# Patient Record
Sex: FEMALE | Race: WHITE | Employment: OTHER | URBAN - METROPOLITAN AREA
[De-identification: names, ages, dates, MRNs, and addresses within clinical notes are randomized per-mention and may not be internally consistent; named-entity substitution may affect disease eponyms.]

---

## 2017-02-08 ENCOUNTER — HOSPITAL ENCOUNTER (OUTPATIENT)
Dept: CARDIOLOGY | Age: 77
Discharge: HOME | End: 2017-02-08
Payer: MEDICARE

## 2017-02-08 PROCEDURE — 93296 REM INTERROG EVL PM/IDS: CPT

## 2017-02-28 RX ORDER — THYROID 30 MG/1
TABLET ORAL
Qty: 135 TABLET | Refills: 3 | Status: ON HOLD | OUTPATIENT
Start: 2017-02-28 | End: 2017-03-03 | Stop reason: HOSPADM

## 2017-03-03 ENCOUNTER — HOSPITAL ENCOUNTER (OUTPATIENT)
Dept: CARDIAC CATH/INVASIVE PROCEDURES | Age: 77
Setting detail: OUTPATIENT SURGERY
Discharge: HOME | End: 2017-03-03
Attending: INTERNAL MEDICINE | Admitting: INTERNAL MEDICINE
Payer: MEDICARE

## 2017-03-03 VITALS
WEIGHT: 184 LBS | OXYGEN SATURATION: 99 % | HEIGHT: 65 IN | BODY MASS INDEX: 30.66 KG/M2 | TEMPERATURE: 97.6 F | RESPIRATION RATE: 2 BRPM

## 2017-03-03 LAB
ANION GAP SERPL CALCULATED.3IONS-SCNC: 9 MEQ/L (ref 7–13)
BUN BLDV-MCNC: 31 MG/DL (ref 8–23)
CALCIUM SERPL-MCNC: 10 MG/DL (ref 8.6–10.2)
CHLORIDE BLD-SCNC: 102 MEQ/L (ref 98–107)
CO2: 29 MEQ/L (ref 22–29)
CREAT SERPL-MCNC: 0.83 MG/DL (ref 0.5–0.9)
EKG ATRIAL RATE: 68 BPM
EKG Q-T INTERVAL: 498 MS
EKG QRS DURATION: 168 MS
EKG QTC CALCULATION (BAZETT): 517 MS
EKG R AXIS: -49 DEGREES
EKG T AXIS: 107 DEGREES
EKG VENTRICULAR RATE: 65 BPM
GFR AFRICAN AMERICAN: >60
GFR NON-AFRICAN AMERICAN: >60
GLUCOSE BLD-MCNC: 98 MG/DL (ref 74–109)
POTASSIUM SERPL-SCNC: 4.4 MEQ/L (ref 3.5–5.1)
SODIUM BLD-SCNC: 140 MEQ/L (ref 132–144)

## 2017-03-03 PROCEDURE — 33230 INSRT PULSE GEN W/DUAL LEADS: CPT | Performed by: INTERNAL MEDICINE

## 2017-03-03 PROCEDURE — 93005 ELECTROCARDIOGRAM TRACING: CPT

## 2017-03-03 PROCEDURE — 2580000003 HC RX 258: Performed by: INTERNAL MEDICINE

## 2017-03-03 PROCEDURE — 33233 REMOVAL OF PM GENERATOR: CPT | Performed by: INTERNAL MEDICINE

## 2017-03-03 PROCEDURE — C1785 PMKR, DUAL, RATE-RESP: HCPCS | Performed by: NURSE PRACTITIONER

## 2017-03-03 PROCEDURE — 80048 BASIC METABOLIC PNL TOTAL CA: CPT

## 2017-03-03 PROCEDURE — 2500000003 HC RX 250 WO HCPCS

## 2017-03-03 PROCEDURE — 33228 REMV&REPLC PM GEN DUAL LEAD: CPT | Performed by: INTERNAL MEDICINE

## 2017-03-03 PROCEDURE — 6360000002 HC RX W HCPCS: Performed by: INTERNAL MEDICINE

## 2017-03-03 PROCEDURE — 2580000003 HC RX 258

## 2017-03-03 PROCEDURE — 6360000002 HC RX W HCPCS

## 2017-03-03 RX ORDER — SODIUM CHLORIDE 9 MG/ML
INJECTION, SOLUTION INTRAVENOUS CONTINUOUS
Status: DISCONTINUED | OUTPATIENT
Start: 2017-03-03 | End: 2017-03-03 | Stop reason: HOSPADM

## 2017-03-03 RX ORDER — ACETAMINOPHEN 325 MG/1
650 TABLET ORAL EVERY 4 HOURS PRN
Status: DISCONTINUED | OUTPATIENT
Start: 2017-03-03 | End: 2017-03-03 | Stop reason: HOSPADM

## 2017-03-03 RX ORDER — ONDANSETRON 2 MG/ML
4 INJECTION INTRAMUSCULAR; INTRAVENOUS EVERY 6 HOURS PRN
Status: DISCONTINUED | OUTPATIENT
Start: 2017-03-03 | End: 2017-03-03 | Stop reason: HOSPADM

## 2017-03-03 RX ORDER — AMLODIPINE BESYLATE 5 MG/1
2.5 TABLET ORAL DAILY
COMMUNITY
End: 2018-01-20 | Stop reason: SDUPTHER

## 2017-03-03 RX ORDER — SODIUM CHLORIDE 0.9 % (FLUSH) 0.9 %
10 SYRINGE (ML) INJECTION PRN
Status: DISCONTINUED | OUTPATIENT
Start: 2017-03-03 | End: 2017-03-03 | Stop reason: HOSPADM

## 2017-03-03 RX ORDER — TRYPTOPHAN 500 MG
1000 TABLET ORAL DAILY
COMMUNITY

## 2017-03-03 RX ORDER — SODIUM CHLORIDE 0.9 % (FLUSH) 0.9 %
10 SYRINGE (ML) INJECTION EVERY 12 HOURS SCHEDULED
Status: DISCONTINUED | OUTPATIENT
Start: 2017-03-03 | End: 2017-03-03 | Stop reason: HOSPADM

## 2017-03-03 RX ORDER — SULFAMETHOXAZOLE AND TRIMETHOPRIM 800; 160 MG/1; MG/1
1 TABLET ORAL 2 TIMES DAILY
Qty: 7 TABLET | Refills: 0 | Status: SHIPPED | OUTPATIENT
Start: 2017-03-03 | End: 2017-03-06

## 2017-03-03 RX ADMIN — DEXTROSE MONOHYDRATE 1 G: 5 INJECTION INTRAVENOUS at 12:17

## 2017-03-03 RX ADMIN — DEXTROSE MONOHYDRATE 1 G: 5 INJECTION INTRAVENOUS at 07:59

## 2017-03-03 RX ADMIN — SODIUM CHLORIDE: 9 INJECTION, SOLUTION INTRAVENOUS at 06:56

## 2017-04-20 ENCOUNTER — OFFICE VISIT (OUTPATIENT)
Dept: FAMILY MEDICINE CLINIC | Age: 77
End: 2017-04-20

## 2017-04-20 VITALS
BODY MASS INDEX: 30.99 KG/M2 | HEART RATE: 83 BPM | WEIGHT: 186 LBS | HEIGHT: 65 IN | DIASTOLIC BLOOD PRESSURE: 72 MMHG | OXYGEN SATURATION: 97 % | SYSTOLIC BLOOD PRESSURE: 118 MMHG | TEMPERATURE: 98.2 F

## 2017-04-20 DIAGNOSIS — N30.00 ACUTE CYSTITIS WITHOUT HEMATURIA: Primary | ICD-10-CM

## 2017-04-20 DIAGNOSIS — J06.9 ACUTE UPPER RESPIRATORY INFECTION: ICD-10-CM

## 2017-04-20 LAB
BILIRUBIN, POC: ABNORMAL
BLOOD URINE, POC: ABNORMAL
CLARITY, POC: CLEAR
COLOR, POC: YELLOW
GLUCOSE URINE, POC: ABNORMAL
KETONES, POC: ABNORMAL
LEUKOCYTE EST, POC: ABNORMAL
NITRITE, POC: ABNORMAL
PH, POC: 5
PROTEIN, POC: ABNORMAL
SPECIFIC GRAVITY, POC: 0.25
UROBILINOGEN, POC: 0.2

## 2017-04-20 PROCEDURE — 99213 OFFICE O/P EST LOW 20 MIN: CPT | Performed by: FAMILY MEDICINE

## 2017-04-20 PROCEDURE — 4040F PNEUMOC VAC/ADMIN/RCVD: CPT | Performed by: FAMILY MEDICINE

## 2017-04-20 PROCEDURE — 1123F ACP DISCUSS/DSCN MKR DOCD: CPT | Performed by: FAMILY MEDICINE

## 2017-04-20 PROCEDURE — G8419 CALC BMI OUT NRM PARAM NOF/U: HCPCS | Performed by: FAMILY MEDICINE

## 2017-04-20 PROCEDURE — 81003 URINALYSIS AUTO W/O SCOPE: CPT | Performed by: FAMILY MEDICINE

## 2017-04-20 PROCEDURE — G8427 DOCREV CUR MEDS BY ELIG CLIN: HCPCS | Performed by: FAMILY MEDICINE

## 2017-04-20 PROCEDURE — 1090F PRES/ABSN URINE INCON ASSESS: CPT | Performed by: FAMILY MEDICINE

## 2017-04-20 PROCEDURE — G8400 PT W/DXA NO RESULTS DOC: HCPCS | Performed by: FAMILY MEDICINE

## 2017-04-20 PROCEDURE — 3017F COLORECTAL CA SCREEN DOC REV: CPT | Performed by: FAMILY MEDICINE

## 2017-04-20 PROCEDURE — 1036F TOBACCO NON-USER: CPT | Performed by: FAMILY MEDICINE

## 2017-04-20 RX ORDER — SULFAMETHOXAZOLE AND TRIMETHOPRIM 800; 160 MG/1; MG/1
1 TABLET ORAL 2 TIMES DAILY
Qty: 20 TABLET | Refills: 0 | Status: SHIPPED | OUTPATIENT
Start: 2017-04-20 | End: 2017-04-24 | Stop reason: ALTCHOICE

## 2017-04-21 DIAGNOSIS — N30.00 ACUTE CYSTITIS WITHOUT HEMATURIA: ICD-10-CM

## 2017-04-23 LAB — URINE CULTURE, ROUTINE: NORMAL

## 2017-04-24 ENCOUNTER — OFFICE VISIT (OUTPATIENT)
Dept: FAMILY MEDICINE CLINIC | Age: 77
End: 2017-04-24

## 2017-04-24 VITALS
WEIGHT: 188 LBS | OXYGEN SATURATION: 97 % | TEMPERATURE: 98.4 F | HEIGHT: 65 IN | BODY MASS INDEX: 31.32 KG/M2 | DIASTOLIC BLOOD PRESSURE: 66 MMHG | HEART RATE: 73 BPM | SYSTOLIC BLOOD PRESSURE: 112 MMHG

## 2017-04-24 DIAGNOSIS — R50.9 FEVER, UNSPECIFIED FEVER CAUSE: Primary | ICD-10-CM

## 2017-04-24 DIAGNOSIS — R05.9 COUGH: ICD-10-CM

## 2017-04-24 DIAGNOSIS — R35.0 URINARY FREQUENCY: ICD-10-CM

## 2017-04-24 PROCEDURE — 1036F TOBACCO NON-USER: CPT | Performed by: NURSE PRACTITIONER

## 2017-04-24 PROCEDURE — 4040F PNEUMOC VAC/ADMIN/RCVD: CPT | Performed by: NURSE PRACTITIONER

## 2017-04-24 PROCEDURE — 1090F PRES/ABSN URINE INCON ASSESS: CPT | Performed by: NURSE PRACTITIONER

## 2017-04-24 PROCEDURE — G8417 CALC BMI ABV UP PARAM F/U: HCPCS | Performed by: NURSE PRACTITIONER

## 2017-04-24 PROCEDURE — 1123F ACP DISCUSS/DSCN MKR DOCD: CPT | Performed by: NURSE PRACTITIONER

## 2017-04-24 PROCEDURE — 99213 OFFICE O/P EST LOW 20 MIN: CPT | Performed by: NURSE PRACTITIONER

## 2017-04-24 PROCEDURE — G8427 DOCREV CUR MEDS BY ELIG CLIN: HCPCS | Performed by: NURSE PRACTITIONER

## 2017-04-24 PROCEDURE — 3017F COLORECTAL CA SCREEN DOC REV: CPT | Performed by: NURSE PRACTITIONER

## 2017-04-24 PROCEDURE — G8400 PT W/DXA NO RESULTS DOC: HCPCS | Performed by: NURSE PRACTITIONER

## 2017-04-24 RX ORDER — CEFDINIR 300 MG/1
300 CAPSULE ORAL 2 TIMES DAILY
Qty: 20 CAPSULE | Refills: 0 | Status: SHIPPED | OUTPATIENT
Start: 2017-04-24 | End: 2017-05-04

## 2017-04-24 ASSESSMENT — ENCOUNTER SYMPTOMS
SINUS PRESSURE: 1
SHORTNESS OF BREATH: 0
COUGH: 1
WHEEZING: 0
VOMITING: 0
RHINORRHEA: 1
NAUSEA: 0
ABDOMINAL PAIN: 0

## 2017-04-25 DIAGNOSIS — R74.01 ELEVATED ALT MEASUREMENT: ICD-10-CM

## 2017-04-25 DIAGNOSIS — R35.0 URINARY FREQUENCY: ICD-10-CM

## 2017-04-25 DIAGNOSIS — R74.8 ELEVATED LIVER ENZYMES: Primary | ICD-10-CM

## 2017-04-25 DIAGNOSIS — R05.9 COUGH: ICD-10-CM

## 2017-04-25 DIAGNOSIS — R74.01 ELEVATED AST (SGOT): ICD-10-CM

## 2017-04-25 DIAGNOSIS — R50.9 FEVER, UNSPECIFIED FEVER CAUSE: ICD-10-CM

## 2017-04-25 LAB
ALBUMIN SERPL-MCNC: 3.9 G/DL (ref 3.9–4.9)
ALP BLD-CCNC: 116 U/L (ref 40–130)
ALT SERPL-CCNC: 78 U/L (ref 0–33)
ANION GAP SERPL CALCULATED.3IONS-SCNC: 12 MEQ/L (ref 7–13)
AST SERPL-CCNC: 48 U/L (ref 0–35)
BASOPHILS ABSOLUTE: 0 K/UL (ref 0–0.2)
BASOPHILS RELATIVE PERCENT: 1 %
BILIRUB SERPL-MCNC: 0.3 MG/DL (ref 0–1.2)
BUN BLDV-MCNC: 22 MG/DL (ref 8–23)
CALCIUM SERPL-MCNC: 9.5 MG/DL (ref 8.6–10.2)
CHLORIDE BLD-SCNC: 94 MEQ/L (ref 98–107)
CO2: 26 MEQ/L (ref 22–29)
CREAT SERPL-MCNC: 1.07 MG/DL (ref 0.5–0.9)
EOSINOPHILS ABSOLUTE: 0.2 K/UL (ref 0–0.7)
EOSINOPHILS RELATIVE PERCENT: 5.2 %
GFR AFRICAN AMERICAN: >60
GFR NON-AFRICAN AMERICAN: 49.9
GLOBULIN: 2.4 G/DL (ref 2.3–3.5)
GLUCOSE BLD-MCNC: 79 MG/DL (ref 74–109)
HAV IGM SER IA-ACNC: NORMAL
HCT VFR BLD CALC: 40.8 % (ref 37–47)
HEMOGLOBIN: 13.5 G/DL (ref 12–16)
HEPATITIS B CORE IGM ANTIBODY: NORMAL
HEPATITIS B SURFACE ANTIGEN INTERPRETATION: NORMAL
HEPATITIS C ANTIBODY INTERPRETATION: NORMAL
HEPATITIS INTERPRETATION:: NORMAL
LYMPHOCYTES ABSOLUTE: 0.7 K/UL (ref 1–4.8)
LYMPHOCYTES RELATIVE PERCENT: 15.3 %
MCH RBC QN AUTO: 28.1 PG (ref 27–31.3)
MCHC RBC AUTO-ENTMCNC: 33 % (ref 33–37)
MCV RBC AUTO: 85.3 FL (ref 82–100)
MONOCYTES ABSOLUTE: 0.5 K/UL (ref 0.2–0.8)
MONOCYTES RELATIVE PERCENT: 10.4 %
NEUTROPHILS ABSOLUTE: 3 K/UL (ref 1.4–6.5)
NEUTROPHILS RELATIVE PERCENT: 68.1 %
PDW BLD-RTO: 14.2 % (ref 11.5–14.5)
PLATELET # BLD: 217 K/UL (ref 130–400)
POTASSIUM SERPL-SCNC: 4.6 MEQ/L (ref 3.5–5.1)
RBC # BLD: 4.79 M/UL (ref 4.2–5.4)
SODIUM BLD-SCNC: 132 MEQ/L (ref 132–144)
TOTAL PROTEIN: 6.3 G/DL (ref 6.4–8.1)
WBC # BLD: 4.5 K/UL (ref 4.8–10.8)

## 2017-04-27 ENCOUNTER — OFFICE VISIT (OUTPATIENT)
Dept: FAMILY MEDICINE CLINIC | Age: 77
End: 2017-04-27

## 2017-04-27 VITALS
WEIGHT: 188 LBS | HEART RATE: 84 BPM | HEIGHT: 65 IN | SYSTOLIC BLOOD PRESSURE: 120 MMHG | BODY MASS INDEX: 31.32 KG/M2 | DIASTOLIC BLOOD PRESSURE: 74 MMHG | TEMPERATURE: 98 F | OXYGEN SATURATION: 97 %

## 2017-04-27 DIAGNOSIS — E86.0 DEHYDRATION: ICD-10-CM

## 2017-04-27 DIAGNOSIS — R79.89 ABNORMAL LFTS: ICD-10-CM

## 2017-04-27 DIAGNOSIS — R50.9 FEVER, UNSPECIFIED FEVER CAUSE: Primary | ICD-10-CM

## 2017-04-27 LAB
ALBUMIN SERPL-MCNC: 3.9 G/DL (ref 3.9–4.9)
ALP BLD-CCNC: 109 U/L (ref 40–130)
ALT SERPL-CCNC: 62 U/L (ref 0–33)
ANION GAP SERPL CALCULATED.3IONS-SCNC: 12 MEQ/L (ref 7–13)
AST SERPL-CCNC: 33 U/L (ref 0–35)
BILIRUB SERPL-MCNC: 0.2 MG/DL (ref 0–1.2)
BILIRUBIN DIRECT: 0 MG/DL (ref 0–0.3)
BILIRUBIN, INDIRECT: 0.2 MG/DL (ref 0–0.6)
BUN BLDV-MCNC: 29 MG/DL (ref 8–23)
CALCIUM SERPL-MCNC: 10.1 MG/DL (ref 8.6–10.2)
CHLORIDE BLD-SCNC: 96 MEQ/L (ref 98–107)
CO2: 27 MEQ/L (ref 22–29)
CREAT SERPL-MCNC: 0.93 MG/DL (ref 0.5–0.9)
EPSTEIN BARR VIRUS NUCLEAR AB IGG: <3 U/ML (ref 0–21.9)
EPSTEIN-BARR EARLY ANTIGEN ANTIBODY: <5 U/ML (ref 0–10.9)
EPSTEIN-BARR VCA IGG: <10 U/ML (ref 0–21.9)
EPSTEIN-BARR VCA IGM: <10 U/ML (ref 0–43.9)
GFR AFRICAN AMERICAN: >60
GFR NON-AFRICAN AMERICAN: 58.6
GLUCOSE BLD-MCNC: 82 MG/DL (ref 74–109)
POTASSIUM SERPL-SCNC: 5.7 MEQ/L (ref 3.5–5.1)
SODIUM BLD-SCNC: 135 MEQ/L (ref 132–144)
TOTAL PROTEIN: 6.6 G/DL (ref 6.4–8.1)

## 2017-04-27 PROCEDURE — G8427 DOCREV CUR MEDS BY ELIG CLIN: HCPCS | Performed by: FAMILY MEDICINE

## 2017-04-27 PROCEDURE — 1090F PRES/ABSN URINE INCON ASSESS: CPT | Performed by: FAMILY MEDICINE

## 2017-04-27 PROCEDURE — G8400 PT W/DXA NO RESULTS DOC: HCPCS | Performed by: FAMILY MEDICINE

## 2017-04-27 PROCEDURE — 3017F COLORECTAL CA SCREEN DOC REV: CPT | Performed by: FAMILY MEDICINE

## 2017-04-27 PROCEDURE — 4040F PNEUMOC VAC/ADMIN/RCVD: CPT | Performed by: FAMILY MEDICINE

## 2017-04-27 PROCEDURE — 99213 OFFICE O/P EST LOW 20 MIN: CPT | Performed by: FAMILY MEDICINE

## 2017-04-27 PROCEDURE — 1123F ACP DISCUSS/DSCN MKR DOCD: CPT | Performed by: FAMILY MEDICINE

## 2017-04-27 PROCEDURE — G8417 CALC BMI ABV UP PARAM F/U: HCPCS | Performed by: FAMILY MEDICINE

## 2017-04-27 PROCEDURE — 1036F TOBACCO NON-USER: CPT | Performed by: FAMILY MEDICINE

## 2017-04-27 ASSESSMENT — PATIENT HEALTH QUESTIONNAIRE - PHQ9
SUM OF ALL RESPONSES TO PHQ QUESTIONS 1-9: 0
1. LITTLE INTEREST OR PLEASURE IN DOING THINGS: 0
SUM OF ALL RESPONSES TO PHQ9 QUESTIONS 1 & 2: 0
2. FEELING DOWN, DEPRESSED OR HOPELESS: 0

## 2017-04-28 ENCOUNTER — TELEPHONE (OUTPATIENT)
Dept: FAMILY MEDICINE CLINIC | Age: 77
End: 2017-04-28

## 2017-04-30 LAB — BLOOD CULTURE, ROUTINE: NORMAL

## 2017-05-01 ENCOUNTER — TELEPHONE (OUTPATIENT)
Dept: FAMILY MEDICINE CLINIC | Age: 77
End: 2017-05-01

## 2017-05-01 ENCOUNTER — HOSPITAL ENCOUNTER (OUTPATIENT)
Dept: ULTRASOUND IMAGING | Age: 77
Discharge: HOME | End: 2017-05-01
Payer: MEDICARE

## 2017-05-01 DIAGNOSIS — R74.8 ELEVATED LIVER ENZYMES: ICD-10-CM

## 2017-05-01 DIAGNOSIS — R74.01 ELEVATED AST (SGOT): ICD-10-CM

## 2017-05-01 DIAGNOSIS — R74.01 ELEVATED ALT MEASUREMENT: ICD-10-CM

## 2017-05-01 PROCEDURE — 76705 ECHO EXAM OF ABDOMEN: CPT

## 2017-05-02 DIAGNOSIS — R79.89 ELEVATED LFTS: ICD-10-CM

## 2017-05-02 DIAGNOSIS — K76.89 LIVER CYST: Primary | ICD-10-CM

## 2017-05-05 ENCOUNTER — TELEPHONE (OUTPATIENT)
Dept: FAMILY MEDICINE CLINIC | Age: 77
End: 2017-05-05

## 2017-05-05 DIAGNOSIS — R79.89 ABNORMAL LFTS: ICD-10-CM

## 2017-05-05 DIAGNOSIS — E87.5 HYPERKALEMIA: ICD-10-CM

## 2017-05-05 DIAGNOSIS — E87.5 HYPERKALEMIA: Primary | ICD-10-CM

## 2017-05-05 LAB
ALBUMIN SERPL-MCNC: 3.8 G/DL (ref 3.9–4.9)
ALP BLD-CCNC: 83 U/L (ref 40–130)
ALT SERPL-CCNC: 27 U/L (ref 0–33)
ANION GAP SERPL CALCULATED.3IONS-SCNC: 10 MEQ/L (ref 7–13)
AST SERPL-CCNC: 21 U/L (ref 0–35)
BILIRUB SERPL-MCNC: 0.2 MG/DL (ref 0–1.2)
BUN BLDV-MCNC: 28 MG/DL (ref 8–23)
CALCIUM SERPL-MCNC: 9.4 MG/DL (ref 8.6–10.2)
CHLORIDE BLD-SCNC: 99 MEQ/L (ref 98–107)
CO2: 30 MEQ/L (ref 22–29)
CREAT SERPL-MCNC: 0.87 MG/DL (ref 0.5–0.9)
GFR AFRICAN AMERICAN: >60
GFR NON-AFRICAN AMERICAN: >60
GLOBULIN: 2.3 G/DL (ref 2.3–3.5)
GLUCOSE BLD-MCNC: 97 MG/DL (ref 74–109)
POTASSIUM SERPL-SCNC: 4.2 MEQ/L (ref 3.5–5.1)
SODIUM BLD-SCNC: 139 MEQ/L (ref 132–144)
TOTAL PROTEIN: 6.1 G/DL (ref 6.4–8.1)

## 2017-05-25 ENCOUNTER — HOSPITAL ENCOUNTER (OUTPATIENT)
Dept: WOMENS IMAGING | Age: 77
Discharge: HOME | End: 2017-05-25
Payer: MEDICARE

## 2017-05-25 DIAGNOSIS — Z13.9 SCREENING: ICD-10-CM

## 2017-05-25 PROCEDURE — 77063 BREAST TOMOSYNTHESIS BI: CPT

## 2017-05-30 ENCOUNTER — HOSPITAL ENCOUNTER (EMERGENCY)
Age: 77
Discharge: HOME | End: 2017-05-30
Attending: EMERGENCY MEDICINE
Payer: MEDICARE

## 2017-05-30 ENCOUNTER — APPOINTMENT (OUTPATIENT)
Dept: GENERAL RADIOLOGY | Age: 77
End: 2017-05-30
Payer: MEDICARE

## 2017-05-30 VITALS
HEART RATE: 65 BPM | BODY MASS INDEX: 30.82 KG/M2 | OXYGEN SATURATION: 96 % | WEIGHT: 185 LBS | RESPIRATION RATE: 16 BRPM | DIASTOLIC BLOOD PRESSURE: 67 MMHG | SYSTOLIC BLOOD PRESSURE: 129 MMHG | HEIGHT: 65 IN | TEMPERATURE: 98.2 F

## 2017-05-30 DIAGNOSIS — R07.89 OTHER CHEST PAIN: Primary | ICD-10-CM

## 2017-05-30 LAB
ALBUMIN SERPL-MCNC: 4 G/DL (ref 3.9–4.9)
ALP BLD-CCNC: 66 U/L (ref 40–130)
ALT SERPL-CCNC: 17 U/L (ref 0–33)
ANION GAP SERPL CALCULATED.3IONS-SCNC: 10 MEQ/L (ref 7–13)
AST SERPL-CCNC: 28 U/L (ref 0–35)
BASOPHILS ABSOLUTE: 0 K/UL (ref 0–0.2)
BASOPHILS RELATIVE PERCENT: 0.5 %
BILIRUB SERPL-MCNC: 0.2 MG/DL (ref 0–1.2)
BUN BLDV-MCNC: 33 MG/DL (ref 8–23)
CALCIUM SERPL-MCNC: 9.6 MG/DL (ref 8.6–10.2)
CHLORIDE BLD-SCNC: 99 MEQ/L (ref 98–107)
CO2: 27 MEQ/L (ref 22–29)
CREAT SERPL-MCNC: 0.86 MG/DL (ref 0.5–0.9)
EOSINOPHILS ABSOLUTE: 0.1 K/UL (ref 0–0.7)
EOSINOPHILS RELATIVE PERCENT: 1.3 %
GFR AFRICAN AMERICAN: >60
GFR NON-AFRICAN AMERICAN: >60
GLOBULIN: 2.4 G/DL (ref 2.3–3.5)
GLUCOSE BLD-MCNC: 101 MG/DL (ref 74–109)
HCT VFR BLD CALC: 42.5 % (ref 37–47)
HEMOGLOBIN: 13.7 G/DL (ref 12–16)
LYMPHOCYTES ABSOLUTE: 1.3 K/UL (ref 1–4.8)
LYMPHOCYTES RELATIVE PERCENT: 19.9 %
MCH RBC QN AUTO: 28.4 PG (ref 27–31.3)
MCHC RBC AUTO-ENTMCNC: 32.2 % (ref 33–37)
MCV RBC AUTO: 88.1 FL (ref 82–100)
MONOCYTES ABSOLUTE: 0.5 K/UL (ref 0.2–0.8)
MONOCYTES RELATIVE PERCENT: 7.9 %
NEUTROPHILS ABSOLUTE: 4.7 K/UL (ref 1.4–6.5)
NEUTROPHILS RELATIVE PERCENT: 70.4 %
PDW BLD-RTO: 14.6 % (ref 11.5–14.5)
PLATELET # BLD: 198 K/UL (ref 130–400)
POTASSIUM SERPL-SCNC: 5.2 MEQ/L (ref 3.5–5.1)
RBC # BLD: 4.82 M/UL (ref 4.2–5.4)
SODIUM BLD-SCNC: 136 MEQ/L (ref 132–144)
TOTAL PROTEIN: 6.4 G/DL (ref 6.4–8.1)
TROPONIN: <0.01 NG/ML (ref 0–0.01)
WBC # BLD: 6.6 K/UL (ref 4.8–10.8)

## 2017-05-30 PROCEDURE — 85025 COMPLETE CBC W/AUTO DIFF WBC: CPT

## 2017-05-30 PROCEDURE — 80053 COMPREHEN METABOLIC PANEL: CPT

## 2017-05-30 PROCEDURE — 36415 COLL VENOUS BLD VENIPUNCTURE: CPT

## 2017-05-30 PROCEDURE — 71010 XR CHEST PORTABLE: CPT

## 2017-05-30 PROCEDURE — 93005 ELECTROCARDIOGRAM TRACING: CPT

## 2017-05-30 PROCEDURE — 84484 ASSAY OF TROPONIN QUANT: CPT

## 2017-05-30 PROCEDURE — 99285 EMERGENCY DEPT VISIT HI MDM: CPT

## 2017-05-30 ASSESSMENT — ENCOUNTER SYMPTOMS
EYE DISCHARGE: 0
ABDOMINAL DISTENTION: 0
CHEST TIGHTNESS: 0
SHORTNESS OF BREATH: 0
COUGH: 0
SORE THROAT: 0
VOMITING: 0
WHEEZING: 0
PHOTOPHOBIA: 0
ABDOMINAL PAIN: 0

## 2017-05-31 LAB
EKG ATRIAL RATE: 60 BPM
EKG P AXIS: 86 DEGREES
EKG P-R INTERVAL: 298 MS
EKG Q-T INTERVAL: 446 MS
EKG QRS DURATION: 94 MS
EKG QTC CALCULATION (BAZETT): 446 MS
EKG R AXIS: -36 DEGREES
EKG T AXIS: 56 DEGREES
EKG VENTRICULAR RATE: 60 BPM

## 2017-06-14 ENCOUNTER — APPOINTMENT (OUTPATIENT)
Dept: GENERAL RADIOLOGY | Age: 77
DRG: 287 | End: 2017-06-14
Payer: MEDICARE

## 2017-06-14 ENCOUNTER — HOSPITAL ENCOUNTER (INPATIENT)
Age: 77
LOS: 1 days | Discharge: HOME | DRG: 287 | End: 2017-06-15
Attending: INTERNAL MEDICINE | Admitting: INTERNAL MEDICINE
Payer: MEDICARE

## 2017-06-14 DIAGNOSIS — I20.0 UNSTABLE ANGINA (HCC): Primary | ICD-10-CM

## 2017-06-14 LAB
ALBUMIN SERPL-MCNC: 3.8 G/DL (ref 3.9–4.9)
ALP BLD-CCNC: 63 U/L (ref 40–130)
ALT SERPL-CCNC: 19 U/L (ref 0–33)
ANION GAP SERPL CALCULATED.3IONS-SCNC: 11 MEQ/L (ref 7–13)
APTT: 25.7 SEC (ref 21.6–35.4)
AST SERPL-CCNC: 19 U/L (ref 0–35)
BACTERIA: NORMAL /HPF
BILIRUB SERPL-MCNC: 0.6 MG/DL (ref 0–1.2)
BILIRUBIN URINE: NEGATIVE
BLOOD, URINE: NEGATIVE
BUN BLDV-MCNC: 22 MG/DL (ref 8–23)
CALCIUM SERPL-MCNC: 9.1 MG/DL (ref 8.6–10.2)
CASTS: NORMAL /LPF
CHLORIDE BLD-SCNC: 96 MEQ/L (ref 98–107)
CLARITY: CLEAR
CO2: 25 MEQ/L (ref 22–29)
COLOR: YELLOW
CREAT SERPL-MCNC: 0.64 MG/DL (ref 0.5–0.9)
EPITHELIAL CELLS, UA: NORMAL /HPF
GFR AFRICAN AMERICAN: >60
GFR NON-AFRICAN AMERICAN: >60
GLOBULIN: 2.3 G/DL (ref 2.3–3.5)
GLUCOSE BLD-MCNC: 98 MG/DL (ref 74–109)
GLUCOSE URINE: NEGATIVE MG/DL
HCT VFR BLD CALC: 40.2 % (ref 37–47)
HEMOGLOBIN: 13.4 G/DL (ref 12–16)
INR BLD: 1
KETONES, URINE: NEGATIVE MG/DL
LEUKOCYTE ESTERASE, URINE: ABNORMAL
MCH RBC QN AUTO: 28.3 PG (ref 27–31.3)
MCHC RBC AUTO-ENTMCNC: 33.3 % (ref 33–37)
MCV RBC AUTO: 85.1 FL (ref 82–100)
NITRITE, URINE: NEGATIVE
PDW BLD-RTO: 14.2 % (ref 11.5–14.5)
PH UA: 7 (ref 5–9)
PLATELET # BLD: 182 K/UL (ref 130–400)
POTASSIUM SERPL-SCNC: 3.9 MEQ/L (ref 3.5–5.1)
PRO-BNP: 146 PG/ML
PROTEIN UA: NEGATIVE MG/DL
PROTHROMBIN TIME: 10.3 SEC (ref 8.1–13.7)
RBC # BLD: 4.72 M/UL (ref 4.2–5.4)
RBC UA: NORMAL /HPF (ref 0–2)
SODIUM BLD-SCNC: 132 MEQ/L (ref 132–144)
SPECIFIC GRAVITY UA: 1 (ref 1–1.03)
TOTAL CK: 90 U/L (ref 0–170)
TOTAL PROTEIN: 6.1 G/DL (ref 6.4–8.1)
TROPONIN: <0.01 NG/ML (ref 0–0.01)
URINE REFLEX TO CULTURE: YES
UROBILINOGEN, URINE: 0.2 E.U./DL
WBC # BLD: 5.2 K/UL (ref 4.8–10.8)
WBC UA: NORMAL /HPF (ref 0–5)

## 2017-06-14 PROCEDURE — 85610 PROTHROMBIN TIME: CPT

## 2017-06-14 PROCEDURE — 93005 ELECTROCARDIOGRAM TRACING: CPT

## 2017-06-14 PROCEDURE — 99285 EMERGENCY DEPT VISIT HI MDM: CPT

## 2017-06-14 PROCEDURE — 87086 URINE CULTURE/COLONY COUNT: CPT

## 2017-06-14 PROCEDURE — 71010 XR CHEST PORTABLE: CPT

## 2017-06-14 PROCEDURE — 83880 ASSAY OF NATRIURETIC PEPTIDE: CPT

## 2017-06-14 PROCEDURE — 85730 THROMBOPLASTIN TIME PARTIAL: CPT

## 2017-06-14 PROCEDURE — 85027 COMPLETE CBC AUTOMATED: CPT

## 2017-06-14 PROCEDURE — 36415 COLL VENOUS BLD VENIPUNCTURE: CPT

## 2017-06-14 PROCEDURE — 82550 ASSAY OF CK (CPK): CPT

## 2017-06-14 PROCEDURE — 80053 COMPREHEN METABOLIC PANEL: CPT

## 2017-06-14 PROCEDURE — 2580000003 HC RX 258: Performed by: PHYSICIAN ASSISTANT

## 2017-06-14 PROCEDURE — 6370000000 HC RX 637 (ALT 250 FOR IP): Performed by: PHYSICIAN ASSISTANT

## 2017-06-14 PROCEDURE — 84484 ASSAY OF TROPONIN QUANT: CPT

## 2017-06-14 PROCEDURE — 81001 URINALYSIS AUTO W/SCOPE: CPT

## 2017-06-14 PROCEDURE — 1210000000 HC MED SURG R&B

## 2017-06-14 PROCEDURE — 2580000003 HC RX 258: Performed by: INTERNAL MEDICINE

## 2017-06-14 RX ORDER — AMLODIPINE BESYLATE 5 MG/1
2.5 TABLET ORAL
Status: DISCONTINUED | OUTPATIENT
Start: 2017-06-15 | End: 2017-06-15 | Stop reason: HOSPADM

## 2017-06-14 RX ORDER — LEVOTHYROXINE AND LIOTHYRONINE 19; 4.5 UG/1; UG/1
30 TABLET ORAL
Status: DISCONTINUED | OUTPATIENT
Start: 2017-06-15 | End: 2017-06-14

## 2017-06-14 RX ORDER — ASPIRIN 81 MG/1
81 TABLET, CHEWABLE ORAL DAILY
Status: DISCONTINUED | OUTPATIENT
Start: 2017-06-14 | End: 2017-06-15 | Stop reason: HOSPADM

## 2017-06-14 RX ORDER — ASPIRIN 81 MG/1
324 TABLET, CHEWABLE ORAL ONCE
Status: COMPLETED | OUTPATIENT
Start: 2017-06-14 | End: 2017-06-14

## 2017-06-14 RX ORDER — NITROGLYCERIN 0.4 MG/1
0.4 TABLET SUBLINGUAL EVERY 5 MIN PRN
Status: DISCONTINUED | OUTPATIENT
Start: 2017-06-14 | End: 2017-06-15 | Stop reason: HOSPADM

## 2017-06-14 RX ORDER — SODIUM CHLORIDE 9 MG/ML
INJECTION, SOLUTION INTRAVENOUS CONTINUOUS
Status: DISCONTINUED | OUTPATIENT
Start: 2017-06-14 | End: 2017-06-15 | Stop reason: HOSPADM

## 2017-06-14 RX ORDER — LEVOTHYROXINE AND LIOTHYRONINE 19; 4.5 UG/1; UG/1
30 TABLET ORAL DAILY
Status: DISCONTINUED | OUTPATIENT
Start: 2017-06-14 | End: 2017-06-14

## 2017-06-14 RX ORDER — ASPIRIN 81 MG/1
324 TABLET, CHEWABLE ORAL ONCE
Status: DISCONTINUED | OUTPATIENT
Start: 2017-06-14 | End: 2017-06-14

## 2017-06-14 RX ORDER — SOTALOL HYDROCHLORIDE 80 MG/1
80 TABLET ORAL DAILY
Status: DISCONTINUED | OUTPATIENT
Start: 2017-06-14 | End: 2017-06-14

## 2017-06-14 RX ORDER — SODIUM CHLORIDE 0.9 % (FLUSH) 0.9 %
10 SYRINGE (ML) INJECTION PRN
Status: DISCONTINUED | OUTPATIENT
Start: 2017-06-14 | End: 2017-06-15 | Stop reason: HOSPADM

## 2017-06-14 RX ORDER — AMLODIPINE BESYLATE 2.5 MG/1
2.5 TABLET ORAL DAILY
Status: DISCONTINUED | OUTPATIENT
Start: 2017-06-15 | End: 2017-06-14 | Stop reason: CLARIF

## 2017-06-14 RX ORDER — LEVOTHYROXINE AND LIOTHYRONINE 19; 4.5 UG/1; UG/1
45 TABLET ORAL
Status: DISCONTINUED | OUTPATIENT
Start: 2017-06-16 | End: 2017-06-15 | Stop reason: HOSPADM

## 2017-06-14 RX ORDER — SOTALOL HYDROCHLORIDE 80 MG/1
40 TABLET ORAL 2 TIMES DAILY
Status: DISCONTINUED | OUTPATIENT
Start: 2017-06-15 | End: 2017-06-15 | Stop reason: HOSPADM

## 2017-06-14 RX ORDER — AMLODIPINE BESYLATE 5 MG/1
5 TABLET ORAL
Status: DISCONTINUED | OUTPATIENT
Start: 2017-06-16 | End: 2017-06-15 | Stop reason: HOSPADM

## 2017-06-14 RX ORDER — AMLODIPINE BESYLATE 5 MG/1
5 TABLET ORAL DAILY
Status: DISCONTINUED | OUTPATIENT
Start: 2017-06-14 | End: 2017-06-14

## 2017-06-14 RX ORDER — HYDROCHLOROTHIAZIDE 25 MG/1
25 TABLET ORAL DAILY
Status: DISCONTINUED | OUTPATIENT
Start: 2017-06-14 | End: 2017-06-15 | Stop reason: HOSPADM

## 2017-06-14 RX ORDER — TRYPTOPHAN 500 MG
750 TABLET ORAL DAILY
Status: DISCONTINUED | OUTPATIENT
Start: 2017-06-14 | End: 2017-06-15 | Stop reason: HOSPADM

## 2017-06-14 RX ORDER — SODIUM CHLORIDE 0.9 % (FLUSH) 0.9 %
10 SYRINGE (ML) INJECTION EVERY 12 HOURS SCHEDULED
Status: DISCONTINUED | OUTPATIENT
Start: 2017-06-14 | End: 2017-06-15 | Stop reason: HOSPADM

## 2017-06-14 RX ORDER — LEVOTHYROXINE AND LIOTHYRONINE 19; 4.5 UG/1; UG/1
30 TABLET ORAL
Status: DISCONTINUED | OUTPATIENT
Start: 2017-06-15 | End: 2017-06-15 | Stop reason: HOSPADM

## 2017-06-14 RX ADMIN — SODIUM CHLORIDE: 900 INJECTION, SOLUTION INTRAVENOUS at 15:15

## 2017-06-14 RX ADMIN — NITROGLYCERIN 0.5 INCH: 20 OINTMENT TOPICAL at 11:47

## 2017-06-14 RX ADMIN — SODIUM CHLORIDE: 9 INJECTION, SOLUTION INTRAVENOUS at 11:18

## 2017-06-14 RX ADMIN — ASPIRIN 81 MG 324 MG: 81 TABLET ORAL at 11:48

## 2017-06-14 ASSESSMENT — PAIN SCALES - GENERAL
PAINLEVEL_OUTOF10: 0

## 2017-06-14 ASSESSMENT — ENCOUNTER SYMPTOMS
DIARRHEA: 0
VOMITING: 0
COLOR CHANGE: 0
ABDOMINAL DISTENTION: 0
CONSTIPATION: 0
SHORTNESS OF BREATH: 1
BACK PAIN: 0
COUGH: 1
CHOKING: 0
RHINORRHEA: 0
ABDOMINAL PAIN: 0
NAUSEA: 0
SORE THROAT: 0
EYE DISCHARGE: 0

## 2017-06-15 ENCOUNTER — APPOINTMENT (OUTPATIENT)
Dept: CARDIAC CATH/INVASIVE PROCEDURES | Age: 77
DRG: 287 | End: 2017-06-15
Payer: MEDICARE

## 2017-06-15 VITALS
DIASTOLIC BLOOD PRESSURE: 79 MMHG | SYSTOLIC BLOOD PRESSURE: 145 MMHG | HEART RATE: 66 BPM | WEIGHT: 189 LBS | BODY MASS INDEX: 31.49 KG/M2 | RESPIRATION RATE: 16 BRPM | TEMPERATURE: 97.7 F | HEIGHT: 65 IN | OXYGEN SATURATION: 97 %

## 2017-06-15 LAB
MAGNESIUM: 2.3 MG/DL (ref 1.7–2.3)
SEDIMENTATION RATE, ERYTHROCYTE: 22 MM (ref 0–30)
TROPONIN: <0.01 NG/ML (ref 0–0.01)
TROPONIN: <0.01 NG/ML (ref 0–0.01)
URINE CULTURE, ROUTINE: NORMAL

## 2017-06-15 PROCEDURE — 6360000002 HC RX W HCPCS

## 2017-06-15 PROCEDURE — 83735 ASSAY OF MAGNESIUM: CPT

## 2017-06-15 PROCEDURE — 4A023N7 MEASUREMENT OF CARDIAC SAMPLING AND PRESSURE, LEFT HEART, PERCUTANEOUS APPROACH: ICD-10-PCS | Performed by: INTERNAL MEDICINE

## 2017-06-15 PROCEDURE — C1725 CATH, TRANSLUMIN NON-LASER: HCPCS

## 2017-06-15 PROCEDURE — B2151ZZ FLUOROSCOPY OF LEFT HEART USING LOW OSMOLAR CONTRAST: ICD-10-PCS | Performed by: INTERNAL MEDICINE

## 2017-06-15 PROCEDURE — 85652 RBC SED RATE AUTOMATED: CPT

## 2017-06-15 PROCEDURE — B2111ZZ FLUOROSCOPY OF MULTIPLE CORONARY ARTERIES USING LOW OSMOLAR CONTRAST: ICD-10-PCS | Performed by: INTERNAL MEDICINE

## 2017-06-15 PROCEDURE — 2580000003 HC RX 258

## 2017-06-15 PROCEDURE — 6370000000 HC RX 637 (ALT 250 FOR IP): Performed by: INTERNAL MEDICINE

## 2017-06-15 PROCEDURE — 93458 L HRT ARTERY/VENTRICLE ANGIO: CPT | Performed by: INTERNAL MEDICINE

## 2017-06-15 PROCEDURE — 2500000003 HC RX 250 WO HCPCS

## 2017-06-15 PROCEDURE — 84484 ASSAY OF TROPONIN QUANT: CPT

## 2017-06-15 PROCEDURE — C1894 INTRO/SHEATH, NON-LASER: HCPCS

## 2017-06-15 PROCEDURE — 93005 ELECTROCARDIOGRAM TRACING: CPT

## 2017-06-15 PROCEDURE — 36415 COLL VENOUS BLD VENIPUNCTURE: CPT

## 2017-06-15 PROCEDURE — C1769 GUIDE WIRE: HCPCS

## 2017-06-15 PROCEDURE — 2580000003 HC RX 258: Performed by: INTERNAL MEDICINE

## 2017-06-15 RX ORDER — SODIUM CHLORIDE 0.9 % (FLUSH) 0.9 %
10 SYRINGE (ML) INJECTION EVERY 12 HOURS SCHEDULED
Status: CANCELLED | OUTPATIENT
Start: 2017-06-15

## 2017-06-15 RX ORDER — ONDANSETRON 2 MG/ML
4 INJECTION INTRAMUSCULAR; INTRAVENOUS EVERY 6 HOURS PRN
Status: CANCELLED | OUTPATIENT
Start: 2017-06-15

## 2017-06-15 RX ORDER — ACETAMINOPHEN 325 MG/1
650 TABLET ORAL EVERY 4 HOURS PRN
Status: CANCELLED | OUTPATIENT
Start: 2017-06-15

## 2017-06-15 RX ORDER — SODIUM CHLORIDE 9 MG/ML
100 INJECTION, SOLUTION INTRAVENOUS CONTINUOUS
Qty: 250 ML | Refills: 0 | Status: SHIPPED | OUTPATIENT
Start: 2017-06-15 | End: 2017-07-13 | Stop reason: ALTCHOICE

## 2017-06-15 RX ORDER — SODIUM CHLORIDE 0.9 % (FLUSH) 0.9 %
10 SYRINGE (ML) INJECTION PRN
Status: CANCELLED | OUTPATIENT
Start: 2017-06-15

## 2017-06-15 RX ADMIN — SODIUM CHLORIDE: 900 INJECTION, SOLUTION INTRAVENOUS at 13:53

## 2017-06-15 RX ADMIN — NITROGLYCERIN 0.5 INCH: 20 OINTMENT TOPICAL at 07:57

## 2017-06-15 RX ADMIN — ASPIRIN 81 MG 81 MG: 81 TABLET ORAL at 07:57

## 2017-06-15 RX ADMIN — LEVOTHYROXINE AND LIOTHYRONINE 30 MG: 19; 4.5 TABLET ORAL at 06:25

## 2017-06-15 RX ADMIN — Medication 10 ML: at 01:05

## 2017-06-15 RX ADMIN — SODIUM CHLORIDE: 900 INJECTION, SOLUTION INTRAVENOUS at 03:21

## 2017-06-16 LAB
EKG ATRIAL RATE: 65 BPM
EKG P AXIS: 76 DEGREES
EKG P-R INTERVAL: 256 MS
EKG Q-T INTERVAL: 430 MS
EKG QRS DURATION: 92 MS
EKG QTC CALCULATION (BAZETT): 447 MS
EKG R AXIS: -45 DEGREES
EKG T AXIS: 65 DEGREES
EKG VENTRICULAR RATE: 65 BPM

## 2017-06-19 LAB
EKG ATRIAL RATE: 61 BPM
EKG ATRIAL RATE: 80 BPM
EKG P AXIS: 73 DEGREES
EKG P AXIS: 92 DEGREES
EKG P-R INTERVAL: 292 MS
EKG P-R INTERVAL: 358 MS
EKG Q-T INTERVAL: 406 MS
EKG Q-T INTERVAL: 424 MS
EKG QRS DURATION: 90 MS
EKG QRS DURATION: 92 MS
EKG QTC CALCULATION (BAZETT): 426 MS
EKG QTC CALCULATION (BAZETT): 468 MS
EKG R AXIS: -38 DEGREES
EKG R AXIS: -47 DEGREES
EKG T AXIS: 52 DEGREES
EKG T AXIS: 76 DEGREES
EKG VENTRICULAR RATE: 61 BPM
EKG VENTRICULAR RATE: 80 BPM

## 2017-07-11 ENCOUNTER — ANESTHESIA EVENT (OUTPATIENT)
Dept: ENDOSCOPY | Age: 77
End: 2017-07-11
Payer: MEDICARE

## 2017-07-11 ENCOUNTER — ANESTHESIA (OUTPATIENT)
Dept: ENDOSCOPY | Age: 77
End: 2017-07-11
Payer: MEDICARE

## 2017-07-11 ENCOUNTER — HOSPITAL ENCOUNTER (OUTPATIENT)
Age: 77
Setting detail: OUTPATIENT SURGERY
Discharge: HOME | End: 2017-07-11
Attending: SPECIALIST | Admitting: SPECIALIST
Payer: MEDICARE

## 2017-07-11 VITALS
DIASTOLIC BLOOD PRESSURE: 82 MMHG | HEIGHT: 65 IN | RESPIRATION RATE: 16 BRPM | WEIGHT: 185 LBS | BODY MASS INDEX: 30.82 KG/M2 | SYSTOLIC BLOOD PRESSURE: 136 MMHG | HEART RATE: 63 BPM | TEMPERATURE: 98.1 F | OXYGEN SATURATION: 95 %

## 2017-07-11 VITALS
DIASTOLIC BLOOD PRESSURE: 58 MMHG | RESPIRATION RATE: 16 BRPM | SYSTOLIC BLOOD PRESSURE: 121 MMHG | OXYGEN SATURATION: 98 %

## 2017-07-11 PROCEDURE — 3609027000 HC COLONOSCOPY: Performed by: SPECIALIST

## 2017-07-11 PROCEDURE — 3700000000 HC ANESTHESIA ATTENDED CARE: Performed by: SPECIALIST

## 2017-07-11 PROCEDURE — 7100000010 HC PHASE II RECOVERY - FIRST 15 MIN: Performed by: SPECIALIST

## 2017-07-11 PROCEDURE — 3700000001 HC ADD 15 MINUTES (ANESTHESIA): Performed by: SPECIALIST

## 2017-07-11 PROCEDURE — 88305 TISSUE EXAM BY PATHOLOGIST: CPT

## 2017-07-11 PROCEDURE — 2500000003 HC RX 250 WO HCPCS: Performed by: NURSE ANESTHETIST, CERTIFIED REGISTERED

## 2017-07-11 PROCEDURE — 6360000002 HC RX W HCPCS: Performed by: NURSE ANESTHETIST, CERTIFIED REGISTERED

## 2017-07-11 PROCEDURE — 2580000003 HC RX 258: Performed by: SPECIALIST

## 2017-07-11 PROCEDURE — 7100000011 HC PHASE II RECOVERY - ADDTL 15 MIN: Performed by: SPECIALIST

## 2017-07-11 RX ORDER — PROPOFOL 10 MG/ML
INJECTION, EMULSION INTRAVENOUS PRN
Status: DISCONTINUED | OUTPATIENT
Start: 2017-07-11 | End: 2017-07-11 | Stop reason: SDUPTHER

## 2017-07-11 RX ORDER — SODIUM CHLORIDE 9 MG/ML
INJECTION, SOLUTION INTRAVENOUS CONTINUOUS
Status: DISCONTINUED | OUTPATIENT
Start: 2017-07-11 | End: 2017-07-11 | Stop reason: HOSPADM

## 2017-07-11 RX ORDER — ONDANSETRON 2 MG/ML
4 INJECTION INTRAMUSCULAR; INTRAVENOUS
Status: DISCONTINUED | OUTPATIENT
Start: 2017-07-11 | End: 2017-07-11 | Stop reason: HOSPADM

## 2017-07-11 RX ORDER — VITAMIN B COMPLEX
1 CAPSULE ORAL DAILY
COMMUNITY

## 2017-07-11 RX ORDER — SODIUM CHLORIDE 0.9 % (FLUSH) 0.9 %
10 SYRINGE (ML) INJECTION EVERY 12 HOURS SCHEDULED
Status: DISCONTINUED | OUTPATIENT
Start: 2017-07-11 | End: 2017-07-11 | Stop reason: HOSPADM

## 2017-07-11 RX ORDER — LIDOCAINE HYDROCHLORIDE 10 MG/ML
1 INJECTION, SOLUTION EPIDURAL; INFILTRATION; INTRACAUDAL; PERINEURAL
Status: DISCONTINUED | OUTPATIENT
Start: 2017-07-11 | End: 2017-07-11 | Stop reason: HOSPADM

## 2017-07-11 RX ORDER — LIDOCAINE HYDROCHLORIDE 20 MG/ML
INJECTION, SOLUTION INFILTRATION; PERINEURAL PRN
Status: DISCONTINUED | OUTPATIENT
Start: 2017-07-11 | End: 2017-07-11 | Stop reason: SDUPTHER

## 2017-07-11 RX ORDER — SODIUM CHLORIDE 0.9 % (FLUSH) 0.9 %
10 SYRINGE (ML) INJECTION PRN
Status: DISCONTINUED | OUTPATIENT
Start: 2017-07-11 | End: 2017-07-11 | Stop reason: HOSPADM

## 2017-07-11 RX ADMIN — PROPOFOL 40 MG: 10 INJECTION, EMULSION INTRAVENOUS at 08:35

## 2017-07-11 RX ADMIN — PROPOFOL 20 MG: 10 INJECTION, EMULSION INTRAVENOUS at 08:41

## 2017-07-11 RX ADMIN — PROPOFOL 20 MG: 10 INJECTION, EMULSION INTRAVENOUS at 08:39

## 2017-07-11 RX ADMIN — PROPOFOL 20 MG: 10 INJECTION, EMULSION INTRAVENOUS at 08:45

## 2017-07-11 RX ADMIN — SODIUM CHLORIDE: 900 INJECTION, SOLUTION INTRAVENOUS at 07:43

## 2017-07-11 RX ADMIN — SODIUM CHLORIDE: 900 INJECTION, SOLUTION INTRAVENOUS at 08:48

## 2017-07-11 RX ADMIN — PROPOFOL 20 MG: 10 INJECTION, EMULSION INTRAVENOUS at 08:43

## 2017-07-11 RX ADMIN — PROPOFOL 20 MG: 10 INJECTION, EMULSION INTRAVENOUS at 08:37

## 2017-07-11 RX ADMIN — LIDOCAINE HYDROCHLORIDE 40 MG: 20 INJECTION, SOLUTION INFILTRATION; PERINEURAL at 08:35

## 2017-07-11 RX ADMIN — PROPOFOL 20 MG: 10 INJECTION, EMULSION INTRAVENOUS at 08:47

## 2017-07-11 ASSESSMENT — PAIN - FUNCTIONAL ASSESSMENT: PAIN_FUNCTIONAL_ASSESSMENT: 0-10

## 2017-07-13 ENCOUNTER — OFFICE VISIT (OUTPATIENT)
Dept: FAMILY MEDICINE CLINIC | Age: 77
End: 2017-07-13

## 2017-07-13 VITALS
HEIGHT: 65 IN | WEIGHT: 187 LBS | SYSTOLIC BLOOD PRESSURE: 104 MMHG | BODY MASS INDEX: 31.16 KG/M2 | OXYGEN SATURATION: 96 % | DIASTOLIC BLOOD PRESSURE: 68 MMHG | TEMPERATURE: 97.5 F | HEART RATE: 65 BPM

## 2017-07-13 DIAGNOSIS — Z77.120 SUSPECTED EXPOSURE TO MOLD: ICD-10-CM

## 2017-07-13 DIAGNOSIS — E03.9 HYPOTHYROIDISM, UNSPECIFIED TYPE: ICD-10-CM

## 2017-07-13 DIAGNOSIS — I48.91 ATRIAL FIBRILLATION, UNSPECIFIED TYPE (HCC): ICD-10-CM

## 2017-07-13 DIAGNOSIS — E03.9 HYPOTHYROIDISM, UNSPECIFIED TYPE: Primary | ICD-10-CM

## 2017-07-13 LAB
T4 TOTAL: 6.6 UG/DL (ref 4.5–11.7)
TSH SERPL DL<=0.05 MIU/L-ACNC: 2.12 UIU/ML (ref 0.27–4.2)

## 2017-07-13 PROCEDURE — G8427 DOCREV CUR MEDS BY ELIG CLIN: HCPCS | Performed by: NURSE PRACTITIONER

## 2017-07-13 PROCEDURE — G8598 ASA/ANTIPLAT THER USED: HCPCS | Performed by: NURSE PRACTITIONER

## 2017-07-13 PROCEDURE — 4040F PNEUMOC VAC/ADMIN/RCVD: CPT | Performed by: NURSE PRACTITIONER

## 2017-07-13 PROCEDURE — G8400 PT W/DXA NO RESULTS DOC: HCPCS | Performed by: NURSE PRACTITIONER

## 2017-07-13 PROCEDURE — 1123F ACP DISCUSS/DSCN MKR DOCD: CPT | Performed by: NURSE PRACTITIONER

## 2017-07-13 PROCEDURE — 1036F TOBACCO NON-USER: CPT | Performed by: NURSE PRACTITIONER

## 2017-07-13 PROCEDURE — 99213 OFFICE O/P EST LOW 20 MIN: CPT | Performed by: NURSE PRACTITIONER

## 2017-07-13 PROCEDURE — 1111F DSCHRG MED/CURRENT MED MERGE: CPT | Performed by: NURSE PRACTITIONER

## 2017-07-13 PROCEDURE — G8417 CALC BMI ABV UP PARAM F/U: HCPCS | Performed by: NURSE PRACTITIONER

## 2017-07-13 PROCEDURE — 1090F PRES/ABSN URINE INCON ASSESS: CPT | Performed by: NURSE PRACTITIONER

## 2017-07-13 RX ORDER — NITROGLYCERIN 0.4 MG/1
TABLET SUBLINGUAL
Refills: 5 | COMMUNITY
Start: 2017-06-13 | End: 2018-04-05

## 2017-07-13 ASSESSMENT — ENCOUNTER SYMPTOMS
COUGH: 0
SHORTNESS OF BREATH: 0

## 2017-07-31 ENCOUNTER — OFFICE VISIT (OUTPATIENT)
Dept: INFECTIOUS DISEASES | Age: 77
End: 2017-07-31

## 2017-07-31 VITALS
HEIGHT: 65 IN | RESPIRATION RATE: 18 BRPM | BODY MASS INDEX: 30.59 KG/M2 | HEART RATE: 88 BPM | DIASTOLIC BLOOD PRESSURE: 88 MMHG | SYSTOLIC BLOOD PRESSURE: 137 MMHG | TEMPERATURE: 98.4 F | WEIGHT: 183.6 LBS

## 2017-07-31 DIAGNOSIS — R43.1 ABNORMAL SMELL: ICD-10-CM

## 2017-07-31 DIAGNOSIS — T73.2XXA FATIGUE DUE TO EXPOSURE, INITIAL ENCOUNTER: Primary | ICD-10-CM

## 2017-07-31 PROCEDURE — 4040F PNEUMOC VAC/ADMIN/RCVD: CPT | Performed by: INTERNAL MEDICINE

## 2017-07-31 PROCEDURE — 99203 OFFICE O/P NEW LOW 30 MIN: CPT | Performed by: INTERNAL MEDICINE

## 2017-07-31 PROCEDURE — G8400 PT W/DXA NO RESULTS DOC: HCPCS | Performed by: INTERNAL MEDICINE

## 2017-07-31 PROCEDURE — 1090F PRES/ABSN URINE INCON ASSESS: CPT | Performed by: INTERNAL MEDICINE

## 2017-07-31 PROCEDURE — G8427 DOCREV CUR MEDS BY ELIG CLIN: HCPCS | Performed by: INTERNAL MEDICINE

## 2017-07-31 PROCEDURE — 1036F TOBACCO NON-USER: CPT | Performed by: INTERNAL MEDICINE

## 2017-07-31 PROCEDURE — 1123F ACP DISCUSS/DSCN MKR DOCD: CPT | Performed by: INTERNAL MEDICINE

## 2017-07-31 PROCEDURE — G8598 ASA/ANTIPLAT THER USED: HCPCS | Performed by: INTERNAL MEDICINE

## 2017-07-31 PROCEDURE — G8417 CALC BMI ABV UP PARAM F/U: HCPCS | Performed by: INTERNAL MEDICINE

## 2017-07-31 ASSESSMENT — ENCOUNTER SYMPTOMS
VOMITING: 0
SHORTNESS OF BREATH: 1
COUGH: 0
ABDOMINAL PAIN: 0
DIARRHEA: 0
SORE THROAT: 0
NAUSEA: 0

## 2017-08-02 DIAGNOSIS — R43.1 ABNORMAL SMELL: ICD-10-CM

## 2017-08-02 DIAGNOSIS — T73.2XXA FATIGUE DUE TO EXPOSURE, INITIAL ENCOUNTER: ICD-10-CM

## 2017-08-02 LAB
ALBUMIN SERPL-MCNC: 4.1 G/DL (ref 3.9–4.9)
ALP BLD-CCNC: 66 U/L (ref 40–130)
ALT SERPL-CCNC: 17 U/L (ref 0–33)
ANION GAP SERPL CALCULATED.3IONS-SCNC: 14 MEQ/L (ref 7–13)
AST SERPL-CCNC: 17 U/L (ref 0–35)
BASOPHILS ABSOLUTE: 0 K/UL (ref 0–0.2)
BASOPHILS RELATIVE PERCENT: 0.3 %
BILIRUB SERPL-MCNC: 0.4 MG/DL (ref 0–1.2)
BUN BLDV-MCNC: 22 MG/DL (ref 8–23)
C-REACTIVE PROTEIN: 15.7 MG/L (ref 0–5)
CALCIUM SERPL-MCNC: 9.3 MG/DL (ref 8.6–10.2)
CHLORIDE BLD-SCNC: 96 MEQ/L (ref 98–107)
CO2: 28 MEQ/L (ref 22–29)
CREAT SERPL-MCNC: 0.69 MG/DL (ref 0.5–0.9)
EOSINOPHILS ABSOLUTE: 0 K/UL (ref 0–0.7)
EOSINOPHILS RELATIVE PERCENT: 0.2 %
GFR AFRICAN AMERICAN: >60
GFR NON-AFRICAN AMERICAN: >60
GLOBULIN: 2.5 G/DL (ref 2.3–3.5)
GLUCOSE BLD-MCNC: 82 MG/DL (ref 74–109)
HCT VFR BLD CALC: 43.5 % (ref 37–47)
HEMOGLOBIN: 14.2 G/DL (ref 12–16)
LYMPHOCYTES ABSOLUTE: 0.7 K/UL (ref 1–4.8)
LYMPHOCYTES RELATIVE PERCENT: 8.8 %
MCH RBC QN AUTO: 28.6 PG (ref 27–31.3)
MCHC RBC AUTO-ENTMCNC: 32.6 % (ref 33–37)
MCV RBC AUTO: 87.7 FL (ref 82–100)
MONOCYTES ABSOLUTE: 0.6 K/UL (ref 0.2–0.8)
MONOCYTES RELATIVE PERCENT: 7.1 %
NEUTROPHILS ABSOLUTE: 6.6 K/UL (ref 1.4–6.5)
NEUTROPHILS RELATIVE PERCENT: 83.6 %
PDW BLD-RTO: 14.4 % (ref 11.5–14.5)
PLATELET # BLD: 205 K/UL (ref 130–400)
POTASSIUM SERPL-SCNC: 4.6 MEQ/L (ref 3.5–5.1)
RBC # BLD: 4.96 M/UL (ref 4.2–5.4)
SEDIMENTATION RATE, ERYTHROCYTE: 22 MM (ref 0–30)
SLIDE REVIEW: ABNORMAL
SODIUM BLD-SCNC: 138 MEQ/L (ref 132–144)
TOTAL PROTEIN: 6.6 G/DL (ref 6.4–8.1)
WBC # BLD: 7.9 K/UL (ref 4.8–10.8)

## 2017-08-03 DIAGNOSIS — R43.1 ABNORMAL SMELL: Primary | ICD-10-CM

## 2017-08-03 DIAGNOSIS — R53.83 FATIGUE, UNSPECIFIED TYPE: ICD-10-CM

## 2017-08-03 DIAGNOSIS — I49.5 SICK SINUS SYNDROME (HCC): ICD-10-CM

## 2017-08-06 LAB
ASPERGILLUS ANTIBODY CF: NORMAL
ASPERGILLUS ANTIBODY ID: NORMAL

## 2017-08-08 ENCOUNTER — HOSPITAL ENCOUNTER (OUTPATIENT)
Dept: CT IMAGING | Age: 77
Discharge: HOME | End: 2017-08-08
Payer: MEDICARE

## 2017-08-08 DIAGNOSIS — R43.1 ABNORMAL SMELL: ICD-10-CM

## 2017-08-08 DIAGNOSIS — I49.5 SICK SINUS SYNDROME (HCC): ICD-10-CM

## 2017-08-08 DIAGNOSIS — R53.83 FATIGUE, UNSPECIFIED TYPE: ICD-10-CM

## 2017-08-08 PROCEDURE — 70486 CT MAXILLOFACIAL W/O DYE: CPT

## 2017-08-30 ENCOUNTER — HOSPITAL ENCOUNTER (OUTPATIENT)
Dept: CARDIOLOGY | Age: 77
Discharge: HOME | End: 2017-08-30
Payer: MEDICARE

## 2017-08-30 PROCEDURE — 93280 PM DEVICE PROGR EVAL DUAL: CPT

## 2017-10-13 ENCOUNTER — OFFICE VISIT (OUTPATIENT)
Dept: FAMILY MEDICINE CLINIC | Age: 77
End: 2017-10-13

## 2017-10-13 VITALS
DIASTOLIC BLOOD PRESSURE: 82 MMHG | WEIGHT: 187 LBS | SYSTOLIC BLOOD PRESSURE: 110 MMHG | BODY MASS INDEX: 31.16 KG/M2 | HEART RATE: 85 BPM | HEIGHT: 65 IN | OXYGEN SATURATION: 98 %

## 2017-10-13 DIAGNOSIS — M21.619 BUNION: ICD-10-CM

## 2017-10-13 DIAGNOSIS — L57.0 AK (ACTINIC KERATOSIS): Primary | ICD-10-CM

## 2017-10-13 DIAGNOSIS — M20.41 HAMMER TOES OF BOTH FEET: ICD-10-CM

## 2017-10-13 DIAGNOSIS — M20.42 HAMMER TOES OF BOTH FEET: ICD-10-CM

## 2017-10-13 DIAGNOSIS — L98.9 FACIAL LESION: ICD-10-CM

## 2017-10-13 DIAGNOSIS — N39.46 MIXED INCONTINENCE: ICD-10-CM

## 2017-10-13 PROCEDURE — G8427 DOCREV CUR MEDS BY ELIG CLIN: HCPCS | Performed by: FAMILY MEDICINE

## 2017-10-13 PROCEDURE — 17000 DESTRUCT PREMALG LESION: CPT | Performed by: FAMILY MEDICINE

## 2017-10-13 PROCEDURE — G8483 FLU IMM NO ADMIN DOC REA: HCPCS | Performed by: FAMILY MEDICINE

## 2017-10-13 PROCEDURE — 1036F TOBACCO NON-USER: CPT | Performed by: FAMILY MEDICINE

## 2017-10-13 PROCEDURE — 1123F ACP DISCUSS/DSCN MKR DOCD: CPT | Performed by: FAMILY MEDICINE

## 2017-10-13 PROCEDURE — 0509F URINE INCON PLAN DOCD: CPT | Performed by: FAMILY MEDICINE

## 2017-10-13 PROCEDURE — 1090F PRES/ABSN URINE INCON ASSESS: CPT | Performed by: FAMILY MEDICINE

## 2017-10-13 PROCEDURE — 99213 OFFICE O/P EST LOW 20 MIN: CPT | Performed by: FAMILY MEDICINE

## 2017-10-13 PROCEDURE — G8598 ASA/ANTIPLAT THER USED: HCPCS | Performed by: FAMILY MEDICINE

## 2017-10-13 PROCEDURE — 4040F PNEUMOC VAC/ADMIN/RCVD: CPT | Performed by: FAMILY MEDICINE

## 2017-10-13 PROCEDURE — G8417 CALC BMI ABV UP PARAM F/U: HCPCS | Performed by: FAMILY MEDICINE

## 2017-10-13 PROCEDURE — G8400 PT W/DXA NO RESULTS DOC: HCPCS | Performed by: FAMILY MEDICINE

## 2017-10-13 NOTE — PROGRESS NOTES
ROS: no cough, shortness of breath, or wheezing  Cardiovascular ROS: no chest pain or dyspnea on exertion        Physical Exam:  /82 (Site: Left Arm)   Pulse 85   Ht 5' 5\" (1.651 m)   Wt 187 lb (84.8 kg)   LMP 01/01/1996   SpO2 98%   Breastfeeding? No   BMI 31.12 kg/m²     Gen: Well, NAD, Alert, Oriented x 3   HEENT: EOMI, eyes clear, MMM  Skin: without rash or jaundice  Neck: no significant lymphadenopathy or thyromegaly  Back: she is sitting in chair comfortably without distress at this time  Foot right, bunion deformity (severe) hammer toes, no lateral deformity or tenderness      Lab Results   Component Value Date    WBC 7.9 08/02/2017    HGB 14.2 08/02/2017    HCT 43.5 08/02/2017     08/02/2017    CHOL 253 (H) 06/02/2017    TRIG 105 06/02/2017    HDL 74 (H) 06/02/2017    ALT 17 08/02/2017    AST 17 08/02/2017     08/02/2017    K 4.6 08/02/2017    CL 96 (L) 08/02/2017    CREATININE 0.69 08/02/2017    BUN 22 08/02/2017    CO2 28 08/02/2017    TSH 2.120 07/13/2017    INR 1.0 06/14/2017         A&P  1. AK (actinic keratosis)  [address]8583 - PA DESTRUC PREMALIGNANT, FIRST LESION   2. Facial lesion  [address]   3. Bunion  Referral To Podiatry - ([facility]) Torsten Villanueva DPM   4. Hammer toes of both feet  Referral To Podiatry - (DONNA) Torsten Villanueva DPM   5.  Mixed incontinence       Podiatry  Dermatology referral    Discussed incontinence        Parker Sumner MD

## 2017-10-16 ENCOUNTER — TELEPHONE (OUTPATIENT)
Dept: FAMILY MEDICINE CLINIC | Age: 77
End: 2017-10-16

## 2017-10-16 ENCOUNTER — OFFICE VISIT (OUTPATIENT)
Dept: FAMILY MEDICINE CLINIC | Age: 77
End: 2017-10-16

## 2017-10-16 VITALS
WEIGHT: 189 LBS | OXYGEN SATURATION: 98 % | HEART RATE: 76 BPM | HEIGHT: 65 IN | BODY MASS INDEX: 31.49 KG/M2 | SYSTOLIC BLOOD PRESSURE: 122 MMHG | DIASTOLIC BLOOD PRESSURE: 68 MMHG

## 2017-10-16 DIAGNOSIS — D23.30 BENIGN NEOPLASM OF SKIN OF FACE: Primary | ICD-10-CM

## 2017-10-16 PROCEDURE — 99999 PR OFFICE/OUTPT VISIT,PROCEDURE ONLY: CPT | Performed by: FAMILY MEDICINE

## 2017-10-16 ASSESSMENT — ENCOUNTER SYMPTOMS
ABDOMINAL PAIN: 0
SHORTNESS OF BREATH: 0

## 2017-10-16 NOTE — PROGRESS NOTES
Subjective  Onesimo Moreland, 69 y.o. female presents today with:  Chief Complaint   Patient presents with    Lesion(s)       HPI    Pt of Dr. Ramona Rangel here today for evaluation of a lesion on her left cheek that has been there for months. Denies personal h/o skin cancer. Review of Systems   Constitutional: Negative for fever. Respiratory: Negative for shortness of breath. Cardiovascular: Negative for chest pain. Gastrointestinal: Negative for abdominal pain. Skin: Negative for rash. Past Medical History:   Diagnosis Date    Atrial fibrillation (HCC)     no coumadin pt preference    Colon cancer ([address]      H/O COLON AND UTERINE [id number]    Constipation     Fatigue     History of blood transfusion     x2 1996    Hyperlipidemia     Hypertension     BENIGN ESSENTIAL    Hypothyroidism 1/23/2014    Obesity     Pacemaker     Sick sinus syndrome (HCC)     Sleep apnea     noncompliant with cpap    TIA (transient ischemic attack)     Uterine cancer [facility])      Past Surgical History:   Procedure Laterality Date    CARDIAC CATHETERIZATION      COLON SURGERY  2001    RESECTION    COLONOSCOPY  08-05-11,10/8/2013    RANDOM BIOPSIES W/COLITIS    HERNIA REPAIR      HYSTERECTOMY      PACEMAKER INSERTION      [place] COLON CA SCRN NOT  [address] [facility] N/A 7/11/2017    COLONOSCOPY performed by Yulissa Garg MD at [facility]      age 9   [facility] TUMOR REMOVAL      fatty tumor lower back     Social History     Social History    Marital status: Single     Spouse name: N/A    Number of children: N/A    Years of education: N/A     Occupational History    Not on file.      Social History Main Topics    Smoking status: Never Smoker    Smokeless tobacco: Never Used    Alcohol use No    Drug use: No    Sexual activity: Not on file     Other Topics Concern    Not on file     Social History Narrative    No narrative on file     Family History   Problem Relation Age of Onset    Heart Disease Maternal Grandmother     Emphysema Paternal Grandmother     Stroke Mother     High Blood Pressure Father      Allergies   Allergen Reactions    Cortisone Other (See Comments)     Bruising and swelling    Coumadin [Warfarin Sodium]      Capillary swelling/inflammation    Eggs [Egg White]      ALLERGIC TO FLU SHOT    Hydrolase     Levothyroxine     Pneumococcal Vaccine Hives     Pt states has an allergy to any vaccines that have egg embryo in them. Current Outpatient Prescriptions   Medication Sig Dispense Refill    nitroGLYCERIN (NITROSTAT) 0.4 MG SL tablet DISSOLVE 1 (ONE) TABLET UNDER THE TONGUE AS NEEDED FOR CHEST PAIN. MAY REPEAT EVERY 5 MINUTES IF NEEDED; MAX OF 3 DOSES. IF NO RELIEF, MARCELLA  5    IRON PO Take by mouth      b complex vitamins capsule Take 1 capsule by mouth daily      Multiple Vitamins-Minerals (ONE DAILY CALCIUM/IRON PO) Take by mouth      MAGNESIUM PO Take 200 mg by mouth       Glucosamine-Chondroitin (GLUCOSAMINE CHONDR COMPLEX PO) Take by mouth      amLODIPine (NORVASC) 5 MG tablet Take 2.5 mg by mouth daily Currently on hold       aspirin 81 MG tablet Take 81 mg by mouth daily      L-Tryptophan 500 MG TABS Take 500 mg by mouth daily At hs       sotalol (BETAPACE) 80 MG tablet 1/2 tablet bid  3    indapamide (LOZOL) 1.25 MG tablet Take 1.25 mg by mouth daily       thyroid (ARMOUR) 30 MG tablet 45mg PO Sunday, Monday, Wednesday, Friday, 30mg PO Tuesday Thursday and Saturday 135 tablet 2     No current facility-administered medications for this visit. Objective    Vitals:    10/16/17 0723   BP: 122/68   Pulse: 76   SpO2: 98%   Weight: 189 lb (85.7 kg)   Height: 5' 5\" (1.651 m)       Physical Exam   Constitutional: She appears well-developed and well-nourished. HENT:   Head: Normocephalic and atraumatic. Skin: Skin is warm and dry. Left lateral cheek with 0.4 cm pink pearly papule. Psychiatric: She has a normal mood and affect.        Assessment & Plan   1. Benign neoplasm of skin of face       Left lateral cheek excision, r/o BCC.  1% Lidocaine with epi used for anesthesia. A 15 blade scalpel was used to remove the lesion. A 0.3 cm margin surrounded the entire lesion creating a total surgical size of 1.0 cm in diameter. Hyfercation at the base of the lesion. Informed consent given by the patient. Risks including infection, bleeding and scarring were discussed with the patient. No guarantee how the scar will look. Post op instructions were given. The patient will clean the area daily with antibacterial soap and water. Apply vaseline or antibiotic ointment and a clean bandage daily for 2 weeks. Path c/w with sebaceous adenoma/benign. No orders of the defined types were placed in this encounter. No orders of the defined types were placed in this encounter. Medications Discontinued During This Encounter   Medication Reason    Multiple Vitamin (MULTI VITAMIN PO) Therapy completed    DiphenhydrAMINE HCl (BENADRYL PO)      Return in about 4 weeks (around 11/13/2017).     Halina Conway MD

## 2017-10-16 NOTE — TELEPHONE ENCOUNTER
She doesn't have to stop. It was a mistake. Both meds were likely marked \"not taking\" by the MA and then got accidentally deleted.

## 2017-10-16 NOTE — PATIENT INSTRUCTIONS
Clean surgical area with antibacterial soap and water once daily. Keep surgical site moist with vaseline or polysporin and apply a fresh bandage daily for 14 days. Follow up immediately if any redness surrounds the surgical site or if drainage occurs at surgical site. After 14 day no more bandage needed.

## 2017-11-07 ENCOUNTER — TELEPHONE (OUTPATIENT)
Dept: FAMILY MEDICINE CLINIC | Age: 77
End: 2017-11-07

## 2017-11-14 ENCOUNTER — OFFICE VISIT (OUTPATIENT)
Dept: FAMILY MEDICINE CLINIC | Age: 77
End: 2017-11-14

## 2017-11-14 VITALS
BODY MASS INDEX: 31.29 KG/M2 | OXYGEN SATURATION: 97 % | DIASTOLIC BLOOD PRESSURE: 68 MMHG | HEIGHT: 65 IN | WEIGHT: 187.8 LBS | HEART RATE: 91 BPM | SYSTOLIC BLOOD PRESSURE: 98 MMHG

## 2017-11-14 DIAGNOSIS — L85.3 XEROSIS OF SKIN: ICD-10-CM

## 2017-11-14 DIAGNOSIS — D23.9 SEBACEOUS ADENOMA: Primary | ICD-10-CM

## 2017-11-14 DIAGNOSIS — H00.15 CHALAZION OF LEFT LOWER EYELID: ICD-10-CM

## 2017-11-14 PROCEDURE — G8427 DOCREV CUR MEDS BY ELIG CLIN: HCPCS | Performed by: FAMILY MEDICINE

## 2017-11-14 PROCEDURE — 1123F ACP DISCUSS/DSCN MKR DOCD: CPT | Performed by: FAMILY MEDICINE

## 2017-11-14 PROCEDURE — 1090F PRES/ABSN URINE INCON ASSESS: CPT | Performed by: FAMILY MEDICINE

## 2017-11-14 PROCEDURE — 4040F PNEUMOC VAC/ADMIN/RCVD: CPT | Performed by: FAMILY MEDICINE

## 2017-11-14 PROCEDURE — G8400 PT W/DXA NO RESULTS DOC: HCPCS | Performed by: FAMILY MEDICINE

## 2017-11-14 PROCEDURE — G8598 ASA/ANTIPLAT THER USED: HCPCS | Performed by: FAMILY MEDICINE

## 2017-11-14 PROCEDURE — G8483 FLU IMM NO ADMIN DOC REA: HCPCS | Performed by: FAMILY MEDICINE

## 2017-11-14 PROCEDURE — G8417 CALC BMI ABV UP PARAM F/U: HCPCS | Performed by: FAMILY MEDICINE

## 2017-11-14 PROCEDURE — 1036F TOBACCO NON-USER: CPT | Performed by: FAMILY MEDICINE

## 2017-11-14 PROCEDURE — 99213 OFFICE O/P EST LOW 20 MIN: CPT | Performed by: FAMILY MEDICINE

## 2017-11-14 RX ORDER — ERYTHROMYCIN 5 MG/G
OINTMENT OPHTHALMIC 3 TIMES DAILY
Qty: 3.5 G | Refills: 0 | Status: SHIPPED | OUTPATIENT
Start: 2017-11-14 | End: 2017-11-24

## 2017-11-14 ASSESSMENT — ENCOUNTER SYMPTOMS
SHORTNESS OF BREATH: 0
ABDOMINAL PAIN: 0

## 2017-11-14 NOTE — PATIENT INSTRUCTIONS
Patient Education     Pt advised to use Free and Clear Detergent such as All free and clear; use Dove soap, take warm, not hot showers; avoid long showers; do not get into hot tub. Recommended moisturizers for the skin:  Cetaphil DermaControl Moisturizer for face in people with acne, Cetaphil, CeraVe, Aveeno, or Vanicream-(this is free of dyes/lanolin/fragrance, masking fragrance, parabens formaldehyde) and is excellent for people with sensitive skin or eczema. The use of sunscreen with an SPF of 30 or higher was discussed and recommended. If you have a history of precancers or skin cancer, you should be seen AT LEAST once yearly for a skin check or sooner if recommended by your doctor. You should follow up immediately for any new or changing lesions. Styes and Chalazia: Care Instructions  Your Care Instructions    Styes and chalazia (say \"oiy-ODO-vqx-\") are both conditions that can cause swelling of the eyelid. A stye is an infection in the root of an eyelash. The infection causes a tender red lump on the edge of the eyelid. The infection can spread until the whole eyelid becomes red and inflamed. Styes usually break open, and a tiny amount of pus drains. They usually clear up on their own in about a week, but they sometimes need treatment with antibiotics. A chalazion is a lump or cyst in the eyelid (chalazion is singular; chalazia is plural). It is caused by swelling and inflammation of deep oil glands inside the eyelid. Chalazia are usually not infected. They can take a few months to heal.  If a chalazion becomes more swollen and painful or does not go away, you may need to have it drained by your doctor. Follow-up care is a key part of your treatment and safety. Be sure to make and go to all appointments, and call your doctor if you are having problems. It's also a good idea to know your test results and keep a list of the medicines you take. How can you care for yourself at home?   · Do not rub your eyes. Do not squeeze or try to open a stye or chalazion. · To help a stye or chalazion heal faster:  ¨ Put a warm, moist compress on your eye for 5 to 10 minutes, 3 to 6 times a day. Heat often brings a stye to a point where it drains on its own. Keep in mind that warm compresses will often increase swelling a little at first.  ¨ Do not use hot water or heat a wet cloth in a microwave oven. The compress may get too hot and can burn the eyelid. · Always wash your hands before and after you use a compress or touch your eyes. · If the doctor gave you antibiotic drops or ointment, use the medicine exactly as directed. Use the medicine for as long as instructed, even if your eye starts to feel better. · To put in eyedrops or ointment:  ¨ Tilt your head back, and pull your lower eyelid down with one finger. ¨ Drop or squirt the medicine inside the lower lid. ¨ Close your eye for 30 to 60 seconds to let the drops or ointment move around. ¨ Do not touch the ointment or dropper tip to your eyelashes or any other surface. · Do not wear eye makeup or contact lenses until the stye or chalazion heals. · Do not share towels, pillows, or washcloths while you have a stye. When should you call for help? Call your doctor now or seek immediate medical care if:  · You have pain in your eye. · You have a change in vision or loss of vision. · Redness and swelling get much worse. Watch closely for changes in your health, and be sure to contact your doctor if:  · Your stye does not get better in 1 week. · Your chalazion does not start to get better after several weeks. Where can you learn more? Go to [email or web address] and sign in to your [employer] account. Enter V621 in the [employer] box to learn more about \"Styes and Chalazia: Care Instructions. \"     If you do not have an account, please click on the \"Sign Up Now\" link.   Current as of: March 3, 2017  Content Version: 11.3  ©

## 2017-11-14 NOTE — PROGRESS NOTES
Subjective  Satira Friendly, 69 y.o. female presents today with:  Chief Complaint   Patient presents with    1 Month Follow-Up     left eye is red       HPI    Pt of Dr. James Oropeza here today for f/u on an excision on her left cheek. Denies personal h/o skin cancer. C/o red sore lump on left lower lid. Tried baby shampoo. C/o dry skin. Review of Systems   Constitutional: Negative for fever. Respiratory: Negative for shortness of breath. Cardiovascular: Negative for chest pain. Gastrointestinal: Negative for abdominal pain. Skin: Negative for rash. Past Medical History:   Diagnosis Date    Atrial fibrillation (HCC)     no coumadin pt preference    Colon cancer ([address]      H/O COLON AND UTERINE [id number]    Constipation     Fatigue     History of blood transfusion     x2 1996    Hyperlipidemia     Hypertension     BENIGN ESSENTIAL    Hypothyroidism 1/23/2014    Obesity     Pacemaker     Sick sinus syndrome (HCC)     Sleep apnea     noncompliant with cpap    TIA (transient ischemic attack)     Uterine cancer [facility])      Past Surgical History:   Procedure Laterality Date    CARDIAC CATHETERIZATION      COLON SURGERY  2001    RESECTION    COLONOSCOPY  08-05-11,10/8/2013    RANDOM BIOPSIES W/COLITIS    HERNIA REPAIR      HYSTERECTOMY      PACEMAKER INSERTION      [facility] COLON [facility] SCRN NOT  [address] [facility] N/A 7/11/2017    COLONOSCOPY performed by Meryl Magana MD at [facility]      age 9   [facility] TUMOR REMOVAL      fatty tumor lower back     Social History     Social History    Marital status: Single     Spouse name: N/A    Number of children: 0    Years of education: N/A     Occupational History    Not on file.      Social History Main Topics    Smoking status: Never Smoker    Smokeless tobacco: Never Used    Alcohol use No    Drug use: No    Sexual activity: Not on file     Other Topics Concern    Not on file     Social History Narrative    No narrative on file Family History   Problem Relation Age of Onset    Heart Disease Maternal Grandmother     Emphysema Paternal Grandmother     Stroke Mother     High Blood Pressure Father      Allergies   Allergen Reactions    Cortisone Other (See Comments)     Bruising and swelling    Coumadin [Warfarin Sodium]      Capillary swelling/inflammation    Eggs [Egg White]      ALLERGIC TO FLU SHOT    Hydrolase     Levothyroxine     Pneumococcal Vaccine Hives     Pt states has an allergy to any vaccines that have egg embryo in them. Current Outpatient Prescriptions   Medication Sig Dispense Refill    erythromycin (ROMYCIN) 5 MG/GM ophthalmic ointment Place into the left eye 3 times daily for 10 days 3.5 g 0    nitroGLYCERIN (NITROSTAT) 0.4 MG SL tablet DISSOLVE 1 (ONE) TABLET UNDER THE TONGUE AS NEEDED FOR CHEST PAIN. MAY REPEAT EVERY 5 MINUTES IF NEEDED; MAX OF 3 DOSES. IF NO RELIEF, MARCELLA  5    IRON PO Take by mouth      b complex vitamins capsule Take 1 capsule by mouth daily      Multiple Vitamins-Minerals (ONE DAILY CALCIUM/IRON PO) Take by mouth      MAGNESIUM PO Take 200 mg by mouth       Glucosamine-Chondroitin (GLUCOSAMINE CHONDR COMPLEX PO) Take by mouth      amLODIPine (NORVASC) 5 MG tablet Take 2.5 mg by mouth daily Currently on hold       aspirin 81 MG tablet Take 81 mg by mouth daily      L-Tryptophan 500 MG TABS Take 500 mg by mouth daily At hs       sotalol (BETAPACE) 80 MG tablet 1/2 tablet bid  3    indapamide (LOZOL) 1.25 MG tablet Take 1.25 mg by mouth daily       thyroid (ARMOUR) 30 MG tablet 45mg PO Sunday, Monday, Wednesday, Friday, 30mg PO Tuesday Thursday and Saturday 135 tablet 2     No current facility-administered medications for this visit. Objective    Vitals:    11/14/17 1142   BP: 98/68   Pulse: 91   SpO2: 97%   Weight: 187 lb 12.8 oz (85.2 kg)   Height: 5' 5\" (1.651 m)       Physical Exam   Constitutional: She appears well-developed and well-nourished.    HENT:   Head: worsen or fail to improve.     Jovana Nobles MD

## 2017-11-30 ENCOUNTER — HOSPITAL ENCOUNTER (OUTPATIENT)
Dept: CARDIOLOGY | Age: 77
Discharge: HOME | End: 2017-11-30
Payer: MEDICARE

## 2017-11-30 PROCEDURE — 93296 REM INTERROG EVL PM/IDS: CPT

## 2018-01-04 LAB
ANION GAP SERPL CALCULATED.3IONS-SCNC: 14 MEQ/L (ref 7–13)
BUN BLDV-MCNC: 30 MG/DL (ref 8–23)
CALCIUM SERPL-MCNC: 9.6 MG/DL (ref 8.6–10.2)
CHLORIDE BLD-SCNC: 100 MEQ/L (ref 98–107)
CO2: 28 MEQ/L (ref 22–29)
CREAT SERPL-MCNC: 0.84 MG/DL (ref 0.5–0.9)
GFR AFRICAN AMERICAN: >60
GFR NON-AFRICAN AMERICAN: >60
GLUCOSE BLD-MCNC: 81 MG/DL (ref 74–109)
HCT VFR BLD CALC: 44.4 % (ref 37–47)
HEMOGLOBIN: 14.9 G/DL (ref 12–16)
MAGNESIUM: 2.8 MG/DL (ref 1.7–2.3)
MCH RBC QN AUTO: 29.9 PG (ref 27–31.3)
MCHC RBC AUTO-ENTMCNC: 33.5 % (ref 33–37)
MCV RBC AUTO: 89.3 FL (ref 82–100)
PDW BLD-RTO: 14 % (ref 11.5–14.5)
PLATELET # BLD: 211 K/UL (ref 130–400)
POTASSIUM SERPL-SCNC: 4.8 MEQ/L (ref 3.5–5.1)
RBC # BLD: 4.97 M/UL (ref 4.2–5.4)
SODIUM BLD-SCNC: 142 MEQ/L (ref 132–144)
WBC # BLD: 4.6 K/UL (ref 4.8–10.8)

## 2018-01-22 RX ORDER — AMLODIPINE BESYLATE 5 MG/1
TABLET ORAL
Qty: 90 TABLET | Refills: 2 | Status: SHIPPED | OUTPATIENT
Start: 2018-01-22 | End: 2018-12-11 | Stop reason: SDUPTHER

## 2018-03-06 ENCOUNTER — HOSPITAL ENCOUNTER (OUTPATIENT)
Dept: CARDIOLOGY | Age: 78
Discharge: HOME | End: 2018-03-06
Payer: MEDICARE

## 2018-03-06 PROCEDURE — 93280 PM DEVICE PROGR EVAL DUAL: CPT

## 2018-04-05 ENCOUNTER — OFFICE VISIT (OUTPATIENT)
Dept: FAMILY MEDICINE CLINIC | Age: 78
End: 2018-04-05
Payer: MEDICARE

## 2018-04-05 VITALS
WEIGHT: 190.6 LBS | DIASTOLIC BLOOD PRESSURE: 80 MMHG | BODY MASS INDEX: 31.75 KG/M2 | OXYGEN SATURATION: 98 % | SYSTOLIC BLOOD PRESSURE: 130 MMHG | HEIGHT: 65 IN | HEART RATE: 78 BPM

## 2018-04-05 DIAGNOSIS — I49.5 SICK SINUS SYNDROME (HCC): ICD-10-CM

## 2018-04-05 DIAGNOSIS — Z95.0 PACEMAKER: ICD-10-CM

## 2018-04-05 DIAGNOSIS — E03.9 HYPOTHYROIDISM, UNSPECIFIED TYPE: Primary | ICD-10-CM

## 2018-04-05 DIAGNOSIS — I48.91 ATRIAL FIBRILLATION, UNSPECIFIED TYPE (HCC): ICD-10-CM

## 2018-04-05 DIAGNOSIS — Z85.42 HISTORY OF UTERINE CANCER: ICD-10-CM

## 2018-04-05 DIAGNOSIS — E03.9 HYPOTHYROIDISM, UNSPECIFIED TYPE: ICD-10-CM

## 2018-04-05 LAB
T4 TOTAL: 6.3 UG/DL (ref 4.5–11.7)
TSH SERPL DL<=0.05 MIU/L-ACNC: 2.62 UIU/ML (ref 0.27–4.2)

## 2018-04-05 PROCEDURE — 1123F ACP DISCUSS/DSCN MKR DOCD: CPT | Performed by: FAMILY MEDICINE

## 2018-04-05 PROCEDURE — G8427 DOCREV CUR MEDS BY ELIG CLIN: HCPCS | Performed by: FAMILY MEDICINE

## 2018-04-05 PROCEDURE — 99213 OFFICE O/P EST LOW 20 MIN: CPT | Performed by: FAMILY MEDICINE

## 2018-04-05 PROCEDURE — G8400 PT W/DXA NO RESULTS DOC: HCPCS | Performed by: FAMILY MEDICINE

## 2018-04-05 PROCEDURE — 1036F TOBACCO NON-USER: CPT | Performed by: FAMILY MEDICINE

## 2018-04-05 PROCEDURE — 4040F PNEUMOC VAC/ADMIN/RCVD: CPT | Performed by: FAMILY MEDICINE

## 2018-04-05 PROCEDURE — G8417 CALC BMI ABV UP PARAM F/U: HCPCS | Performed by: FAMILY MEDICINE

## 2018-04-05 PROCEDURE — 1090F PRES/ABSN URINE INCON ASSESS: CPT | Performed by: FAMILY MEDICINE

## 2018-04-05 RX ORDER — LEVOTHYROXINE AND LIOTHYRONINE 19; 4.5 UG/1; UG/1
TABLET ORAL
Qty: 135 TABLET | Refills: 2 | Status: SHIPPED | OUTPATIENT
Start: 2018-04-05 | End: 2019-03-13 | Stop reason: SDUPTHER

## 2018-04-29 LAB
ALBUMIN SERPL-MCNC: 4.5 G/DL
ALP BLD-CCNC: 70 U/L
ALT SERPL-CCNC: 20 U/L
ANION GAP SERPL CALCULATED.3IONS-SCNC: 13 MMOL/L
AST SERPL-CCNC: 19 U/L
AVERAGE GLUCOSE: NORMAL
BILIRUB SERPL-MCNC: 0.3 MG/DL (ref 0.1–1.4)
BUN BLDV-MCNC: 34 MG/DL
CALCIUM SERPL-MCNC: 9.6 MG/DL
CHLORIDE BLD-SCNC: 96 MMOL/L
CHOLESTEROL, TOTAL: 266 MG/DL
CHOLESTEROL/HDL RATIO: ABNORMAL
CO2: 30 MMOL/L
CREAT SERPL-MCNC: 0.84 MG/DL
GFR CALCULATED: >60
GLUCOSE BLD-MCNC: 87 MG/DL
HBA1C MFR BLD: 5.6 %
HDLC SERPL-MCNC: 78 MG/DL (ref 35–70)
LDL CHOLESTEROL CALCULATED: 162 MG/DL (ref 0–160)
POTASSIUM SERPL-SCNC: 4.9 MMOL/L
SODIUM BLD-SCNC: 139 MMOL/L
TOTAL PROTEIN: 6.8
TRIGL SERPL-MCNC: 128 MG/DL
TSH SERPL DL<=0.05 MIU/L-ACNC: 2.22 UIU/ML
VLDLC SERPL CALC-MCNC: ABNORMAL MG/DL

## 2018-05-04 DIAGNOSIS — E03.9 HYPOTHYROIDISM, UNSPECIFIED TYPE: ICD-10-CM

## 2018-05-04 DIAGNOSIS — I10 ESSENTIAL HYPERTENSION: Primary | ICD-10-CM

## 2018-05-04 DIAGNOSIS — Z00.00 PHYSICAL EXAM: ICD-10-CM

## 2018-05-04 DIAGNOSIS — E78.5 HYPERLIPIDEMIA, UNSPECIFIED HYPERLIPIDEMIA TYPE: ICD-10-CM

## 2018-05-31 ENCOUNTER — OFFICE VISIT (OUTPATIENT)
Dept: FAMILY MEDICINE CLINIC | Age: 78
End: 2018-05-31
Payer: MEDICARE

## 2018-05-31 VITALS
OXYGEN SATURATION: 98 % | WEIGHT: 187 LBS | DIASTOLIC BLOOD PRESSURE: 76 MMHG | HEIGHT: 65 IN | HEART RATE: 62 BPM | BODY MASS INDEX: 31.16 KG/M2 | SYSTOLIC BLOOD PRESSURE: 130 MMHG

## 2018-05-31 DIAGNOSIS — L82.1 SEBORRHEIC KERATOSES: ICD-10-CM

## 2018-05-31 DIAGNOSIS — L91.8 INFLAMED SKIN TAG: Primary | ICD-10-CM

## 2018-05-31 DIAGNOSIS — L29.9 PRURITUS OF SKIN: ICD-10-CM

## 2018-05-31 PROCEDURE — 11200 RMVL SKIN TAGS UP TO&INC 15: CPT | Performed by: FAMILY MEDICINE

## 2018-05-31 RX ORDER — M-VIT,TX,IRON,MINS/CALC/FOLIC 27MG-0.4MG
1 TABLET ORAL DAILY
COMMUNITY

## 2018-05-31 RX ORDER — CALCIUM CARBONATE 500(1250)
500 TABLET ORAL DAILY
COMMUNITY
End: 2021-07-27 | Stop reason: ALTCHOICE

## 2018-05-31 ASSESSMENT — PATIENT HEALTH QUESTIONNAIRE - PHQ9
SUM OF ALL RESPONSES TO PHQ QUESTIONS 1-9: 0
2. FEELING DOWN, DEPRESSED OR HOPELESS: 0
1. LITTLE INTEREST OR PLEASURE IN DOING THINGS: 0
SUM OF ALL RESPONSES TO PHQ9 QUESTIONS 1 & 2: 0

## 2018-05-31 ASSESSMENT — ENCOUNTER SYMPTOMS
SHORTNESS OF BREATH: 0
ABDOMINAL PAIN: 0

## 2018-06-05 ENCOUNTER — HOSPITAL ENCOUNTER (OUTPATIENT)
Dept: CARDIOLOGY | Age: 78
Discharge: HOME | End: 2018-06-05
Payer: MEDICARE

## 2018-06-05 PROCEDURE — 93296 REM INTERROG EVL PM/IDS: CPT

## 2018-07-13 LAB
ALBUMIN SERPL-MCNC: 3.9 G/DL (ref 3.9–4.9)
ALP BLD-CCNC: 59 U/L (ref 40–130)
ALT SERPL-CCNC: 17 U/L (ref 0–33)
AST SERPL-CCNC: 16 U/L (ref 0–35)
BILIRUB SERPL-MCNC: 0.4 MG/DL (ref 0–1.2)
BILIRUBIN DIRECT: <0.2 MG/DL (ref 0–0.3)
BILIRUBIN, INDIRECT: NORMAL MG/DL (ref 0–0.6)
CHOLESTEROL, TOTAL: 240 MG/DL (ref 0–199)
HDLC SERPL-MCNC: 68 MG/DL (ref 40–59)
LDL CHOLESTEROL CALCULATED: 156 MG/DL (ref 0–129)
TOTAL PROTEIN: 6.8 G/DL (ref 6.4–8.1)
TRIGL SERPL-MCNC: 80 MG/DL (ref 0–200)

## 2018-09-04 ENCOUNTER — HOSPITAL ENCOUNTER (OUTPATIENT)
Dept: CARDIOLOGY | Age: 78
Discharge: HOME | End: 2018-09-04
Payer: MEDICARE

## 2018-09-04 PROCEDURE — 93280 PM DEVICE PROGR EVAL DUAL: CPT

## 2018-10-23 ENCOUNTER — OFFICE VISIT (OUTPATIENT)
Dept: FAMILY MEDICINE CLINIC | Age: 78
End: 2018-10-23
Payer: MEDICARE

## 2018-10-23 VITALS
BODY MASS INDEX: 30.56 KG/M2 | DIASTOLIC BLOOD PRESSURE: 80 MMHG | WEIGHT: 183.4 LBS | SYSTOLIC BLOOD PRESSURE: 130 MMHG | HEIGHT: 65 IN | HEART RATE: 83 BPM | OXYGEN SATURATION: 96 %

## 2018-10-23 DIAGNOSIS — G89.29 CHRONIC BILATERAL LOW BACK PAIN WITH BILATERAL SCIATICA: Primary | ICD-10-CM

## 2018-10-23 DIAGNOSIS — M54.42 CHRONIC BILATERAL LOW BACK PAIN WITH BILATERAL SCIATICA: Primary | ICD-10-CM

## 2018-10-23 DIAGNOSIS — M54.41 CHRONIC BILATERAL LOW BACK PAIN WITH BILATERAL SCIATICA: Primary | ICD-10-CM

## 2018-10-23 DIAGNOSIS — M25.362 KNEE GIVES OUT, LEFT: ICD-10-CM

## 2018-10-23 PROCEDURE — G8417 CALC BMI ABV UP PARAM F/U: HCPCS | Performed by: FAMILY MEDICINE

## 2018-10-23 PROCEDURE — 99213 OFFICE O/P EST LOW 20 MIN: CPT | Performed by: FAMILY MEDICINE

## 2018-10-23 PROCEDURE — 1090F PRES/ABSN URINE INCON ASSESS: CPT | Performed by: FAMILY MEDICINE

## 2018-10-23 PROCEDURE — G8400 PT W/DXA NO RESULTS DOC: HCPCS | Performed by: FAMILY MEDICINE

## 2018-10-23 PROCEDURE — G8427 DOCREV CUR MEDS BY ELIG CLIN: HCPCS | Performed by: FAMILY MEDICINE

## 2018-10-23 PROCEDURE — G8483 FLU IMM NO ADMIN DOC REA: HCPCS | Performed by: FAMILY MEDICINE

## 2018-10-23 PROCEDURE — 1123F ACP DISCUSS/DSCN MKR DOCD: CPT | Performed by: FAMILY MEDICINE

## 2018-10-23 PROCEDURE — 4040F PNEUMOC VAC/ADMIN/RCVD: CPT | Performed by: FAMILY MEDICINE

## 2018-10-23 PROCEDURE — 1036F TOBACCO NON-USER: CPT | Performed by: FAMILY MEDICINE

## 2018-10-23 PROCEDURE — 1101F PT FALLS ASSESS-DOCD LE1/YR: CPT | Performed by: FAMILY MEDICINE

## 2018-10-23 RX ORDER — CELECOXIB 200 MG/1
200 CAPSULE ORAL DAILY
Qty: 30 CAPSULE | Refills: 3 | Status: SHIPPED | OUTPATIENT
Start: 2018-10-23 | End: 2019-01-22

## 2018-10-25 ENCOUNTER — TELEPHONE (OUTPATIENT)
Dept: FAMILY MEDICINE CLINIC | Age: 78
End: 2018-10-25

## 2018-10-30 ENCOUNTER — TELEPHONE (OUTPATIENT)
Dept: FAMILY MEDICINE CLINIC | Age: 78
End: 2018-10-30

## 2018-10-30 DIAGNOSIS — I10 ESSENTIAL HYPERTENSION: Primary | ICD-10-CM

## 2018-11-02 DIAGNOSIS — I10 ESSENTIAL HYPERTENSION: ICD-10-CM

## 2018-11-02 LAB
ANION GAP SERPL CALCULATED.3IONS-SCNC: 10 MEQ/L (ref 7–13)
BUN BLDV-MCNC: 34 MG/DL (ref 8–23)
CALCIUM SERPL-MCNC: 9.7 MG/DL (ref 8.6–10.2)
CHLORIDE BLD-SCNC: 100 MEQ/L (ref 98–107)
CO2: 32 MEQ/L (ref 22–29)
CREAT SERPL-MCNC: 0.82 MG/DL (ref 0.5–0.9)
GFR AFRICAN AMERICAN: >60
GFR NON-AFRICAN AMERICAN: >60
GLUCOSE BLD-MCNC: 72 MG/DL (ref 74–109)
POTASSIUM SERPL-SCNC: 4.8 MEQ/L (ref 3.5–5.1)
SODIUM BLD-SCNC: 142 MEQ/L (ref 132–144)

## 2018-12-03 ENCOUNTER — HOSPITAL ENCOUNTER (OUTPATIENT)
Dept: CARDIOLOGY | Age: 78
Discharge: HOME | End: 2018-12-03
Payer: MEDICARE

## 2018-12-03 PROCEDURE — 93296 REM INTERROG EVL PM/IDS: CPT

## 2018-12-11 ENCOUNTER — OFFICE VISIT (OUTPATIENT)
Dept: FAMILY MEDICINE CLINIC | Age: 78
End: 2018-12-11
Payer: MEDICARE

## 2018-12-11 VITALS
WEIGHT: 168 LBS | HEIGHT: 65 IN | HEART RATE: 78 BPM | OXYGEN SATURATION: 98 % | DIASTOLIC BLOOD PRESSURE: 70 MMHG | SYSTOLIC BLOOD PRESSURE: 130 MMHG | BODY MASS INDEX: 27.99 KG/M2

## 2018-12-11 DIAGNOSIS — R53.83 FATIGUE, UNSPECIFIED TYPE: ICD-10-CM

## 2018-12-11 DIAGNOSIS — I10 ESSENTIAL HYPERTENSION: ICD-10-CM

## 2018-12-11 DIAGNOSIS — G89.29 CHRONIC PAIN OF LEFT KNEE: ICD-10-CM

## 2018-12-11 DIAGNOSIS — M20.41 HAMMER TOE OF RIGHT FOOT: ICD-10-CM

## 2018-12-11 DIAGNOSIS — B02.9 HERPES ZOSTER WITHOUT COMPLICATION: ICD-10-CM

## 2018-12-11 DIAGNOSIS — I48.91 ATRIAL FIBRILLATION, UNSPECIFIED TYPE (HCC): Primary | ICD-10-CM

## 2018-12-11 DIAGNOSIS — M62.830 BACK SPASM: ICD-10-CM

## 2018-12-11 DIAGNOSIS — M25.562 CHRONIC PAIN OF LEFT KNEE: ICD-10-CM

## 2018-12-11 PROCEDURE — 4040F PNEUMOC VAC/ADMIN/RCVD: CPT | Performed by: FAMILY MEDICINE

## 2018-12-11 PROCEDURE — 1123F ACP DISCUSS/DSCN MKR DOCD: CPT | Performed by: FAMILY MEDICINE

## 2018-12-11 PROCEDURE — 99213 OFFICE O/P EST LOW 20 MIN: CPT | Performed by: FAMILY MEDICINE

## 2018-12-11 PROCEDURE — 1090F PRES/ABSN URINE INCON ASSESS: CPT | Performed by: FAMILY MEDICINE

## 2018-12-11 PROCEDURE — G8417 CALC BMI ABV UP PARAM F/U: HCPCS | Performed by: FAMILY MEDICINE

## 2018-12-11 PROCEDURE — 1036F TOBACCO NON-USER: CPT | Performed by: FAMILY MEDICINE

## 2018-12-11 PROCEDURE — 96372 THER/PROPH/DIAG INJ SC/IM: CPT | Performed by: FAMILY MEDICINE

## 2018-12-11 PROCEDURE — G8427 DOCREV CUR MEDS BY ELIG CLIN: HCPCS | Performed by: FAMILY MEDICINE

## 2018-12-11 PROCEDURE — 1101F PT FALLS ASSESS-DOCD LE1/YR: CPT | Performed by: FAMILY MEDICINE

## 2018-12-11 PROCEDURE — G8400 PT W/DXA NO RESULTS DOC: HCPCS | Performed by: FAMILY MEDICINE

## 2018-12-11 PROCEDURE — G8483 FLU IMM NO ADMIN DOC REA: HCPCS | Performed by: FAMILY MEDICINE

## 2018-12-11 RX ORDER — METHOCARBAMOL 500 MG/1
500 TABLET, FILM COATED ORAL 2 TIMES DAILY
Qty: 60 TABLET | Refills: 5 | Status: SHIPPED | OUTPATIENT
Start: 2018-12-11 | End: 2019-01-22

## 2018-12-11 RX ORDER — CYANOCOBALAMIN 1000 UG/ML
1000 INJECTION INTRAMUSCULAR; SUBCUTANEOUS ONCE
Status: COMPLETED | OUTPATIENT
Start: 2018-12-11 | End: 2018-12-11

## 2018-12-11 RX ORDER — VALACYCLOVIR HYDROCHLORIDE 1 G/1
1000 TABLET, FILM COATED ORAL 3 TIMES DAILY
Qty: 21 TABLET | Refills: 0 | Status: SHIPPED | OUTPATIENT
Start: 2018-12-11 | End: 2018-12-18

## 2018-12-11 RX ORDER — AMLODIPINE BESYLATE 5 MG/1
TABLET ORAL
Qty: 90 TABLET | Refills: 2 | COMMUNITY
Start: 2018-12-11 | End: 2019-07-29 | Stop reason: SDUPTHER

## 2018-12-11 RX ADMIN — CYANOCOBALAMIN 1000 MCG: 1000 INJECTION INTRAMUSCULAR; SUBCUTANEOUS at 09:33

## 2019-01-22 ENCOUNTER — OFFICE VISIT (OUTPATIENT)
Dept: FAMILY MEDICINE CLINIC | Age: 79
End: 2019-01-22
Payer: MEDICARE

## 2019-01-22 VITALS
DIASTOLIC BLOOD PRESSURE: 64 MMHG | BODY MASS INDEX: 30.99 KG/M2 | WEIGHT: 186 LBS | SYSTOLIC BLOOD PRESSURE: 120 MMHG | OXYGEN SATURATION: 97 % | HEART RATE: 88 BPM | HEIGHT: 65 IN

## 2019-01-22 DIAGNOSIS — M20.41 HAMMER TOE OF RIGHT FOOT: ICD-10-CM

## 2019-01-22 DIAGNOSIS — G47.30 SLEEP APNEA, UNSPECIFIED TYPE: ICD-10-CM

## 2019-01-22 DIAGNOSIS — R43.1 ABNORMAL SMELL: ICD-10-CM

## 2019-01-22 DIAGNOSIS — M21.611 BUNION OF GREAT TOE OF RIGHT FOOT: ICD-10-CM

## 2019-01-22 DIAGNOSIS — I10 ESSENTIAL HYPERTENSION: ICD-10-CM

## 2019-01-22 DIAGNOSIS — M79.672 PAIN IN BOTH FEET: ICD-10-CM

## 2019-01-22 DIAGNOSIS — M79.671 PAIN IN BOTH FEET: ICD-10-CM

## 2019-01-22 DIAGNOSIS — Z85.42 HISTORY OF UTERINE CANCER: ICD-10-CM

## 2019-01-22 DIAGNOSIS — E78.5 HYPERLIPIDEMIA, UNSPECIFIED HYPERLIPIDEMIA TYPE: ICD-10-CM

## 2019-01-22 DIAGNOSIS — T73.2XXS FATIGUE DUE TO EXPOSURE, SEQUELA: ICD-10-CM

## 2019-01-22 DIAGNOSIS — I49.5 SICK SINUS SYNDROME (HCC): ICD-10-CM

## 2019-01-22 DIAGNOSIS — R53.83 FATIGUE, UNSPECIFIED TYPE: ICD-10-CM

## 2019-01-22 DIAGNOSIS — E03.9 HYPOTHYROIDISM, UNSPECIFIED TYPE: ICD-10-CM

## 2019-01-22 DIAGNOSIS — I48.91 ATRIAL FIBRILLATION, UNSPECIFIED TYPE (HCC): Primary | ICD-10-CM

## 2019-01-22 PROCEDURE — 4040F PNEUMOC VAC/ADMIN/RCVD: CPT | Performed by: FAMILY MEDICINE

## 2019-01-22 PROCEDURE — 1036F TOBACCO NON-USER: CPT | Performed by: FAMILY MEDICINE

## 2019-01-22 PROCEDURE — G8483 FLU IMM NO ADMIN DOC REA: HCPCS | Performed by: FAMILY MEDICINE

## 2019-01-22 PROCEDURE — 1101F PT FALLS ASSESS-DOCD LE1/YR: CPT | Performed by: FAMILY MEDICINE

## 2019-01-22 PROCEDURE — G8400 PT W/DXA NO RESULTS DOC: HCPCS | Performed by: FAMILY MEDICINE

## 2019-01-22 PROCEDURE — 99214 OFFICE O/P EST MOD 30 MIN: CPT | Performed by: FAMILY MEDICINE

## 2019-01-22 PROCEDURE — G8417 CALC BMI ABV UP PARAM F/U: HCPCS | Performed by: FAMILY MEDICINE

## 2019-01-22 PROCEDURE — 1123F ACP DISCUSS/DSCN MKR DOCD: CPT | Performed by: FAMILY MEDICINE

## 2019-01-22 PROCEDURE — 1090F PRES/ABSN URINE INCON ASSESS: CPT | Performed by: FAMILY MEDICINE

## 2019-01-22 PROCEDURE — G8427 DOCREV CUR MEDS BY ELIG CLIN: HCPCS | Performed by: FAMILY MEDICINE

## 2019-01-25 LAB
ALBUMIN SERPL-MCNC: 3.9 G/DL (ref 3.9–4.9)
ALP BLD-CCNC: 61 U/L (ref 40–130)
ALT SERPL-CCNC: 18 U/L (ref 0–33)
ANION GAP SERPL CALCULATED.3IONS-SCNC: 11 MEQ/L (ref 7–13)
AST SERPL-CCNC: 17 U/L (ref 0–35)
BILIRUB SERPL-MCNC: 0.4 MG/DL (ref 0–1.2)
BILIRUBIN DIRECT: <0.2 MG/DL (ref 0–0.3)
BILIRUBIN, INDIRECT: NORMAL MG/DL (ref 0–0.6)
BUN BLDV-MCNC: 31 MG/DL (ref 8–23)
CALCIUM SERPL-MCNC: 9.5 MG/DL (ref 8.6–10.2)
CHLORIDE BLD-SCNC: 101 MEQ/L (ref 98–107)
CHOLESTEROL, TOTAL: 230 MG/DL (ref 0–199)
CO2: 30 MEQ/L (ref 22–29)
CREAT SERPL-MCNC: 0.79 MG/DL (ref 0.5–0.9)
GFR AFRICAN AMERICAN: >60
GFR NON-AFRICAN AMERICAN: >60
GLUCOSE BLD-MCNC: 58 MG/DL (ref 74–109)
HCT VFR BLD CALC: 40.6 % (ref 37–47)
HDLC SERPL-MCNC: 70 MG/DL (ref 40–59)
HEMOGLOBIN: 14.1 G/DL (ref 12–16)
LDL CHOLESTEROL CALCULATED: 138 MG/DL (ref 0–129)
MCH RBC QN AUTO: 31.4 PG (ref 27–31.3)
MCHC RBC AUTO-ENTMCNC: 34.9 % (ref 33–37)
MCV RBC AUTO: 90 FL (ref 82–100)
PDW BLD-RTO: 14.5 % (ref 11.5–14.5)
PLATELET # BLD: 199 K/UL (ref 130–400)
POTASSIUM SERPL-SCNC: 4.7 MEQ/L (ref 3.5–5.1)
RBC # BLD: 4.51 M/UL (ref 4.2–5.4)
SODIUM BLD-SCNC: 142 MEQ/L (ref 132–144)
TOTAL PROTEIN: 6.7 G/DL (ref 6.4–8.1)
TRIGL SERPL-MCNC: 111 MG/DL (ref 0–200)
WBC # BLD: 4.4 K/UL (ref 4.8–10.8)

## 2019-01-28 RX ORDER — AMLODIPINE BESYLATE 5 MG/1
TABLET ORAL
Qty: 90 TABLET | Refills: 1 | Status: SHIPPED | OUTPATIENT
Start: 2019-01-28 | End: 2019-02-08 | Stop reason: DRUGHIGH

## 2019-02-08 ENCOUNTER — OFFICE VISIT (OUTPATIENT)
Dept: FAMILY MEDICINE CLINIC | Age: 79
End: 2019-02-08
Payer: MEDICARE

## 2019-02-08 VITALS
TEMPERATURE: 98.7 F | BODY MASS INDEX: 31.49 KG/M2 | DIASTOLIC BLOOD PRESSURE: 64 MMHG | WEIGHT: 189 LBS | SYSTOLIC BLOOD PRESSURE: 112 MMHG | HEIGHT: 65 IN | HEART RATE: 78 BPM | OXYGEN SATURATION: 98 %

## 2019-02-08 DIAGNOSIS — N30.00 ACUTE CYSTITIS WITHOUT HEMATURIA: Primary | ICD-10-CM

## 2019-02-08 DIAGNOSIS — N30.00 ACUTE CYSTITIS WITHOUT HEMATURIA: ICD-10-CM

## 2019-02-08 LAB
BILIRUBIN, POC: NORMAL
BLOOD URINE, POC: NORMAL
CLARITY, POC: NORMAL
COLOR, POC: NORMAL
GLUCOSE URINE, POC: NORMAL
KETONES, POC: NORMAL
LEUKOCYTE EST, POC: NORMAL
NITRITE, POC: NORMAL
PH, POC: 5
PROTEIN, POC: NORMAL
SPECIFIC GRAVITY, POC: 1.01
UROBILINOGEN, POC: 0.2

## 2019-02-08 PROCEDURE — 81002 URINALYSIS NONAUTO W/O SCOPE: CPT | Performed by: NURSE PRACTITIONER

## 2019-02-08 PROCEDURE — 4040F PNEUMOC VAC/ADMIN/RCVD: CPT | Performed by: NURSE PRACTITIONER

## 2019-02-08 PROCEDURE — G8427 DOCREV CUR MEDS BY ELIG CLIN: HCPCS | Performed by: NURSE PRACTITIONER

## 2019-02-08 PROCEDURE — 1036F TOBACCO NON-USER: CPT | Performed by: NURSE PRACTITIONER

## 2019-02-08 PROCEDURE — 99213 OFFICE O/P EST LOW 20 MIN: CPT | Performed by: NURSE PRACTITIONER

## 2019-02-08 PROCEDURE — G8483 FLU IMM NO ADMIN DOC REA: HCPCS | Performed by: NURSE PRACTITIONER

## 2019-02-08 PROCEDURE — 1090F PRES/ABSN URINE INCON ASSESS: CPT | Performed by: NURSE PRACTITIONER

## 2019-02-08 PROCEDURE — G8510 SCR DEP NEG, NO PLAN REQD: HCPCS | Performed by: NURSE PRACTITIONER

## 2019-02-08 PROCEDURE — 1101F PT FALLS ASSESS-DOCD LE1/YR: CPT | Performed by: NURSE PRACTITIONER

## 2019-02-08 PROCEDURE — G8417 CALC BMI ABV UP PARAM F/U: HCPCS | Performed by: NURSE PRACTITIONER

## 2019-02-08 PROCEDURE — 1123F ACP DISCUSS/DSCN MKR DOCD: CPT | Performed by: NURSE PRACTITIONER

## 2019-02-08 PROCEDURE — G8400 PT W/DXA NO RESULTS DOC: HCPCS | Performed by: NURSE PRACTITIONER

## 2019-02-08 RX ORDER — NITROFURANTOIN 25; 75 MG/1; MG/1
100 CAPSULE ORAL 2 TIMES DAILY
Qty: 14 CAPSULE | Refills: 0 | Status: SHIPPED | OUTPATIENT
Start: 2019-02-08 | End: 2019-02-15

## 2019-02-08 ASSESSMENT — ENCOUNTER SYMPTOMS
SHORTNESS OF BREATH: 0
COUGH: 0

## 2019-02-08 ASSESSMENT — PATIENT HEALTH QUESTIONNAIRE - PHQ9
2. FEELING DOWN, DEPRESSED OR HOPELESS: 0
SUM OF ALL RESPONSES TO PHQ9 QUESTIONS 1 & 2: 0
1. LITTLE INTEREST OR PLEASURE IN DOING THINGS: 0
SUM OF ALL RESPONSES TO PHQ QUESTIONS 1-9: 0
SUM OF ALL RESPONSES TO PHQ QUESTIONS 1-9: 0

## 2019-02-10 LAB — URINE CULTURE, ROUTINE: NORMAL

## 2019-02-12 ENCOUNTER — NURSE ONLY (OUTPATIENT)
Dept: FAMILY MEDICINE CLINIC | Age: 79
End: 2019-02-12
Payer: MEDICARE

## 2019-02-12 DIAGNOSIS — N39.0 URINARY TRACT INFECTION WITHOUT HEMATURIA, SITE UNSPECIFIED: Primary | ICD-10-CM

## 2019-02-12 DIAGNOSIS — N39.0 URINARY TRACT INFECTION WITHOUT HEMATURIA, SITE UNSPECIFIED: ICD-10-CM

## 2019-02-12 LAB
BILIRUBIN, POC: NORMAL
BLOOD URINE, POC: NORMAL
CLARITY, POC: CLEAR
COLOR, POC: YELLOW
GLUCOSE URINE, POC: NORMAL
KETONES, POC: NORMAL
LEUKOCYTE EST, POC: NORMAL
NITRITE, POC: NORMAL
PH, POC: 7
PROTEIN, POC: NORMAL
SPECIFIC GRAVITY, POC: 1.01
UROBILINOGEN, POC: 0.2

## 2019-02-12 PROCEDURE — 81002 URINALYSIS NONAUTO W/O SCOPE: CPT | Performed by: FAMILY MEDICINE

## 2019-02-14 LAB — URINE CULTURE, ROUTINE: NORMAL

## 2019-03-04 ENCOUNTER — HOSPITAL ENCOUNTER (OUTPATIENT)
Dept: CARDIOLOGY | Age: 79
Discharge: HOME | End: 2019-03-04
Payer: MEDICARE

## 2019-03-04 PROCEDURE — 93280 PM DEVICE PROGR EVAL DUAL: CPT

## 2019-03-13 DIAGNOSIS — E03.9 HYPOTHYROIDISM, UNSPECIFIED TYPE: ICD-10-CM

## 2019-03-13 RX ORDER — LEVOTHYROXINE AND LIOTHYRONINE 19; 4.5 UG/1; UG/1
TABLET ORAL
Qty: 135 TABLET | Refills: 2 | Status: SHIPPED | OUTPATIENT
Start: 2019-03-13 | End: 2020-01-29 | Stop reason: SDUPTHER

## 2019-03-21 PROBLEM — C18.4 MALIGNANT NEOPLASM OF TRANSVERSE COLON (HCC): Status: ACTIVE | Noted: 2019-03-21

## 2019-03-28 DIAGNOSIS — C18.4 MALIGNANT NEOPLASM OF TRANSVERSE COLON (HCC): ICD-10-CM

## 2019-03-28 LAB
ALBUMIN SERPL-MCNC: 4 G/DL (ref 3.5–4.6)
ALP BLD-CCNC: 61 U/L (ref 40–130)
ALT SERPL-CCNC: 17 U/L (ref 0–33)
ANION GAP SERPL CALCULATED.3IONS-SCNC: 15 MEQ/L (ref 9–15)
AST SERPL-CCNC: 19 U/L (ref 0–35)
BILIRUB SERPL-MCNC: 0.3 MG/DL (ref 0.2–0.7)
BUN BLDV-MCNC: 32 MG/DL (ref 8–23)
CALCIUM SERPL-MCNC: 9.7 MG/DL (ref 8.5–9.9)
CEA: 1.3 NG/ML (ref 0–5.5)
CHLORIDE BLD-SCNC: 99 MEQ/L (ref 95–107)
CO2: 25 MEQ/L (ref 20–31)
CREAT SERPL-MCNC: 0.96 MG/DL (ref 0.5–0.9)
GFR AFRICAN AMERICAN: >60
GFR NON-AFRICAN AMERICAN: 56.2
GLOBULIN: 2.6 G/DL (ref 2.3–3.5)
GLUCOSE BLD-MCNC: 111 MG/DL (ref 70–99)
POTASSIUM SERPL-SCNC: 4.7 MEQ/L (ref 3.4–4.9)
SODIUM BLD-SCNC: 139 MEQ/L (ref 135–144)
TOTAL PROTEIN: 6.6 G/DL (ref 6.3–8)

## 2019-04-09 LAB
ALBUMIN SERPL-MCNC: 3.9 G/DL
ALP BLD-CCNC: 64 U/L
ALT SERPL-CCNC: 17 U/L
ANION GAP SERPL CALCULATED.3IONS-SCNC: NORMAL MMOL/L
AST SERPL-CCNC: 19 U/L
AVERAGE GLUCOSE: NORMAL
BILIRUB SERPL-MCNC: 0.3 MG/DL (ref 0.1–1.4)
BUN BLDV-MCNC: 27 MG/DL
CALCIUM SERPL-MCNC: 9.8 MG/DL
CHLORIDE BLD-SCNC: 98 MMOL/L
CHOLESTEROL, TOTAL: 240 MG/DL
CHOLESTEROL/HDL RATIO: NORMAL
CO2: 28 MMOL/L
CREAT SERPL-MCNC: 0.8 MG/DL
GFR CALCULATED: >60
GLUCOSE BLD-MCNC: 94 MG/DL
HBA1C MFR BLD: 5.5 %
HDLC SERPL-MCNC: 67 MG/DL (ref 35–70)
LDL CHOLESTEROL CALCULATED: 150 MG/DL (ref 0–160)
POTASSIUM SERPL-SCNC: 4.4 MMOL/L
SODIUM BLD-SCNC: 140 MMOL/L
TOTAL PROTEIN: 6.8
TRIGL SERPL-MCNC: 114 MG/DL
TSH SERPL DL<=0.05 MIU/L-ACNC: 1.88 UIU/ML
VLDLC SERPL CALC-MCNC: NORMAL MG/DL

## 2019-04-15 DIAGNOSIS — Z12.31 ENCOUNTER FOR SCREENING MAMMOGRAM FOR MALIGNANT NEOPLASM OF BREAST: ICD-10-CM

## 2019-04-15 DIAGNOSIS — I10 ESSENTIAL HYPERTENSION: ICD-10-CM

## 2019-04-15 DIAGNOSIS — E02 SUBCLINICAL IODINE-DEFICIENCY HYPOTHYROIDISM: Primary | ICD-10-CM

## 2019-04-15 DIAGNOSIS — C18.4 MALIGNANT NEOPLASM OF TRANSVERSE COLON (HCC): ICD-10-CM

## 2019-04-15 DIAGNOSIS — Z79.899 OTHER LONG TERM (CURRENT) DRUG THERAPY: ICD-10-CM

## 2019-04-15 DIAGNOSIS — E78.2 MIXED HYPERLIPIDEMIA: ICD-10-CM

## 2019-05-15 ENCOUNTER — OFFICE VISIT (OUTPATIENT)
Dept: FAMILY MEDICINE CLINIC | Age: 79
End: 2019-05-15
Payer: MEDICARE

## 2019-05-15 VITALS
HEART RATE: 89 BPM | DIASTOLIC BLOOD PRESSURE: 58 MMHG | HEIGHT: 65 IN | OXYGEN SATURATION: 98 % | BODY MASS INDEX: 30.66 KG/M2 | TEMPERATURE: 98.7 F | WEIGHT: 184 LBS | SYSTOLIC BLOOD PRESSURE: 100 MMHG

## 2019-05-15 DIAGNOSIS — D49.2 ABNORMAL SKIN GROWTH: Primary | ICD-10-CM

## 2019-05-15 DIAGNOSIS — D49.2 ABNORMAL SKIN GROWTH: ICD-10-CM

## 2019-05-15 PROCEDURE — 56605 BIOPSY OF VULVA/PERINEUM: CPT | Performed by: FAMILY MEDICINE

## 2019-05-15 ASSESSMENT — ENCOUNTER SYMPTOMS
ABDOMINAL PAIN: 0
CONSTIPATION: 0
NAUSEA: 0
EYE DISCHARGE: 0
ABDOMINAL DISTENTION: 0
SHORTNESS OF BREATH: 0
COLOR CHANGE: 0
COUGH: 0
VOICE CHANGE: 0
TROUBLE SWALLOWING: 0
DIARRHEA: 0

## 2019-05-15 NOTE — PROGRESS NOTES
Pt has multple lesions in genital area that are firm and growing. Dose not recall pain, swelling, drainage. Other   Pertinent negatives include no abdominal pain, chest pain, congestion, coughing, fatigue, joint swelling, nausea, rash or weakness. Current Outpatient Medications on File Prior to Visit   Medication Sig Dispense Refill    thyroid (ARMOUR THYROID) 30 MG tablet take 1 & 1/2 tablets (45mg) by mouth once daily on Sun, Mon, Wed, Fri. Take 1 tablet (30mg) once daily on Tue, Thur, and Sat 135 tablet 2    Handicap Placard MISC by Does not apply route Exp 5 years 1 each 0    amLODIPine (NORVASC) 5 MG tablet TAKE 1/2 TABLET BY MOUTH ONCE DAILY 90 tablet 2    Multiple Vitamins-Minerals (THERAPEUTIC MULTIVITAMIN-MINERALS) tablet Take 1 tablet by mouth daily      calcium carbonate (OSCAL) 500 MG TABS tablet Take 500 mg by mouth daily Take 2 pills daily      b complex vitamins capsule Take 1 capsule by mouth daily      MAGNESIUM PO Take 250 mg by mouth       Glucosamine-Chondroitin (GLUCOSAMINE CHONDR COMPLEX PO) Take 2 tablets by mouth daily       aspirin 81 MG tablet Take 81 mg by mouth daily      L-Tryptophan 500 MG TABS Take 500 mg by mouth daily At hs       sotalol (BETAPACE) 80 MG tablet 1/2 tablet bid  3    indapamide (LOZOL) 1.25 MG tablet Take 1.25 mg by mouth daily        No current facility-administered medications on file prior to visit. Hydralazine; Influenza vaccines; Cortisone; Coumadin [warfarin sodium]; Hydrolase; Levothyroxine; and Pneumococcal vaccine    Review of Systems   Constitutional: Negative for activity change, appetite change, fatigue and unexpected weight change. HENT: Negative for congestion, dental problem, trouble swallowing and voice change. Eyes: Negative for discharge and visual disturbance. Respiratory: Negative for cough and shortness of breath. Cardiovascular: Negative for chest pain.    Gastrointestinal: Negative for abdominal distention, abdominal pain, constipation, diarrhea and nausea. Endocrine: Negative for polydipsia and polyuria. Genitourinary: Negative for dysuria and urgency. Musculoskeletal: Negative for gait problem and joint swelling. Skin: Negative for color change and rash. Allergic/Immunologic: Negative for environmental allergies and food allergies. Neurological: Negative for speech difficulty and weakness. Hematological: Does not bruise/bleed easily. Psychiatric/Behavioral: Negative for agitation and behavioral problems. BP (!) 100/58   Pulse 89   Temp 98.7 °F (37.1 °C)   Ht 5' 5\" (1.651 m)   Wt 184 lb (83.5 kg)   LMP 01/01/1996   SpO2 98%   BMI 30.62 kg/m²   Physical Exam   Constitutional: Vital signs are normal. She appears well-developed and well-nourished. Non-toxic appearance. No distress. HENT:   Head: Normocephalic and atraumatic. Right Ear: Hearing and tympanic membrane normal.   Left Ear: Hearing and tympanic membrane normal.   Nose: Nose normal. No nasal deformity. Mouth/Throat: Oropharynx is clear and moist and mucous membranes are normal.   Eyes: Pupils are equal, round, and reactive to light. Conjunctivae, EOM and lids are normal. Right eye exhibits no discharge. Left eye exhibits no discharge. Neck: Trachea normal, full passive range of motion without pain and phonation normal. No JVD present. No thyroid mass and no thyromegaly present. Cardiovascular: Normal rate and regular rhythm. Pulmonary/Chest: No accessory muscle usage. No respiratory distress. Abdominal: Normal appearance. Musculoskeletal:   FROM all large muscle groups and joints as witnessed when walking to exam table, getting on, and getting off the exam table. Neurological: She is alert. She displays no atrophy and no tremor. Gait normal.   Skin: Skin is warm, dry and intact. No rash noted. R and L labia majora have raised lesions varying in size with flat tops, flesh colored, no obvious flaking.  Firm in texture, no fluctuance or erythema. Almost resemble flat warts    A large lesion on the R labia 3 x 5 mm is sampled for biospy   Psychiatric: She has a normal mood and affect. Her speech is normal and behavior is normal. Thought content normal.       CONSENT:  The procedure was discussed with the patient. All questions were answered and alternative options discussed. The patient is aware of the risks of bleeding, infection,unsatisfactory scar result. Informed consent paperwork was signed by the patient. PROCEDURE:    Area was prepped with iodine swabs  1 cc of 1% Lidocaine with epi used for anesthesia. Dermablade scalpel was used to remove the lesion. The lesion size noted in physical exam and the excision size is 5 x 7  3-0 ethilon x 2 of interrupted simple sutures were used to close the lesion. Zero mattress sutures placed. Complete hemostasis is achieved with < 1 cc EBL. ASSESSMENT AND PLAN:   Diagnosis Orders   1. Abnormal skin growth  CT SHAV SKIN LES <5MM REMAINDR BODY    Specimen to Pathology Outpatient       Return in about 1 week (around 5/22/2019) for procedure appt.       DO NOT USE TRIPLE ANTIBIOTIC OINTMENT    Orders Placed This Encounter   Procedures    Specimen to Pathology Outpatient     Margins requested on all specimens  R/O  Infected cyst  Location  r labia majora     Standing Status:   Future     Standing Expiration Date:   5/15/2020     Order Specific Question:   PREVIOUS BIOPSY     Answer:   No     Order Specific Question:   PREOP DIAGNOSIS     Answer:   abnormal skin growth     Order Specific Question:   FROZEN SECTION - NO OR YES/SPECIMEN     Answer:   No    CT SHAV SKIN LES <5MM REMAINDR BODY     No orders of the defined types were placed in this encounter.      -Clean surgical area with antibacterial soap and water once daily.    -Keep surgical site moist with vaseline or antibiotic ointment (single, not triple) and apply a fresh bandage daily until a solid scab forms or if the wound is at risk for trauma or dirt.   -Follow up immediately if any growing redness (minimal redness or pale pink is normal along wound edges) surrounds the surgical site or if dripping drainage occurs at surgical site. Once a solid scab forms no more bandage needed.   -Once the lesion is healed be sure to apply sunscreen to the area to prevent burn of the newer and more delicate skin during the first 6 months of healing.    -If the scar begins to be raised you may massage the area firmly twice a day to help break down scar tissue and help the area become a flat scar. There is some evidence that Mederma applied to a scar daily for the first few months can help shrink and fade it more quickly then without intervention. Post op wound instructions have been given to the patient. Bacitracin ointment samples have been given to the patient. Patient Instructions     Patient Education        Skin Lesion Removal: What to Expect at Home  Your Recovery  After your procedure, you should not have much pain. But some soreness, swelling, or bruising is normal. Your doctor may recommend over-the-counter medicines to help with any discomfort. Most people can return to their normal routine the same day of their procedure. How quickly your wound heals depends on the size of your wound and the type of procedure you had. Most wounds take 1 to 3 weeks to heal. If you had laser surgery, your skin may change color and then slowly return to its normal color. You may need only a bandage, or you may need stitches. If you had stitches, your doctor will probably remove them 5 to 14 days later. If you have the type of stitches that dissolve, they do not have to be removed. They will disappear on their own. This care sheet gives you a general idea about how long it will take for you to recover. But each person recovers at a different pace. Follow the steps below to get better as quickly as possible.   How can you care for yourself at home? Activity    · For the first few days, try not to bump or knock your wound.     · Depending on where your wound is, you may need to avoid strenuous exercise for 2 weeks after the procedure or until your doctor says it is okay.     · If you have had a lesion removed from your face, do not use makeup near your wound until you have your stitches taken out.     · Ask your doctor when it is okay to shower, bathe, or swim. Medicines    · Your doctor will tell you if and when you can restart your medicines. He or she will also give you instructions about taking any new medicines.     · If you take blood thinners, such as warfarin (Coumadin), clopidogrel (Plavix), or aspirin, be sure to talk to your doctor. He or she will tell you if and when to start taking those medicines again. Make sure that you understand exactly what your doctor wants you to do.     · Be safe with medicines. Take pain medicines exactly as directed. ? If the doctor gave you a prescription medicine for pain, take it as prescribed. ? If you are not taking a prescription pain medicine, ask your doctor if you can take an over-the-counter medicine.    Wound care    · If your doctor told you how to care for your incision, follow your doctor's instructions. If you did not get instructions, follow this general advice:  ? Keep the wound bandaged and dry for the first day. ? After the first 24 to 48 hours, wash around the wound with clean water 2 times a day. Don't use hydrogen peroxide or alcohol, which can slow healing. ? You may cover the wound with a thin layer of petroleum jelly, such as Vaseline, and a nonstick bandage. ? Apply more petroleum jelly and replace the bandage as needed.     · If you have stitches, you may get other instructions.     · If a scab forms, do not pull it off. Let it fall off on its own. Wounds heal faster if no scab forms. Washing the area every day and using petroleum jelly will help prevent a scab from forming.   · If the wound bleeds, put direct pressure on it with a clean cloth until the bleeding stops.     · If you had a growth \"frozen\" off, you may get a blister. Do not break it. Let it dry up on its own. It is common for the blister to fill with blood. You do not need to do anything about this, but if it becomes too painful, call your doctor.     · Avoid the sun until your stitches are removed. Follow-up care is a key part of your treatment and safety. Be sure to make and go to all appointments, and call your doctor if you are having problems. It's also a good idea to know your test results and keep a list of the medicines you take. When should you call for help? Call 911 anytime you think you may need emergency care. For example, call if:    · You passed out (lost consciousness).     · You have severe trouble breathing.     · You have sudden chest pain and shortness of breath, or you cough up blood.    Call your doctor now or seek immediate medical care if:    · You have symptoms of a blood clot in your leg (called a deep vein thrombosis), such as:  ? Pain in the calf, back of the knee, thigh, or groin. ? Redness and swelling in your leg or groin.     · You have signs of infection, such as:  ? Increased pain, swelling, warmth, or redness. ? Red streaks leading from the wound. ? Pus draining from the wound. ? A fever.     · You have pain that does not get better after you take pain medicine.     · You have loose stitches.    Watch closely for changes in your health, and be sure to contact your doctor if you have any problems. Where can you learn more? Go to https://[employer]pepiceweb.[employer]. org and sign in to your [employer] account. Enter Q228 in the [employer]. box to learn more about \"Skin Lesion Removal: What to Expect at Home. \"     If you do not have an account, please click on the \"Sign Up Now\" link.   Current as of: April 17, 2018  Content Version: 12.0  © 9084-9294 Healthwise,

## 2019-05-15 NOTE — PATIENT INSTRUCTIONS
Patient Education        Skin Lesion Removal: What to Expect at Home  Your Recovery  After your procedure, you should not have much pain. But some soreness, swelling, or bruising is normal. Your doctor may recommend over-the-counter medicines to help with any discomfort. Most people can return to their normal routine the same day of their procedure. How quickly your wound heals depends on the size of your wound and the type of procedure you had. Most wounds take 1 to 3 weeks to heal. If you had laser surgery, your skin may change color and then slowly return to its normal color. You may need only a bandage, or you may need stitches. If you had stitches, your doctor will probably remove them 5 to 14 days later. If you have the type of stitches that dissolve, they do not have to be removed. They will disappear on their own. This care sheet gives you a general idea about how long it will take for you to recover. But each person recovers at a different pace. Follow the steps below to get better as quickly as possible. How can you care for yourself at home? Activity    · For the first few days, try not to bump or knock your wound.     · Depending on where your wound is, you may need to avoid strenuous exercise for 2 weeks after the procedure or until your doctor says it is okay.     · If you have had a lesion removed from your face, do not use makeup near your wound until you have your stitches taken out.     · Ask your doctor when it is okay to shower, bathe, or swim. Medicines    · Your doctor will tell you if and when you can restart your medicines. He or she will also give you instructions about taking any new medicines.     · If you take blood thinners, such as warfarin (Coumadin), clopidogrel (Plavix), or aspirin, be sure to talk to your doctor. He or she will tell you if and when to start taking those medicines again.  Make sure that you understand exactly what your doctor wants you to do.     · Be safe with medicines. Take pain medicines exactly as directed. ? If the doctor gave you a prescription medicine for pain, take it as prescribed. ? If you are not taking a prescription pain medicine, ask your doctor if you can take an over-the-counter medicine.    Wound care    · If your doctor told you how to care for your incision, follow your doctor's instructions. If you did not get instructions, follow this general advice:  ? Keep the wound bandaged and dry for the first day. ? After the first 24 to 48 hours, wash around the wound with clean water 2 times a day. Don't use hydrogen peroxide or alcohol, which can slow healing. ? You may cover the wound with a thin layer of petroleum jelly, such as Vaseline, and a nonstick bandage. ? Apply more petroleum jelly and replace the bandage as needed.     · If you have stitches, you may get other instructions.     · If a scab forms, do not pull it off. Let it fall off on its own. Wounds heal faster if no scab forms. Washing the area every day and using petroleum jelly will help prevent a scab from forming.     · If the wound bleeds, put direct pressure on it with a clean cloth until the bleeding stops.     · If you had a growth \"frozen\" off, you may get a blister. Do not break it. Let it dry up on its own. It is common for the blister to fill with blood. You do not need to do anything about this, but if it becomes too painful, call your doctor.     · Avoid the sun until your stitches are removed. Follow-up care is a key part of your treatment and safety. Be sure to make and go to all appointments, and call your doctor if you are having problems. It's also a good idea to know your test results and keep a list of the medicines you take. When should you call for help? Call 911 anytime you think you may need emergency care.  For example, call if:    · You passed out (lost consciousness).     · You have severe trouble breathing.     · You have sudden chest pain and shortness of breath, or you cough up blood.    Call your doctor now or seek immediate medical care if:    · You have symptoms of a blood clot in your leg (called a deep vein thrombosis), such as:  ? Pain in the calf, back of the knee, thigh, or groin. ? Redness and swelling in your leg or groin.     · You have signs of infection, such as:  ? Increased pain, swelling, warmth, or redness. ? Red streaks leading from the wound. ? Pus draining from the wound. ? A fever.     · You have pain that does not get better after you take pain medicine.     · You have loose stitches.    Watch closely for changes in your health, and be sure to contact your doctor if you have any problems. Where can you learn more? Go to https://[employer]. org and sign in to your [employer] account. Enter Q228 in the [employer] box to learn more about \"Skin Lesion Removal: What to Expect at Home. \"     If you do not have an account, please click on the \"Sign Up Now\" link. Current as of: April 17, 2018  Content Version: 12.0  © 7372-2168 Healthwise, [employer]. Care instructions adapted under license by [facility] ([facility] [place]). If you have questions about a medical condition or this instruction, always ask your healthcare professional. [address] any warranty or liability for your use of this information.

## 2019-05-22 ENCOUNTER — PROCEDURE VISIT (OUTPATIENT)
Dept: FAMILY MEDICINE CLINIC | Age: 79
End: 2019-05-22
Payer: MEDICARE

## 2019-05-22 VITALS
OXYGEN SATURATION: 98 % | TEMPERATURE: 97.9 F | HEART RATE: 83 BPM | WEIGHT: 184 LBS | DIASTOLIC BLOOD PRESSURE: 68 MMHG | BODY MASS INDEX: 30.66 KG/M2 | HEIGHT: 65 IN | SYSTOLIC BLOOD PRESSURE: 118 MMHG

## 2019-05-22 DIAGNOSIS — L82.1 SEBORRHEIC KERATOSES: ICD-10-CM

## 2019-05-22 DIAGNOSIS — D49.2 ABNORMAL SKIN GROWTH: ICD-10-CM

## 2019-05-22 DIAGNOSIS — L72.0 EPIDERMOID CYST: Primary | ICD-10-CM

## 2019-05-22 PROCEDURE — G8417 CALC BMI ABV UP PARAM F/U: HCPCS | Performed by: FAMILY MEDICINE

## 2019-05-22 PROCEDURE — 1036F TOBACCO NON-USER: CPT | Performed by: FAMILY MEDICINE

## 2019-05-22 PROCEDURE — 1123F ACP DISCUSS/DSCN MKR DOCD: CPT | Performed by: FAMILY MEDICINE

## 2019-05-22 PROCEDURE — 1090F PRES/ABSN URINE INCON ASSESS: CPT | Performed by: FAMILY MEDICINE

## 2019-05-22 PROCEDURE — 99213 OFFICE O/P EST LOW 20 MIN: CPT | Performed by: FAMILY MEDICINE

## 2019-05-22 PROCEDURE — G8400 PT W/DXA NO RESULTS DOC: HCPCS | Performed by: FAMILY MEDICINE

## 2019-05-22 PROCEDURE — G8427 DOCREV CUR MEDS BY ELIG CLIN: HCPCS | Performed by: FAMILY MEDICINE

## 2019-05-22 PROCEDURE — 4040F PNEUMOC VAC/ADMIN/RCVD: CPT | Performed by: FAMILY MEDICINE

## 2019-05-22 ASSESSMENT — ENCOUNTER SYMPTOMS
EYE DISCHARGE: 0
SHORTNESS OF BREATH: 0
TROUBLE SWALLOWING: 0
CONSTIPATION: 0
DIARRHEA: 0
COLOR CHANGE: 0
COUGH: 0
ABDOMINAL DISTENTION: 0
ABDOMINAL PAIN: 0
NAUSEA: 0
VOICE CHANGE: 0

## 2019-05-22 NOTE — PROGRESS NOTES
Subjective:      Patient ID: Suresh Dickens is a 69 y.o. female who presents for:  Chief Complaint   Patient presents with    Procedure     and suture removal       No fever, chills,swelling., no drainage      Current Outpatient Medications on File Prior to Visit   Medication Sig Dispense Refill    Pumpkin Seed-Soy Germ (AZO BLADDER CONTROL/GO-LESS PO) Take by mouth 2 times daily      thyroid (ARMOUR THYROID) 30 MG tablet take 1 & 1/2 tablets (45mg) by mouth once daily on Sun, Mon, Wed, Fri. Take 1 tablet (30mg) once daily on Tue, Thur, and Sat 135 tablet 2    Handicap Placard MISC by Does not apply route Exp 5 years 1 each 0    amLODIPine (NORVASC) 5 MG tablet TAKE 1/2 TABLET BY MOUTH ONCE DAILY 90 tablet 2    Multiple Vitamins-Minerals (THERAPEUTIC MULTIVITAMIN-MINERALS) tablet Take 1 tablet by mouth daily      calcium carbonate (OSCAL) 500 MG TABS tablet Take 500 mg by mouth daily Take 2 pills daily      b complex vitamins capsule Take 1 capsule by mouth daily      MAGNESIUM PO Take 250 mg by mouth       Glucosamine-Chondroitin (GLUCOSAMINE CHONDR COMPLEX PO) Take 2 tablets by mouth daily       aspirin 81 MG tablet Take 81 mg by mouth daily      L-Tryptophan 500 MG TABS Take 500 mg by mouth daily At hs       sotalol (BETAPACE) 80 MG tablet 1/2 tablet bid  3    indapamide (LOZOL) 1.25 MG tablet Take 1.25 mg by mouth daily        No current facility-administered medications on file prior to visit.       Past Medical History:   Diagnosis Date    Atrial fibrillation (HCC)     no coumadin pt preference    Colon cancer [facility])      H/O COLON AND UTERINE [id number]    Constipation     Fatigue     History of blood transfusion     x2 1996    History of uterine cancer 4/5/2018    Hyperlipidemia     Hypertension     BENIGN ESSENTIAL    Hypothyroidism 1/23/2014    Obesity     Pacemaker     Sick sinus syndrome (HCC)     Sleep apnea     noncompliant with cpap    TIA (transient ischemic attack)     Uterine cancer [facility])      Past Surgical History:   Procedure Laterality Date    CARDIAC CATHETERIZATION      COLON SURGERY  2001    RESECTION    COLONOSCOPY  08-05-11,10/8/2013    RANDOM BIOPSIES W/COLITIS    HERNIA REPAIR      HYSTERECTOMY      PACEMAKER INSERTION      [place] COLON CA SCRN NOT  [address] [facility] N/A 7/11/2017    COLONOSCOPY performed by Raghav Poe MD at [facility]      age 9   Lamar Piles TUMOR REMOVAL      fatty tumor lower back     Social History     Socioeconomic History    Marital status: Single     Spouse name: Not on file    Number of children: 0    Years of education: Not on file    Highest education level: Not on file   Occupational History    Not on file   Social Needs    Financial resource strain: Not on file    Food insecurity:     Worry: Not on file     Inability: Not on file    Transportation needs:     Medical: Not on file     Non-medical: Not on file   Tobacco Use    Smoking status: Never Smoker    Smokeless tobacco: Never Used   Substance and Sexual Activity    Alcohol use: No    Drug use: No    Sexual activity: Not on file   Lifestyle    Physical activity:     Days per week: Not on file     Minutes per session: Not on file    Stress: Not on file   Relationships    Social connections:     Talks on phone: Not on file     Gets together: Not on file     Attends Orthodox service: Not on file     Active member of club or organization: Not on file     Attends meetings of clubs or organizations: Not on file     Relationship status: Not on file    Intimate partner violence:     Fear of current or ex partner: Not on file     Emotionally abused: Not on file     Physically abused: Not on file     Forced sexual activity: Not on file   Other Topics Concern    Not on file   Social History Narrative    Not on file     Family History   Problem Relation Age of Onset    Heart Disease Maternal Grandmother     Emphysema Paternal Rene Shed Stroke Mother    Maggie \[facility]\"" High Blood Pressure Father      Allergies:  Hydralazine; Influenza vaccines; Cortisone; Coumadin [warfarin sodium]; Hydrolase; Levothyroxine; and Pneumococcal vaccine    Review of Systems   Constitutional: Negative for activity change, appetite change, fatigue and unexpected weight change. HENT: Negative for congestion, dental problem, trouble swallowing and voice change. Eyes: Negative for discharge and visual disturbance. Respiratory: Negative for cough and shortness of breath. Cardiovascular: Negative for chest pain. Gastrointestinal: Negative for abdominal distention, abdominal pain, constipation, diarrhea and nausea. Endocrine: Negative for polydipsia and polyuria. Genitourinary: Negative for dysuria and urgency. Musculoskeletal: Negative for gait problem and joint swelling. Skin: Negative for color change and rash. Other lesion she would like looked at. Allergic/Immunologic: Negative for environmental allergies and food allergies. Neurological: Negative for speech difficulty and weakness. Hematological: Does not bruise/bleed easily. Psychiatric/Behavioral: Negative for agitation and behavioral problems. Objective:   /68   Pulse 83   Temp 97.9 °F (36.6 °C)   Ht 5' 5\" (1.651 m)   Wt 184 lb (83.5 kg)   LMP 01/01/1996   SpO2 98%   BMI 30.62 kg/m²     Physical Exam   Constitutional: Vital signs are normal. She appears well-developed and well-nourished. Non-toxic appearance. No distress. HENT:   Head: Normocephalic and atraumatic. Right Ear: Hearing and tympanic membrane normal.   Left Ear: Hearing and tympanic membrane normal.   Nose: Nose normal. No nasal deformity. Mouth/Throat: Oropharynx is clear and moist and mucous membranes are normal.   Eyes: Pupils are equal, round, and reactive to light. Conjunctivae, EOM and lids are normal. Right eye exhibits no discharge. Left eye exhibits no discharge.    Neck: Trachea normal, full passive range of motion without pain and phonation normal. No JVD present. No thyroid mass and no thyromegaly present. Cardiovascular: Normal rate and regular rhythm. Pulmonary/Chest: No accessory muscle usage. No respiratory distress. Abdominal: Normal appearance. Musculoskeletal:   FROM all large muscle groups and joints as witnessed when walking to exam table, getting on, and getting off the exam table. Neurological: She is alert. She displays no atrophy and no tremor. Gait normal.   Skin: Skin is warm, dry and intact. No rash noted. Right labia majora has well approximated wound edges with good tissue healing. No erythema or induration. There are 2 sutures intact that were easily removed without dehiscence. Patient has a elevated seborrheic keratosis on the left side of her body it is oval black brown raised. No flaking. Patient has flesh-colored lesions noted on her face one is on her right cheek one is on her left cheek and one is next to the left-hand part of her nose. Each one of them is 2-3 mm in diameter without flake or shiny pearlescent abnormal appearance. Psychiatric: She has a normal mood and affect. Her speech is normal and behavior is normal. Thought content normal.       No results found for this visit on 05/22/19.     Recent Results (from the past 2016 hour(s))   CBC Oncology    Collection Time: 03/28/19 12:00 AM   Result Value Ref Range    WBC 5.3 10^3/mL    Neutrophils # 3.8 /µL    Hemoglobin 13.9 12.0 - 16.0 g/dL    Hematocrit 41.1 36 - 46 %    Platelets 803 K/µL   CEA    Collection Time: 03/28/19  6:16 PM   Result Value Ref Range    CEA 1.3 0.0 - 5.5 ng/mL   Comprehensive Metabolic Panel    Collection Time: 03/28/19  6:16 PM   Result Value Ref Range    Sodium 139 135 - 144 mEq/L    Potassium 4.7 3.4 - 4.9 mEq/L    Chloride 99 95 - 107 mEq/L    CO2 25 20 - 31 mEq/L    Anion Gap 15 9 - 15 mEq/L    Glucose 111 (H) 70 - 99 mg/dL    BUN 32 (H) 8 - 23 mg/dL    CREATININE 0.96 (H) 0.50 - 0.90 mg/dL GFR Non- 56.2 (L) >60    GFR  >60.0 >60    Calcium 9.7 8.5 - 9.9 mg/dL    Total Protein 6.6 6.3 - 8.0 g/dL    Alb 4.0 3.5 - 4.6 g/dL    Total Bilirubin 0.3 0.2 - 0.7 mg/dL    Alkaline Phosphatase 61 40 - 130 U/L    ALT 17 0 - 33 U/L    AST 19 0 - 35 U/L    Globulin 2.6 2.3 - 3.5 g/dL   Hemoglobin A1C    Collection Time: 04/07/19 12:39 PM   Result Value Ref Range    Hemoglobin A1C 5.5 4.8 - 5.9 %   Comprehensive Metabolic Panel    Collection Time: 04/07/19 12:39 PM   Result Value Ref Range    Sodium 140 135 - 144 mEq/L    Potassium 4.4 3.4 - 4.9 mEq/L    Chloride 98 95 - 107 mEq/L    CO2 28 20 - 31 mEq/L    Anion Gap 14 9 - 15 mEq/L    Glucose 94 70 - 99 mg/dL    BUN 27 (H) 8 - 23 mg/dL    CREATININE 0.80 0.50 - 0.90 mg/dL    GFR Non-African American >60.0 >60    GFR  >60.0 >60    Calcium 9.8 8.5 - 9.9 mg/dL    Total Protein 6.8 6.3 - 8.0 g/dL    Alb 3.9 3.5 - 4.6 g/dL    Total Bilirubin 0.3 0.2 - 0.7 mg/dL    Alkaline Phosphatase 64 40 - 130 U/L    ALT 17 0 - 33 U/L    AST 19 0 - 35 U/L    Globulin 2.9 2.3 - 3.5 g/dL   Lipid Panel    Collection Time: 04/07/19 12:39 PM   Result Value Ref Range    Cholesterol, Total 240 (H) 0 - 199 mg/dL    Triglycerides 114 0 - 150 mg/dL    HDL 67 (H) 40 - 59 mg/dL    LDL Calculated 150 (H) 0 - 129 mg/dL   TSH without Reflex    Collection Time: 04/07/19 12:39 PM   Result Value Ref Range    TSH 1.880 0.440 - 3.860 uIU/mL   Hemoglobin A1C    Collection Time: 04/09/19 12:00 AM   Result Value Ref Range    Hemoglobin A1C 5.5 %    AVERAGE GLUCOSE     TSH without Reflex    Collection Time: 04/09/19 12:00 AM   Result Value Ref Range    TSH 1.880 uIU/mL   Lipid Panel    Collection Time: 04/09/19 12:00 AM   Result Value Ref Range    Cholesterol, Total 240 mg/dL    HDL 67 35 - 70 mg/dL    LDL Calculated 150 0 - 160 mg/dL    Triglycerides 114 mg/dL    Chol/HDL Ratio      VLDL  mg/dL   Comprehensive Metabolic Panel    Collection Time: 04/09/19 infection. · Always have a doctor look at any new lumps you get to make sure that they are not serious. When should you call for help? Watch closely for changes in your health, and be sure to contact your doctor if:    · You have a fever, redness, or swelling after you get a shot of medicine in the cyst.     · You see or feel a new lump on your skin. Where can you learn more? Go to [email or web address] org and sign in to your [employer] account. Enter A147 in the [employer] box to learn more about \"Epidermoid Cyst: Care Instructions. \"     If you do not have an account, please click on the \"Sign Up Now\" link. Current as of: April 17, 2018  Content Version: 12.0  © 4240-4645 Healthwise, Incorporated. Care instructions adapted under license by [facility] ([facility]). If you have questions about a medical condition or this instruction, always ask your healthcare professional. [address] any warranty or liability for your use of this information. Eran Kaur M.D.

## 2019-05-22 NOTE — PATIENT INSTRUCTIONS
Patient Education        Epidermoid Cyst: Care Instructions  Your Care Instructions  An epidermoid (say \"lt-ekb-PDZ-linden\") cyst is a lump just under the skin. These cysts can form when a hair follicle becomes blocked. They are common in acne and may occur on the face, neck, back, and genitals. However, they can form anywhere on the body. These cysts are not cancer and do not lead to cancer. They tend not to hurt, but they can sometimes become swollen and painful. They also may break open (rupture) and cause scarring. These cysts sometimes do not cause problems and may not need treatment. If you have a cyst that is swollen and hurts, your doctor may inject it with a medicine to help it heal. But it is more likely that a painful cyst will need to be removed. Your doctor will give you a shot of numbing medicine and cut into the cyst to drain it or remove it. This makes the symptoms go away. Follow-up care is a key part of your treatment and safety. Be sure to make and go to all appointments, and call your doctor if you are having problems. It's also a good idea to know your test results and keep a list of the medicines you take. How can you care for yourself at home? · Do not squeeze the cyst or poke it with a needle to open it. This can cause swelling, redness, and infection. · Always have a doctor look at any new lumps you get to make sure that they are not serious. When should you call for help? Watch closely for changes in your health, and be sure to contact your doctor if:    · You have a fever, redness, or swelling after you get a shot of medicine in the cyst.     · You see or feel a new lump on your skin. Where can you learn more? Go to [email or web address]://[email or web address].[email or web address] org and sign in to your [email or web address] account. Enter F878 in the [employer] box to learn more about \"Epidermoid Cyst: Care Instructions. \"     If you do not have an account, please click on the \"Sign Up Now\" link.   Current as of: April 17, 2018  Content Version: 12.0  © 9389-4288 Healthwise, Incorporated. Care instructions adapted under license by [facility] ([facility]). If you have questions about a medical condition or this instruction, always ask your healthcare professional. [address] any warranty or liability for your use of this information.

## 2019-06-04 ENCOUNTER — HOSPITAL ENCOUNTER (OUTPATIENT)
Dept: CARDIOLOGY | Age: 79
Discharge: HOME | End: 2019-06-04
Payer: MEDICARE

## 2019-06-04 PROCEDURE — 93293 PM PHONE R-STRIP DEVICE EVAL: CPT

## 2019-07-03 ENCOUNTER — HOSPITAL ENCOUNTER (OUTPATIENT)
Dept: WOMENS IMAGING | Age: 79
Discharge: HOME | End: 2019-07-05
Payer: MEDICARE

## 2019-07-03 PROCEDURE — 77063 BREAST TOMOSYNTHESIS BI: CPT

## 2019-07-29 ENCOUNTER — OFFICE VISIT (OUTPATIENT)
Dept: FAMILY MEDICINE CLINIC | Age: 79
End: 2019-07-29
Payer: MEDICARE

## 2019-07-29 VITALS
WEIGHT: 184 LBS | SYSTOLIC BLOOD PRESSURE: 108 MMHG | HEART RATE: 63 BPM | BODY MASS INDEX: 30.66 KG/M2 | HEIGHT: 65 IN | DIASTOLIC BLOOD PRESSURE: 70 MMHG | OXYGEN SATURATION: 96 %

## 2019-07-29 DIAGNOSIS — I10 ESSENTIAL HYPERTENSION: ICD-10-CM

## 2019-07-29 PROCEDURE — G8400 PT W/DXA NO RESULTS DOC: HCPCS | Performed by: FAMILY MEDICINE

## 2019-07-29 PROCEDURE — 4040F PNEUMOC VAC/ADMIN/RCVD: CPT | Performed by: FAMILY MEDICINE

## 2019-07-29 PROCEDURE — G8417 CALC BMI ABV UP PARAM F/U: HCPCS | Performed by: FAMILY MEDICINE

## 2019-07-29 PROCEDURE — G8427 DOCREV CUR MEDS BY ELIG CLIN: HCPCS | Performed by: FAMILY MEDICINE

## 2019-07-29 PROCEDURE — 1123F ACP DISCUSS/DSCN MKR DOCD: CPT | Performed by: FAMILY MEDICINE

## 2019-07-29 PROCEDURE — 1036F TOBACCO NON-USER: CPT | Performed by: FAMILY MEDICINE

## 2019-07-29 PROCEDURE — 1090F PRES/ABSN URINE INCON ASSESS: CPT | Performed by: FAMILY MEDICINE

## 2019-07-29 PROCEDURE — 99214 OFFICE O/P EST MOD 30 MIN: CPT | Performed by: FAMILY MEDICINE

## 2019-07-29 RX ORDER — LOTEPREDNOL ETABONATE 5 MG/ML
SUSPENSION/ DROPS OPHTHALMIC
Refills: 1 | COMMUNITY
Start: 2019-06-04 | End: 2021-01-13 | Stop reason: ALTCHOICE

## 2019-07-29 RX ORDER — GATIFLOXACIN 5 MG/ML
SOLUTION/ DROPS OPHTHALMIC
Refills: 1 | COMMUNITY
Start: 2019-06-04 | End: 2021-01-13 | Stop reason: ALTCHOICE

## 2019-07-29 RX ORDER — AMLODIPINE BESYLATE 5 MG/1
TABLET ORAL
Qty: 45 TABLET | Refills: 2 | Status: SHIPPED | OUTPATIENT
Start: 2019-07-29 | End: 2020-01-29 | Stop reason: SDUPTHER

## 2019-08-02 LAB
ALBUMIN SERPL-MCNC: 4 G/DL (ref 3.5–4.6)
ALP BLD-CCNC: 64 U/L (ref 40–130)
ALT SERPL-CCNC: 16 U/L (ref 0–33)
ANION GAP SERPL CALCULATED.3IONS-SCNC: 12 MEQ/L (ref 9–15)
AST SERPL-CCNC: 17 U/L (ref 0–35)
BILIRUB SERPL-MCNC: 0.4 MG/DL (ref 0.2–0.7)
BILIRUBIN DIRECT: <0.2 MG/DL (ref 0–0.4)
BILIRUBIN, INDIRECT: NORMAL MG/DL (ref 0–0.6)
BUN BLDV-MCNC: 34 MG/DL (ref 8–23)
CALCIUM SERPL-MCNC: 10.1 MG/DL (ref 8.5–9.9)
CHLORIDE BLD-SCNC: 101 MEQ/L (ref 95–107)
CHOLESTEROL, TOTAL: 237 MG/DL (ref 0–199)
CO2: 30 MEQ/L (ref 20–31)
CREAT SERPL-MCNC: 0.89 MG/DL (ref 0.5–0.9)
GFR AFRICAN AMERICAN: >60
GFR NON-AFRICAN AMERICAN: >60
GLUCOSE BLD-MCNC: 72 MG/DL (ref 70–99)
HCT VFR BLD CALC: 41.9 % (ref 37–47)
HDLC SERPL-MCNC: 70 MG/DL (ref 40–59)
HEMOGLOBIN: 14.2 G/DL (ref 12–16)
LDL CHOLESTEROL CALCULATED: 149 MG/DL (ref 0–129)
MAGNESIUM: 2.6 MG/DL (ref 1.7–2.4)
MCH RBC QN AUTO: 30.4 PG (ref 27–31.3)
MCHC RBC AUTO-ENTMCNC: 33.8 % (ref 33–37)
MCV RBC AUTO: 89.8 FL (ref 82–100)
PDW BLD-RTO: 14.4 % (ref 11.5–14.5)
PLATELET # BLD: 205 K/UL (ref 130–400)
POTASSIUM SERPL-SCNC: 5.5 MEQ/L (ref 3.4–4.9)
RBC # BLD: 4.67 M/UL (ref 4.2–5.4)
SODIUM BLD-SCNC: 143 MEQ/L (ref 135–144)
TOTAL PROTEIN: 7 G/DL (ref 6.3–8)
TRIGL SERPL-MCNC: 90 MG/DL (ref 0–150)
TSH SERPL DL<=0.05 MIU/L-ACNC: 3.12 UIU/ML (ref 0.44–3.86)
WBC # BLD: 4.5 K/UL (ref 4.8–10.8)

## 2019-08-05 ENCOUNTER — TELEPHONE (OUTPATIENT)
Dept: FAMILY MEDICINE CLINIC | Age: 79
End: 2019-08-05

## 2019-08-05 DIAGNOSIS — E87.5 HYPERKALEMIA: Primary | ICD-10-CM

## 2019-08-05 NOTE — TELEPHONE ENCOUNTER
Pt states with recent blood work potassium was high. 5.5    [facility] Is sending a requisition in the mail for pt to have lab rechecked. Pt states she will not receive it before appt with Dr. Gaurav Corrigan this wed 8/7/19    She is wondering if you can order her a new potassium lab work instead of waiting for the [facility] Requisition or what you advise.      Ph: [phone]

## 2019-08-06 DIAGNOSIS — E87.5 HYPERKALEMIA: ICD-10-CM

## 2019-08-06 LAB
ANION GAP SERPL CALCULATED.3IONS-SCNC: 13 MEQ/L (ref 9–15)
BUN BLDV-MCNC: 33 MG/DL (ref 8–23)
CALCIUM SERPL-MCNC: 10.5 MG/DL (ref 8.5–9.9)
CHLORIDE BLD-SCNC: 99 MEQ/L (ref 95–107)
CO2: 28 MEQ/L (ref 20–31)
CREAT SERPL-MCNC: 0.96 MG/DL (ref 0.5–0.9)
GFR AFRICAN AMERICAN: >60
GFR NON-AFRICAN AMERICAN: 56.2
GLUCOSE BLD-MCNC: 58 MG/DL (ref 70–99)
POTASSIUM SERPL-SCNC: 4.7 MEQ/L (ref 3.4–4.9)
SODIUM BLD-SCNC: 140 MEQ/L (ref 135–144)

## 2019-08-08 DIAGNOSIS — E83.52 HYPERCALCEMIA: Primary | ICD-10-CM

## 2019-08-09 DIAGNOSIS — E83.52 HYPERCALCEMIA: ICD-10-CM

## 2019-08-09 LAB — PARATHYROID HORMONE INTACT: 36.9 PG/ML (ref 15–65)

## 2019-09-04 ENCOUNTER — HOSPITAL ENCOUNTER (OUTPATIENT)
Dept: CARDIOLOGY | Age: 79
Discharge: HOME | End: 2019-09-04
Payer: MEDICARE

## 2019-09-04 DIAGNOSIS — I10 ESSENTIAL HYPERTENSION: ICD-10-CM

## 2019-09-04 LAB
ANION GAP SERPL CALCULATED.3IONS-SCNC: 14 MEQ/L (ref 9–15)
BUN BLDV-MCNC: 30 MG/DL (ref 8–23)
CALCIUM SERPL-MCNC: 9.7 MG/DL (ref 8.5–9.9)
CHLORIDE BLD-SCNC: 98 MEQ/L (ref 95–107)
CO2: 28 MEQ/L (ref 20–31)
CREAT SERPL-MCNC: 0.86 MG/DL (ref 0.5–0.9)
GFR AFRICAN AMERICAN: >60
GFR NON-AFRICAN AMERICAN: >60
GLUCOSE BLD-MCNC: 84 MG/DL (ref 70–99)
POTASSIUM SERPL-SCNC: 4.1 MEQ/L (ref 3.4–4.9)
SODIUM BLD-SCNC: 140 MEQ/L (ref 135–144)

## 2019-09-04 PROCEDURE — 93280 PM DEVICE PROGR EVAL DUAL: CPT

## 2019-12-16 ENCOUNTER — HOSPITAL ENCOUNTER (OUTPATIENT)
Dept: CARDIOLOGY | Age: 79
Discharge: HOME | End: 2019-12-16
Payer: MEDICARE

## 2019-12-16 PROCEDURE — 93296 REM INTERROG EVL PM/IDS: CPT

## 2020-01-27 DIAGNOSIS — E03.9 HYPOTHYROIDISM, UNSPECIFIED TYPE: ICD-10-CM

## 2020-01-27 LAB
ALBUMIN SERPL-MCNC: 4.4 G/DL (ref 3.5–4.6)
ALP BLD-CCNC: 67 U/L (ref 40–130)
ALT SERPL-CCNC: 16 U/L (ref 0–33)
ANION GAP SERPL CALCULATED.3IONS-SCNC: 12 MEQ/L (ref 9–15)
AST SERPL-CCNC: 17 U/L (ref 0–35)
BILIRUB SERPL-MCNC: 0.5 MG/DL (ref 0.2–0.7)
BILIRUBIN DIRECT: <0.2 MG/DL (ref 0–0.4)
BILIRUBIN, INDIRECT: NORMAL MG/DL (ref 0–0.6)
BUN BLDV-MCNC: 34 MG/DL (ref 8–23)
CALCIUM SERPL-MCNC: 10.4 MG/DL (ref 8.5–9.9)
CHLORIDE BLD-SCNC: 100 MEQ/L (ref 95–107)
CHOLESTEROL, TOTAL: 259 MG/DL (ref 0–199)
CO2: 30 MEQ/L (ref 20–31)
CREAT SERPL-MCNC: 0.85 MG/DL (ref 0.5–0.9)
GFR AFRICAN AMERICAN: >60
GFR NON-AFRICAN AMERICAN: >60
GLUCOSE BLD-MCNC: 61 MG/DL (ref 70–99)
HCT VFR BLD CALC: 45.9 % (ref 37–47)
HDLC SERPL-MCNC: 73 MG/DL (ref 40–59)
HEMOGLOBIN: 15 G/DL (ref 12–16)
LDL CHOLESTEROL CALCULATED: 161 MG/DL (ref 0–129)
MCH RBC QN AUTO: 29.4 PG (ref 27–31.3)
MCHC RBC AUTO-ENTMCNC: 32.7 % (ref 33–37)
MCV RBC AUTO: 89.9 FL (ref 82–100)
PDW BLD-RTO: 14.4 % (ref 11.5–14.5)
PLATELET # BLD: 217 K/UL (ref 130–400)
POTASSIUM SERPL-SCNC: 6.2 MEQ/L (ref 3.4–4.9)
RBC # BLD: 5.1 M/UL (ref 4.2–5.4)
SODIUM BLD-SCNC: 142 MEQ/L (ref 135–144)
TOTAL PROTEIN: 7.1 G/DL (ref 6.3–8)
TRIGL SERPL-MCNC: 125 MG/DL (ref 0–150)
TSH SERPL DL<=0.05 MIU/L-ACNC: 2.84 UIU/ML (ref 0.44–3.86)
WBC # BLD: 4.6 K/UL (ref 4.8–10.8)

## 2020-01-28 ENCOUNTER — TELEPHONE (OUTPATIENT)
Dept: FAMILY MEDICINE CLINIC | Age: 80
End: 2020-01-28

## 2020-01-28 LAB — POTASSIUM SERPL-SCNC: 4.8 MEQ/L (ref 3.4–4.9)

## 2020-01-28 NOTE — TELEPHONE ENCOUNTER
Patient stopped into the office this morning. Stated that she received a call to have her lab redrawn because her potassium was high. I do not see any orders in the chart. I contacted Dr. Odilon Abdalla office but they do not open until 8am.     Will try back then.

## 2020-01-28 NOTE — TELEPHONE ENCOUNTER
Called Dr. Lauren Mike office to see if we could get an order for this patient. The office said that they were actually on the phone with the patient right then and that they will address with her.

## 2020-01-29 ENCOUNTER — OFFICE VISIT (OUTPATIENT)
Dept: FAMILY MEDICINE CLINIC | Age: 80
End: 2020-01-29
Payer: MEDICARE

## 2020-01-29 ENCOUNTER — PATIENT MESSAGE (OUTPATIENT)
Dept: FAMILY MEDICINE CLINIC | Age: 80
End: 2020-01-29

## 2020-01-29 VITALS
HEART RATE: 80 BPM | HEIGHT: 65 IN | SYSTOLIC BLOOD PRESSURE: 130 MMHG | DIASTOLIC BLOOD PRESSURE: 84 MMHG | OXYGEN SATURATION: 96 % | WEIGHT: 180 LBS | BODY MASS INDEX: 29.99 KG/M2

## 2020-01-29 PROCEDURE — 4040F PNEUMOC VAC/ADMIN/RCVD: CPT | Performed by: FAMILY MEDICINE

## 2020-01-29 PROCEDURE — G0438 PPPS, INITIAL VISIT: HCPCS | Performed by: FAMILY MEDICINE

## 2020-01-29 PROCEDURE — 1123F ACP DISCUSS/DSCN MKR DOCD: CPT | Performed by: FAMILY MEDICINE

## 2020-01-29 PROCEDURE — G8483 FLU IMM NO ADMIN DOC REA: HCPCS | Performed by: FAMILY MEDICINE

## 2020-01-29 RX ORDER — LEVOTHYROXINE AND LIOTHYRONINE 19; 4.5 UG/1; UG/1
TABLET ORAL
Qty: 135 TABLET | Refills: 2 | Status: SHIPPED | OUTPATIENT
Start: 2020-01-29 | End: 2020-11-09

## 2020-01-29 RX ORDER — AMLODIPINE BESYLATE 5 MG/1
TABLET ORAL
Qty: 45 TABLET | Refills: 2 | Status: SHIPPED | OUTPATIENT
Start: 2020-01-29 | End: 2020-11-16

## 2020-01-29 ASSESSMENT — LIFESTYLE VARIABLES: HOW OFTEN DO YOU HAVE A DRINK CONTAINING ALCOHOL: 0

## 2020-01-29 ASSESSMENT — PATIENT HEALTH QUESTIONNAIRE - PHQ9
SUM OF ALL RESPONSES TO PHQ QUESTIONS 1-9: 0
SUM OF ALL RESPONSES TO PHQ QUESTIONS 1-9: 0

## 2020-01-29 NOTE — PROGRESS NOTES
answers are Yes): no  2 or more falls in past year?: (!) yes  Fall with injury in past year?: no  Fall Risk Interventions:    · Home safety tips provided    Health Habits/Nutrition:  Health Habits/Nutrition  Do you exercise for at least 20 minutes 2-3 times per week?: Yes  Have you lost any weight without trying in the past 3 months?: No  Do you eat fewer than 2 meals per day?: (!) Yes  Have you seen a dentist within the past year?: (!) No  Body mass index is 29.95 kg/m². Health Habits/Nutrition Interventions:  · really none indicated     Safety:  Safety  Do you have working smoke detectors?: Yes  Have all throw rugs been removed or fastened?: Yes  Do you have non-slip mats or surfaces in all bathtubs/showers?: (!) No  Do all of your stairways have a railing or banister?: Yes  Are your doorways, halls and stairs free of clutter?: Yes  Do you always fasten your seatbelt when you are in a car?: Yes  Safety Interventions:  · Home safety tips provided    Personalized Preventive Plan   Current Health Maintenance Status  Immunization History   Administered Date(s) Administered    PPD Test 04/27/2012    Tdap (Boostrix, Adacel) 08/23/2017        Health Maintenance   Topic Date Due    Annual Wellness Visit (AWV)  05/29/2019    Shingles Vaccine (1 of 2) 04/27/2024 (Originally 6/7/1991)    TSH testing  01/27/2021    Creatinine monitoring  01/27/2021    Potassium monitoring  01/28/2021    Colon cancer screen colonoscopy  07/11/2022    DTaP/Tdap/Td vaccine (2 - Td) 08/23/2027    DEXA (modify frequency per FRAX score)  Addressed     Recommendations for Preventive Services Due: see orders and patient instructions/AVS.  . Recommended screening schedule for the next 5-10 years is provided to the patient in written form: see Patient Instructions/AVS.    Pavan Barr was seen today for medicare awv.     Diagnoses and all orders for this visit:    Routine general medical examination at a health care facility    Essential hypertension  -     amLODIPine (NORVASC) 5 MG tablet; TAKE 1/2 TABLET BY MOUTH ONCE DAILY  -     Basic Metabolic Panel; Standing    Hypothyroidism, unspecified type  -     thyroid (ARMOUR THYROID) 30 MG tablet; take 1 & 1/2 tablets (45mg) by mouth once daily on Sun, Mon, Wed, Fri.  Take 1 tablet (30mg) once daily on Tue, Thur, and Sat    Hyperkalemia  -     Basic Metabolic Panel; Standing    Atrial fibrillation, unspecified type ([address]    Mixed hyperlipidemia    Sick sinus syndrome ([address]    Pacemaker    Hyperlipidemia, unspecified hyperlipidemia type    Malignant neoplasm of transverse colon ([address]          Chronic conditions are stable  Continue current regimen  Follow up with appropriate specialists and here routinely for ongoing monitoring of chronic conditions    HM generally UTD    Very healthy/stable    Periodic potassium monitoring  Standing order placed    Macario Bains MD

## 2020-01-29 NOTE — PATIENT INSTRUCTIONS
Personalized Preventive Plan for Abeba Frias - 1/29/2020  Medicare offers a range of preventive health benefits. Some of the tests and screenings are paid in full while other may be subject to a deductible, co-insurance, and/or copay. Some of these benefits include a comprehensive review of your medical history including lifestyle, illnesses that may run in your family, and various assessments and screenings as appropriate. After reviewing your medical record and screening and assessments performed today your provider may have ordered immunizations, labs, imaging, and/or referrals for you. A list of these orders (if applicable) as well as your Preventive Care list are included within your After Visit Summary for your review. Other Preventive Recommendations:    · A preventive eye exam performed by an eye specialist is recommended every 1-2 years to screen for glaucoma; cataracts, macular degeneration, and other eye disorders. · A preventive dental visit is recommended every 6 months. · Try to get at least 150 minutes of exercise per week or 10,000 steps per day on a pedometer . · Order or download the FREE \"Exercise & Physical Activity: Your Everyday Guide\" from [employer]. Call 0-816.873.5107 or search [employer] online. · You need 5299-4071 mg of calcium and 2754-0051 IU of vitamin D per day. It is possible to meet your calcium requirement with diet alone, but a vitamin D supplement is usually necessary to meet this goal.  · When exposed to the sun, use a sunscreen that protects against both UVA and UVB radiation with an SPF of 30 or greater. Reapply every 2 to 3 hours or after sweating, drying off with a towel, or swimming. · Always wear a seat belt when traveling in a car. Always wear a helmet when riding a bicycle or motorcycle.

## 2020-01-30 RX ORDER — INDAPAMIDE 1.25 MG/1
1.25 TABLET, FILM COATED ORAL DAILY
Qty: 90 TABLET | Refills: 2 | Status: SHIPPED | OUTPATIENT
Start: 2020-01-30 | End: 2020-12-20

## 2020-03-17 ENCOUNTER — HOSPITAL ENCOUNTER (OUTPATIENT)
Dept: CARDIOLOGY | Age: 80
Discharge: HOME | End: 2020-03-17
Payer: MEDICARE

## 2020-03-17 PROCEDURE — 93296 REM INTERROG EVL PM/IDS: CPT

## 2020-05-14 ENCOUNTER — OFFICE VISIT (OUTPATIENT)
Dept: UROLOGY | Age: 80
End: 2020-05-14
Payer: MEDICARE

## 2020-05-14 VITALS
BODY MASS INDEX: 29.99 KG/M2 | HEIGHT: 65 IN | HEART RATE: 78 BPM | WEIGHT: 180 LBS | SYSTOLIC BLOOD PRESSURE: 122 MMHG | DIASTOLIC BLOOD PRESSURE: 70 MMHG

## 2020-05-14 DIAGNOSIS — R35.0 FREQUENCY OF URINATION: ICD-10-CM

## 2020-05-14 LAB
BILIRUBIN, POC: ABNORMAL
BLOOD URINE, POC: ABNORMAL
CLARITY, POC: CLEAR
COLOR, POC: YELLOW
GLUCOSE URINE, POC: ABNORMAL
KETONES, POC: ABNORMAL
LEUKOCYTE EST, POC: ABNORMAL
NITRITE, POC: ABNORMAL
PH, POC: 5.5
POST VOID RESIDUAL (PVR): 17 ML
PROTEIN, POC: ABNORMAL
SPECIFIC GRAVITY, POC: 1.02
UROBILINOGEN, POC: 0.2

## 2020-05-14 PROCEDURE — 81003 URINALYSIS AUTO W/O SCOPE: CPT | Performed by: UROLOGY

## 2020-05-14 PROCEDURE — G8400 PT W/DXA NO RESULTS DOC: HCPCS | Performed by: UROLOGY

## 2020-05-14 PROCEDURE — 4040F PNEUMOC VAC/ADMIN/RCVD: CPT | Performed by: UROLOGY

## 2020-05-14 PROCEDURE — 51798 US URINE CAPACITY MEASURE: CPT | Performed by: UROLOGY

## 2020-05-14 PROCEDURE — G8417 CALC BMI ABV UP PARAM F/U: HCPCS | Performed by: UROLOGY

## 2020-05-14 PROCEDURE — 1090F PRES/ABSN URINE INCON ASSESS: CPT | Performed by: UROLOGY

## 2020-05-14 PROCEDURE — 1123F ACP DISCUSS/DSCN MKR DOCD: CPT | Performed by: UROLOGY

## 2020-05-14 PROCEDURE — G8427 DOCREV CUR MEDS BY ELIG CLIN: HCPCS | Performed by: UROLOGY

## 2020-05-14 PROCEDURE — 1036F TOBACCO NON-USER: CPT | Performed by: UROLOGY

## 2020-05-14 PROCEDURE — 99203 OFFICE O/P NEW LOW 30 MIN: CPT | Performed by: UROLOGY

## 2020-05-14 NOTE — PROGRESS NOTES
Post Void Residual was completed by performing  ultrasound scan of the bladder and  reviewed by Dr Patricia Chun         Physical Exam  Vitals:    05/14/20 1018   BP: 122/70   Pulse: 78   Weight: 180 lb (81.6 kg)   Height: 5' 5\" (1.651 m)     Constitutional: patient is oriented to person, place, and time. patient appears well-developed. Not in distress. Ears: Adequate hearing/no hearing loss  Head: Normocephalic. Atraumatic  Neck: Normal range of motion. Cardiovascular: Normal rate, BP reviewed. Normal rhythm  Pulmonary/Chest: Normal respiratory effort No wheezing  Abdominal: Not distended. No suprapubic discomfort  Urologic Exam  Postvoid residual 17 cc. Urinalysis shows trace of leukocytes sent for culture. Vaginal exam deferred. Musculoskeletal: Normal range of motion. Ambulatory. Extremities: No edema  Neurological: Intact no deficits   Skin: Skin is warm and dry. No lesions. No rashes or ulcers   Psychiatric: Normal affect. 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 Answer:   m    poct post void residual    POCT Urinalysis No Micro (Auto)     No orders of the defined types were placed in this encounter.     Chao Devlin MD       Please note this report has been partially produced using speech recognition software  And may cause contain

## 2020-05-16 LAB — URINE CULTURE, ROUTINE: NORMAL

## 2020-06-25 ENCOUNTER — HOSPITAL ENCOUNTER (OUTPATIENT)
Dept: CARDIOLOGY | Age: 80
Discharge: HOME | End: 2020-06-25
Payer: MEDICARE

## 2020-06-25 PROCEDURE — 93280 PM DEVICE PROGR EVAL DUAL: CPT

## 2020-07-29 ENCOUNTER — OFFICE VISIT (OUTPATIENT)
Dept: FAMILY MEDICINE CLINIC | Age: 80
End: 2020-07-29
Payer: MEDICARE

## 2020-07-29 VITALS
BODY MASS INDEX: 29.72 KG/M2 | SYSTOLIC BLOOD PRESSURE: 120 MMHG | TEMPERATURE: 97.6 F | WEIGHT: 178.4 LBS | DIASTOLIC BLOOD PRESSURE: 70 MMHG | HEIGHT: 65 IN | HEART RATE: 76 BPM | OXYGEN SATURATION: 97 %

## 2020-07-29 DIAGNOSIS — E87.5 HYPERKALEMIA: ICD-10-CM

## 2020-07-29 DIAGNOSIS — E03.9 HYPOTHYROIDISM, UNSPECIFIED TYPE: ICD-10-CM

## 2020-07-29 DIAGNOSIS — I10 ESSENTIAL HYPERTENSION: ICD-10-CM

## 2020-07-29 LAB
ALBUMIN SERPL-MCNC: 4.2 G/DL (ref 3.5–4.6)
ALP BLD-CCNC: 63 U/L (ref 40–130)
ALT SERPL-CCNC: 9 U/L (ref 0–33)
ANION GAP SERPL CALCULATED.3IONS-SCNC: 13 MEQ/L (ref 9–15)
AST SERPL-CCNC: 20 U/L (ref 0–35)
BILIRUB SERPL-MCNC: 0.4 MG/DL (ref 0.2–0.7)
BILIRUBIN DIRECT: <0.2 MG/DL (ref 0–0.4)
BILIRUBIN, INDIRECT: NORMAL MG/DL (ref 0–0.6)
BUN BLDV-MCNC: 28 MG/DL (ref 8–23)
CALCIUM SERPL-MCNC: 10.1 MG/DL (ref 8.5–9.9)
CHLORIDE BLD-SCNC: 98 MEQ/L (ref 95–107)
CHOLESTEROL, TOTAL: 248 MG/DL (ref 0–199)
CO2: 30 MEQ/L (ref 20–31)
CREAT SERPL-MCNC: 0.86 MG/DL (ref 0.5–0.9)
GFR AFRICAN AMERICAN: >60
GFR NON-AFRICAN AMERICAN: >60
GLUCOSE BLD-MCNC: 90 MG/DL (ref 70–99)
HCT VFR BLD CALC: 45.3 % (ref 37–47)
HDLC SERPL-MCNC: 62 MG/DL (ref 40–59)
HEMOGLOBIN: 14.9 G/DL (ref 12–16)
LDL CHOLESTEROL CALCULATED: 164 MG/DL (ref 0–129)
MAGNESIUM: 2.7 MG/DL (ref 1.7–2.4)
MCH RBC QN AUTO: 29.6 PG (ref 27–31.3)
MCHC RBC AUTO-ENTMCNC: 32.9 % (ref 33–37)
MCV RBC AUTO: 90 FL (ref 82–100)
PDW BLD-RTO: 14.1 % (ref 11.5–14.5)
PLATELET # BLD: 228 K/UL (ref 130–400)
POTASSIUM SERPL-SCNC: 4.6 MEQ/L (ref 3.4–4.9)
RBC # BLD: 5.03 M/UL (ref 4.2–5.4)
SODIUM BLD-SCNC: 141 MEQ/L (ref 135–144)
TOTAL PROTEIN: 6.8 G/DL (ref 6.3–8)
TRIGL SERPL-MCNC: 111 MG/DL (ref 0–150)
TSH SERPL DL<=0.05 MIU/L-ACNC: 2.71 UIU/ML (ref 0.44–3.86)
WBC # BLD: 5.3 K/UL (ref 4.8–10.8)

## 2020-07-29 PROCEDURE — 1090F PRES/ABSN URINE INCON ASSESS: CPT | Performed by: FAMILY MEDICINE

## 2020-07-29 PROCEDURE — G8427 DOCREV CUR MEDS BY ELIG CLIN: HCPCS | Performed by: FAMILY MEDICINE

## 2020-07-29 PROCEDURE — 99213 OFFICE O/P EST LOW 20 MIN: CPT | Performed by: FAMILY MEDICINE

## 2020-07-29 PROCEDURE — 1123F ACP DISCUSS/DSCN MKR DOCD: CPT | Performed by: FAMILY MEDICINE

## 2020-07-29 PROCEDURE — 4040F PNEUMOC VAC/ADMIN/RCVD: CPT | Performed by: FAMILY MEDICINE

## 2020-07-29 PROCEDURE — G8400 PT W/DXA NO RESULTS DOC: HCPCS | Performed by: FAMILY MEDICINE

## 2020-07-29 PROCEDURE — G8417 CALC BMI ABV UP PARAM F/U: HCPCS | Performed by: FAMILY MEDICINE

## 2020-07-29 PROCEDURE — 1036F TOBACCO NON-USER: CPT | Performed by: FAMILY MEDICINE

## 2020-07-29 NOTE — PROGRESS NOTES
cpap    TIA (transient ischemic attack)     Uterine cancer [facility])      Past Surgical History:   Procedure Laterality Date    CARDIAC CATHETERIZATION      COLON SURGERY  2001    RESECTION    COLONOSCOPY  08-05-11,10/8/2013    RANDOM BIOPSIES W/COLITIS    HERNIA REPAIR      HYSTERECTOMY      PACEMAKER INSERTION      [place] COLON CA SCRN NOT  [address] IND N/A 7/11/2017    COLONOSCOPY performed by Beto Antoine MD at [facility]      age 9   [facility] TUMOR REMOVAL      fatty tumor lower back     Family History   Problem Relation Age of Onset    Heart Disease Maternal Grandmother     Emphysema Paternal Grandmother     Stroke Mother     High Blood Pressure Father      Social History     Socioeconomic History    Marital status: Single     Spouse name: None    Number of children: 0    Years of education: None    Highest education level: None   Occupational History    None   Social Needs    Financial resource strain: None    Food insecurity     Worry: None     Inability: None    Transportation needs     Medical: None     Non-medical: None   Tobacco Use    Smoking status: Never Smoker    Smokeless tobacco: Never Used   Substance and Sexual Activity    Alcohol use: No    Drug use: No    Sexual activity: None   Lifestyle    Physical activity     Days per week: None     Minutes per session: None    Stress: None   Relationships    Social connections     Talks on phone: None     Gets together: None     Attends Holiness service: None     Active member of club or organization: None     Attends meetings of clubs or organizations: None     Relationship status: None    Intimate partner violence     Fear of current or ex partner: None     Emotionally abused: None     Physically abused: None     Forced sexual activity: None   Other Topics Concern    None   Social History Narrative    None     Current Outpatient Medications   Medication Sig Dispense Refill    indapamide (LOZOL) 1.25 MG tablet Take 1 tablet by mouth daily 90 tablet 2    amLODIPine (NORVASC) 5 MG tablet TAKE 1/2 TABLET BY MOUTH ONCE DAILY 45 tablet 2    thyroid (ARMOUR THYROID) 30 MG tablet take 1 & 1/2 tablets (45mg) by mouth once daily on Sun, Mon, Wed, Fri. Take 1 tablet (30mg) once daily on Tue, Thur, and Sat (Patient taking differently: 45 mg daily take 1 & 1/2 tablets (45mg) by mouth once daily) 135 tablet 2    Pumpkin Seed-Soy Germ (AZO BLADDER CONTROL/GO-LESS PO) Take by mouth 2 times daily      Handicap Placard MISC by Does not apply route Exp 5 years 1 each 0    Multiple Vitamins-Minerals (THERAPEUTIC MULTIVITAMIN-MINERALS) tablet Take 1 tablet by mouth daily      b complex vitamins capsule Take 1 capsule by mouth daily      MAGNESIUM PO Take 500 mg by mouth       Glucosamine-Chondroitin (GLUCOSAMINE CHONDR COMPLEX PO) Take 2 tablets by mouth daily       aspirin 81 MG tablet Take 81 mg by mouth daily      L-Tryptophan 500 MG TABS Take 750 mg by mouth daily At hs       sotalol (BETAPACE) 80 MG tablet 1/2 tablet bid  3    gatifloxacin (ZYMAR) 0.5 % SOLN INSERT 1 DROP IN THE RIGHT EYE FOUR TIMES DAILY FOR 7 DAYS  1    LOTEMAX 0.5 % ophthalmic suspension INSERT 1 DROP IN THE RIGHT EYE FOUR TIMES DAILY until directed to stop  1    calcium carbonate (OSCAL) 500 MG TABS tablet Take 500 mg by mouth daily Take 1 daily       No current facility-administered medications for this visit. Allergies   Allergen Reactions    Hydralazine Other (See Comments)     Ran fever and chills. Face got real hot.  Influenza Vaccines Hives    Cortisone Other (See Comments)     Affects eyes- blurry vision    Coumadin [Warfarin Sodium]      Capillary swelling/inflammation    Levothyroxine     Pneumococcal Vaccine Hives     Pt states has an allergy to any vaccines that have egg embryo in them.        Review of Systems:   General ROS: per HPI  ENT ROS: negative for - headaches, hearing change, oral lesions, vertigo or visual changes  Hematological and Lymphatic ROS: negative for - bleeding problems, bruising, fatigue or night sweats  Endocrine ROS: negative for - polydipsia/polyuria or temperature intolerance  Respiratory ROS: no cough, shortness of breath, or wheezing  Cardiovascular ROS: no chest pain or dyspnea on exertion  Gastrointestinal ROS: no abdominal pain, no bloody or black stool - hx of colon CA -  She tends to have some constipation  Genito-Urinary ROS: per HPI        Physical Exam:  /70 (Site: Left Upper Arm)   Pulse 76   Temp 97.6 °F (36.4 °C)   Ht 5' 5\" (1.651 m)   Wt 178 lb 6.4 oz (80.9 kg)   LMP 01/01/1996   SpO2 97%   Breastfeeding No   BMI 29.69 kg/m²     Gen: Well, NAD, Alert, Oriented x 3   HEENT: EOMI, eyes clear, MMM  Skin: without rash or jaundice  Neck: no significant lymphadenopathy or thyromegaly  Lungs: CTA B w/out Rales/Wheezes/Rhonchi, Good respiratory effort   Heart: RRR, S1S2, w/out M/R/G, non-displaced PMI   Abdomen: Soft NT/ND, w/out R/G, w/ +BSx4   Ext: No C/C/E Bilaterally. Lab Results   Component Value Date    WBC 4.6 (L) 01/27/2020    HGB 15.0 01/27/2020    HCT 45.9 01/27/2020     01/27/2020    CHOL 259 (H) 01/27/2020    TRIG 125 01/27/2020    HDL 73 (H) 01/27/2020    ALT 16 01/27/2020    AST 17 01/27/2020     01/27/2020    K 4.8 01/28/2020     01/27/2020    CREATININE 0.85 01/27/2020    BUN 34 (H) 01/27/2020    CO2 30 01/27/2020    TSH 2.840 01/27/2020    INR 1.0 06/14/2017    LABA1C 5.5 04/09/2019         A&P   Diagnosis Orders   1. Hypothyroidism, unspecified type     2. At high risk for falls     3. Essential hypertension     4. Atrial fibrillation, unspecified type ([address]     5. Hyperkalemia     6. Sick sinus syndrome ([address]     7. Mixed hyperlipidemia     8. Pacemaker     9.  Malignant neoplasm of transverse colon ([facility] [address]          Continue as below  Current Outpatient Medications   Medication Sig Dispense Refill    indapamide (LOZOL) 1.25 MG tablet Take 1 tablet by mouth daily 90 tablet 2    amLODIPine (NORVASC) 5 MG tablet TAKE 1/2 TABLET BY MOUTH ONCE DAILY 45 tablet 2    thyroid (ARMOUR THYROID) 30 MG tablet take 1 & 1/2 tablets (45mg) by mouth once daily on Sun, Mon, Wed, Fri. Take 1 tablet (30mg) once daily on Tue, Thur, and Sat (Patient taking differently: 45 mg daily take 1 & 1/2 tablets (45mg) by mouth once daily) 135 tablet 2    Pumpkin Seed-Soy Germ (AZO BLADDER CONTROL/GO-LESS PO) Take by mouth 2 times daily      Handicap Placard MISC by Does not apply route Exp 5 years 1 each 0    Multiple Vitamins-Minerals (THERAPEUTIC MULTIVITAMIN-MINERALS) tablet Take 1 tablet by mouth daily      b complex vitamins capsule Take 1 capsule by mouth daily      MAGNESIUM PO Take 500 mg by mouth       Glucosamine-Chondroitin (GLUCOSAMINE CHONDR COMPLEX PO) Take 2 tablets by mouth daily       aspirin 81 MG tablet Take 81 mg by mouth daily      L-Tryptophan 500 MG TABS Take 750 mg by mouth daily At hs       sotalol (BETAPACE) 80 MG tablet 1/2 tablet bid  3    gatifloxacin (ZYMAR) 0.5 % SOLN INSERT 1 DROP IN THE RIGHT EYE FOUR TIMES DAILY FOR 7 DAYS  1    LOTEMAX 0.5 % ophthalmic suspension INSERT 1 DROP IN THE RIGHT EYE FOUR TIMES DAILY until directed to stop  1    calcium carbonate (OSCAL) 500 MG TABS tablet Take 500 mg by mouth daily Take 1 daily       No current facility-administered medications for this visit. She will also follow up with cardiology  Updated health maintenance  Allergic to flu shot, egg allergy    Overall doing very well    Ana Cuevas MD        On the basis of positive falls risk screening, assessment and plan is as follows: home safety tips provided.

## 2020-09-24 ENCOUNTER — HOSPITAL ENCOUNTER (OUTPATIENT)
Dept: CARDIOLOGY | Age: 80
Discharge: HOME | End: 2020-09-24
Payer: MEDICARE

## 2020-09-24 PROCEDURE — 93296 REM INTERROG EVL PM/IDS: CPT

## 2020-10-29 DIAGNOSIS — I10 ESSENTIAL HYPERTENSION: ICD-10-CM

## 2020-10-29 LAB
ANION GAP SERPL CALCULATED.3IONS-SCNC: 8 MEQ/L (ref 9–15)
BUN BLDV-MCNC: 33 MG/DL (ref 8–23)
CALCIUM SERPL-MCNC: 9.5 MG/DL (ref 8.5–9.9)
CHLORIDE BLD-SCNC: 101 MEQ/L (ref 95–107)
CO2: 31 MEQ/L (ref 20–31)
CREAT SERPL-MCNC: 0.89 MG/DL (ref 0.5–0.9)
GFR AFRICAN AMERICAN: >60
GFR NON-AFRICAN AMERICAN: >60
GLUCOSE BLD-MCNC: 77 MG/DL (ref 70–99)
POTASSIUM SERPL-SCNC: 4.9 MEQ/L (ref 3.4–4.9)
SODIUM BLD-SCNC: 140 MEQ/L (ref 135–144)

## 2020-11-09 RX ORDER — LEVOTHYROXINE AND LIOTHYRONINE 19; 4.5 UG/1; UG/1
45 TABLET ORAL DAILY
Qty: 45 TABLET | Refills: 5 | Status: SHIPPED | OUTPATIENT
Start: 2020-11-09 | End: 2021-05-07

## 2020-11-13 ENCOUNTER — OFFICE VISIT (OUTPATIENT)
Dept: FAMILY MEDICINE CLINIC | Age: 80
End: 2020-11-13
Payer: MEDICARE

## 2020-11-13 VITALS
WEIGHT: 181 LBS | HEART RATE: 68 BPM | DIASTOLIC BLOOD PRESSURE: 68 MMHG | SYSTOLIC BLOOD PRESSURE: 128 MMHG | BODY MASS INDEX: 30.12 KG/M2

## 2020-11-13 PROCEDURE — G8400 PT W/DXA NO RESULTS DOC: HCPCS | Performed by: FAMILY MEDICINE

## 2020-11-13 PROCEDURE — 1090F PRES/ABSN URINE INCON ASSESS: CPT | Performed by: FAMILY MEDICINE

## 2020-11-13 PROCEDURE — G8427 DOCREV CUR MEDS BY ELIG CLIN: HCPCS | Performed by: FAMILY MEDICINE

## 2020-11-13 PROCEDURE — G8483 FLU IMM NO ADMIN DOC REA: HCPCS | Performed by: FAMILY MEDICINE

## 2020-11-13 PROCEDURE — 1036F TOBACCO NON-USER: CPT | Performed by: FAMILY MEDICINE

## 2020-11-13 PROCEDURE — G8417 CALC BMI ABV UP PARAM F/U: HCPCS | Performed by: FAMILY MEDICINE

## 2020-11-13 PROCEDURE — 1123F ACP DISCUSS/DSCN MKR DOCD: CPT | Performed by: FAMILY MEDICINE

## 2020-11-13 PROCEDURE — 4040F PNEUMOC VAC/ADMIN/RCVD: CPT | Performed by: FAMILY MEDICINE

## 2020-11-13 PROCEDURE — 99214 OFFICE O/P EST MOD 30 MIN: CPT | Performed by: FAMILY MEDICINE

## 2020-11-13 ASSESSMENT — ENCOUNTER SYMPTOMS
COUGH: 0
SHORTNESS OF BREATH: 0
SORE THROAT: 0
COLOR CHANGE: 1

## 2020-11-13 NOTE — PROGRESS NOTES
Diagnosis Orders   1. Abnormal skin growth     2. Dandruff in adult     3. Acne, unspecified acne type       Return in about 3 weeks (around 12/4/2020) for 30 min skin check/medical conditions recheck scalp, acne, and do biopsy on nose. Patient Instructions   Benzoyl peroxide 10 % cream to open lesions on face, or new bumps on face. Selsun blue original formula for dandruff to be applied to scalp daily for one week then 3 times weekly thereafter. The application should be for 2-3 minutes then wash and rinse    Schedule biopsy of atleast one skin lesion on the nose    If the lesion on the cheek is still present at the 1 month follow-up we will biopsy that as well. If the dandruff is not resolved at the next appointment consider ketoconazole shampoo. Subjective:      Patient ID: Jamarcus Ann is a 79 y.o. female who presents for:  Chief Complaint   Patient presents with    Skin Problem     diluted apple cider vinegar as skin care. spots longer than she can remember       Long term spots on face shrinking she has had them for years. She is noted they have been shrinking with the use of apple cider vinegar but they will not go away. They have blood at times. No current peeling. Bumps on chin that get better over months with apple cider vinegar. She has a new spot on her cheek that is similar to what she gets on the chin. It is red. It popped open the other day after apple cider vinegar. Small amount of discharge was noted from it. The most current lesion is just a couple days old. Itching scalp that she can feel skin lesions on for months. She was told she had dandruff in the past.  She tried head and shoulder shampoo and did let it soak for a couple minutes when she used it. No improvement in her condition.       Current Outpatient Medications on File Prior to Visit   Medication Sig Dispense Refill    thyroid (ARMOUR THYROID) 30 MG tablet Take 1.5 tablets by mouth daily take 1 & 1/2 tablets (45mg) by mouth once daily 45 tablet 5    indapamide (LOZOL) 1.25 MG tablet Take 1 tablet by mouth daily 90 tablet 2    amLODIPine (NORVASC) 5 MG tablet TAKE 1/2 TABLET BY MOUTH ONCE DAILY 45 tablet 2    gatifloxacin (ZYMAR) 0.5 % SOLN INSERT 1 DROP IN THE RIGHT EYE FOUR TIMES DAILY FOR 7 DAYS  1    LOTEMAX 0.5 % ophthalmic suspension INSERT 1 DROP IN THE RIGHT EYE FOUR TIMES DAILY until directed to stop  1    Pumpkin Seed-Soy Germ (AZO BLADDER CONTROL/GO-LESS PO) Take by mouth 2 times daily      Handicap Placard MISC by Does not apply route Exp 5 years 1 each 0    Multiple Vitamins-Minerals (THERAPEUTIC MULTIVITAMIN-MINERALS) tablet Take 1 tablet by mouth daily      calcium carbonate (OSCAL) 500 MG TABS tablet Take 500 mg by mouth daily Take 1 daily      b complex vitamins capsule Take 1 capsule by mouth daily      MAGNESIUM PO Take 500 mg by mouth       Glucosamine-Chondroitin (GLUCOSAMINE CHONDR COMPLEX PO) Take 2 tablets by mouth daily       aspirin 81 MG tablet Take 81 mg by mouth daily      L-Tryptophan 500 MG TABS Take 750 mg by mouth daily At hs       sotalol (BETAPACE) 80 MG tablet 1/2 tablet bid  3     No current facility-administered medications on file prior to visit.       Past Medical History:   Diagnosis Date    Atrial fibrillation (HCC)     no coumadin pt preference    Colon cancer ([address]      H/O COLON AND UTERINE [id number]    Constipation     Fatigue     History of blood transfusion     x2 1996    History of uterine cancer 4/5/2018    Hyperlipidemia     Hypertension     BENIGN ESSENTIAL    Hypothyroidism 1/23/2014    Obesity     Pacemaker     Sick sinus syndrome (HCC)     Sleep apnea     noncompliant with cpap    TIA (transient ischemic attack)     Uterine cancer [facility])      Past Surgical History:   Procedure Laterality Date    CARDIAC CATHETERIZATION      COLON SURGERY  2001    RESECTION    COLONOSCOPY  08-05-11,10/8/2013    RANDOM BIOPSIES W/COLITIS    HERNIA REPAIR      HYSTERECTOMY      PACEMAKER INSERTION      [place] COLON CA [address]N [address]  IND N/A 7/11/2017    COLONOSCOPY performed [facility]ucky [facility] MD at [facility]      age 9   [facility] TUMOR REMOVAL      fatty tumor lower back     Social History     Socioeconomic History    Marital status: Single     Spouse name: Not on file    Number of children: 0    Years of education: Not on file    Highest education level: Not on file   Occupational History    Not on file   Social Needs    Financial resource strain: Not on file    Food insecurity     Worry: Not on file     Inability: Not on file    Transportation needs     Medical: Not on file     Non-medical: Not on file   Tobacco Use    Smoking status: Never Smoker    Smokeless tobacco: Never Used   Substance and Sexual Activity    Alcohol use: No    Drug use: No    Sexual activity: Not on file   Lifestyle    Physical activity     Days per week: Not on file     Minutes per session: Not on file    Stress: Not on file   Relationships    Social connections     Talks on phone: Not on file     Gets together: Not on file     Attends Muslim service: Not on file     Active member of club or organization: Not on file     Attends meetings of clubs or organizations: Not on file     Relationship status: Not on file    Intimate partner violence     Fear of current or ex partner: Not on file     Emotionally abused: Not on file     Physically abused: Not on file     Forced sexual activity: Not on file   Other Topics Concern    Not on file   Social History Narrative    Not on file     Family History   Problem Relation Age of Onset    Heart Disease Maternal Grandmother     Emphysema Paternal Grandmother     Stroke Mother     High Blood Pressure Father      Allergies:  Hydralazine; Influenza vaccines; Cortisone; Coumadin [warfarin sodium];  Levothyroxine; and Pneumococcal vaccine    Review of Systems   Constitutional: Negative for chills and fever.   HENT: Negative for congestion and sore throat. Respiratory: Negative for cough and shortness of breath. Skin: Positive for color change. Negative for rash and wound. Allergic/Immunologic: Negative for environmental allergies, food allergies and immunocompromised state. Hematological: Negative for adenopathy. Does not bruise/bleed easily. Psychiatric/Behavioral: Negative for behavioral problems and sleep disturbance. Objective:   /68 (Site: Left Upper Arm, Position: Sitting, Cuff Size: Medium Adult)   Pulse 68   Wt 181 lb (82.1 kg)   LMP 01/01/1996   BMI 30.12 kg/m²     Physical Exam  Constitutional:       General: She is not in acute distress. Appearance: Normal appearance. She is well-developed. She is not toxic-appearing. HENT:      Head: Normocephalic and atraumatic. Right Ear: Hearing and tympanic membrane normal.      Left Ear: Hearing and tympanic membrane normal.      Nose: Nose normal. No nasal deformity. Eyes:      General: Lids are normal.         Right eye: No discharge. Left eye: No discharge. Conjunctiva/sclera: Conjunctivae normal.      Pupils: Pupils are equal, round, and reactive to light. Neck:      Musculoskeletal: Full passive range of motion without pain. Thyroid: No thyroid mass or thyromegaly. Vascular: No JVD. Trachea: Trachea and phonation normal.   Cardiovascular:      Rate and Rhythm: Normal rate and regular rhythm. Pulmonary:      Effort: No accessory muscle usage or respiratory distress. Musculoskeletal:      Comments: FROM all large muscle groups and joints as witnessed when walking to exam table, getting on, and getting off the exam table. Skin:     General: Skin is warm and dry. Findings: No rash. Comments: Scalp has diffuse dryness noted throughout. Flap. Without plaques or thickness.     Left side of the nose has 2 lesions that are each about 3 mm in diameter pearly top, yellowish errors. Eran Singh M.D.

## 2020-11-13 NOTE — PATIENT INSTRUCTIONS
Benzoyl peroxide 10 % cream to open lesions on face, or new bumps on face. Selsun blue original formula for dandruff to be applied to scalp daily for one week then 3 times weekly thereafter. The application should be for 2-3 minutes then wash and rinse    Schedule biopsy of atleast one skin lesion on the nose    If the lesion on the cheek is still present at the 1 month follow-up we will biopsy that as well. If the dandruff is not resolved at the next appointment consider ketoconazole shampoo.

## 2020-11-16 RX ORDER — AMLODIPINE BESYLATE 5 MG/1
TABLET ORAL
Qty: 45 TABLET | Refills: 2 | Status: SHIPPED | OUTPATIENT
Start: 2020-11-16 | End: 2021-08-09 | Stop reason: SDUPTHER

## 2020-12-04 ENCOUNTER — PROCEDURE VISIT (OUTPATIENT)
Dept: FAMILY MEDICINE CLINIC | Age: 80
End: 2020-12-04
Payer: MEDICARE

## 2020-12-04 VITALS
WEIGHT: 181 LBS | OXYGEN SATURATION: 98 % | BODY MASS INDEX: 30.16 KG/M2 | HEART RATE: 64 BPM | TEMPERATURE: 97.1 F | SYSTOLIC BLOOD PRESSURE: 128 MMHG | HEIGHT: 65 IN | DIASTOLIC BLOOD PRESSURE: 68 MMHG

## 2020-12-04 DIAGNOSIS — D49.2 ABNORMAL SKIN GROWTH: ICD-10-CM

## 2020-12-04 PROCEDURE — 11103 TANGNTL BX SKIN EA SEP/ADDL: CPT | Performed by: FAMILY MEDICINE

## 2020-12-04 PROCEDURE — G8483 FLU IMM NO ADMIN DOC REA: HCPCS | Performed by: FAMILY MEDICINE

## 2020-12-04 PROCEDURE — 4040F PNEUMOC VAC/ADMIN/RCVD: CPT | Performed by: FAMILY MEDICINE

## 2020-12-04 PROCEDURE — G8417 CALC BMI ABV UP PARAM F/U: HCPCS | Performed by: FAMILY MEDICINE

## 2020-12-04 PROCEDURE — 1090F PRES/ABSN URINE INCON ASSESS: CPT | Performed by: FAMILY MEDICINE

## 2020-12-04 PROCEDURE — G8400 PT W/DXA NO RESULTS DOC: HCPCS | Performed by: FAMILY MEDICINE

## 2020-12-04 PROCEDURE — 1036F TOBACCO NON-USER: CPT | Performed by: FAMILY MEDICINE

## 2020-12-04 PROCEDURE — 1123F ACP DISCUSS/DSCN MKR DOCD: CPT | Performed by: FAMILY MEDICINE

## 2020-12-04 PROCEDURE — 99214 OFFICE O/P EST MOD 30 MIN: CPT | Performed by: FAMILY MEDICINE

## 2020-12-04 PROCEDURE — G8427 DOCREV CUR MEDS BY ELIG CLIN: HCPCS | Performed by: FAMILY MEDICINE

## 2020-12-04 PROCEDURE — 11102 TANGNTL BX SKIN SINGLE LES: CPT | Performed by: FAMILY MEDICINE

## 2020-12-04 ASSESSMENT — ENCOUNTER SYMPTOMS: COLOR CHANGE: 1

## 2020-12-04 NOTE — PROGRESS NOTES
Plan:   Return in about 3 weeks (around 12/4/2020) for 30 min skin check/medical conditions recheck scalp, acne, and do biopsy on nose. Patient Instructions   Benzoyl peroxide 10 % cream to open lesions on face, or new bumps on face. Selsun blue original formula for dandruff to be applied to scalp daily for one week then 3 times weekly thereafter. The application should be for 2-3 minutes then wash and rinse     Schedule biopsy of atleast one skin lesion on the nose     If the lesion on the cheek is still present at the 1 month follow-up we will biopsy that as well. If the dandruff is not resolved at the next appointment consider ketoconazole shampoo. This note was partially created with the assistance of dictation. This may lead to grammatical or spelling errors. Eran Davis Se, M.D.

## 2020-12-04 NOTE — PATIENT INSTRUCTIONS
Patient's dandruff is significantly improved on most sides of the head. Along the nape of the neck there is still some concentrated dandruff. Patient will continue Selsun Blue 3 days a week. She will use more of the direct solution versus solids on this area. If this does not improve the next month return for biopsy for possible psoriasis. Incision/ Shave biopsy/ Laceration repair    -Clean surgical area with antibacterial soap and water once daily.    -Keep surgical site moist with vaseline or antibiotic ointment (single, not triple-Neosporin) and apply a fresh bandage daily until a solid scab forms or if the wound is at risk for trauma or dirt.   -Follow up immediately if any growing redness (minimal redness or pale pink is normal along wound edges) surrounds the surgical site or if dripping drainage occurs at surgical site. Once a solid scab forms no more bandage needed. A wet scab can look yellow. This is not infection, just moisture.  -Once the lesion is healed be sure to apply sunscreen to the area to prevent burn of the newer and more delicate skin during the first 6 months of healing.    -If the scar begins to be raised you may massage the area firmly twice a day to help break down scar tissue and help the area become a flat scar. There is some evidence that Mederma applied to a scar daily for the first few months can help shrink and fade it more quickly then without intervention.

## 2020-12-04 NOTE — PROGRESS NOTES
Diagnosis Orders   1. Dandruff in adult     2. Abnormal skin growth  DE TANGENTIAL BIOPSY SKIN SINGLE LESION    Specimen to Pathology Outpatient    Specimen to Pathology Outpatient    DE TANGENTIAL BIOPSY SKIN EA SEP/ADDITIONAL LESION    DE TANGENTIAL BIOPSY SKIN EA SEP/ADDITIONAL LESION    Specimen to Pathology Outpatient   3.  Acne, unspecified acne type           Orders Placed This Encounter   Procedures    Specimen to Pathology Outpatient     Margins requested on all specimens  R/O basal cell cancer  Location right cheek     Standing Status:   Future     Standing Expiration Date:   12/4/2021     Order Specific Question:   PREVIOUS BIOPSY     Answer:   No     Order Specific Question:   PREOP DIAGNOSIS     Answer:   Abnormal skin growth     Order Specific Question:   FROZEN SECTION - NO OR YES/SPECIMEN     Answer:   No    Specimen to Pathology Outpatient     Margins requested on all specimens  R/O basal cell cancer versus xanthelasma  Location left nose     Standing Status:   Future     Standing Expiration Date:   12/4/2021     Order Specific Question:   PREVIOUS BIOPSY     Answer:   No     Order Specific Question:   PREOP DIAGNOSIS     Answer:   Abnormal skin growth     Order Specific Question:   FROZEN SECTION - NO OR YES/SPECIMEN     Answer:   No    Specimen to Pathology Outpatient     Margins requested on all specimens  R/O basal cell cancer versus xanthelasma   location left cheek     Standing Status:   Future     Standing Expiration Date:   12/4/2021     Order Specific Question:   PREVIOUS BIOPSY     Answer:   No     Order Specific Question:   PREOP DIAGNOSIS     Answer:   Abnormal skin growth     Order Specific Question:   FROZEN SECTION - NO OR YES/SPECIMEN     Answer:   No    DE TANGENTIAL BIOPSY SKIN SINGLE LESION     Right cheek    DE TANGENTIAL BIOPSY SKIN EA SEP/ADDITIONAL LESION     Left nose    DE TANGENTIAL BIOPSY SKIN EA SEP/ADDITIONAL LESION     Left cheek       Rustam Pelletier was seen today for procedure. Diagnoses and all orders for this visit:    Dandruff in adult    Abnormal skin growth  -     ME TANGENTIAL BIOPSY SKIN SINGLE LESION  -     Specimen to Pathology Outpatient; Future  -     Specimen to Pathology Outpatient; Future  -     ME TANGENTIAL BIOPSY SKIN EA SEP/ADDITIONAL LESION  -     ME TANGENTIAL BIOPSY SKIN EA SEP/ADDITIONAL LESION  -     Specimen to Pathology Outpatient; Future    Acne, unspecified acne type        Return in about 6 months (around 6/4/2021) for 15 min skin check. Patient Instructions   Patient's dandruff is significantly improved on most sides of the head. Along the nape of the neck there is still some concentrated dandruff. Patient will continue Selsun Blue 3 days a week. She will use more of the direct solution versus solids on this area. If this does not improve the next month return for biopsy for possible psoriasis. Incision/ Shave biopsy/ Laceration repair    -Clean surgical area with antibacterial soap and water once daily.    -Keep surgical site moist with vaseline or antibiotic ointment (single, not triple-Neosporin) and apply a fresh bandage daily until a solid scab forms or if the wound is at risk for trauma or dirt.   -Follow up immediately if any growing redness (minimal redness or pale pink is normal along wound edges) surrounds the surgical site or if dripping drainage occurs at surgical site. Once a solid scab forms no more bandage needed. A wet scab can look yellow. This is not infection, just moisture.  -Once the lesion is healed be sure to apply sunscreen to the area to prevent burn of the newer and more delicate skin during the first 6 months of healing.    -If the scar begins to be raised you may massage the area firmly twice a day to help break down scar tissue and help the area become a flat scar.  There is some evidence that Mederma applied to a scar daily for the first few months can help shrink and fade it more quickly then without facility-administered medications on file prior to visit. Hydralazine; Influenza vaccines; Cortisone; Coumadin [warfarin sodium]; Levothyroxine; and Pneumococcal vaccine    Review of Systems   Constitutional: Negative for chills and fever. Skin: Positive for color change and rash. Negative for wound. Allergic/Immunologic: Negative for environmental allergies, food allergies and immunocompromised state. Hematological: Negative for adenopathy. Does not bruise/bleed easily. Psychiatric/Behavioral: Negative for behavioral problems and sleep disturbance. /68   Pulse 64   Temp 97.1 °F (36.2 °C)   Ht 5' 5\" (1.651 m)   Wt 181 lb (82.1 kg)   LMP 01/01/1996   SpO2 98%   BMI 30.12 kg/m²   Physical Exam  Constitutional:       General: She is not in acute distress. Appearance: Normal appearance. She is well-developed. She is not toxic-appearing. HENT:      Head: Normocephalic and atraumatic. Right Ear: Hearing and tympanic membrane normal.      Left Ear: Hearing and tympanic membrane normal.      Nose: Nose normal. No nasal deformity. Eyes:      General: Lids are normal.         Right eye: No discharge. Left eye: No discharge. Conjunctiva/sclera: Conjunctivae normal.      Pupils: Pupils are equal, round, and reactive to light. Neck:      Musculoskeletal: Full passive range of motion without pain. Thyroid: No thyroid mass or thyromegaly. Vascular: No JVD. Trachea: Trachea and phonation normal.   Cardiovascular:      Rate and Rhythm: Normal rate and regular rhythm. Pulmonary:      Effort: No accessory muscle usage or respiratory distress. Musculoskeletal:      Comments: FROM all large muscle groups and joints as witnessed when walking to exam table, getting on, and getting off the exam table. Skin:     General: Skin is warm and dry. Findings: No rash.       Comments: Right cheek 3 mm umbilicated raised pearly lesion with vascularity noted. Left side of the nose and left cheek up near the external Meaghan Pillar there are 2 lesions each are 3 mm in diameter pearly texture vascular tops with yellowish discoloration    Acne lesions of the face are cleared. Nape of the neck inside the hairline has diffuse soft dry flaking skin   Neurological:      Mental Status: She is alert. Motor: No tremor or atrophy. Gait: Gait normal.   Psychiatric:         Speech: Speech normal.         Behavior: Behavior normal.         Thought Content: Thought content normal.           PROCEDURE: All 3 lesions and skin exam are removed with same technique  Informed consent was given by the patient. Risks including infection, bleeding and scarring were discussed. No guarantee how the scar will look. Patient skin was prepped with alcohol. Wound was anesthetized using 0.2 cc of 1% lidocaine with epinephrine for anesthesia. A #15 blade was used to obtain the shave of the lesion. Drysol was used at the base of site. Bacitracin ointment and bandage were placed on the wound. Estimated blood loss less than 1 cc    Post op wound care instructions were given to the patient. He/She will f/u in 2 weeks or sooner if needed for problems. Pedro Dove was seen today for procedure. Diagnoses and all orders for this visit:    Dandruff in adult    Abnormal skin growth  -     IN TANGENTIAL BIOPSY SKIN SINGLE LESION  -     Specimen to Pathology Outpatient; Future  -     Specimen to Pathology Outpatient; Future  -     IN TANGENTIAL BIOPSY SKIN EA SEP/ADDITIONAL LESION  -     IN TANGENTIAL BIOPSY SKIN EA SEP/ADDITIONAL LESION  -     Specimen to Pathology Outpatient; Future    Acne, unspecified acne type        Return in about 6 months (around 6/4/2021) for 15 min skin check. Patient Instructions   Patient's dandruff is significantly improved on most sides of the head. Along the nape of the neck there is still some concentrated dandruff.   Patient will continue [facility] 3 days a week. She will use more of the direct solution versus solids on this area. If this does not improve the next month return for biopsy for possible psoriasis. Incision/ Shave biopsy/ Laceration repair    -Clean surgical area with antibacterial soap and water once daily.    -Keep surgical site moist with vaseline or antibiotic ointment (single, not triple-Neosporin) and apply a fresh bandage daily until a solid scab forms or if the wound is at risk for trauma or dirt.   -Follow up immediately if any growing redness (minimal redness or pale pink is normal along wound edges) surrounds the surgical site or if dripping drainage occurs at surgical site. Once a solid scab forms no more bandage needed. A wet scab can look yellow. This is not infection, just moisture.  -Once the lesion is healed be sure to apply sunscreen to the area to prevent burn of the newer and more delicate skin during the first 6 months of healing.    -If the scar begins to be raised you may massage the area firmly twice a day to help break down scar tissue and help the area become a flat scar. There is some evidence that Mederma applied to a scar daily for the first few months can help shrink and fade it more quickly then without intervention. Broderick Lilly M.D. This note was partially created with the assistance of dictation. This may lead to grammatical or spelling errors.

## 2020-12-09 PROBLEM — L57.0 AK (ACTINIC KERATOSIS): Status: ACTIVE | Noted: 2020-12-09

## 2020-12-20 RX ORDER — INDAPAMIDE 1.25 MG/1
1.25 TABLET, FILM COATED ORAL DAILY
Qty: 90 TABLET | Refills: 1 | Status: SHIPPED | OUTPATIENT
Start: 2020-12-20 | End: 2021-06-21

## 2020-12-29 ENCOUNTER — HOSPITAL ENCOUNTER (OUTPATIENT)
Dept: CARDIOLOGY | Age: 80
Discharge: HOME | End: 2020-12-29
Payer: MEDICARE

## 2020-12-29 PROCEDURE — 93296 REM INTERROG EVL PM/IDS: CPT

## 2021-01-13 ENCOUNTER — OFFICE VISIT (OUTPATIENT)
Dept: FAMILY MEDICINE CLINIC | Age: 81
End: 2021-01-13
Payer: MEDICARE

## 2021-01-13 VITALS
WEIGHT: 182 LBS | HEIGHT: 65 IN | DIASTOLIC BLOOD PRESSURE: 68 MMHG | BODY MASS INDEX: 30.32 KG/M2 | TEMPERATURE: 98.1 F | OXYGEN SATURATION: 99 % | SYSTOLIC BLOOD PRESSURE: 112 MMHG | HEART RATE: 89 BPM

## 2021-01-13 DIAGNOSIS — L70.9 ACNE, UNSPECIFIED ACNE TYPE: ICD-10-CM

## 2021-01-13 DIAGNOSIS — L21.0 DANDRUFF IN ADULT: ICD-10-CM

## 2021-01-13 DIAGNOSIS — L30.9 DERMATITIS: Primary | ICD-10-CM

## 2021-01-13 PROCEDURE — G8427 DOCREV CUR MEDS BY ELIG CLIN: HCPCS | Performed by: FAMILY MEDICINE

## 2021-01-13 PROCEDURE — G8510 SCR DEP NEG, NO PLAN REQD: HCPCS | Performed by: FAMILY MEDICINE

## 2021-01-13 PROCEDURE — G8483 FLU IMM NO ADMIN DOC REA: HCPCS | Performed by: FAMILY MEDICINE

## 2021-01-13 PROCEDURE — 1036F TOBACCO NON-USER: CPT | Performed by: FAMILY MEDICINE

## 2021-01-13 PROCEDURE — 99214 OFFICE O/P EST MOD 30 MIN: CPT | Performed by: FAMILY MEDICINE

## 2021-01-13 PROCEDURE — 1123F ACP DISCUSS/DSCN MKR DOCD: CPT | Performed by: FAMILY MEDICINE

## 2021-01-13 PROCEDURE — 4040F PNEUMOC VAC/ADMIN/RCVD: CPT | Performed by: FAMILY MEDICINE

## 2021-01-13 PROCEDURE — 1090F PRES/ABSN URINE INCON ASSESS: CPT | Performed by: FAMILY MEDICINE

## 2021-01-13 PROCEDURE — G8417 CALC BMI ABV UP PARAM F/U: HCPCS | Performed by: FAMILY MEDICINE

## 2021-01-13 PROCEDURE — G8400 PT W/DXA NO RESULTS DOC: HCPCS | Performed by: FAMILY MEDICINE

## 2021-01-13 RX ORDER — TRIAMCINOLONE ACETONIDE 1 MG/G
CREAM TOPICAL
Qty: 30 G | Refills: 0 | Status: SHIPPED | OUTPATIENT
Start: 2021-01-13 | End: 2021-02-12 | Stop reason: SDUPTHER

## 2021-01-13 ASSESSMENT — ENCOUNTER SYMPTOMS: COLOR CHANGE: 1

## 2021-01-13 ASSESSMENT — PATIENT HEALTH QUESTIONNAIRE - PHQ9
SUM OF ALL RESPONSES TO PHQ QUESTIONS 1-9: 0
1. LITTLE INTEREST OR PLEASURE IN DOING THINGS: 0
SUM OF ALL RESPONSES TO PHQ QUESTIONS 1-9: 0
2. FEELING DOWN, DEPRESSED OR HOPELESS: 0
SUM OF ALL RESPONSES TO PHQ QUESTIONS 1-9: 0

## 2021-01-13 NOTE — PROGRESS NOTES
Diagnosis Orders   1. Dermatitis  triamcinolone (KENALOG) 0.1 % cream   2. Acne, unspecified acne type     3. Dandruff in adult       Return if symptoms worsen or fail to improve. Patient Instructions   Apply steroid to erythematous area twice a day for about a week. Will take probably about 21 days for the skin area to heal completely. Continue benzoyl peroxide as needed on the acne. Hairline has cleared. No biopsy needed. Subjective:      Patient ID: Bee Traylor is a 79 y.o. female who presents for:  Chief Complaint   Patient presents with    Rash     x 1 month ago neck & back - facial acne        Patient purchase benzyl peroxide 10% and it works well on her facial acne. She had a new spot show up on her neck that she tried it on it then changed to a pustule broke open and exuded pus. She proceeded to have some redness and itching in the neck that is gone. She got [employer] eczema relief cream and that has taken away the itch but the redness remains.   No fever, chills, pain      Current Outpatient Medications on File Prior to Visit   Medication Sig Dispense Refill    indapamide (LOZOL) 1.25 MG tablet TAKE 1 TABLET BY MOUTH DAILY 90 tablet 1    amLODIPine (NORVASC) 5 MG tablet TAKE 1/2 TABLET BY MOUTH ONCE DAILY 45 tablet 2    thyroid (ARMOUR THYROID) 30 MG tablet Take 1.5 tablets by mouth daily take 1 & 1/2 tablets (45mg) by mouth once daily 45 tablet 5    Pumpkin Seed-Soy Germ (AZO BLADDER CONTROL/GO-LESS PO) Take by mouth 2 times daily      Handicap Placard MISC by Does not apply route Exp 5 years 1 each 0    Multiple Vitamins-Minerals (THERAPEUTIC MULTIVITAMIN-MINERALS) tablet Take 1 tablet by mouth daily      calcium carbonate (OSCAL) 500 MG TABS tablet Take 500 mg by mouth daily Take 1 daily      b complex vitamins capsule Take 1 capsule by mouth daily      MAGNESIUM PO Take 500 mg by mouth  Glucosamine-Chondroitin (GLUCOSAMINE CHONDR COMPLEX PO) Take 2 tablets by mouth daily       aspirin 81 MG tablet Take 81 mg by mouth daily      L-Tryptophan 500 MG TABS Take 750 mg by mouth daily At hs       sotalol (BETAPACE) 80 MG tablet 1/2 tablet bid  3     No current facility-administered medications on file prior to visit.       Past Medical History:   Diagnosis Date    Atrial fibrillation (HCC)     no coumadin pt preference    Colon cancer ([address]      H/O COLON AND UTERINE [id number]    Constipation     Fatigue     History of blood transfusion     x2 1996    History of uterine cancer 4/5/2018    Hyperlipidemia     Hypertension     BENIGN ESSENTIAL    Hypothyroidism 1/23/2014    Obesity     Pacemaker     Sick sinus syndrome (HCC)     Sleep apnea     noncompliant with cpap    TIA (transient ischemic attack)     Uterine cancer [facility])      Past Surgical History:   Procedure Laterality Date    CARDIAC CATHETERIZATION      COLON SURGERY  2001    RESECTION    COLONOSCOPY  08-05-11,10/8/2013    RANDOM BIOPSIES W/COLITIS    HERNIA REPAIR      HYSTERECTOMY      PACEMAKER INSERTION      [place] COLON CA SCRN NOT  [address] IND N/A 7/11/2017    COLONOSCOPY performed by Javi Mayer MD at [facility]      age 9   Duana Hark TUMOR REMOVAL      fatty tumor lower back     Social History     Socioeconomic History    Marital status: Single     Spouse name: Not on file    Number of children: 0    Years of education: Not on file    Highest education level: Not on file   Occupational History    Not on file   Social Needs    Financial resource strain: Not on file    Food insecurity     Worry: Not on file     Inability: Not on file    Transportation needs     Medical: Not on file     Non-medical: Not on file   Tobacco Use    Smoking status: Never Smoker    Smokeless tobacco: Never Used   Substance and Sexual Activity    Alcohol use: No    Drug use: No    Sexual activity: Not on file Lifestyle    Physical activity     Days per week: Not on file     Minutes per session: Not on file    Stress: Not on file   Relationships    Social connections     Talks on phone: Not on file     Gets together: Not on file     Attends [employer] service: Not on file     Active member of club or organization: Not on file     Attends meetings of clubs or organizations: Not on file     Relationship status: Not on file    Intimate partner violence     Fear of current or ex partner: Not on file     Emotionally abused: Not on file     Physically abused: Not on file     Forced sexual activity: Not on file   Other Topics Concern    Not on file   Social History Narrative    Not on file     Family History   Problem Relation Age of Onset    Heart Disease Maternal Grandmother     Emphysema Paternal Grandmother     Stroke Mother     High Blood Pressure Father      Allergies:  Hydralazine, Influenza vaccines, Cortisone, Coumadin [warfarin sodium], Levothyroxine, and Pneumococcal vaccine    Review of Systems   Constitutional: Negative for chills and fever. Skin: Positive for color change and rash. Negative for wound. Allergic/Immunologic: Negative for environmental allergies, food allergies and immunocompromised state. Hematological: Negative for adenopathy. Does not bruise/bleed easily. Psychiatric/Behavioral: Negative for behavioral problems and sleep disturbance. Objective:   /68   Pulse 89   Temp 98.1 °F (36.7 °C)   Ht 5' 5\" (1.651 m)   Wt 182 lb (82.6 kg)   LMP 01/01/1996   SpO2 99%   BMI 30.29 kg/m²     Physical Exam  Constitutional:       General: She is not in acute distress. Appearance: Normal appearance. She is well-developed. She is not toxic-appearing. HENT:      Head: Normocephalic and atraumatic. Right Ear: Hearing and tympanic membrane normal.      Left Ear: Hearing and tympanic membrane normal.      Nose: Nose normal. No nasal deformity.    Eyes: General: Lids are normal.         Right eye: No discharge. Left eye: No discharge. Conjunctiva/sclera: Conjunctivae normal.      Pupils: Pupils are equal, round, and reactive to light. Neck:      Musculoskeletal: Full passive range of motion without pain. Thyroid: No thyroid mass or thyromegaly. Vascular: No JVD. Trachea: Trachea and phonation normal.   Cardiovascular:      Rate and Rhythm: Normal rate and regular rhythm. Pulmonary:      Effort: No accessory muscle usage or respiratory distress. Musculoskeletal:      Comments: FROM all large muscle groups and joints as witnessed when walking to exam table, getting on, and getting off the exam table. Skin:     General: Skin is warm and dry. Findings: No rash. Comments: Erythematous raised plaques on the right side of the neck only from the chin to the clavicle and from the sternum to the preauricular area. No vesicles. No pustules. Not warm to touch. Erythema is more salmon-colored thin rim   Neurological:      Mental Status: She is alert. Motor: No tremor or atrophy. Gait: Gait normal.   Psychiatric:         Speech: Speech normal.         Behavior: Behavior normal.         Thought Content: Thought content normal.         No results found for this visit on 01/13/21. Recent Results (from the past 2016 hour(s))   Basic Metabolic Panel    Collection Time: 10/29/20  8:11 AM   Result Value Ref Range    Sodium 140 135 - 144 mEq/L    Potassium 4.9 3.4 - 4.9 mEq/L    Chloride 101 95 - 107 mEq/L    CO2 31 20 - 31 mEq/L    Anion Gap 8 (L) 9 - 15 mEq/L    Glucose 77 70 - 99 mg/dL    BUN 33 (H) 8 - 23 mg/dL    CREATININE 0.89 0.50 - 0.90 mg/dL    GFR Non-African American >60.0 >60    GFR  >60.0 >60    Calcium 9.5 8.5 - 9.9 mg/dL           Assessment:       Diagnosis Orders   1. Dermatitis  triamcinolone (KENALOG) 0.1 % cream   2. Acne, unspecified acne type     3.  Dandruff in adult No orders of the defined types were placed in this encounter. Plan:   Return if symptoms worsen or fail to improve. Patient Instructions   Apply steroid to erythematous area twice a day for about a week. Will take probably about 21 days for the skin area to heal completely. Continue benzoyl peroxide as needed on the acne. Hairline has cleared. No biopsy needed. This note was partially created with the assistance of dictation. This may lead to grammatical or spelling errors. Eran Brannon Sa, M.D.

## 2021-01-13 NOTE — PATIENT INSTRUCTIONS
Apply steroid to erythematous area twice a day for about a week. Will take probably about 21 days for the skin area to heal completely. Continue benzoyl peroxide as needed on the acne. Hairline has cleared. No biopsy needed.

## 2021-01-22 LAB
ALBUMIN SERPL-MCNC: 4.3 G/DL (ref 3.5–4.6)
ALP BLD-CCNC: 81 U/L (ref 40–130)
ALT SERPL-CCNC: 11 U/L (ref 0–33)
ANION GAP SERPL CALCULATED.3IONS-SCNC: 6 MEQ/L (ref 9–15)
AST SERPL-CCNC: 16 U/L (ref 0–35)
BILIRUB SERPL-MCNC: 0.4 MG/DL (ref 0.2–0.7)
BILIRUBIN DIRECT: <0.2 MG/DL (ref 0–0.4)
BILIRUBIN, INDIRECT: NORMAL MG/DL (ref 0–0.6)
BUN BLDV-MCNC: 34 MG/DL (ref 8–23)
CALCIUM SERPL-MCNC: 9.9 MG/DL (ref 8.5–9.9)
CHLORIDE BLD-SCNC: 100 MEQ/L (ref 95–107)
CHOLESTEROL, TOTAL: 235 MG/DL (ref 0–199)
CO2: 31 MEQ/L (ref 20–31)
CREAT SERPL-MCNC: 0.81 MG/DL (ref 0.5–0.9)
GFR AFRICAN AMERICAN: >60
GFR NON-AFRICAN AMERICAN: >60
GLUCOSE BLD-MCNC: 88 MG/DL (ref 70–99)
HCT VFR BLD CALC: 42 % (ref 37–47)
HDLC SERPL-MCNC: 66 MG/DL (ref 40–59)
HEMOGLOBIN: 13.9 G/DL (ref 12–16)
LDL CHOLESTEROL CALCULATED: 153 MG/DL (ref 0–129)
MCH RBC QN AUTO: 29.7 PG (ref 27–31.3)
MCHC RBC AUTO-ENTMCNC: 33.1 % (ref 33–37)
MCV RBC AUTO: 89.8 FL (ref 82–100)
PDW BLD-RTO: 14 % (ref 11.5–14.5)
PLATELET # BLD: 226 K/UL (ref 130–400)
POTASSIUM SERPL-SCNC: 5.3 MEQ/L (ref 3.4–4.9)
RBC # BLD: 4.67 M/UL (ref 4.2–5.4)
SODIUM BLD-SCNC: 137 MEQ/L (ref 135–144)
TOTAL PROTEIN: 6.9 G/DL (ref 6.3–8)
TRIGL SERPL-MCNC: 79 MG/DL (ref 0–150)
TSH SERPL DL<=0.05 MIU/L-ACNC: 2.66 UIU/ML (ref 0.44–3.86)
WBC # BLD: 6.2 K/UL (ref 4.8–10.8)

## 2021-01-26 ENCOUNTER — OFFICE VISIT (OUTPATIENT)
Dept: FAMILY MEDICINE CLINIC | Age: 81
End: 2021-01-26
Payer: MEDICARE

## 2021-01-26 VITALS
TEMPERATURE: 98.2 F | BODY MASS INDEX: 30.09 KG/M2 | OXYGEN SATURATION: 97 % | HEART RATE: 78 BPM | HEIGHT: 65 IN | WEIGHT: 180.6 LBS | DIASTOLIC BLOOD PRESSURE: 70 MMHG | SYSTOLIC BLOOD PRESSURE: 120 MMHG

## 2021-01-26 DIAGNOSIS — Z95.0 PACEMAKER: ICD-10-CM

## 2021-01-26 DIAGNOSIS — E78.2 MIXED HYPERLIPIDEMIA: ICD-10-CM

## 2021-01-26 DIAGNOSIS — C18.4 MALIGNANT NEOPLASM OF TRANSVERSE COLON (HCC): ICD-10-CM

## 2021-01-26 DIAGNOSIS — A46 ERYSIPELAS: Primary | ICD-10-CM

## 2021-01-26 DIAGNOSIS — I49.5 SICK SINUS SYNDROME (HCC): ICD-10-CM

## 2021-01-26 DIAGNOSIS — H35.3211 EXUDATIVE AGE-RELATED MACULAR DEGENERATION OF RIGHT EYE WITH ACTIVE CHOROIDAL NEOVASCULARIZATION (HCC): ICD-10-CM

## 2021-01-26 DIAGNOSIS — I10 ESSENTIAL HYPERTENSION: ICD-10-CM

## 2021-01-26 PROCEDURE — G8427 DOCREV CUR MEDS BY ELIG CLIN: HCPCS | Performed by: FAMILY MEDICINE

## 2021-01-26 PROCEDURE — 99214 OFFICE O/P EST MOD 30 MIN: CPT | Performed by: FAMILY MEDICINE

## 2021-01-26 PROCEDURE — 1123F ACP DISCUSS/DSCN MKR DOCD: CPT | Performed by: FAMILY MEDICINE

## 2021-01-26 PROCEDURE — G8400 PT W/DXA NO RESULTS DOC: HCPCS | Performed by: FAMILY MEDICINE

## 2021-01-26 PROCEDURE — 1090F PRES/ABSN URINE INCON ASSESS: CPT | Performed by: FAMILY MEDICINE

## 2021-01-26 PROCEDURE — 1036F TOBACCO NON-USER: CPT | Performed by: FAMILY MEDICINE

## 2021-01-26 PROCEDURE — G8417 CALC BMI ABV UP PARAM F/U: HCPCS | Performed by: FAMILY MEDICINE

## 2021-01-26 PROCEDURE — G8483 FLU IMM NO ADMIN DOC REA: HCPCS | Performed by: FAMILY MEDICINE

## 2021-01-26 PROCEDURE — 4040F PNEUMOC VAC/ADMIN/RCVD: CPT | Performed by: FAMILY MEDICINE

## 2021-01-26 RX ORDER — DOXYCYCLINE HYCLATE 100 MG
100 TABLET ORAL 2 TIMES DAILY
Qty: 20 TABLET | Refills: 0 | Status: SHIPPED | OUTPATIENT
Start: 2021-01-26 | End: 2021-02-05

## 2021-01-26 NOTE — PROGRESS NOTES
Chief Complaint   Patient presents with    Hypertension     6 month     Other     having a skin issues, is seeing Dr. Amari Bonilla for this, starting to spread        HPI:  Katiuska Levy is a 79 y.o. female     Checkup/review medical issues  Mainly bp/thyroid    Did see Dr. Amari Bonilla for rash in past   Kenalog cream has helped neck, but notes redness on her right cheek    Taking 45mg armour daily consistently    issues with balance    Does follow with Dr. Namrata Costello for cardio          Patient Active Problem List   Diagnosis    Constipation    Fatigue    Atrial fibrillation ([address]    Hypertension    Sleep apnea    Sick sinus syndrome ([address]    Pacemaker    Hyperlipidemia    Hypothyroidism    Bunion of great toe of right foot    Hammer toe of right foot    Fatigue due to exposure    Abnormal smell    History of uterine cancer    Malignant neoplasm of transverse colon (HCC)    AK (actinic keratosis)    Exudative age-related macular degeneration of right eye with active choroidal neovascularization ([address]       Follow up of blood pressure and thyroid    Seeing Dr. Martinez Spotted regarding bp and potassium    Reviewed labs in [facility] and Dr. Fredy Liu note. No statin therapy despite high cholesterol.    She is a \"reluctant patient\" she states, which is the nice way of saying  \"obstinate\"      PAF  No anticoag   Was on coumadin in past, had reaction  Has pacemaker    Takes low 81mg asa    Wt Readings from Last 3 Encounters:   01/26/21 180 lb 9.6 oz (81.9 kg)   01/13/21 182 lb (82.6 kg)   12/04/20 181 lb (82.1 kg)     Ongoing fatigue  She does have mild MACK, couldn't tolerate        Past Medical History:   Diagnosis Date    Atrial fibrillation (HCC)     no coumadin pt preference    Colon cancer ([address]      H/O COLON AND UTERINE [id number]    Constipation     Fatigue     History of blood transfusion     x2 1996    History of uterine cancer 4/5/2018    Hyperlipidemia     Hypertension     BENIGN ESSENTIAL    Hypothyroidism 1/23/2014    Obesity     Pacemaker     Sick sinus syndrome (HCC)     Sleep apnea     noncompliant with cpap    TIA (transient ischemic attack)     Uterine cancer [facility])      Past Surgical History:   Procedure Laterality Date    CARDIAC CATHETERIZATION      COLON SURGERY  2001    RESECTION    COLONOSCOPY  08-05-11,10/8/2013    RANDOM BIOPSIES W/COLITIS    HERNIA REPAIR      HYSTERECTOMY      PACEMAKER INSERTION      [place] COLON CA SCRN NOT  [address] IND N/A 7/11/2017    COLONOSCOPY performed by Vel Madrid MD at [facility]      age 9   [place] TUMOR REMOVAL      fatty tumor lower back     Family History   Problem Relation Age of Onset    Heart Disease Maternal Grandmother     Emphysema Paternal Grandmother     Stroke Mother     High Blood Pressure Father      Social History     Socioeconomic History    Marital status: Single     Spouse name: None    Number of children: 0    Years of education: None    Highest education level: None   Occupational History    None   Social Needs    Financial resource strain: None    Food insecurity     Worry: None     Inability: None    Transportation needs     Medical: None     Non-medical: None   Tobacco Use    Smoking status: Never Smoker    Smokeless tobacco: Never Used   Substance and Sexual Activity    Alcohol use: No    Drug use: No    Sexual activity: None   Lifestyle    Physical activity     Days per week: None     Minutes per session: None    Stress: None   Relationships    Social connections     Talks on phone: None     Gets together: None     Attends Mandaeism service: None     Active member of club or organization: None     Attends meetings of clubs or organizations: None     Relationship status: None    Intimate partner violence     Fear of current or ex partner: None     Emotionally abused: None     Physically abused: None     Forced sexual activity: None   Other Topics Concern    None   Social History Narrative    None     Current Outpatient Medications   Medication Sig Dispense Refill    doxycycline hyclate (VIBRA-TABS) 100 MG tablet Take 1 tablet by mouth 2 times daily for 10 days 20 tablet 0    triamcinolone (KENALOG) 0.1 % cream Apply topically 2 times daily. 30 g 0    indapamide (LOZOL) 1.25 MG tablet TAKE 1 TABLET BY MOUTH DAILY 90 tablet 1    amLODIPine (NORVASC) 5 MG tablet TAKE 1/2 TABLET BY MOUTH ONCE DAILY 45 tablet 2    thyroid (ARMOUR THYROID) 30 MG tablet Take 1.5 tablets by mouth daily take 1 & 1/2 tablets (45mg) by mouth once daily 45 tablet 5    Pumpkin Seed-Soy Germ (AZO BLADDER CONTROL/GO-LESS PO) Take by mouth 2 times daily      Handicap Placard MISC by Does not apply route Exp 5 years 1 each 0    Multiple Vitamins-Minerals (THERAPEUTIC MULTIVITAMIN-MINERALS) tablet Take 1 tablet by mouth daily      calcium carbonate (OSCAL) 500 MG TABS tablet Take 500 mg by mouth daily Take 1 daily      b complex vitamins capsule Take 1 capsule by mouth daily      MAGNESIUM PO Take 500 mg by mouth       Glucosamine-Chondroitin (GLUCOSAMINE CHONDR COMPLEX PO) Take 2 tablets by mouth daily       aspirin 81 MG tablet Take 81 mg by mouth daily      sotalol (BETAPACE) 80 MG tablet 1/2 tablet bid  3    L-Tryptophan 500 MG TABS Take 750 mg by mouth daily At hs        No current facility-administered medications for this visit. Allergies   Allergen Reactions    Hydralazine Other (See Comments)     Ran fever and chills. Face got real hot.  Influenza Vaccines Hives    Cortisone Other (See Comments)     Affects eyes- blurry vision    Coumadin [Warfarin Sodium]      Capillary swelling/inflammation    Levothyroxine     Pneumococcal Vaccine Hives     Pt states has an allergy to any vaccines that have egg embryo in them.        Review of Systems:   General ROS: per HPI  ENT ROS: negative for - headaches, hearing change, oral lesions, vertigo or visual changes  Hematological and Lymphatic ROS: negative for - bleeding problems, bruising, fatigue or night sweats  Endocrine ROS: negative for - polydipsia/polyuria or temperature intolerance  Respiratory ROS: no cough, shortness of breath, or wheezing  Cardiovascular ROS: no chest pain or dyspnea on exertion  Gastrointestinal ROS: no abdominal pain, no bloody or black stool - hx of colon CA -  She tends to have some constipation  Genito-Urinary ROS: per HPI        Physical Exam:  /70 (Site: Left Upper Arm)   Pulse 78   Temp 98.2 °F (36.8 °C)   Ht 5' 5\" (1.651 m)   Wt 180 lb 9.6 oz (81.9 kg)   LMP 01/01/1996   SpO2 97%   Breastfeeding No   BMI 30.05 kg/m²     Gen: Well, NAD, Alert, Oriented x 3   HEENT: EOMI, eyes clear, MMM  Skin: redness/swelling over right side of face, more consistent with erysipelas  Improved rash on right side of neck  Neck: no significant lymphadenopathy or thyromegaly  Lungs: CTA B w/out Rales/Wheezes/Rhonchi, Good respiratory effort   Heart: RRR, S1S2, w/out M/R/G, non-displaced PMI   Abdomen: Soft NT/ND, w/out R/G, w/ +BSx4   Ext: No C/C/E Bilaterally. Lab Results   Component Value Date    WBC 6.2 01/22/2021    HGB 13.9 01/22/2021    HCT 42.0 01/22/2021     01/22/2021    CHOL 235 (H) 01/22/2021    TRIG 79 01/22/2021    HDL 66 (H) 01/22/2021    ALT 11 01/22/2021    AST 16 01/22/2021     01/22/2021    K 5.3 (H) 01/22/2021     01/22/2021    CREATININE 0.81 01/22/2021    BUN 34 (H) 01/22/2021    CO2 31 01/22/2021    TSH 2.660 01/22/2021    INR 1.0 06/14/2017    LABA1C 5.5 04/09/2019         A&P   Diagnosis Orders   1. Erysipelas  doxycycline hyclate (VIBRA-TABS) 100 MG tablet   2. Exudative age-related macular degeneration of right eye with active choroidal neovascularization ([address]     3. Malignant neoplasm of transverse colon ([address]     4. Sick sinus syndrome ([address]     5. Mixed hyperlipidemia     6. Pacemaker     7.  Essential hypertension        Recent labs ok  Potassium up a bit  Will have rechecked this week    Doxy course  Consider medrol nandini    Continue as below  Current Outpatient Medications   Medication Sig Dispense Refill    doxycycline hyclate (VIBRA-TABS) 100 MG tablet Take 1 tablet by mouth 2 times daily for 10 days 20 tablet 0    triamcinolone (KENALOG) 0.1 % cream Apply topically 2 times daily. 30 g 0    indapamide (LOZOL) 1.25 MG tablet TAKE 1 TABLET BY MOUTH DAILY 90 tablet 1    amLODIPine (NORVASC) 5 MG tablet TAKE 1/2 TABLET BY MOUTH ONCE DAILY 45 tablet 2    thyroid (ARMOUR THYROID) 30 MG tablet Take 1.5 tablets by mouth daily take 1 & 1/2 tablets (45mg) by mouth once daily 45 tablet 5    Pumpkin Seed-Soy Germ (AZO BLADDER CONTROL/GO-LESS PO) Take by mouth 2 times daily      Handicap Placard MISC by Does not apply route Exp 5 years 1 each 0    Multiple Vitamins-Minerals (THERAPEUTIC MULTIVITAMIN-MINERALS) tablet Take 1 tablet by mouth daily      calcium carbonate (OSCAL) 500 MG TABS tablet Take 500 mg by mouth daily Take 1 daily      b complex vitamins capsule Take 1 capsule by mouth daily      MAGNESIUM PO Take 500 mg by mouth       Glucosamine-Chondroitin (GLUCOSAMINE CHONDR COMPLEX PO) Take 2 tablets by mouth daily       aspirin 81 MG tablet Take 81 mg by mouth daily      sotalol (BETAPACE) 80 MG tablet 1/2 tablet bid  3    L-Tryptophan 500 MG TABS Take 750 mg by mouth daily At hs        No current facility-administered medications for this visit. Will try doxycycline    She will also follow up with cardiology  Updated health maintenance  Allergic to flu shot, egg allergy  She will avoid the covid shot    Overall doing very well    Her balance has greatly improved and she has no significant fall risk    Sumi Chavira MD        On the basis of positive falls risk screening, assessment and plan is as follows: home safety tips provided.

## 2021-02-12 ENCOUNTER — OFFICE VISIT (OUTPATIENT)
Dept: FAMILY MEDICINE CLINIC | Age: 81
End: 2021-02-12
Payer: MEDICARE

## 2021-02-12 VITALS
HEIGHT: 65 IN | DIASTOLIC BLOOD PRESSURE: 78 MMHG | HEART RATE: 74 BPM | TEMPERATURE: 98.2 F | SYSTOLIC BLOOD PRESSURE: 134 MMHG | OXYGEN SATURATION: 99 % | WEIGHT: 179.5 LBS | BODY MASS INDEX: 29.91 KG/M2

## 2021-02-12 DIAGNOSIS — L30.9 DERMATITIS: ICD-10-CM

## 2021-02-12 PROCEDURE — 99213 OFFICE O/P EST LOW 20 MIN: CPT | Performed by: FAMILY MEDICINE

## 2021-02-12 PROCEDURE — 1123F ACP DISCUSS/DSCN MKR DOCD: CPT | Performed by: FAMILY MEDICINE

## 2021-02-12 PROCEDURE — G8483 FLU IMM NO ADMIN DOC REA: HCPCS | Performed by: FAMILY MEDICINE

## 2021-02-12 PROCEDURE — G8400 PT W/DXA NO RESULTS DOC: HCPCS | Performed by: FAMILY MEDICINE

## 2021-02-12 PROCEDURE — G8417 CALC BMI ABV UP PARAM F/U: HCPCS | Performed by: FAMILY MEDICINE

## 2021-02-12 PROCEDURE — 3288F FALL RISK ASSESSMENT DOCD: CPT | Performed by: FAMILY MEDICINE

## 2021-02-12 PROCEDURE — G8427 DOCREV CUR MEDS BY ELIG CLIN: HCPCS | Performed by: FAMILY MEDICINE

## 2021-02-12 PROCEDURE — 1090F PRES/ABSN URINE INCON ASSESS: CPT | Performed by: FAMILY MEDICINE

## 2021-02-12 PROCEDURE — 4040F PNEUMOC VAC/ADMIN/RCVD: CPT | Performed by: FAMILY MEDICINE

## 2021-02-12 PROCEDURE — 1036F TOBACCO NON-USER: CPT | Performed by: FAMILY MEDICINE

## 2021-02-12 RX ORDER — TRIAMCINOLONE ACETONIDE 1 MG/G
CREAM TOPICAL
Qty: 454 G | Refills: 0 | Status: SHIPPED | OUTPATIENT
Start: 2021-02-12 | End: 2021-03-30 | Stop reason: ALTCHOICE

## 2021-02-12 ASSESSMENT — ENCOUNTER SYMPTOMS: COLOR CHANGE: 1

## 2021-02-12 NOTE — PROGRESS NOTES
Diagnosis Orders   1. Dermatitis  triamcinolone (KENALOG) 0.1 % cream     Return if symptoms worsen or fail to improve. Patient Instructions   Patient may use steroid cream in the form of triamcinolone on any rash that occurs like this on her body for 7 to 10 days. If it does not resolve with the triamcinolone cream she should then be evaluated. Subjective:      Patient ID: Yaima Burleson is a 79 y.o. female who presents for:  Chief Complaint   Patient presents with    Rash     pt states has documentation reg previous rash and current   [facility] Maintenance     will not be getting covid vaccine, allergic to vaccines. due for AWV, in wrap up       Patient states she already got on her right upper extremity but she proceeded to use benzoyl peroxide on. On the entire arm became covered in rash. Slightly pink-red, dry flake, she was afraid to try anything else on it. She wanted it evaluated first.    She did see Dr. Dora Mooney in between the last appointment I had with her. Was diagnosed with erysipelas which responded to the doxycycline and she is feeling better. Her prior neck rash is completely relieved.           Current Outpatient Medications on File Prior to Visit   Medication Sig Dispense Refill    indapamide (LOZOL) 1.25 MG tablet TAKE 1 TABLET BY MOUTH DAILY 90 tablet 1    amLODIPine (NORVASC) 5 MG tablet TAKE 1/2 TABLET BY MOUTH ONCE DAILY 45 tablet 2    thyroid (ARMOUR THYROID) 30 MG tablet Take 1.5 tablets by mouth daily take 1 & 1/2 tablets (45mg) by mouth once daily 45 tablet 5    Pumpkin Seed-Soy Germ (AZO BLADDER CONTROL/GO-LESS PO) Take by mouth 2 times daily      Handicap Placard MISC by Does not apply route Exp 5 years 1 each 0    Multiple Vitamins-Minerals (THERAPEUTIC MULTIVITAMIN-MINERALS) tablet Take 1 tablet by mouth daily      calcium carbonate (OSCAL) 500 MG TABS tablet Take 500 mg by mouth daily Take 1 daily      b complex vitamins capsule Take 1 capsule by mouth daily  MAGNESIUM PO Take 500 mg by mouth       Glucosamine-Chondroitin (GLUCOSAMINE CHONDR COMPLEX PO) Take 2 tablets by mouth daily       aspirin 81 MG tablet Take 81 mg by mouth daily      sotalol (BETAPACE) 80 MG tablet 1/2 tablet bid  3    L-Tryptophan 500 MG TABS Take 750 mg by mouth daily At hs        No current facility-administered medications on file prior to visit. Past Medical History:   Diagnosis Date    Atrial fibrillation (HCC)     no coumadin pt preference    Colon cancer ([address]      H/O COLON AND UTERINE [id number]    Constipation     Fatigue     History of blood transfusion     x2 1996    History of uterine cancer 4/5/2018    Hyperlipidemia     Hypertension     BENIGN ESSENTIAL    Hypothyroidism 1/23/2014    Obesity     Pacemaker     Sick sinus syndrome (HCC)     Sleep apnea     noncompliant with cpap    TIA (transient ischemic attack)     Uterine cancer [facility])      Past Surgical History:   Procedure Laterality Date    CARDIAC CATHETERIZATION      COLON SURGERY  2001    RESECTION    COLONOSCOPY  08-05-11,10/8/2013    RANDOM BIOPSIES W/COLITIS    HERNIA REPAIR      HYSTERECTOMY      PACEMAKER INSERTION      [place] COLON CA SCRN NOT  [address] [facility] N/A 7/11/2017    COLONOSCOPY performed by Kylie Resendiz MD at [facility] 8   [facility] TUMOR REMOVAL      fatty tumor lower back     Social History     Socioeconomic History    Marital status: Single     Spouse name: Not on file    Number of children: 0    Years of education: Not on file    Highest education level: Not on file   Occupational History    Not on file   Social Needs    Financial resource strain: Not on file    Food insecurity     Worry: Not on file     Inability: Not on file    Transportation needs     Medical: Not on file     Non-medical: Not on file   Tobacco Use    Smoking status: Never Smoker    Smokeless tobacco: Never Used   Substance and Sexual Activity    Alcohol use:  No  Drug use: No    Sexual activity: Not on file   Lifestyle    Physical activity     Days per week: Not on file     Minutes per session: Not on file    Stress: Not on file   Relationships    Social connections     Talks on phone: Not on file     Gets together: Not on file     Attends Sikh service: Not on file     Active member of club or organization: Not on file     Attends meetings of clubs or organizations: Not on file     Relationship status: Not on file    Intimate partner violence     Fear of current or ex partner: Not on file     Emotionally abused: Not on file     Physically abused: Not on file     Forced sexual activity: Not on file   Other Topics Concern    Not on file   Social History Narrative    Not on file     Family History   Problem Relation Age of Onset    Heart Disease Maternal Grandmother     Emphysema Paternal Grandmother     Stroke Mother     High Blood Pressure Father      Allergies:  Hydralazine, Influenza vaccines, Cortisone, Coumadin [warfarin sodium], Levothyroxine, and Pneumococcal vaccine    Review of Systems   Constitutional: Negative for chills and fever. Skin: Positive for color change and rash. Negative for wound. Allergic/Immunologic: Negative for environmental allergies, food allergies and immunocompromised state. Hematological: Negative for adenopathy. Does not bruise/bleed easily. Psychiatric/Behavioral: Negative for behavioral problems and sleep disturbance. Objective:   /78   Pulse 74   Temp 98.2 °F (36.8 °C)   Ht 5' 5\" (1.651 m)   Wt 179 lb 8 oz (81.4 kg)   LMP 01/01/1996   SpO2 99%   Breastfeeding No   BMI 29.87 kg/m²     Physical Exam  Constitutional:       General: She is not in acute distress. Appearance: Normal appearance. She is well-developed. She is not toxic-appearing. HENT:      Head: Normocephalic and atraumatic.       Right Ear: Hearing and tympanic membrane normal. Left Ear: Hearing and tympanic membrane normal.      Nose: Nose normal. No nasal deformity. Eyes:      General: Lids are normal.         Right eye: No discharge. Left eye: No discharge. Conjunctiva/sclera: Conjunctivae normal.      Pupils: Pupils are equal, round, and reactive to light. Neck:      Musculoskeletal: Full passive range of motion without pain. Thyroid: No thyroid mass or thyromegaly. Vascular: No JVD. Trachea: Trachea and phonation normal.   Cardiovascular:      Rate and Rhythm: Normal rate and regular rhythm. Pulmonary:      Effort: No accessory muscle usage or respiratory distress. Musculoskeletal:      Comments: FROM all large muscle groups and joints as witnessed when walking to exam table, getting on, and getting off the exam table. Skin:     General: Skin is warm and dry. Findings: No rash. Comments: [place]-colored scattered papular ~plaque-like lesions with soft white flake no central clearing. Only on the right upper extremity extensor surface not including the elbow   Neurological:      Mental Status: She is alert. Motor: No tremor or atrophy. Gait: Gait normal.   Psychiatric:         Speech: Speech normal.         Behavior: Behavior normal.         Thought Content: Thought content normal.         No results found for this visit on 02/12/21. Assessment:       Diagnosis Orders   1. Dermatitis  triamcinolone (KENALOG) 0.1 % cream         No orders of the defined types were placed in this encounter. Plan:   Return if symptoms worsen or fail to improve. Patient Instructions   Patient may use steroid cream in the form of triamcinolone on any rash that occurs like this on her body for 7 to 10 days. If it does not resolve with the triamcinolone cream she should then be evaluated. This note was partially created with the assistance of dictation. This may lead to grammatical or spelling errors.

## 2021-03-23 ASSESSMENT — LIFESTYLE VARIABLES
AUDIT TOTAL SCORE: INCOMPLETE
AUDIT-C TOTAL SCORE: INCOMPLETE
HOW OFTEN DO YOU HAVE A DRINK CONTAINING ALCOHOL: 0
HOW OFTEN DO YOU HAVE A DRINK CONTAINING ALCOHOL: NEVER

## 2021-03-23 ASSESSMENT — PATIENT HEALTH QUESTIONNAIRE - PHQ9
SUM OF ALL RESPONSES TO PHQ QUESTIONS 1-9: 0
SUM OF ALL RESPONSES TO PHQ QUESTIONS 1-9: 0
SUM OF ALL RESPONSES TO PHQ9 QUESTIONS 1 & 2: 0
2. FEELING DOWN, DEPRESSED OR HOPELESS: 0
1. LITTLE INTEREST OR PLEASURE IN DOING THINGS: 0

## 2021-03-30 ENCOUNTER — OFFICE VISIT (OUTPATIENT)
Dept: FAMILY MEDICINE CLINIC | Age: 81
End: 2021-03-30
Payer: MEDICARE

## 2021-03-30 VITALS
SYSTOLIC BLOOD PRESSURE: 120 MMHG | DIASTOLIC BLOOD PRESSURE: 72 MMHG | TEMPERATURE: 98.9 F | WEIGHT: 179 LBS | HEIGHT: 65 IN | HEART RATE: 84 BPM | BODY MASS INDEX: 29.82 KG/M2 | OXYGEN SATURATION: 97 %

## 2021-03-30 DIAGNOSIS — E78.2 MIXED HYPERLIPIDEMIA: ICD-10-CM

## 2021-03-30 DIAGNOSIS — Z00.00 ROUTINE GENERAL MEDICAL EXAMINATION AT A HEALTH CARE FACILITY: Primary | ICD-10-CM

## 2021-03-30 DIAGNOSIS — Z95.0 PACEMAKER: ICD-10-CM

## 2021-03-30 DIAGNOSIS — C18.4 MALIGNANT NEOPLASM OF TRANSVERSE COLON (HCC): ICD-10-CM

## 2021-03-30 DIAGNOSIS — I48.91 ATRIAL FIBRILLATION, UNSPECIFIED TYPE (HCC): ICD-10-CM

## 2021-03-30 DIAGNOSIS — H35.3211 EXUDATIVE AGE-RELATED MACULAR DEGENERATION OF RIGHT EYE WITH ACTIVE CHOROIDAL NEOVASCULARIZATION (HCC): ICD-10-CM

## 2021-03-30 DIAGNOSIS — E03.9 HYPOTHYROIDISM, UNSPECIFIED TYPE: ICD-10-CM

## 2021-03-30 DIAGNOSIS — I49.5 SICK SINUS SYNDROME (HCC): ICD-10-CM

## 2021-03-30 DIAGNOSIS — I10 ESSENTIAL HYPERTENSION: ICD-10-CM

## 2021-03-30 PROCEDURE — G8483 FLU IMM NO ADMIN DOC REA: HCPCS | Performed by: FAMILY MEDICINE

## 2021-03-30 PROCEDURE — 1123F ACP DISCUSS/DSCN MKR DOCD: CPT | Performed by: FAMILY MEDICINE

## 2021-03-30 PROCEDURE — 4040F PNEUMOC VAC/ADMIN/RCVD: CPT | Performed by: FAMILY MEDICINE

## 2021-03-30 PROCEDURE — G0439 PPPS, SUBSEQ VISIT: HCPCS | Performed by: FAMILY MEDICINE

## 2021-03-30 ASSESSMENT — PATIENT HEALTH QUESTIONNAIRE - PHQ9
SUM OF ALL RESPONSES TO PHQ QUESTIONS 1-9: 0
2. FEELING DOWN, DEPRESSED OR HOPELESS: 0
SUM OF ALL RESPONSES TO PHQ9 QUESTIONS 1 & 2: 0

## 2021-03-30 NOTE — PATIENT INSTRUCTIONS
Personalized Preventive Plan for [employer] - 3/30/2021  Medicare offers a range of preventive health benefits. Some of the tests and screenings are paid in full while other may be subject to a deductible, co-insurance, and/or copay. Some of these benefits include a comprehensive review of your medical history including lifestyle, illnesses that may run in your family, and various assessments and screenings as appropriate. After reviewing your medical record and screening and assessments performed today your provider may have ordered immunizations, labs, imaging, and/or referrals for you. A list of these orders (if applicable) as well as your Preventive Care list are included within your After Visit Summary for your review. Other Preventive Recommendations:    · A preventive eye exam performed by an eye specialist is recommended every 1-2 years to screen for glaucoma; cataracts, macular degeneration, and other eye disorders. · A preventive dental visit is recommended every 6 months. · Try to get at least 150 minutes of exercise per week or 10,000 steps per day on a pedometer . · Order or download the FREE \"Exercise & Physical Activity: Your Everyday Guide\" from [employer]. Call 4-502.265.5553 or search [employer] online. · You need 2065-8684 mg of calcium and 4503-3268 IU of vitamin D per day. It is possible to meet your calcium requirement with diet alone, but a vitamin D supplement is usually necessary to meet this goal.  · When exposed to the sun, use a sunscreen that protects against both UVA and UVB radiation with an SPF of 30 or greater. Reapply every 2 to 3 hours or after sweating, drying off with a towel, or swimming. · Always wear a seat belt when traveling in a car. Always wear a helmet when riding a bicycle or motorcycle.

## 2021-03-30 NOTE — PROGRESS NOTES
Medicare Annual Wellness Visit  Name: Gurjit Galvan Date: 3/30/2021   MRN: [id number] Sex: Female   Age: 79 y.o. Ethnicity: Non-/Non    : 1940 Race: Joseph Latif is here for Medicare AWV and Advance Care Planning    Screenings for behavioral, psychosocial and functional/safety risks, and cognitive dysfunction are all negative except as indicated below. These results, as well as other patient data from the [address] form, are documented in Flowsheets linked to this Encounter. 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 Face got real hot.  Influenza Vaccines Hives    Cortisone Other (See Comments)     Affects eyes- blurry vision    Coumadin [Warfarin Sodium]      Capillary swelling/inflammation    Levothyroxine     Pneumococcal Vaccine Hives     Pt states has an allergy to any vaccines that have egg embryo in them. Prior to Visit Medications    Medication Sig Taking?  Authorizing Provider   indapamide (LOZOL) 1.25 MG tablet TAKE 1 TABLET BY MOUTH DAILY Yes Nandini Santana MD   amLODIPine (NORVASC) 5 MG tablet TAKE 1/2 TABLET BY MOUTH ONCE DAILY Yes Nandini Santana MD   thyroid (ARMOUR THYROID) 30 MG tablet Take 1.5 tablets by mouth daily take 1 & 1/2 tablets (45mg) by mouth once daily Yes Nandini Santana MD   Pumpkin Seed-Soy Germ (AZO BLADDER CONTROL/GO-LESS PO) Take by mouth 2 times daily Yes Historical Provider, MD   Handicap Placard MISC by Does not apply route Exp 5 years Yes Piero Frank MD   Multiple Vitamins-Minerals (THERAPEUTIC MULTIVITAMIN-MINERALS) tablet Take 1 tablet by mouth daily Yes Historical Provider, MD   calcium carbonate (OSCAL) 500 MG TABS tablet Take 500 mg by mouth daily Take 1 daily Yes Historical Provider, MD   b complex vitamins capsule Take 1 capsule by mouth daily Yes Historical Provider, MD   MAGNESIUM PO Take 500 mg by mouth  Yes Historical Provider, MD   Glucosamine-Chondroitin (GLUCOSAMINE CHONDR COMPLEX PO) Take 2 tablets by mouth daily  Yes Historical Provider, MD   aspirin 81 MG tablet Take 81 mg by mouth daily Yes Historical Provider, MD   L-Tryptophan 500 MG TABS Take 750 mg by mouth daily At hs  Yes Historical Provider, MD   sotalol (BETAPACE) 80 MG tablet 1/2 tablet bid Yes Historical Provider, MD         Past Medical History:   Diagnosis Date    Atrial fibrillation (HCC)     no coumadin pt preference    Colon cancer ([address]      H/O COLON AND UTERINE [id number]    Constipation     Fatigue     History of blood transfusion     x2 1996    History of uterine cancer 4/5/2018    Hyperlipidemia     Hypertension     BENIGN ESSENTIAL    Hypothyroidism 1/23/2014    Obesity     Pacemaker     Sick sinus syndrome ([facility] [address] [facility]     Sleep apnea     noncompliant with cpap    TIA (transient ischemic attack)     Uterine cancer [facility])        Past Surgical History:   Procedure Laterality Date    CARDIAC CATHETERIZATION      COLON SURGERY  2001    RESECTION    COLONOSCOPY  08-05-11,10/8/2013    RANDOM BIOPSIES W/COLITIS    HERNIA REPAIR      HYSTERECTOMY      PACEMAKER INSERTION      [place] COLON CA SCRN NOT  [address] IND N/A 7/11/2017    COLONOSCOPY performed by Rodolfo Montiel MD at [facility]      age 10    TUMOR REMOVAL      fatty tumor lower back         Family History   Problem Relation Age of Onset    Heart Disease Loneliness, Social Isolation, Stress or Anger?: (!) Stress  Do you get the social and emotional support that you need?: Yes  Do you have a Living Will?: Yes  Advance Directives     Power of  Living Will ACP-Advance Directive ACP-Power of     Not on File Filed on 07/12/17 Filed Not on File      General Health Risk Interventions:  · denies any need for intervention     Hearing/Vision:  No exam data present  Hearing/Vision  Do you or your family notice any trouble with your hearing that hasn't been managed with hearing aids?: No  Do you have difficulty driving, watching TV, or doing any of your daily activities because of your eyesight?: (!) Yes  Have you had an eye exam within the past year?: Yes  Hearing/Vision Interventions:  · She gets regular eye exam      Personalized Preventive Plan   Current Health Maintenance Status  Immunization History   Administered Date(s) Administered    PPD Test 04/27/2012    Tdap (Boostrix, Adacel) 08/23/2017        Health Maintenance   Topic Date Due    COVID-19 Vaccine (1) Never done   [employer] Visit (AWV)  Never done    Shingles Vaccine (1 of 2) 04/27/2024 (Originally 6/7/1991)    TSH testing  01/22/2022    Potassium monitoring  01/29/2022    Creatinine monitoring  01/29/2022    Colon cancer screen colonoscopy  07/11/2022    DTaP/Tdap/Td vaccine (2 - Td) 08/23/2027    Hepatitis C screen  Completed    DEXA (modify frequency per FRAX score)  Addressed    Hepatitis A vaccine  Aged Out    Hepatitis B vaccine  Aged Out    Hib vaccine  Aged Out    Meningococcal (ACWY) vaccine  Aged Out     Recommendations for [employer] Due: see orders and patient instructions/AVS.    Recommended screening schedule for the next 5-10 years is provided to the patient in written form: see Patient Instructions/AVS.    Isela Mcnally was seen today for medicare awv and advance care planning.     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

## 2021-03-31 ENCOUNTER — HOSPITAL ENCOUNTER (OUTPATIENT)
Dept: CARDIOLOGY | Age: 81
Discharge: HOME | End: 2021-03-31
Payer: MEDICARE

## 2021-03-31 PROCEDURE — 93296 REM INTERROG EVL PM/IDS: CPT

## 2021-04-12 ENCOUNTER — OFFICE VISIT (OUTPATIENT)
Dept: FAMILY MEDICINE CLINIC | Age: 81
End: 2021-04-12
Payer: MEDICARE

## 2021-04-12 VITALS
TEMPERATURE: 98.6 F | HEART RATE: 103 BPM | HEIGHT: 65 IN | WEIGHT: 180.4 LBS | BODY MASS INDEX: 30.06 KG/M2 | OXYGEN SATURATION: 98 %

## 2021-04-12 DIAGNOSIS — L70.8 FOLLICULAR ACNE: ICD-10-CM

## 2021-04-12 DIAGNOSIS — L57.0 ACTINIC KERATOSES: Primary | ICD-10-CM

## 2021-04-12 DIAGNOSIS — L21.9 SEBORRHEIC DERMATITIS: ICD-10-CM

## 2021-04-12 PROCEDURE — 1036F TOBACCO NON-USER: CPT | Performed by: FAMILY MEDICINE

## 2021-04-12 PROCEDURE — G8427 DOCREV CUR MEDS BY ELIG CLIN: HCPCS | Performed by: FAMILY MEDICINE

## 2021-04-12 PROCEDURE — 4040F PNEUMOC VAC/ADMIN/RCVD: CPT | Performed by: FAMILY MEDICINE

## 2021-04-12 PROCEDURE — G8400 PT W/DXA NO RESULTS DOC: HCPCS | Performed by: FAMILY MEDICINE

## 2021-04-12 PROCEDURE — 17000 DESTRUCT PREMALG LESION: CPT | Performed by: FAMILY MEDICINE

## 2021-04-12 PROCEDURE — 1090F PRES/ABSN URINE INCON ASSESS: CPT | Performed by: FAMILY MEDICINE

## 2021-04-12 PROCEDURE — 99214 OFFICE O/P EST MOD 30 MIN: CPT | Performed by: FAMILY MEDICINE

## 2021-04-12 PROCEDURE — G8417 CALC BMI ABV UP PARAM F/U: HCPCS | Performed by: FAMILY MEDICINE

## 2021-04-12 PROCEDURE — 1123F ACP DISCUSS/DSCN MKR DOCD: CPT | Performed by: FAMILY MEDICINE

## 2021-04-12 RX ORDER — TETRACYCLINE HYDROCHLORIDE 250 MG/1
250 CAPSULE ORAL 2 TIMES DAILY
Qty: 20 CAPSULE | Refills: 0 | Status: SHIPPED | OUTPATIENT
Start: 2021-04-12 | End: 2021-04-22

## 2021-04-12 ASSESSMENT — ENCOUNTER SYMPTOMS: COLOR CHANGE: 0

## 2021-04-12 NOTE — PROGRESS NOTES
Diagnosis Orders   1. Actinic keratoses  [place] DESTRUC PREMALIGNANT, FIRST LESION   2. Seborrheic dermatitis     3. Follicular acne  tetracycline (ACHROMYCIN;SUMYCIN) 250 MG capsule       Return in about 2 weeks (around 4/26/2021) for for review of outcome of today's recommendation. Orders Placed This Encounter   Procedures     [address], FIRST LESION       Elisha Delaney was seen today for skin exam and skin problem. Diagnoses and all orders for this visit:    Actinic keratoses  -     [place] DESTRUC PREMALIG[place]NT, FIRST LESION    Seborrheic dermatitis    Follicular acne  -     tetracycline (ACHROMYCIN;SUMYCIN) 250 MG capsule; Take 1 capsule by mouth 2 times daily for 10 days        Return in about 2 weeks (around 4/26/2021) for for review of outcome of today's recommendation. Patient Instructions   Follow-up in 10 to 14 days to reevaluate facial skin. Suspecting follicular acne flareup. However since the lesions have pearly texture I want to confirm change with antibiotic treatment. If no changes noted biopsy will be done. Educated patient on seborrheic dermatitis treatment. It is not necessary to treat if not symptomatic. Even if the plaque is present it does not require treatment unless there are other complications. So she can avoid using [facility] for now if she would like. Cryotherapy instructions    Post op instructions given. A printed copy provided. It is best to leave blisters alone if they form for the first 1-3 days to allow the desired damaged tissue (precancer lesion, wart, or whatever lesion is being removed) to separate from healthy tissue. They should be covered with a bandage to prevent blister breakage and dirt exposure. The wounds should remain dry while there is a blister, therefore if this is a sweaty location like the foot you may need to change socks multiple times per day.    When the blister(s) pop or patient removes the top as instructed between day 3-5, apply antibiotic (NOT triple antibiotic, one brand is Neosporin) ointment and a bandage to affected area(s). The ointment should be applied to the open area as long as it is not covered with skin. Exposed tissue is meant to be moist.  Once a scab is formed the patient may stop applying ointment. The scab may appear yellow while moist, don't confuse this with infection. If the wound is infection pus will drain from the site. If this treatment was for a large wart you may note that a plug of skin may fall out of the area that was treated. That is the center of the wart and it is appropriate for it to come out. If exposed skin remains, treat that area as you would a ruptured blister as mentioned above. Bacitracin sample supplied                    Patient has noted swelling in the lesion above her lip and growth in size quickly. She is also noted a couple other spots on her face have changed and grown. No redness. No bleeding. No itching. She notes she still has some changes in her scalp that are no longer itching or causing flake on her clothing. She has stopped using [facility] and is using Pantene instead. But is not symptomatic.           Current Outpatient Medications on File Prior to Visit   Medication Sig Dispense Refill    indapamide (LOZOL) 1.25 MG tablet TAKE 1 TABLET BY MOUTH DAILY 90 tablet 1    amLODIPine (NORVASC) 5 MG tablet TAKE 1/2 TABLET BY MOUTH ONCE DAILY 45 tablet 2    thyroid (ARMOUR THYROID) 30 MG tablet Take 1.5 tablets by mouth daily take 1 & 1/2 tablets (45mg) by mouth once daily 45 tablet 5    Pumpkin Seed-Soy Germ (AZO BLADDER CONTROL/GO-LESS PO) Take by mouth 2 times daily      Handicap Placard MISC by Does not apply route Exp 5 years 1 each 0    Multiple Vitamins-Minerals (THERAPEUTIC MULTIVITAMIN-MINERALS) tablet Take 1 tablet by mouth daily      calcium carbonate (OSCAL) 500 MG TABS tablet Take 500 mg by mouth daily Take 1 daily      b complex vitamins capsule Take 1 capsule by mouth daily      MAGNESIUM PO Take 500 mg by mouth       Glucosamine-Chondroitin (GLUCOSAMINE CHONDR COMPLEX PO) Take 2 tablets by mouth daily       aspirin 81 MG tablet Take 81 mg by mouth daily      L-Tryptophan 500 MG TABS Take 750 mg by mouth daily At hs       sotalol (BETAPACE) 80 MG tablet 1/2 tablet bid  3     No current facility-administered medications on file prior to visit. Hydralazine, Influenza vaccines, Cortisone, Coumadin [warfarin sodium], Levothyroxine, and Pneumococcal vaccine    Review of Systems   Constitutional: Negative for chills and fever. Skin: Negative for color change, rash and wound. Allergic/Immunologic: Negative for environmental allergies, food allergies and immunocompromised state. Hematological: Negative for adenopathy. Does not bruise/bleed easily. Psychiatric/Behavioral: Negative for behavioral problems and sleep disturbance. Pulse 103   Temp 98.6 °F (37 °C)   Ht 5' 5\" (1.651 m)   Wt 180 lb 6.4 oz (81.8 kg)   LMP 01/01/1996   SpO2 98%   BMI 30.02 kg/m²   Physical Exam  Constitutional:       General: She is not in acute distress. Appearance: Normal appearance. She is well-developed. She is not toxic-appearing. HENT:      Head: Normocephalic and atraumatic. Right Ear: Hearing and tympanic membrane normal.      Left Ear: Hearing and tympanic membrane normal.      Nose: Nose normal. No nasal deformity. Eyes:      General: Lids are normal.         Right eye: No discharge. Left eye: No discharge. Conjunctiva/sclera: Conjunctivae normal.      Pupils: Pupils are equal, round, and reactive to light. Neck:      Musculoskeletal: Full passive range of motion without pain. Thyroid: No thyroid mass or thyromegaly. Vascular: No JVD. Trachea: Trachea and phonation normal.   Cardiovascular:      Rate and Rhythm: Normal rate and regular rhythm.    Pulmonary:      Effort: No accessory muscle usage or respiratory distress. Musculoskeletal:      Comments: FROM all large muscle groups and joints as witnessed when walking to exam table, getting on, and getting off the exam table. Skin:     General: Skin is warm and dry. Findings: No rash. Comments: Posterior scalp still has some area of plaque and flake without any sign of erythema or excoriation. There is 1 central lesion in the posterior occipital area approximately 4 mm in diameter erythematous base rough white flake consistent with actinic keratosis. Facial lesions-refer to picture below. Neurological:      Mental Status: She is alert. Motor: No tremor or atrophy. Gait: Gait normal.   Psychiatric:         Speech: Speech normal.         Behavior: Behavior normal.         Thought Content: Thought content normal.                   Procedure consent  The procedure was discussed with the patient. All questions were answered and alternative options discussed. The patient is aware of the risks of bleeding, infection, unsatisfactory scar result. Informed consent paperwork was signed by the patient. Liquid nitrogen was applied for 2-6 seconds to affected areas with thaw allowed between dosing for a total of 2 applications. Applied to 1 lesion(s). The patient tolerated the procedure well. Diagnosis Orders   1. Actinic keratoses  [place] DESTRUC PREMALIG[place], FIRST LESION   2. Seborrheic dermatitis     3. Follicular acne  tetracycline (ACHROMYCIN;SUMYCIN) 250 MG capsule       Return in about 2 weeks (around 4/26/2021) for for review of outcome of today's recommendation. Orders Placed This Encounter   Procedures     [facility], FIRST LESION               Patient Instructions   Follow-up in 10 to 14 days to reevaluate facial skin. Suspecting follicular acne flareup. However since the lesions have pearly texture I want to confirm change with antibiotic treatment. If no changes noted biopsy will be done.     Educated patient on seborrheic dermatitis treatment. It is not necessary to treat if not symptomatic. Even if the plaque is present it does not require treatment unless there are other complications. So she can avoid using [facility] for now if she would like. Cryotherapy instructions    Post op instructions given. A printed copy provided. It is best to leave blisters alone if they form for the first 1-3 days to allow the desired damaged tissue (precancer lesion, wart, or whatever lesion is being removed) to separate from healthy tissue. They should be covered with a bandage to prevent blister breakage and dirt exposure. The wounds should remain dry while there is a blister, therefore if this is a sweaty location like the foot you may need to change socks multiple times per day. When the blister(s) pop or patient removes the top as instructed between day 3-5, apply antibiotic (NOT triple antibiotic, one brand is Neosporin) ointment and a bandage to affected area(s). The ointment should be applied to the open area as long as it is not covered with skin. Exposed tissue is meant to be moist.  Once a scab is formed the patient may stop applying ointment. The scab may appear yellow while moist, don't confuse this with infection. If the wound is infection pus will drain from the site. If this treatment was for a large wart you may note that a plug of skin may fall out of the area that was treated. That is the center of the wart and it is appropriate for it to come out. If exposed skin remains, treat that area as you would a ruptured blister as mentioned above. Bacitracin sample supplied                DO NOT USE TRIPLE ANTIBIOTIC ON THE WOUND    It is best to leave blisters alone if they form. They should be covered with a bandage to prevent blister breakage and dirt exposure.   The wounds should remain dry while there is a blister, therefore if this is a sweaty location like the foot you may need to change socks

## 2021-04-26 ENCOUNTER — OFFICE VISIT (OUTPATIENT)
Dept: FAMILY MEDICINE CLINIC | Age: 81
End: 2021-04-26
Payer: MEDICARE

## 2021-04-26 ENCOUNTER — TELEPHONE (OUTPATIENT)
Dept: FAMILY MEDICINE CLINIC | Age: 81
End: 2021-04-26

## 2021-04-26 VITALS — HEIGHT: 65 IN | OXYGEN SATURATION: 98 % | WEIGHT: 180 LBS | BODY MASS INDEX: 29.99 KG/M2 | HEART RATE: 83 BPM

## 2021-04-26 DIAGNOSIS — L57.0 ACTINIC KERATOSES: ICD-10-CM

## 2021-04-26 DIAGNOSIS — L70.8 FOLLICULAR ACNE: ICD-10-CM

## 2021-04-26 DIAGNOSIS — D49.2 ABNORMAL SKIN GROWTH: Primary | ICD-10-CM

## 2021-04-26 DIAGNOSIS — L21.9 SEBORRHEIC DERMATITIS: ICD-10-CM

## 2021-04-26 PROCEDURE — 4040F PNEUMOC VAC/ADMIN/RCVD: CPT | Performed by: FAMILY MEDICINE

## 2021-04-26 PROCEDURE — 1036F TOBACCO NON-USER: CPT | Performed by: FAMILY MEDICINE

## 2021-04-26 PROCEDURE — G8427 DOCREV CUR MEDS BY ELIG CLIN: HCPCS | Performed by: FAMILY MEDICINE

## 2021-04-26 PROCEDURE — 1123F ACP DISCUSS/DSCN MKR DOCD: CPT | Performed by: FAMILY MEDICINE

## 2021-04-26 PROCEDURE — G8400 PT W/DXA NO RESULTS DOC: HCPCS | Performed by: FAMILY MEDICINE

## 2021-04-26 PROCEDURE — G8417 CALC BMI ABV UP PARAM F/U: HCPCS | Performed by: FAMILY MEDICINE

## 2021-04-26 PROCEDURE — 99213 OFFICE O/P EST LOW 20 MIN: CPT | Performed by: FAMILY MEDICINE

## 2021-04-26 PROCEDURE — 1090F PRES/ABSN URINE INCON ASSESS: CPT | Performed by: FAMILY MEDICINE

## 2021-04-26 ASSESSMENT — ENCOUNTER SYMPTOMS: COLOR CHANGE: 1

## 2021-04-26 NOTE — PROGRESS NOTES
Diagnosis Orders   1. Abnormal skin growth     2. Follicular acne     3. Seborrheic dermatitis     4. Actinic keratoses       Return in about 2 days (around 4/28/2021) for 30 min procedure R above lip. There are no Patient Instructions on file for this visit. Subjective:      Patient ID: Tyler Garcia is a 79 y.o. female who presents for:  Chief Complaint   Patient presents with    2 Week Follow-Up     skin, pt states that she is doing well. believes to be healing fine?  Health Maintenance     pt states allergic to vaccines       Patient here for follow-up of follicular acne as well as lesions that were removed. There is decreased irritation and much of the skin but 2 lesions remain. The one lesion is above her lip and has continued to have changed color and shape. She is concerned about this. The lesions that were treated with liquid nitrogen last time have come off and are healing well.           Current Outpatient Medications on File Prior to Visit   Medication Sig Dispense Refill    indapamide (LOZOL) 1.25 MG tablet TAKE 1 TABLET BY MOUTH DAILY 90 tablet 1    amLODIPine (NORVASC) 5 MG tablet TAKE 1/2 TABLET BY MOUTH ONCE DAILY 45 tablet 2    thyroid (ARMOUR THYROID) 30 MG tablet Take 1.5 tablets by mouth daily take 1 & 1/2 tablets (45mg) by mouth once daily 45 tablet 5    Pumpkin Seed-Soy Germ (AZO BLADDER CONTROL/GO-LESS PO) Take by mouth 2 times daily      Handicap Placard MISC by Does not apply route Exp 5 years 1 each 0    Multiple Vitamins-Minerals (THERAPEUTIC MULTIVITAMIN-MINERALS) tablet Take 1 tablet by mouth daily      calcium carbonate (OSCAL) 500 MG TABS tablet Take 500 mg by mouth daily Take 1 daily      b complex vitamins capsule Take 1 capsule by mouth daily      MAGNESIUM PO Take 500 mg by mouth       Glucosamine-Chondroitin (GLUCOSAMINE CHONDR COMPLEX PO) Take 2 tablets by mouth daily       aspirin 81 MG tablet Take 81 mg by mouth daily      L-Tryptophan 500 MG TABS Take 750 mg by mouth daily At hs       sotalol (BETAPACE) 80 MG tablet 1/2 tablet bid  3     No current facility-administered medications on file prior to visit.       Past Medical History:   Diagnosis Date    Atrial fibrillation (HCC)     no coumadin pt preference    Colon cancer ([address]      H/O COLON AND UTERINE [id number]    Constipation     Fatigue     History of blood transfusion     x2 1996    History of uterine cancer 4/5/2018    Hyperlipidemia     Hypertension     BENIGN ESSENTIAL    Hypothyroidism 1/23/2014    Obesity     Pacemaker     Sick sinus syndrome (HCC)     Sleep apnea     noncompliant with cpap    TIA (transient ischemic attack)     Uterine [facility])      Past Surgical History:   Procedure Laterality Date    CARDIAC CATHETERIZATION      COLON SURGERY  2001    RESECTION    COLONOSCOPY  08-05-11,10/8/2013    RANDOM BIOPSIES W/COLITIS    HERNIA REPAIR      HYSTERECTOMY      PACEMAKER INSERTION      MD COLON CA SCRN NOT  [address] [facility] N/A 7/11/2017    COLONOSCOPY performed by Graham Mayer MD at [facility]      age 9   Noemi Emili TUMOR REMOVAL      fatty tumor lower back     Social History     Socioeconomic History    Marital status: Single     Spouse name: Not on file    Number of children: 0    Years of education: Not on file    Highest education level: Not on file   Occupational History    Not on file   Social Needs    Financial resource strain: Not on file    Food insecurity     Worry: Not on file     Inability: Not on file    Transportation needs     Medical: Not on file     Non-medical: Not on file   Tobacco Use    Smoking status: Never Smoker    Smokeless tobacco: Never Used   Substance and Sexual Activity    Alcohol use: No    Drug use: No    Sexual activity: Not on file   Lifestyle    Physical activity     Days per week: Not on file     Minutes per session: Not on file    Stress: Not on file   Relationships    Social connections     Talks on phone: Not on file     Gets together: Not on file     Attends [employer] service: Not on file     Active member of club or organization: Not on file     Attends meetings of clubs or organizations: Not on file     Relationship status: Not on file    Intimate partner violence     Fear of current or ex partner: Not on file     Emotionally abused: Not on file     Physically abused: Not on file     Forced sexual activity: Not on file   Other Topics Concern    Not on file   Social History Narrative    Not on file     Family History   Problem Relation Age of Onset    Heart Disease Maternal Grandmother     Emphysema Paternal Grandmother     Stroke Mother     High Blood Pressure Father      Allergies:  Hydralazine, Influenza vaccines, Cortisone, Coumadin [warfarin sodium], Levothyroxine, and Pneumococcal vaccine    Review of Systems   Constitutional: Negative for chills and fever. Skin: Positive for color change and wound. Negative for rash. Allergic/Immunologic: Negative for environmental allergies, food allergies and immunocompromised state. Hematological: Negative for adenopathy. Does not bruise/bleed easily. Psychiatric/Behavioral: Negative for behavioral problems and sleep disturbance. Objective:   Pulse 83   Ht 5' 5\" (1.651 m)   Wt 180 lb (81.6 kg)   LMP 01/01/1996   SpO2 98%   Breastfeeding No   BMI 29.95 kg/m²     Physical Exam  Constitutional:       General: She is not in acute distress. Appearance: Normal appearance. She is well-developed. She is not toxic-appearing. HENT:      Head: Normocephalic and atraumatic. Right Ear: Hearing and tympanic membrane normal.      Left Ear: Hearing and tympanic membrane normal.      Nose: Nose normal. No nasal deformity. Eyes:      General: Lids are normal.         Right eye: No discharge. Left eye: No discharge. Conjunctiva/sclera: Conjunctivae normal.      Pupils: Pupils are equal, round, and reactive to light.    Neck: Musculoskeletal: Full passive range of motion without pain. Thyroid: No thyroid mass or thyromegaly. Vascular: No JVD. Trachea: Trachea and phonation normal.   Cardiovascular:      Rate and Rhythm: Normal rate and regular rhythm. Pulmonary:      Effort: No accessory muscle usage or respiratory distress. Musculoskeletal:      Comments: FROM all large muscle groups and joints as witnessed when walking to exam table, getting on, and getting off the exam table. Skin:     General: Skin is warm and dry. Findings: No rash. Comments: Healthy eschar posterior scalp. Decreased erythematous lesions on the face. Irregular pearly raised lesion still present above the right lip. Neurological:      Mental Status: She is alert. Motor: No tremor or atrophy. Gait: Gait normal.   Psychiatric:         Speech: Speech normal.         Behavior: Behavior normal.         Thought Content: Thought content normal.         No results found for this visit on 04/26/21. No results found for this or any previous visit (from the past 2016 hour(s)). Assessment:       Diagnosis Orders   1. Abnormal skin growth     2. Follicular acne     3. Seborrheic dermatitis     4. Actinic keratoses           No orders of the defined types were placed in this encounter. Plan:   Return in about 2 days (around 4/28/2021) for 30 min procedure R above lip. There are no Patient Instructions on file for this visit. This note was partially created with the assistance of dictation. This may lead to grammatical or spelling errors. Eran Sullivan M.D.

## 2021-04-26 NOTE — TELEPHONE ENCOUNTER
Patient would like to remove denton siddiqi as her emergency contact. I am unable to do this because this person is selected as a health care decision maker. Can this be fixed.

## 2021-04-28 ENCOUNTER — PROCEDURE VISIT (OUTPATIENT)
Dept: FAMILY MEDICINE CLINIC | Age: 81
End: 2021-04-28
Payer: MEDICARE

## 2021-04-28 VITALS
HEART RATE: 87 BPM | SYSTOLIC BLOOD PRESSURE: 122 MMHG | DIASTOLIC BLOOD PRESSURE: 74 MMHG | HEIGHT: 65 IN | TEMPERATURE: 97.8 F | OXYGEN SATURATION: 98 % | BODY MASS INDEX: 29.99 KG/M2 | WEIGHT: 180 LBS

## 2021-04-28 DIAGNOSIS — D49.2 ABNORMAL SKIN GROWTH: ICD-10-CM

## 2021-04-28 DIAGNOSIS — D49.2 ABNORMAL SKIN GROWTH: Primary | ICD-10-CM

## 2021-04-28 PROCEDURE — 11310 SHAVE SKIN LESION 0.5 CM/<: CPT | Performed by: FAMILY MEDICINE

## 2021-04-28 ASSESSMENT — ENCOUNTER SYMPTOMS: COLOR CHANGE: 0

## 2021-04-28 NOTE — PATIENT INSTRUCTIONS
Incision/ Shave biopsy/ Laceration repair    -Clean surgical area with antibacterial soap and water once daily.      -Keep surgical site moist with vaseline or antibiotic ointment such as bacitracin (single, not tripleantibiotic or Neosporin) and apply a fresh bandage daily until a solid scab forms or if the wound is at risk for trauma or dirt.     -Follow up immediately if any growing redness (minimal redness or pale pink is normal along wound edges) surrounds the surgical site or if dripping drainage occurs at surgical site. Once a solid scab forms no more bandage needed. A wet scab can look yellow. This is not infection, just moisture.  -Once the lesion is healed be sure to apply sunscreen to the area to prevent burn of the newer and more delicate skin during the first 6 months of healing.    -If the scar begins to be raised you may massage the area firmly twice a day to help break down scar tissue and help the area become a flat scar. There is some evidence that Mederma applied to a scar daily for the first few months can help shrink and fade it more quickly then without intervention.

## 2021-04-28 NOTE — PROGRESS NOTES
Diagnosis Orders   1. Abnormal skin growth  WI SHAV SKIN LES <5MM FACE,FACIAL    Specimen to Pathology Outpatient    WI SHAV SKIN LES <5MM FACE,FACIAL    Specimen to Pathology Outpatient         Orders Placed This Encounter   Procedures    Specimen to Pathology Outpatient     Margins requested on all specimens  R/O basal cell cancer  Location above right lip     Standing Status:   Future     Standing Expiration Date:   4/28/2022     Order Specific Question:   PREVIOUS BIOPSY     Answer:   No     Order Specific Question:   PREOP DIAGNOSIS     Answer:   Abnormal skin growth     Order Specific Question:   FROZEN SECTION - NO OR YES/SPECIMEN     Answer:   No    Specimen to Pathology Outpatient     Margins requested on all specimens  R/O basal cell cancer  Location right facial cheek     Standing Status:   Future     Standing Expiration Date:   4/28/2022     Order Specific Question:   PREVIOUS BIOPSY     Answer:   No     Order Specific Question:   PREOP DIAGNOSIS     Answer:   Abnormal skin growth     Order Specific Question:   FROZEN SECTION - NO OR YES/SPECIMEN     Answer:   No    WI SHAV SKIN LES <5MM FACE,FACIAL     Above right lip    WI SHAV SKIN LES <5MM FACE,FACIAL     Right cheek face       Theta Viviana was seen today for procedure. Diagnoses and all orders for this visit:    Abnormal skin growth  -     WI SHAV SKIN LES <5MM FACE,FACIAL  -     Specimen to Pathology Outpatient; Future  -     WI SHAV SKIN LES <5MM FACE,FACIAL  -     Specimen to Pathology Outpatient; Future        Return if symptoms worsen or fail to improve.     Patient Instructions   Incision/ Shave biopsy/ Laceration repair    -Clean surgical area with antibacterial soap and water once daily.      -Keep surgical site moist with vaseline or antibiotic ointment such as bacitracin (single, not tripleantibiotic or Neosporin) and apply a fresh bandage daily until a solid scab forms or if the wound is at risk for trauma or dirt.     -Follow up immediately if any growing redness (minimal redness or pale pink is normal along wound edges) surrounds the surgical site or if dripping drainage occurs at surgical site. Once a solid scab forms no more bandage needed. A wet scab can look yellow. This is not infection, just moisture.  -Once the lesion is healed be sure to apply sunscreen to the area to prevent burn of the newer and more delicate skin during the first 6 months of healing.    -If the scar begins to be raised you may massage the area firmly twice a day to help break down scar tissue and help the area become a flat scar. There is some evidence that Mederma applied to a scar daily for the first few months can help shrink and fade it more quickly then without intervention. Patient is here for removal of lesion on the face. Increase growth and change in shape. No prior history of skin cancer.       Current Outpatient Medications on File Prior to Visit   Medication Sig Dispense Refill    indapamide (LOZOL) 1.25 MG tablet TAKE 1 TABLET BY MOUTH DAILY 90 tablet 1    amLODIPine (NORVASC) 5 MG tablet TAKE 1/2 TABLET BY MOUTH ONCE DAILY 45 tablet 2    thyroid (ARMOUR THYROID) 30 MG tablet Take 1.5 tablets by mouth daily take 1 & 1/2 tablets (45mg) by mouth once daily 45 tablet 5    Pumpkin Seed-Soy Germ (AZO BLADDER CONTROL/GO-LESS PO) Take by mouth 2 times daily      Handicap Placard MISC by Does not apply route Exp 5 years 1 each 0    Multiple Vitamins-Minerals (THERAPEUTIC MULTIVITAMIN-MINERALS) tablet Take 1 tablet by mouth daily      calcium carbonate (OSCAL) 500 MG TABS tablet Take 500 mg by mouth daily Take 1 daily      b complex vitamins capsule Take 1 capsule by mouth daily      MAGNESIUM PO Take 500 mg by mouth       Glucosamine-Chondroitin (GLUCOSAMINE CHONDR COMPLEX PO) Take 2 tablets by mouth daily       aspirin 81 MG tablet Take 81 mg by mouth daily      L-Tryptophan 500 MG TABS Take 750 mg by mouth daily At hs       sotalol (BETAPACE) 80 MG tablet 1/2 tablet bid  3     No current facility-administered medications on file prior to visit. Hydralazine, Influenza vaccines, Cortisone, Coumadin [warfarin sodium], Levothyroxine, and Pneumococcal vaccine    Review of Systems   Constitutional: Negative for chills and fever. Skin: Negative for color change, rash and wound. Allergic/Immunologic: Negative for environmental allergies, food allergies and immunocompromised state. Hematological: Negative for adenopathy. Does not bruise/bleed easily. Psychiatric/Behavioral: Negative for behavioral problems and sleep disturbance. /74   Pulse 87   Temp 97.8 °F (36.6 °C)   Ht 5' 5\" (1.651 m)   Wt 180 lb (81.6 kg)   LMP 01/01/1996   SpO2 98%   BMI 29.95 kg/m²   Physical Exam  Constitutional:       General: She is not in acute distress. Appearance: Normal appearance. She is well-developed. She is not toxic-appearing. HENT:      Head: Normocephalic and atraumatic. Right Ear: Hearing and tympanic membrane normal.      Left Ear: Hearing and tympanic membrane normal.      Nose: Nose normal. No nasal deformity. Eyes:      General: Lids are normal.         Right eye: No discharge. Left eye: No discharge. Conjunctiva/sclera: Conjunctivae normal.      Pupils: Pupils are equal, round, and reactive to light. Neck:      Musculoskeletal: Full passive range of motion without pain. Thyroid: No thyroid mass or thyromegaly. Vascular: No JVD. Trachea: Trachea and phonation normal.   Cardiovascular:      Rate and Rhythm: Normal rate and regular rhythm. Pulmonary:      Effort: No accessory muscle usage or respiratory distress. Musculoskeletal:      Comments: FROM all large muscle groups and joints as witnessed when walking to exam table, getting on, and getting off the exam table. Skin:     General: Skin is warm and dry. Findings: No rash.       Comments: Lesion above the right lip is pearly textured with some vasculature noted measuring 3 x 4 mm. Right cheek lesion is 2 x 3 mm similar texture   Neurological:      Mental Status: She is alert. Motor: No tremor or atrophy. Gait: Gait normal.   Psychiatric:         Speech: Speech normal.         Behavior: Behavior normal.         Thought Content: Thought content normal.           PROCEDURE: Both lesions have been done with the same technique  Informed consent was given by the patient. Risks including infection, bleeding and scarring were discussed. No guarantee how the scar will look. Patient skin was prepped with alcohol. Wound was anesthetized using 0.2 cc of 1% lidocaine with epinephrine for anesthesia. A Dermablade was used to obtain the complete removal of the visible lesion. Drysol was used at the base of site. Bacitracin ointment and bandage were placed on the wound. Estimated blood loss less than 1 cc    Post op wound care instructions were given to the patient. He/She will f/u in 2 weeks or sooner if needed for problems. Lamarr Hodgkins was seen today for procedure. Diagnoses and all orders for this visit:    Abnormal skin growth  -     MI SHAV SKIN LES <5MM FACE,FACIAL  -     Specimen to Pathology Outpatient; Future  -     MI SHAV SKIN LES <5MM FACE,FACIAL  -     Specimen to Pathology Outpatient; Future        Return if symptoms worsen or fail to improve. Patient Instructions   Incision/ Shave biopsy/ Laceration repair    -Clean surgical area with antibacterial soap and water once daily.      -Keep surgical site moist with vaseline or antibiotic ointment such as bacitracin (single, not tripleantibiotic or Neosporin) and apply a fresh bandage daily until a solid scab forms or if the wound is at risk for trauma or dirt.     -Follow up immediately if any growing redness (minimal redness or pale pink is normal along wound edges) surrounds the surgical site or if dripping drainage occurs at surgical site.  Once a solid scab forms no more bandage needed. A wet scab can look yellow. This is not infection, just moisture.  -Once the lesion is healed be sure to apply sunscreen to the area to prevent burn of the newer and more delicate skin during the first 6 months of healing.    -If the scar begins to be raised you may massage the area firmly twice a day to help break down scar tissue and help the area become a flat scar. There is some evidence that Mederma applied to a scar daily for the first few months can help shrink and fade it more quickly then without intervention. Freddie Epstein M.D. This note was partially created with the assistance of dictation. This may lead to grammatical or spelling errors.

## 2021-04-28 NOTE — PROGRESS NOTES
Plan:   Return in about 2 days (around 2021) for 30 min procedure R above lip. Skin:     General: Skin is warm and dry. Findings: No rash. Comments: Healthy eschar posterior scalp. Decreased erythematous lesions on the face. Irregular pearly raised lesion still present above the right lip. Prior to the start of the procedure known as Excision/Shave biopsy an informed consent has been signed and scanned into patients EMR. After the patient is draped and prepped and before the start of the procedure  Dr. Leisa Sullivan and attending staff Stefanie Middleton both must perform a verbal confirmation using patients Name   and  The patient should participate in this. Dr. Leisa Sullivan will chandni the surgical site if needed for documenting superior and inferior aspects. \"correct site. \" will be verified on container, and order. These above steps will be documented into the patients EMR chart.

## 2021-05-07 DIAGNOSIS — E03.9 HYPOTHYROIDISM, UNSPECIFIED TYPE: ICD-10-CM

## 2021-05-07 RX ORDER — THYROID 30 MG/1
TABLET ORAL
Qty: 45 TABLET | Refills: 5 | Status: SHIPPED | OUTPATIENT
Start: 2021-05-07 | End: 2021-11-09

## 2021-05-25 ENCOUNTER — OFFICE VISIT (OUTPATIENT)
Dept: FAMILY MEDICINE CLINIC | Age: 81
End: 2021-05-25
Payer: MEDICARE

## 2021-05-25 VITALS
BODY MASS INDEX: 29.99 KG/M2 | OXYGEN SATURATION: 96 % | HEART RATE: 92 BPM | TEMPERATURE: 98.8 F | WEIGHT: 180 LBS | HEIGHT: 65 IN

## 2021-05-25 DIAGNOSIS — L57.0 ACTINIC KERATOSES: Primary | ICD-10-CM

## 2021-05-25 PROBLEM — R43.1 ABNORMAL SMELL: Status: RESOLVED | Noted: 2017-07-31 | Resolved: 2021-05-25

## 2021-05-25 PROCEDURE — 4040F PNEUMOC VAC/ADMIN/RCVD: CPT | Performed by: FAMILY MEDICINE

## 2021-05-25 PROCEDURE — 99213 OFFICE O/P EST LOW 20 MIN: CPT | Performed by: FAMILY MEDICINE

## 2021-05-25 PROCEDURE — 1123F ACP DISCUSS/DSCN MKR DOCD: CPT | Performed by: FAMILY MEDICINE

## 2021-05-25 PROCEDURE — 17000 DESTRUCT PREMALG LESION: CPT | Performed by: FAMILY MEDICINE

## 2021-05-25 PROCEDURE — 1090F PRES/ABSN URINE INCON ASSESS: CPT | Performed by: FAMILY MEDICINE

## 2021-05-25 PROCEDURE — G8427 DOCREV CUR MEDS BY ELIG CLIN: HCPCS | Performed by: FAMILY MEDICINE

## 2021-05-25 PROCEDURE — 1036F TOBACCO NON-USER: CPT | Performed by: FAMILY MEDICINE

## 2021-05-25 PROCEDURE — G8417 CALC BMI ABV UP PARAM F/U: HCPCS | Performed by: FAMILY MEDICINE

## 2021-05-25 PROCEDURE — G8400 PT W/DXA NO RESULTS DOC: HCPCS | Performed by: FAMILY MEDICINE

## 2021-05-25 ASSESSMENT — ENCOUNTER SYMPTOMS: COLOR CHANGE: 0

## 2021-05-25 ASSESSMENT — SOCIAL DETERMINANTS OF HEALTH (SDOH): HOW HARD IS IT FOR YOU TO PAY FOR THE VERY BASICS LIKE FOOD, HOUSING, MEDICAL CARE, AND HEATING?: NOT HARD AT ALL

## 2021-05-25 NOTE — PATIENT INSTRUCTIONS
[email or web address] and sign in to your [employer] account. Enter L364 in the [facility] box to learn more about \"Actinic Keratosis: Care Instructions. \"     If you do not have an account, please click on the \"Sign Up Now\" link. Current as of: July 2, 2020               Content Version: 12.8  © 0114-0671 [employer], [facility]. Care instructions adapted under license by [facility] ([facility]). If you have questions about a medical condition or this instruction, always ask your healthcare professional. [address] any warranty or liability for your use of this information.

## 2021-05-25 NOTE — PROGRESS NOTES
Diagnosis Orders   1. Actinic keratoses  NM DESTRUC PREMALIGNANT, FIRST LESION       Return in about 6 months (around 11/25/2021) for 15 min skin check. Orders Placed This Encounter   Procedures     [facility] today for skin exam and health maintenance. Diagnoses and all orders for this visit:    Actinic keratoses  -     NM DESTRUC PREMALIGNANT, FIRST LESION        Return in about 6 months (around 11/25/2021) for 15 min skin check. Patient Instructions       Patient Education        Actinic Keratosis: Care Instructions  Your Care Instructions  Actinic keratosis is a skin growth caused by sun damage. It can turn into skin cancer, but this isn't common. Actinic keratoses, also called solar keratoses, are small red, brown, or skin-colored scaly patches. They are most common on the face, neck, hands, and forearms. Your doctor can remove these growths by freezing or scraping them off or by putting medicines on them. Follow-up care is a key part of your treatment and safety. Be sure to make and go to all appointments, and call your doctor if you are having problems. It's also a good idea to know your test results and keep a list of the medicines you take. How can you care for yourself at home? · If your doctor told you how to care for the treated area, follow your doctor's instructions. If you did not get instructions, follow this general advice:  ? Wash around the area with clean water 2 times a day. Don't use hydrogen peroxide or alcohol, which can slow healing. ? You may cover the area with a thin layer of petroleum jelly, such as Vaseline, and a nonstick bandage. ? Apply more petroleum jelly and replace the bandage as needed. To prevent actinic keratosis  · Always wear sunscreen on exposed skin. Make sure to use a broad-spectrum sunscreen that has a sun protection factor (SPF) of 30 or higher. Use it every day, even when it is cloudy.   · Wear long sleeves, a hat, and pants if you are going to be outdoors for a long time. · Avoid the sun between 10 a.m. and 4 p.m., the peak time for UV rays. · Do not use tanning booths or sunlamps. When should you call for help? Watch closely for changes in your health, and be sure to contact your doctor if:    · You have symptoms of infection, such as:  ? Increased pain, swelling, warmth, or redness. ? Red streaks leading from the area. ? Pus draining from the area. ? A fever. Where can you learn more? Go to [email or web address] and sign in to your [employer] account. Enter L364 in the [place] box to learn more about \"Actinic Keratosis: Care Instructions. \"     If you do not have an account, please click on the \"Sign Up Now\" link. Current as of: July 2, 2020               Content Version: 12.8  © 2006-2021 [employer]. Care instructions adapted under license by [facility] ([facility]). If you have questions about a medical condition or this instruction, always ask your healthcare professional. [place] [address] any warranty or liability for your use of this information. Patient is here for her routine skin check. She is in a little bit early because of scheduling changes. She is noted a spot on her scalp that has not gone away.   Thick flake at times      Current Outpatient Medications on File Prior to Visit   Medication Sig Dispense Refill    ARMOUR THYROID 30 MG tablet take 1 & 1/2 tablets (45mg) by mouth once daily 45 tablet 5    indapamide (LOZOL) 1.25 MG tablet TAKE 1 TABLET BY MOUTH DAILY 90 tablet 1    amLODIPine (NORVASC) 5 MG tablet TAKE 1/2 TABLET BY MOUTH ONCE DAILY 45 tablet 2    Pumpkin Seed-Soy Germ (AZO BLADDER CONTROL/GO-LESS PO) Take by mouth 2 times daily      Handicap Placard MISC by Does not apply route Exp 5 years 1 each 0    Multiple Vitamins-Minerals (THERAPEUTIC MULTIVITAMIN-MINERALS) tablet Take 1 tablet by mouth daily      calcium carbonate (OSCAL) 500 MG TABS tablet Take 500 mg by mouth daily Take 1 daily      b complex vitamins capsule Take 1 capsule by mouth daily      MAGNESIUM PO Take 500 mg by mouth       Glucosamine-Chondroitin (GLUCOSAMINE CHONDR COMPLEX PO) Take 2 tablets by mouth daily       aspirin 81 MG tablet Take 81 mg by mouth daily      L-Tryptophan 500 MG TABS Take 750 mg by mouth daily At hs       sotalol (BETAPACE) 80 MG tablet 1/2 tablet bid  3     No current facility-administered medications on file prior to visit. Hydralazine, Influenza vaccines, Cortisone, Coumadin [warfarin sodium], Levothyroxine, and Pneumococcal vaccine    Review of Systems   Constitutional: Negative for chills and fever. Skin: Negative for color change, rash and wound. Allergic/Immunologic: Negative for environmental allergies, food allergies and immunocompromised state. Hematological: Negative for adenopathy. Does not bruise/bleed easily. Psychiatric/Behavioral: Negative for behavioral problems and sleep disturbance. Pulse 92   Temp 98.8 °F (37.1 °C)   Ht 5' 5\" (1.651 m)   Wt 180 lb (81.6 kg)   LMP 01/01/1996   SpO2 96%   BMI 29.95 kg/m²   Physical Exam  Constitutional:       General: She is not in acute distress. Appearance: Normal appearance. She is well-developed. She is not toxic-appearing. HENT:      Head: Normocephalic and atraumatic. Right Ear: Hearing and tympanic membrane normal.      Left Ear: Hearing and tympanic membrane normal.      Nose: Nose normal. No nasal deformity. Eyes:      General: Lids are normal.         Right eye: No discharge. Left eye: No discharge. Conjunctiva/sclera: Conjunctivae normal.      Pupils: Pupils are equal, round, and reactive to light. Neck:      Thyroid: No thyroid mass or thyromegaly. Vascular: No JVD. Trachea: Trachea and phonation normal.   Cardiovascular:      Rate and Rhythm: Normal rate and regular rhythm. Pulmonary:      Effort: No accessory muscle usage or respiratory distress. Musculoskeletal:      Cervical back: Full passive range of motion without pain. Comments: FROM all large muscle groups and joints as witnessed when walking to exam table, getting on, and getting off the exam table. Skin:     General: Skin is warm and dry. Findings: No rash. Comments: Skin exam is clear with the exception of posterior occipital scalp erythematous base rough white flake lesion 3 mm   Neurological:      Mental Status: She is alert. Motor: No tremor or atrophy. Gait: Gait normal.   Psychiatric:         Speech: Speech normal.         Behavior: Behavior normal.         Thought Content: Thought content normal.           Procedure consent  The procedure was discussed with the patient. All questions were answered and alternative options discussed. The patient is aware of the risks of bleeding, infection, unsatisfactory scar result. Informed consent paperwork was signed by the patient. Liquid nitrogen was applied for 2-6 seconds to affected areas with thaw allowed between dosing for a total of 2 applications. Applied to 1 lesion(s). The patient tolerated the procedure well. Diagnosis Orders   1. Actinic keratoses  [place] DESTRUC PREMALIGNANT, FIRST LESION       Return in about 6 months (around 11/25/2021) for 15 min skin check. Orders Placed This Encounter   Procedures     [facility], FIRST LESION               Patient Instructions       Patient Education        Actinic Keratosis: Care Instructions  Your Care Instructions  Actinic keratosis is a skin growth caused by sun damage. It can turn into skin cancer, but this isn't common. Actinic keratoses, also called solar keratoses, are small red, brown, or skin-colored scaly patches. They are most common on the face, neck, hands, and forearms.   Your doctor can remove these growths by freezing or scraping them off or by putting medicines on them. Follow-up care is a key part of your treatment and safety. Be sure to make and go to all appointments, and call your doctor if you are having problems. It's also a good idea to know your test results and keep a list of the medicines you take. How can you care for yourself at home? · If your doctor told you how to care for the treated area, follow your doctor's instructions. If you did not get instructions, follow this general advice:  ? Wash around the area with clean water 2 times a day. Don't use hydrogen peroxide or alcohol, which can slow healing. ? You may cover the area with a thin layer of petroleum jelly, such as Vaseline, and a nonstick bandage. ? Apply more petroleum jelly and replace the bandage as needed. To prevent actinic keratosis  · Always wear sunscreen on exposed skin. Make sure to use a broad-spectrum sunscreen that has a sun protection factor (SPF) of 30 or higher. Use it every day, even when it is cloudy. · Wear long sleeves, a hat, and pants if you are going to be outdoors for a long time. · Avoid the sun between 10 a.m. and 4 p.m., the peak time for UV rays. · Do not use tanning booths or sunlamps. When should you call for help? Watch closely for changes in your health, and be sure to contact your doctor if:    · You have symptoms of infection, such as:  ? Increased pain, swelling, warmth, or redness. ? Red streaks leading from the area. ? Pus draining from the area. ? A fever. Where can you learn more? Go to [email or web address] [email or web address] and sign in to your [employer] account. Enter L364 in the [facility] box to learn more about \"Actinic Keratosis: Care Instructions. \"     If you do not have an account, please click on the \"Sign Up Now\" link. Current as of: July 2, 2020               Content Version: 12.8  © 2842-5356 [employer]. Care instructions adapted under license by [facility] ([facility]).  If you have questions about a medical condition or this instruction, always ask your healthcare professional. [address] any warranty or liability for your use of this information. DO NOT USE TRIPLE ANTIBIOTIC ON THE WOUND    It is best to leave blisters alone if they form. They should be covered with a bandage to prevent blister breakage and dirt exposure. The wounds should remain dry while there is a blister, therefore if this is a sweaty location like the foot you may need to change socks multiple times per day. If blister(s) pop or patient pops with a sterilized needle, apply antibiotic (NOT triple antibiotic, one brand is Neosporin) ointment and a bandage to affected area(s). The ointment should be applied to the open area as long as it is not covered with skin. Exposed tissue is meant to be moist.  Once a scab formed she may stop applying ointment. If this treatment was for a large wart you may note that a plug of skin may fall out of the area that was treated. That is the center of the wart and it is appropriate for it to come out. If exposed skin remains, treat that area as you would a ruptured blister as mentioned above.

## 2021-06-21 RX ORDER — INDAPAMIDE 1.25 MG/1
1.25 TABLET, FILM COATED ORAL DAILY
Qty: 90 TABLET | Refills: 1 | Status: SHIPPED | OUTPATIENT
Start: 2021-06-21 | End: 2021-08-09 | Stop reason: ALTCHOICE

## 2021-07-13 ENCOUNTER — HOSPITAL ENCOUNTER (OUTPATIENT)
Dept: CARDIOLOGY | Age: 81
Discharge: HOME | End: 2021-07-13
Payer: MEDICARE

## 2021-07-13 PROCEDURE — 93280 PM DEVICE PROGR EVAL DUAL: CPT

## 2021-07-23 LAB
ALBUMIN SERPL-MCNC: 4 G/DL (ref 3.5–4.6)
ALP BLD-CCNC: 78 U/L (ref 40–130)
ALT SERPL-CCNC: 7 U/L (ref 0–33)
ANION GAP SERPL CALCULATED.3IONS-SCNC: 11 MEQ/L (ref 9–15)
AST SERPL-CCNC: 17 U/L (ref 0–35)
BILIRUB SERPL-MCNC: 0.4 MG/DL (ref 0.2–0.7)
BILIRUBIN DIRECT: <0.2 MG/DL (ref 0–0.4)
BILIRUBIN, INDIRECT: NORMAL MG/DL (ref 0–0.6)
BUN BLDV-MCNC: 28 MG/DL (ref 8–23)
CALCIUM SERPL-MCNC: 10.3 MG/DL (ref 8.5–9.9)
CHLORIDE BLD-SCNC: 100 MEQ/L (ref 95–107)
CHOLESTEROL, TOTAL: 252 MG/DL (ref 0–199)
CO2: 29 MEQ/L (ref 20–31)
CREAT SERPL-MCNC: 0.97 MG/DL (ref 0.5–0.9)
GFR AFRICAN AMERICAN: >60
GFR NON-AFRICAN AMERICAN: 55.2
GLUCOSE BLD-MCNC: 90 MG/DL (ref 70–99)
HCT VFR BLD CALC: 41.8 % (ref 37–47)
HDLC SERPL-MCNC: 65 MG/DL (ref 40–59)
HEMOGLOBIN: 13.7 G/DL (ref 12–16)
LDL CHOLESTEROL CALCULATED: 167 MG/DL (ref 0–129)
MAGNESIUM: 2.4 MG/DL (ref 1.7–2.4)
MCH RBC QN AUTO: 28.8 PG (ref 27–31.3)
MCHC RBC AUTO-ENTMCNC: 32.8 % (ref 33–37)
MCV RBC AUTO: 87.9 FL (ref 82–100)
PDW BLD-RTO: 14.5 % (ref 11.5–14.5)
PLATELET # BLD: 232 K/UL (ref 130–400)
POTASSIUM SERPL-SCNC: 5.9 MEQ/L (ref 3.4–4.9)
RBC # BLD: 4.76 M/UL (ref 4.2–5.4)
SODIUM BLD-SCNC: 140 MEQ/L (ref 135–144)
TOTAL PROTEIN: 6.6 G/DL (ref 6.3–8)
TRIGL SERPL-MCNC: 102 MG/DL (ref 0–150)
TSH SERPL DL<=0.05 MIU/L-ACNC: 3.16 UIU/ML (ref 0.44–3.86)
WBC # BLD: 5.2 K/UL (ref 4.8–10.8)

## 2021-07-24 ENCOUNTER — HOSPITAL ENCOUNTER (EMERGENCY)
Age: 81
Discharge: HOME | End: 2021-07-24
Attending: EMERGENCY MEDICINE
Payer: MEDICARE

## 2021-07-24 VITALS
TEMPERATURE: 97.8 F | BODY MASS INDEX: 29.95 KG/M2 | SYSTOLIC BLOOD PRESSURE: 138 MMHG | OXYGEN SATURATION: 99 % | WEIGHT: 180 LBS | DIASTOLIC BLOOD PRESSURE: 77 MMHG | HEART RATE: 96 BPM | RESPIRATION RATE: 18 BRPM

## 2021-07-24 DIAGNOSIS — E87.5 HYPERKALEMIA: Primary | ICD-10-CM

## 2021-07-24 LAB
ANION GAP SERPL CALCULATED.3IONS-SCNC: 11 MEQ/L (ref 9–15)
BUN BLDV-MCNC: 26 MG/DL (ref 8–23)
CALCIUM SERPL-MCNC: 9.2 MG/DL (ref 8.5–9.9)
CHLORIDE BLD-SCNC: 98 MEQ/L (ref 95–107)
CO2: 25 MEQ/L (ref 20–31)
CREAT SERPL-MCNC: 0.9 MG/DL (ref 0.5–0.9)
GFR AFRICAN AMERICAN: >60
GFR NON-AFRICAN AMERICAN: >60
GLUCOSE BLD-MCNC: 109 MG/DL (ref 70–99)
POTASSIUM SERPL-SCNC: 4 MEQ/L (ref 3.4–4.9)
SODIUM BLD-SCNC: 134 MEQ/L (ref 135–144)

## 2021-07-24 PROCEDURE — 80048 BASIC METABOLIC PNL TOTAL CA: CPT

## 2021-07-24 PROCEDURE — 36415 COLL VENOUS BLD VENIPUNCTURE: CPT

## 2021-07-24 PROCEDURE — 93005 ELECTROCARDIOGRAM TRACING: CPT | Performed by: EMERGENCY MEDICINE

## 2021-07-24 PROCEDURE — 99283 EMERGENCY DEPT VISIT LOW MDM: CPT

## 2021-07-24 ASSESSMENT — ENCOUNTER SYMPTOMS
GASTROINTESTINAL NEGATIVE: 1
RESPIRATORY NEGATIVE: 1

## 2021-07-24 NOTE — ED PROVIDER NOTES
[facility] [facility] ED  eMERGENCY dEPARTMENT eNCOUnter      Pt Name: Logan Davis  MRN: [id number]  [place] 1940  Date of evaluation: 7/24/2021  Provider: Shraddha Rivera MD    CHIEF COMPLAINT       Chief Complaint   Patient presents with    Other     Sent for high potassium level         HISTORY OF PRESENT ILLNESS   (Location/Symptom, Timing/Onset,Context/Setting, Quality, Duration, Modifying Factors, Severity)  Note limiting factors. Logan Davis is a [de-identified] y.o. female who presents to the emergency department with complaint of elevated potassium. Patient had routine blood draw with a previous. Patient was notified by telephone that she had elevated potassium approximately 5.9. Patient referred to the emergency department for evaluation. Patient admits she did hydrate overnight and use some apple cider vinegar. Patient admits she has not had any symptomatologies of hypokalemia. Patient admits that given her cardiac history she agreed and consented for evaluation in 1 to be checked for hyperkalemia. This time patient denies any acute symptomatologies and feels at her baseline state. HPI    NursingNotes were reviewed. REVIEW OF SYSTEMS    (2-9 systems for level 4, 10 or more for level 5)     Review of Systems   Constitutional: Negative. Patient admits chronic fatigue but otherwise unremarkable. HENT: Negative. Respiratory: Negative. Cardiovascular: Negative. Gastrointestinal: Negative. Neurological: Negative. Except as noted above the remainder of the review of systems was reviewed and negative.        PAST MEDICAL HISTORY     Past Medical History:   Diagnosis Date    Atrial fibrillation [facility])     no coumadin pt preference    Colon cancer [facility])      H/O COLON AND UTERINE [id number]    Constipation     Fatigue     History of blood transfusion     x2 1996    History of uterine cancer 4/5/2018    Hyperlipidemia     Hypertension     BENIGN ESSENTIAL    Hypothyroidism 1/23/2014    Obesity     Pacemaker     Sick sinus syndrome ([facility] [address]     Sleep apnea     noncompliant with cpap    TIA (transient ischemic attack)     Uterine cancer ([facility] [address]          SURGICALHISTORY       Past Surgical History:   Procedure Laterality Date    CARDIAC CATHETERIZATION      COLON SURGERY  2001    RESECTION    COLONOSCOPY  08-05-11,10/8/2013    RANDOM BIOPSIES W/COLITIS    HERNIA REPAIR      HYSTERECTOMY      PACEMAKER INSERTION      [place] COLON CA SCRN NOT  [address]  IND N/A 7/11/2017    COLONOSCOPY performed by Poppy Schmidt MD at [facility]      age 9   Evelina Bergman TUMOR REMOVAL      fatty tumor lower back         CURRENT MEDICATIONS       Previous Medications    AMLODIPINE (NORVASC) 5 MG TABLET    TAKE 1/2 TABLET BY MOUTH ONCE DAILY    ARMOUR THYROID 30 MG TABLET    take 1 & 1/2 tablets (45mg) by mouth once daily    ASPIRIN 81 MG TABLET    Take 81 mg by mouth daily    B COMPLEX VITAMINS CAPSULE    Take 1 capsule by mouth daily    CALCIUM CARBONATE (OSCAL) 500 MG TABS TABLET    Take 500 mg by mouth daily Take 1 daily    GLUCOSAMINE-CHONDROITIN (GLUCOSAMINE CHONDR COMPLEX PO)    Take 2 tablets by mouth daily     HANDICAP PLACARD MISC    by Does not apply route Exp 5 years    INDAPAMIDE (LOZOL) 1.25 MG TABLET    TAKE 1 TABLET BY MOUTH DAILY    L-TRYPTOPHAN 500 MG TABS    Take 750 mg by mouth daily At hs     MAGNESIUM PO    Take 500 mg by mouth     MULTIPLE VITAMINS-MINERALS (THERAPEUTIC MULTIVITAMIN-MINERALS) TABLET    Take 1 tablet by mouth daily    PUMPKIN SEED-SOY GERM (AZO BLADDER CONTROL/GO-LESS PO)    Take by mouth 2 times daily    SOTALOL (BETAPACE) 80 MG TABLET    1/2 tablet bid       ALLERGIES     Hydralazine, Influenza vaccines, Cortisone, Coumadin [warfarin sodium], Levothyroxine, and Pneumococcal vaccine    FAMILY HISTORY       Family History   Problem Relation Age of Onset    Heart Disease Maternal Grandmother     Emphysema Paternal Grandmother    Evelina Bergman Stroke Mother     High Blood Pressure Father           SOCIAL HISTORY       Social History     Socioeconomic History    Marital status: Single     Spouse name: None    Number of children: 0    Years of education: None    Highest education level: None   Occupational History    None   Tobacco Use    Smoking status: Never Smoker    Smokeless tobacco: Never Used   Vaping Use    Vaping Use: Never used   Substance and Sexual Activity    Alcohol use: No    Drug use: No    Sexual activity: None   Other Topics Concern    None   Social History Narrative    None     Social Determinants of Health     Financial Resource Strain: Low Risk     Difficulty of Paying Living Expenses: Not hard at all   Food Insecurity: No Food Insecurity    Worried About Running Out of Food in the Last Year: Never true    Gabriela of Food in the Last Year: Never true   Transportation Needs: No Transportation Needs    Lack of Transportation (Medical): No    Lack of Transportation (Non-Medical): No   Physical Activity:     Days of Exercise per Week:     Minutes of Exercise per Session:    Stress:     Feeling of Stress :    Social Connections:     Frequency of Communication with Friends and Family:     Frequency of Social Gatherings with Friends and Family:     Attends Caodaism Services:     Active Member of Clubs or Organizations:     Attends Club or Organization Meetings:     Marital Status:    Intimate Partner Violence:     Fear of Current or Ex-Partner:     Emotionally Abused:     Physically Abused:     Sexually Abused:        SCREENINGS             PHYSICAL EXAM    (up to 7 for level 4, 8 or more for level 5)     ED Triage Vitals [07/24/21 0720]   BP Temp Temp Source Pulse Resp SpO2 Height Weight   138/77 97.8 °F (36.6 °C) Oral 96 18 99 % -- 180 lb (81.6 kg)       Physical Exam  Vitals and nursing note reviewed. Constitutional:       General: She is not in acute distress. Appearance: Normal appearance.  She is not ill-appearing. HENT:      Head: Normocephalic. Right Ear: External ear normal.      Left Ear: External ear normal.      Nose: Nose normal.   Eyes:      Extraocular Movements: Extraocular movements intact. Conjunctiva/sclera: Conjunctivae normal.   Cardiovascular:      Rate and Rhythm: Normal rate and regular rhythm. Pulmonary:      Effort: Pulmonary effort is normal.   Musculoskeletal:         General: Normal range of motion. Cervical back: Normal range of motion. Skin:     General: Skin is warm. Capillary Refill: Capillary refill takes less than 2 seconds. Neurological:      General: No focal deficit present. Mental Status: She is alert and oriented to person, place, and time. Psychiatric:         Mood and Affect: Mood normal.         Thought Content: Thought content normal.         Judgment: Judgment normal.         DIAGNOSTIC RESULTS     EKG: All EKG's are interpreted by the Emergency Department Physician who either signs or Co-signsthis chart in the absence of a cardiologist.    EKG demonstrates a atrially paced rhythm. Rate is 65. There is no evidence of acute ectopy or acute ischemic changes. This is a abnormal however nonacute EKG. RADIOLOGY:   Non-plain filmimages such as CT, Ultrasound and MRI are read by the radiologist. Plain radiographic images are visualized and preliminarily interpreted by the emergency physician with the below findings:    -    Interpretation per the Radiologist below, if available at the time ofthis note:    No orders to display         ED BEDSIDE ULTRASOUND:   Performed by ED Physician - none    LABS:  Labs Reviewed   BASIC METABOLIC PANEL - Abnormal; Notable for the following components:       Result Value    Sodium 134 (*)     Glucose 109 (*)     BUN 26 (*)     All other components within normal limits       All other labs were within normal range or not returned as of this dictation.     EMERGENCY DEPARTMENT COURSE and DIFFERENTIAL

## 2021-07-24 NOTE — ED TRIAGE NOTES
Patient states that she had scheduled labs drawn yesterday. States that she received a call last evening telling her that her potassium level was 5.9 and that she needed to go to the emergency department. Patient reports a history of high potassium levels.

## 2021-07-26 ENCOUNTER — CARE COORDINATION (OUTPATIENT)
Dept: CARE COORDINATION | Age: 81
End: 2021-07-26

## 2021-07-26 LAB
EKG ATRIAL RATE: 65 BPM
EKG P AXIS: 73 DEGREES
EKG P-R INTERVAL: 282 MS
EKG Q-T INTERVAL: 428 MS
EKG QRS DURATION: 92 MS
EKG QTC CALCULATION (BAZETT): 445 MS
EKG R AXIS: -40 DEGREES
EKG T AXIS: 64 DEGREES
EKG VENTRICULAR RATE: 65 BPM

## 2021-07-26 PROCEDURE — 93010 ELECTROCARDIOGRAM REPORT: CPT | Performed by: INTERNAL MEDICINE

## 2021-07-26 NOTE — CARE COORDINATION
I called to complete an ER follow up and to discuss care coordination. She tells me that she has had an issue with hyperkalemia for quite some time. She adds that she uses apple cider vinegar along with drinking water   She denies having any symptoms with high potassium  I also discussed care coordination my role and the process of care coordination. She is interested in this program  She is busy preparing for bible study classes today. We will begin care coordination on Wednesday per her request  I have provided her with my contact information and I have asked her to call me with any questions or concerns.

## 2021-07-26 NOTE — LETTER
7/26/2021      Skylar Deleon  [id number] [address] [address]      Dear Skylar Deleon,    My name is Radha Hill RN and I am a registered nurse who partners with Miguel Carrillo MD to improve patients' health. Miguel Carrillo MD believes you would benefit from working with me. As a member of your health care team, I would work with other providers involved in your care, offer education for your specific health conditions, and connect you with additional resources as needed. I will collaborate with Miguel Carrillo MD to support you in following your treatment plan. The additional support I provide is no additional cost to you. My primary focus is to help you achieve specific goals and improve your health. We are committed to walk with you on this journey and look forward to working with you. Please call me to further discuss your healthcare needs. I am available by phone or for appointments at the office. You can reach me at [phone].         In good health,           Radha Hill RN

## 2021-07-27 ENCOUNTER — OFFICE VISIT (OUTPATIENT)
Dept: FAMILY MEDICINE CLINIC | Age: 81
End: 2021-07-27
Payer: MEDICARE

## 2021-07-27 VITALS
HEIGHT: 65 IN | SYSTOLIC BLOOD PRESSURE: 120 MMHG | OXYGEN SATURATION: 96 % | TEMPERATURE: 98 F | BODY MASS INDEX: 29.29 KG/M2 | HEART RATE: 74 BPM | WEIGHT: 175.8 LBS | DIASTOLIC BLOOD PRESSURE: 80 MMHG

## 2021-07-27 DIAGNOSIS — E03.9 HYPOTHYROIDISM, UNSPECIFIED TYPE: ICD-10-CM

## 2021-07-27 DIAGNOSIS — I10 ESSENTIAL HYPERTENSION: ICD-10-CM

## 2021-07-27 DIAGNOSIS — I49.5 SICK SINUS SYNDROME (HCC): ICD-10-CM

## 2021-07-27 DIAGNOSIS — Z95.0 PACEMAKER: ICD-10-CM

## 2021-07-27 DIAGNOSIS — E87.5 HYPERKALEMIA: Primary | ICD-10-CM

## 2021-07-27 DIAGNOSIS — E78.2 MIXED HYPERLIPIDEMIA: ICD-10-CM

## 2021-07-27 DIAGNOSIS — I48.91 ATRIAL FIBRILLATION, UNSPECIFIED TYPE (HCC): ICD-10-CM

## 2021-07-27 PROCEDURE — 99214 OFFICE O/P EST MOD 30 MIN: CPT | Performed by: FAMILY MEDICINE

## 2021-07-27 PROCEDURE — 1123F ACP DISCUSS/DSCN MKR DOCD: CPT | Performed by: FAMILY MEDICINE

## 2021-07-27 PROCEDURE — G8427 DOCREV CUR MEDS BY ELIG CLIN: HCPCS | Performed by: FAMILY MEDICINE

## 2021-07-27 PROCEDURE — 4040F PNEUMOC VAC/ADMIN/RCVD: CPT | Performed by: FAMILY MEDICINE

## 2021-07-27 PROCEDURE — 1036F TOBACCO NON-USER: CPT | Performed by: FAMILY MEDICINE

## 2021-07-27 PROCEDURE — G8400 PT W/DXA NO RESULTS DOC: HCPCS | Performed by: FAMILY MEDICINE

## 2021-07-27 PROCEDURE — G8417 CALC BMI ABV UP PARAM F/U: HCPCS | Performed by: FAMILY MEDICINE

## 2021-07-27 PROCEDURE — 1090F PRES/ABSN URINE INCON ASSESS: CPT | Performed by: FAMILY MEDICINE

## 2021-07-27 RX ORDER — MULTIVIT-MIN/IRON/FOLIC ACID/K 18-600-40
CAPSULE ORAL 2 TIMES DAILY
COMMUNITY
End: 2021-07-27

## 2021-07-27 NOTE — PROGRESS NOTES
Chief Complaint   Patient presents with    Hypothyroidism     6 month    Results     high potassium, follow ER visit from Saturday        HPI:  Natalio Guajardo is a [de-identified] y.o. female     Checkup/review medical issues  Mainly bp/thyroid    Was directed to ER on Friday due to hyperkalemia     Taking 45mg armour daily consistently    Does follow with Dr. Gallo Barron for cardio            Patient Active Problem List   Diagnosis    Constipation    Fatigue    Atrial fibrillation ([address]    Hypertension    Sleep apnea    Sick sinus syndrome ([address]    Pacemaker    Hyperlipidemia    Hypothyroidism    Bunion of great toe of right foot    Hammer toe of right foot    Fatigue due to exposure    History of uterine cancer    Malignant neoplasm of transverse colon ([address]    AK (actinic keratosis)    Exudative senile macular degeneration of retina ([address]    ABMD (anterior basement membrane dystrophy)    Nuclear sclerotic cataract of both eyes    Posterior vitreous detachment of left eye    Punctate keratitis    Senile cataract, unspecified    Vitreous degeneration and detachment of both eyes       Follow up of blood pressure and thyroid    Seeing Dr. Tiff Langley regarding bp and potassium    [facility]ed [facility] in [facility] and Dr. Lev Ji note. No statin therapy despite high cholesterol.        PAF  No anticoag   Was on coumadin in past, had reaction  Has pacemaker    Takes low 81mg asa    Wt Readings from Last 3 Encounters:   07/27/21 175 lb 12.8 oz (79.7 kg)   07/24/21 180 lb (81.6 kg)   05/25/21 180 lb (81.6 kg)     Ongoing fatigue  She does have mild MACK, couldn't tolerate        Past Medical History:   Diagnosis Date    Atrial fibrillation (HCC)     no coumadin pt preference    Colon cancer ([address]      H/O COLON AND UTERINE [id number]    Constipation     Fatigue     History of blood transfusion     x2 1996    History of uterine cancer 4/5/2018    Hyperlipidemia     Hypertension     BENIGN ESSENTIAL    Hypothyroidism 1/23/2014    Obesity     Pacemaker     Sick sinus syndrome (HCC)     Sleep apnea     noncompliant with cpap    TIA (transient ischemic attack)     U[facility]ine [facility])      Past Surgical History:   Procedure Laterality Date    CARDIAC CATHETERIZATION      COLON SURGERY  2001    RESECTION    COLONOSCOPY  08-05-11,10/8/2013    RANDOM BIOPSIES W/COLITIS    HERNIA REPAIR      HYSTERECTOMY      PACEMAKER INSERTION      [place] COLON CA SCRN NOT  [address] IND N/A 7/11/2017    COLONOSCOPY performed by Erin Colorado MD at [facility]      age 9   Anitha Solum TUMOR REMOVAL      fatty tumor lower back     Family History   Problem Relation Age of Onset    Heart Disease Maternal Grandmother     Emphysema Paternal Grandmother     Stroke Mother     High Blood Pressure Father      Social History     Socioeconomic History    Marital status: Single     Spouse name: None    Number of children: 0    Years of education: None    Highest education level: None   Occupational History    None   Tobacco Use    Smoking status: Never Smoker    Smokeless tobacco: Never Used   Vaping Use    Vaping Use: Never used   Substance and Sexual Activity    Alcohol use: No    Drug use: No    Sexual activity: None   Other Topics Concern    None   Social History Narrative    None     Social Determinants of Health     Financial Resource Strain: Low Risk     Difficulty of Paying Living Expenses: Not hard at all   Food Insecurity: No Food Insecurity    Worried About Running Out of Food in the Last Year: Never true    Gabriela of Food in the Last Year: Never true   Transportation Needs: No Transportation Needs    Lack of Transportation (Medical): No    Lack of Transportation (Non-Medical):  No   Physical Activity:     Days of Exercise per Week:     Minutes of Exercise per Session:    Stress:     Feeling of Stress :    Social Connections:     Frequency of Communication with Friends and Family:     Frequency of Social Gatherings with Friends and Family:     Attends Sikhism Services:     Active Member of Clubs or Organizations:     Attends Club or Organization Meetings:     Marital Status:    Intimate Partner Violence:     Fear of Current or Ex-Partner:     Emotionally Abused:     Physically Abused:     Sexually Abused:      Current Outpatient Medications   Medication Sig Dispense Refill    Zinc Sulfate (ZINC 15 PO) Take 50 mg by mouth       vitamin D 25 MCG (1000 UT) CAPS Take by mouth 2 times daily      indapamide (LOZOL) 1.25 MG tablet TAKE 1 TABLET BY MOUTH DAILY 90 tablet 1    ARMOUR THYROID 30 MG tablet take 1 & 1/2 tablets (45mg) by mouth once daily 45 tablet 5    amLODIPine (NORVASC) 5 MG tablet TAKE 1/2 TABLET BY MOUTH ONCE DAILY 45 tablet 2    Pumpkin Seed-Soy Germ (AZO BLADDER CONTROL/GO-LESS PO) Take by mouth 2 times daily      Handicap Placard MISC by Does not apply route Exp 5 years 1 each 0    Multiple Vitamins-Minerals (THERAPEUTIC MULTIVITAMIN-MINERALS) tablet Take 1 tablet by mouth daily      b complex vitamins capsule Take 1 capsule by mouth daily      MAGNESIUM PO Take 500 mg by mouth       Glucosamine-Chondroitin (GLUCOSAMINE CHONDR COMPLEX PO) Take 2 tablets by mouth daily       aspirin 81 MG tablet Take 81 mg by mouth daily      L-Tryptophan 500 MG TABS Take 750 mg by mouth daily At hs       sotalol (BETAPACE) 80 MG tablet 1/2 tablet bid  3     No current facility-administered medications for this visit. Allergies   Allergen Reactions    Hydralazine Other (See Comments)     Ran fever and chills. Face got real hot.  Influenza Vaccines Hives    Cortisone Other (See Comments)     Affects eyes- blurry vision    Coumadin [Warfarin Sodium]      Capillary swelling/inflammation    Levothyroxine     Pneumococcal Vaccine Hives     Pt states has an allergy to any vaccines that have egg embryo in them.        Review of Systems:   General ROS: per HPI  ENT ROS: negative for - headaches, hearing change, oral lesions, vertigo or visual changes  Hematological and Lymphatic ROS: negative for - bleeding problems, bruising, fatigue or night sweats  Endocrine ROS: negative for - polydipsia/polyuria or temperature intolerance  Respiratory ROS: no cough, shortness of breath, or wheezing  Cardiovascular ROS: no chest pain or dyspnea on exertion  Gastrointestinal ROS: no abdominal pain, no bloody or black stool - hx of colon CA -  She tends to have some constipation  Genito-Urinary ROS: incontinence         Physical Exam:  /80 (Site: Left Upper Arm)   Pulse 74   Temp 98 °F (36.7 °C)   Ht 5' 5\" (1.651 m)   Wt 175 lb 12.8 oz (79.7 kg)   LMP 01/01/1996   SpO2 96%   Breastfeeding No   BMI 29.25 kg/m²     Gen: Well, NAD, Alert, Oriented x 3   HEENT: EOMI, eyes clear, MMM  Skin: no rash or jaundice     Neck: no significant lymphadenopathy or thyromegaly  Lungs: CTA B w/out Rales/Wheezes/Rhonchi, Good respiratory effort   Heart: RRR, S1S2, w/out M/R/G, non-displaced PMI   Abdomen: Soft NT/ND, w/out R/G, w/ +BSx4   Ext: No C/C/E Bilaterally. Lab Results   Component Value Date    WBC 5.2 07/23/2021    HGB 13.7 07/23/2021    HCT 41.8 07/23/2021     07/23/2021    CHOL 252 (H) 07/23/2021    TRIG 102 07/23/2021    HDL 65 (H) 07/23/2021    ALT 7 07/23/2021    AST 17 07/23/2021     (L) 07/24/2021    K 4.0 07/24/2021    CL 98 07/24/2021    CREATININE 0.90 07/24/2021    BUN 26 (H) 07/24/2021    CO2 25 07/24/2021    TSH 3.160 07/23/2021    INR 1.0 06/14/2017    LABA1C 5.5 04/09/2019         A&P   Diagnosis Orders   1. Hyperkalemia  Basic Metabolic Panel   2. Hypothyroidism, unspecified type     3. Sick sinus syndrome ([address]     4. Mixed hyperlipidemia     5. Pacemaker     6. Essential hypertension     7.  Atrial fibrillation, unspecified type ([facility]          Periodic monitoring of potassium    Using apple cider vinegar       Continue as below  Current Outpatient Medications Medication Sig Dispense Refill    Zinc Sulfate (ZINC 15 PO) Take 50 mg by mouth       vitamin D 25 MCG (1000 UT) CAPS Take by mouth 2 times daily      indapamide (LOZOL) 1.25 MG tablet TAKE 1 TABLET BY MOUTH DAILY 90 tablet 1    ARMOUR THYROID 30 MG tablet take 1 & 1/2 tablets (45mg) by mouth once daily 45 tablet 5    amLODIPine (NORVASC) 5 MG tablet TAKE 1/2 TABLET BY MOUTH ONCE DAILY 45 tablet 2    Pumpkin Seed-Soy Germ (AZO BLADDER CONTROL/GO-LESS PO) Take by mouth 2 times daily      Handicap Placard MISC by Does not apply route Exp 5 years 1 each 0    Multiple Vitamins-Minerals (THERAPEUTIC MULTIVITAMIN-MINERALS) tablet Take 1 tablet by mouth daily      b complex vitamins capsule Take 1 capsule by mouth daily      MAGNESIUM PO Take 500 mg by mouth       Glucosamine-Chondroitin (GLUCOSAMINE CHONDR COMPLEX PO) Take 2 tablets by mouth daily       aspirin 81 MG tablet Take 81 mg by mouth daily      L-Tryptophan 500 MG TABS Take 750 mg by mouth daily At hs       sotalol (BETAPACE) 80 MG tablet 1/2 tablet bid  3     No current facility-administered medications for this visit.          She will also follow up with cardiology next week   Updated health maintenance    Allergic to flu shot, egg allergy  She will avoid the covid shot    Overall doing very well      Koffi Richards MD

## 2021-07-28 ENCOUNTER — CARE COORDINATION (OUTPATIENT)
Dept: CARE COORDINATION | Age: 81
End: 2021-07-28

## 2021-07-28 RX ORDER — LANOLIN ALCOHOL/MO/W.PET/CERES
1000 CREAM (GRAM) TOPICAL DAILY
COMMUNITY

## 2021-07-28 ASSESSMENT — PATIENT HEALTH QUESTIONNAIRE - PHQ9
SUM OF ALL RESPONSES TO PHQ QUESTIONS 1-9: 6
SUM OF ALL RESPONSES TO PHQ QUESTIONS 1-9: 6

## 2021-07-28 NOTE — CARE COORDINATION
Ambulatory Care Coordination Note  7/28/2021  CM Risk Score: 0  Charlson 10 Year Mortality Risk Score: 100%     ACC: Ana Hernandez RN    Summary Note:     I called to begin care coordination with Drake Herrera. I reviewed allergies, current medical history, initiated CC protocol, completed depression screening, completed a full medication reconciliation. Drake Herrera spoke at length about her allergies including vaccine allergies due to \"egg embryos. \"   She is able to eat eggs and chicken. She feels that this is from her childhood and was told not to obtain the COVID vaccine due to her allergies to vaccines. She is aware that the COVID vaccine is mRNA based. Medication updates included all OTC medications. We will complete the remainder of CC enrollment next week  I have provided her with my contact information and I have asked her call me with any questions or concerns. Care Coordination Interventions    Program Enrollment: Complex Care  Referral from Primary Care Provider: No  Suggested Interventions and Community Resources  Other Services or Interventions: Pharmacy referral declined. Dietician referral initiated. Walk in Clinic hours and usage discussed. Unable to obtain vaccines due to allergies          Goals Addressed    None         Prior to Admission medications    Medication Sig Start Date End Date Taking?  Authorizing Provider   LUTEIN PO Take 40 mg by mouth daily   Yes Historical Provider, MD   vitamin B-12 (CYANOCOBALAMIN) 1000 MCG tablet Take 1,000 mcg by mouth daily Takes 1500 mcg daily   Yes Historical Provider, MD   Zinc Sulfate (ZINC 15 PO) Take 50 mg by mouth    Yes Historical Provider, MD   vitamin D 25 MCG (1000 UT) CAPS Take by mouth 2 times daily   Yes Historical Provider, MD   indapamide (LOZOL) 1.25 MG tablet TAKE 1 TABLET BY MOUTH DAILY 6/21/21  Yes Thomas Pinedo MD   ARMOUR THYROID 30 MG tablet take 1 & 1/2 tablets (45mg) by mouth once daily 5/7/21  Yes Thomas Pinedo MD   amLODIPine (NORVASC) 5 MG tablet TAKE 1/2 TABLET BY MOUTH ONCE DAILY 11/16/20  Yes Lieutenant Shin MD   Pumpkin Seed-Soy Germ (AZO BLADDER CONTROL/GO-LESS PO) Take by mouth 2 times daily   Yes Historical Provider, MD   Handicap Placard MISC by Does not apply route Exp 5 years 12/11/18  Yes Lieutenant Shin MD   Multiple Vitamins-Minerals (THERAPEUTIC MULTIVITAMIN-MINERALS) tablet Take 1 tablet by mouth daily   Yes Historical Provider, MD   b complex vitamins capsule Take 1 capsule by mouth daily   Yes Historical Provider, MD   MAGNESIUM PO Take 500 mg by mouth    Yes Historical Provider, MD   Glucosamine-Chondroitin (GLUCOSAMINE CHONDR COMPLEX PO) Take 2 tablets by mouth daily    Yes Historical Provider, MD   aspirin 81 MG tablet Take 81 mg by mouth daily   Yes Historical Provider, MD   L-Tryptophan 500 MG TABS Take 750 mg by mouth daily At hs    Yes Historical Provider, MD   sotalol (BETAPACE) 80 MG tablet 1/2 tablet bid 10/12/16  Yes Historical Provider, MD       Future Appointments   Date Time Provider Velvet Vallejo   11/23/2021 12:30 PM Monroeearl Dumas MD [facility] [place]   1/25/2022  8:00 AM Lieutenant Shin MD [facility] [place]

## 2021-08-02 ENCOUNTER — TELEPHONE (OUTPATIENT)
Dept: FAMILY MEDICINE CLINIC | Age: 81
End: 2021-08-02

## 2021-08-02 NOTE — TELEPHONE ENCOUNTER
----- Message from Rosalino Sánchez MA sent at 8/2/2021 11:05 AM EDT -----  Subject: Message to Provider    QUESTIONS  Information for Provider? Questioning if the order from Dr Sheri Cheung is   at the office? Pt said it was faxed. Planning on coming tomorrow for   bloodwork. Please call and let patient know.   ---------------------------------------------------------------------------  --------------  CALL BACK INFO  What is the best way for the office to contact you? OK to leave message on   voicemail  Preferred Call Back Phone Number? [phone]  ---------------------------------------------------------------------------  --------------  SCRIPT ANSWERS  Relationship to Patient?  Self

## 2021-08-03 LAB
ANION GAP SERPL CALCULATED.3IONS-SCNC: 14 MEQ/L (ref 9–15)
BUN BLDV-MCNC: 29 MG/DL (ref 8–23)
CHLORIDE BLD-SCNC: 103 MEQ/L (ref 95–107)
CO2: 25 MEQ/L (ref 20–31)
CREAT SERPL-MCNC: 0.85 MG/DL (ref 0.5–0.9)
GFR AFRICAN AMERICAN: >60
GFR NON-AFRICAN AMERICAN: >60
POTASSIUM SERPL-SCNC: 4.5 MEQ/L (ref 3.4–4.9)
SODIUM BLD-SCNC: 142 MEQ/L (ref 135–144)

## 2021-08-05 ENCOUNTER — CARE COORDINATION (OUTPATIENT)
Dept: CARE COORDINATION | Age: 81
End: 2021-08-05

## 2021-08-05 NOTE — CARE COORDINATION
Ambulatory Care Coordination Note  8/5/2021  CM Risk Score: 0  Charlson 10 Year Mortality Risk Score: 100%     ACC: Palak Richards, RN    Summary Note:     I called to complete care coordination enrollment. [facility] general education assessment and goals established. Allison Siddiqui is very engaged in her personal health   She does research her current medical history along with the treatment plan. She would like as much information as she can to ensure that she lives a good healthy life. She is using apple cider vinegar to help with a few aspects of her health. She is concerned about the vacillating potassium levels. She is trying to make sure that she eats no more than 2 Grams per day. She is concerned about cardiac issues in the past that have shown up on ECG. She values and trusts her providers. PLAN:  Allison Siddiqui will continue to monitor her BP daily  I will reach out to our dietician to help educate Allison Siddiqui on lower potassium diet. Allison Siddiqui will follow up with all scheduled appointments and procedures. Care Coordination Interventions    Program Enrollment: Complex Care  Referral from Primary Care Provider: No  Suggested Interventions and Community Resources  Registered Dietician: In Process  Other Services or Interventions: Pharmacy referral declined. Dietician referral initiated. Walk in Clinic hours and usage discussed. Unable to obtain vaccines due to allergies          Goals Addressed                 This Visit's Progress     Conditions and Symptoms        I will schedule office visits, as directed by my provider. I will keep my appointment or reschedule if I have to cancel. I will notify my provider of any barriers to my plan of care. I will notify my provider of any symptoms that indicate a worsening of my condition.     Barriers: lack of education  Plan for overcoming my barriers: I will work with my ACM and health care team to increase my knowledge and self care management skills  Confidence: 8/10  Anticipated Goal Completion Date: 3/15/2022              Prior to Admission medications    Medication Sig Start Date End Date Taking?  Authorizing Provider   LUTEIN PO Take 40 mg by mouth daily    Historical Provider, MD   vitamin B-12 (CYANOCOBALAMIN) 1000 MCG tablet Take 1,000 mcg by mouth daily Takes 1500 mcg daily    Historical Provider, MD   Zinc Sulfate (ZINC 15 PO) Take 50 mg by mouth     Historical Provider, MD   vitamin D 25 MCG (1000 UT) CAPS Take by mouth 2 times daily    Historical Provider, MD   indapamide (LOZOL) 1.25 MG tablet TAKE 1 TABLET BY MOUTH DAILY 6/21/21   Dyan Lesches, MD   ARMOUR THYROID 30 MG tablet take 1 & 1/2 tablets (45mg) by mouth once daily 5/7/21   Dyan Lesches, MD   amLODIPine (NORVASC) 5 MG tablet TAKE 1/2 TABLET BY MOUTH ONCE DAILY 11/16/20   Dyan Lesches, MD   Pumpkin Seed-Soy Germ (AZO BLADDER CONTROL/GO-LESS PO) Take by mouth 2 times daily    Historical Provider, MD   Handicap Placard MISC by Does not apply route Exp 5 years 12/11/18   Dyan Lesches, MD   Multiple Vitamins-Minerals (THERAPEUTIC MULTIVITAMIN-MINERALS) tablet Take 1 tablet by mouth daily    Historical Provider, MD   b complex vitamins capsule Take 1 capsule by mouth daily    Historical Provider, MD   MAGNESIUM PO Take 500 mg by mouth     Historical Provider, MD   Glucosamine-Chondroitin (GLUCOSAMINE CHONDR COMPLEX PO) Take 2 tablets by mouth daily     Historical Provider, MD   aspirin 81 MG tablet Take 81 mg by mouth daily    Historical Provider, MD   L-Tryptophan 500 MG TABS Take 750 mg by mouth daily At      Historical Provider, MD   sotalol (BETAPACE) 80 MG tablet 1/2 tablet bid 10/12/16   Historical Provider, MD       Future Appointments   Date Time Provider Velvet Vallejo   8/9/2021  8:30 AM Dyan Lesches, MD [facility] [place]   11/23/2021 12:30 PM Leland Moreno MD [facility] [place] [facility] [place]   1/25/2022  8:00 AM Dyan Lesches, MD [facility] [place] [facility] [place]      and   General Assessment    Do you have any symptoms that are causing concern?: No

## 2021-08-06 ENCOUNTER — CARE COORDINATION (OUTPATIENT)
Dept: CARE COORDINATION | Age: 81
End: 2021-08-06

## 2021-08-06 NOTE — CARE COORDINATION
Skylar Deleon  August 6, 2021    Initial Referral Reason: Hyperkalemia     Patient Care Team:  Marlena Middleton MD as PCP - Jung Croft MD as PCP - [facility] Provider  Tanya Lauren MD as Consulting Physician (Oncology)  Ricardo Preciado MD (Cardiology)  Radha Hill RN as [facility]  Bora Celaya RD, LD as Dietitian (Dietitian)    Past Medical History:    Current Outpatient Medications   Medication Sig Dispense Refill    LUTEIN PO Take 40 mg by mouth daily      vitamin B-12 (CYANOCOBALAMIN) 1000 MCG tablet Take 1,000 mcg by mouth daily Takes 1500 mcg daily      Zinc Sulfate (ZINC 15 PO) Take 50 mg by mouth       vitamin D 25 MCG (1000 UT) CAPS Take by mouth 2 times daily      indapamide (LOZOL) 1.25 MG tablet TAKE 1 TABLET BY MOUTH DAILY 90 tablet 1    ARMOUR THYROID 30 MG tablet take 1 & 1/2 tablets (45mg) by mouth once daily 45 tablet 5    amLODIPine (NORVASC) 5 MG tablet TAKE 1/2 TABLET BY MOUTH ONCE DAILY 45 tablet 2    Pumpkin Seed-Soy Germ (AZO BLADDER CONTROL/GO-LESS PO) Take by mouth 2 times daily      Handicap Placard MISC by Does not apply route Exp 5 years 1 each 0    Multiple Vitamins-Minerals (THERAPEUTIC MULTIVITAMIN-MINERALS) tablet Take 1 tablet by mouth daily      b complex vitamins capsule Take 1 capsule by mouth daily      MAGNESIUM PO Take 500 mg by mouth       Glucosamine-Chondroitin (GLUCOSAMINE CHONDR COMPLEX PO) Take 2 tablets by mouth daily       aspirin 81 MG tablet Take 81 mg by mouth daily      L-Tryptophan 500 MG TABS Take 750 mg by mouth daily At hs       sotalol (BETAPACE) 80 MG tablet 1/2 tablet bid  3     No current facility-administered medications for this visit.        Biochemical Data, Medical Tests and Procedures:    Lab Results   Component Value Date    LABA1C 5.5 04/09/2019     No results found for: EAG    Lab Results   Component Value Date    CHOL 252 (H) 07/23/2021    CHOL 235 (H) 01/22/2021    CHOL 248 (H) 07/29/2020 Lab Results   Component Value Date    TRIG 102 07/23/2021    TRIG 79 01/22/2021    TRIG 111 07/29/2020     Lab Results   Component Value Date    HDL 65 (H) 07/23/2021    HDL 66 (H) 01/22/2021    HDL 62 (H) 07/29/2020     Lab Results   Component Value Date    LDLCALC 167 (H) 07/23/2021    LDLCALC 153 (H) 01/22/2021    LDLCALC 164 (H) 07/29/2020     No results found for: LABVLDL  Lab Results   Component Value Date    CHOLHDLRATIO 4.1 09/07/2012    CHOLHDLRATIO 4.3 09/09/2011       Anthropometric Measurements:    Height: 65 inches (165.1 cm)  Weight: 175 lb (79.7 kg)  BMI: 29.25 (overweight)  IBW: 125 lb (56.8 kg) +-10%  %IBW: 140%     Physical Exam Findings:  Deferred    Nutrition Interview: RD called pt, explained reason for call and role in care. Pt states appetite is \"good\", typically eats 3 meals/day. See food recall below. RD explained the importance of monitoring potassium intake daily. Discussed limiting potassium from food and beverages to no more than 2000 mg/day. Pt verbalizes understanding. Reviewed a food is considered high potassium if it has more than 200 mg per serving, moderate potassium if it is between  mg per serving and low potassium if it has less than 50 mg per serving. Discussed foods high in potassium to avoid/limit eating. RD offered to mail educational handouts to pt to reinforce concepts discussed during phone conversation, pt accepted and very appreciative. RD verified address.     24-Hour Diet Recall  Breakfast  Consumed: blueberry bagel, slice of cheese and applesauce    Lunch  Consumed: chicken noodle soup    Dinner  Consumed: no example provided per pt    Beverages: water    Nutrition Diagnosis:  #1 Problem Altered nutrition-related lab values: K       Etiology related to hyperkalemia       Signs/Symptoms as evidenced by recent ER visit for K 5.9 on 7/24/21     Nutrition Intervention:     Estimated Needs  regular, low potassium diet providing 1500 kcals to promote wt maintenance ([address] based on CBW). Estimated daily CHO Needs: 206 g (based on 45-65% total calorie intake)  Estimated daily Protein Needs: 64-80 g (based on 0.8-1.0 g/kg based on CBW)  Estimated daily Fluid Needs: 64 oz. #1 Nutrition Information: Provided patient with Potassium Content of Foods handouts. For reinforcement of concepts discussed during nutrition interview. #2 Nutrition Counseling: Used open-ended questions to assess patients perceived susceptibility and severity of disease state. Discussed potential impact of health threat on patient's lifestyle. Used open-ended questions to assess patient's perception regarding benefits of and barriers to implementation of nutrition therapy. #3 Nutrition Education: Clearly defined the benefits of nutrition therapy. Summarized and affirmed positive aspects of current nutrition patterns. Provided education regarding value of adherence to regular, low potassium diet. Discussed ways to establish applying concepts of alternatives and choices regarding implementation of diet. Explored ideas for small, incremental goals to initiate behavior change. Monitoring and Evaluation:    Indicator/Goal Criteria   #1 Eat balanced meals consistently throughout the day. #1 Focus on making meals balanced using the MyPlate [id number]-8/7 plate fruits and/or vegetables, 1/4 plate protein and 1/4 plate starchy carbohydrates with 8 oz glass of low fat milk if desired. #2 Monitor potassium intake daily. Limit potassium from food and drinks to no more than 2000 mg/day. #2 Utilize handout and choose foods lower in potassium. Follow Up: RD will call pt in 2 weeks to follow up and make sure pt received handouts in mail. RD will answer any nutrition related questions at this time.      [address] [facility], [facility] [address]

## 2021-08-09 ENCOUNTER — OFFICE VISIT (OUTPATIENT)
Dept: FAMILY MEDICINE CLINIC | Age: 81
End: 2021-08-09
Payer: MEDICARE

## 2021-08-09 VITALS
WEIGHT: 172 LBS | OXYGEN SATURATION: 96 % | HEART RATE: 70 BPM | BODY MASS INDEX: 28.66 KG/M2 | TEMPERATURE: 97.7 F | SYSTOLIC BLOOD PRESSURE: 94 MMHG | HEIGHT: 65 IN | DIASTOLIC BLOOD PRESSURE: 64 MMHG

## 2021-08-09 DIAGNOSIS — I10 ESSENTIAL HYPERTENSION: ICD-10-CM

## 2021-08-09 DIAGNOSIS — E03.9 HYPOTHYROIDISM, UNSPECIFIED TYPE: ICD-10-CM

## 2021-08-09 DIAGNOSIS — E87.5 HYPERKALEMIA: Primary | ICD-10-CM

## 2021-08-09 PROCEDURE — 99213 OFFICE O/P EST LOW 20 MIN: CPT | Performed by: FAMILY MEDICINE

## 2021-08-09 PROCEDURE — 1090F PRES/ABSN URINE INCON ASSESS: CPT | Performed by: FAMILY MEDICINE

## 2021-08-09 PROCEDURE — G8417 CALC BMI ABV UP PARAM F/U: HCPCS | Performed by: FAMILY MEDICINE

## 2021-08-09 PROCEDURE — 4040F PNEUMOC VAC/ADMIN/RCVD: CPT | Performed by: FAMILY MEDICINE

## 2021-08-09 PROCEDURE — 1036F TOBACCO NON-USER: CPT | Performed by: FAMILY MEDICINE

## 2021-08-09 PROCEDURE — G8400 PT W/DXA NO RESULTS DOC: HCPCS | Performed by: FAMILY MEDICINE

## 2021-08-09 PROCEDURE — G8427 DOCREV CUR MEDS BY ELIG CLIN: HCPCS | Performed by: FAMILY MEDICINE

## 2021-08-09 PROCEDURE — 1123F ACP DISCUSS/DSCN MKR DOCD: CPT | Performed by: FAMILY MEDICINE

## 2021-08-09 RX ORDER — AMLODIPINE BESYLATE 5 MG/1
5 TABLET ORAL DAILY
Qty: 90 TABLET | Refills: 2 | Status: SHIPPED | OUTPATIENT
Start: 2021-08-09 | End: 2022-05-09

## 2021-08-09 NOTE — PROGRESS NOTES
Chief Complaint   Patient presents with    Discuss Medications     amlodipine       HPI:  Nida Calloway is a [de-identified] y.o. female     Follow up bp   Was seen in ER prior to last visit  Hyperkalemia    She was able to \"flush\" her system with water and apple cider vinegar    Was told to go off indapamide by Dr. Rich Poster    She saw that the combo of indapamide and sotalol is not favorable     Currently on 1 amlodipine daily     Briefly had been on BID    136/78 this morning at home  Lower here by manual check     Wt Readings from Last 3 Encounters:   08/09/21 172 lb (78 kg)   07/27/21 175 lb 12.8 oz (79.7 kg)   07/24/21 180 lb (81.6 kg)           Patient Active Problem List   Diagnosis    Constipation    Fatigue    Atrial fibrillation ([address]    Hypertension    Sleep apnea    Sick sinus syndrome ([address]    Pacemaker    Hyperlipidemia    Hypothyroidism    Bunion of great toe of right foot    Hammer toe of right foot    Fatigue due to exposure    History of uterine cancer    Malignant neoplasm of transverse colon ([address]    AK (actinic keratosis)    Exudative senile macular degeneration of retina ([address]    ABMD (anterior basement membrane dystrophy)    Nuclear sclerotic cataract of both eyes    Posterior vitreous detachment of left eye    Punctate keratitis    Senile cataract, unspecified    Vitreous degeneration and detachment of both eyes       Reviewed [facility] in [facility] and Dr. Mitali Marie note. No statin therapy despite high cholesterol.        PAF  No anticoag   Was on coumadin in past, had reaction  Has pacemaker  Takes low 81mg asa    Wt Readings from Last 3 Encounters:   08/09/21 172 lb (78 kg)   07/27/21 175 lb 12.8 oz (79.7 kg)   07/24/21 180 lb (81.6 kg)     Ongoing fatigue  She does have mild MACK, couldn't tolerate        Past Medical History:   Diagnosis Date    Atrial fibrillation (HCC)     no coumadin pt preference    Colon cancer ([address]      H/O COLON AND UTERINE [id number]    Constipation     Fatigue     History of blood transfusion     x2 1996    History of uterine cancer 4/5/2018    Hyperlipidemia     Hypertension     BENIGN ESSENTIAL    Hypothyroidism 1/23/2014    Obesity     Pacemaker     Sick sinus syndrome (HCC)     Sleep apnea     noncompliant with cpap    TIA (transient ischemic attack)     Uterine cancer [facility])      Past Surgical History:   Procedure Laterality Date    CARDIAC CATHETERIZATION      COLON SURGERY  2001    RESECTION    COLONOSCOPY  08-05-11,10/8/2013    RANDOM BIOPSIES W/COLITIS    HERNIA REPAIR      HYSTERECTOMY      PACEMAKER INSERTION      ME COLON CA SCRN NOT  [address] IND N/A 7/11/2017    COLONOSCOPY performed by Junior Raza MD at [facility]      age 9   [facility] TUMOR REMOVAL      fatty tumor lower back     Family History   Problem Relation Age of Onset    Heart Disease Maternal Grandmother     Emphysema Paternal Grandmother     Stroke Mother     High Blood Pressure Father      Social History     Socioeconomic History    Marital status: Single     Spouse name: None    Number of children: 0    Years of education: 12    Highest education level: Bachelor's degree (e.g., BA, AB, BS)   Occupational History    Occupation: [employer]   Tobacco Use    Smoking status: Never Smoker    Smokeless tobacco: Never Used   Vaping Use    Vaping Use: Never used   Substance and Sexual Activity    Alcohol use: No    Drug use: No    Sexual activity: Never   Other Topics Concern    None   Social History Narrative    One story double wide mobile home    4 steps to get into the house.     Working smoke detector and one CO2 detector     Independent with daily ADL's and general self care      Social Determinants of Health     Financial Resource Strain: Low Risk     Difficulty of Paying Living Expenses: Not hard at all   Food Insecurity: No Food Insecurity    Worried About Running Out of Food in the Last Year: Never true    [address] in the Last Year: Never true   Transportation Needs: No Transportation Needs    Lack of Transportation (Medical): No    Lack of Transportation (Non-Medical): No   Physical Activity:     Days of Exercise per Week:     Minutes of Exercise per Session:    Stress:     Feeling of Stress :    Social Connections:     Frequency of Communication with Friends and Family:     Frequency of Social Gatherings with Friends and Family:     Attends Anabaptism Services:     Active Member of Clubs or Organizations:     Attends Club or Organization Meetings:     Marital Status:    Intimate Partner Violence:     Fear of Current or Ex-Partner:     Emotionally Abused:     Physically Abused:     Sexually Abused:      Current Outpatient Medications   Medication Sig Dispense Refill    amLODIPine (NORVASC) 5 MG tablet Take 1 tablet by mouth daily 90 tablet 2    LUTEIN PO Take 40 mg by mouth daily      vitamin B-12 (CYANOCOBALAMIN) 1000 MCG tablet Take 1,000 mcg by mouth daily Takes 1500 mcg daily      Zinc Sulfate (ZINC 15 PO) Take 25 mg by mouth       vitamin D 25 MCG (1000 UT) CAPS Take by mouth 2 times daily      ARMOUR THYROID 30 MG tablet take 1 & 1/2 tablets (45mg) by mouth once daily 45 tablet 5    Pumpkin Seed-Soy Germ (AZO BLADDER CONTROL/GO-LESS PO) Take by mouth 2 times daily      Handicap Placard MISC by Does not apply route Exp 5 years 1 each 0    Multiple Vitamins-Minerals (THERAPEUTIC MULTIVITAMIN-MINERALS) tablet Take 1 tablet by mouth daily      b complex vitamins capsule Take 1 capsule by mouth daily      MAGNESIUM PO Take 500 mg by mouth       Glucosamine-Chondroitin (GLUCOSAMINE CHONDR COMPLEX PO) Take 2 tablets by mouth daily       aspirin 81 MG tablet Take 81 mg by mouth daily      L-Tryptophan 500 MG TABS Take 1,000 mg by mouth daily At hs       sotalol (BETAPACE) 80 MG tablet 1/2 tablet bid  3     No current facility-administered medications for this visit.      Allergies   Allergen Reactions    Hydralazine Other (See Comments)     Ran fever and chills. Face got real hot.  Influenza Vaccines Hives    Cortisone Other (See Comments)     Affects eyes- blurry vision    Coumadin [Warfarin Sodium]      Capillary swelling/inflammation    Levothyroxine     Pneumococcal Vaccine Hives     Pt states has an allergy to any vaccines that have egg embryo in them. Review of Systems:   General ROS: per HPI  ENT ROS: negative for - headaches, hearing change, oral lesions, vertigo or visual changes  Hematological and Lymphatic ROS: negative for - bleeding problems, bruising, fatigue or night sweats  Endocrine ROS: negative for - polydipsia/polyuria or temperature intolerance  Respiratory ROS: no cough, shortness of breath, or wheezing  Cardiovascular ROS: no chest pain or dyspnea on exertion  Gastrointestinal ROS: no abdominal pain, no bloody or black stool - hx of colon CA -  She tends to have some constipation  Genito-Urinary ROS: incontinence         Physical Exam:  BP 94/64 (Site: Right Upper Arm, Position: Sitting, Cuff Size: Medium Adult)   Pulse 70   Temp 97.7 °F (36.5 °C) (Tympanic)   Ht 5' 5\" (1.651 m)   Wt 172 lb (78 kg)   LMP 01/01/1996   SpO2 96%   Breastfeeding No   BMI 28.62 kg/m²     Gen: Well, NAD, Alert, Oriented x 3   HEENT: EOMI, eyes clear, MMM  Skin: no rash or jaundice     Neck: no significant lymphadenopathy or thyromegaly  Lungs: CTA B w/out Rales/Wheezes/Rhonchi, Good respiratory effort   Heart: RRR, S1S2, w/out M/R/G, non-displaced PMI   Ext: No C/C/E Bilaterally.            Lab Results   Component Value Date    WBC 5.2 07/23/2021    HGB 13.7 07/23/2021    HCT 41.8 07/23/2021     07/23/2021    CHOL 252 (H) 07/23/2021    TRIG 102 07/23/2021    HDL 65 (H) 07/23/2021    ALT 7 07/23/2021    AST 17 07/23/2021     08/03/2021    K 4.5 08/03/2021     08/03/2021    CREATININE 0.85 08/03/2021    BUN 29 (H) 08/03/2021    CO2 25 08/03/2021    TSH 3.160 07/23/2021    INR 1.0 06/14/2017    LABA1C 5.5 04/09/2019         A&P   Diagnosis Orders   1. Hyperkalemia     2. Essential hypertension  amLODIPine (NORVASC) 5 MG tablet   3. Hypothyroidism, unspecified type          Periodic monitoring of potassium    Using apple cider vinegar     One amlodipine    No indapamide       Continue as below  Current Outpatient Medications   Medication Sig Dispense Refill    amLODIPine (NORVASC) 5 MG tablet Take 1 tablet by mouth daily 90 tablet 2    LUTEIN PO Take 40 mg by mouth daily      vitamin B-12 (CYANOCOBALAMIN) 1000 MCG tablet Take 1,000 mcg by mouth daily Takes 1500 mcg daily      Zinc Sulfate (ZINC 15 PO) Take 25 mg by mouth       vitamin D 25 MCG (1000 UT) CAPS Take by mouth 2 times daily      ARMOUR THYROID 30 MG tablet take 1 & 1/2 tablets (45mg) by mouth once daily 45 tablet 5    Pumpkin Seed-Soy Germ (AZO BLADDER CONTROL/GO-LESS PO) Take by mouth 2 times daily      Handicap Placard MISC by Does not apply route Exp 5 years 1 each 0    Multiple Vitamins-Minerals (THERAPEUTIC MULTIVITAMIN-MINERALS) tablet Take 1 tablet by mouth daily      b complex vitamins capsule Take 1 capsule by mouth daily      MAGNESIUM PO Take 500 mg by mouth       Glucosamine-Chondroitin (GLUCOSAMINE CHONDR COMPLEX PO) Take 2 tablets by mouth daily       aspirin 81 MG tablet Take 81 mg by mouth daily      L-Tryptophan 500 MG TABS Take 1,000 mg by mouth daily At hs       sotalol (BETAPACE) 80 MG tablet 1/2 tablet bid  3     No current facility-administered medications for this visit.          Ongoing f/u with cardiology     Allergic to flu shot, egg allergy  She will avoid the covid shot    Overall doing very well      Radha Patterson MD

## 2021-08-12 ENCOUNTER — CARE COORDINATION (OUTPATIENT)
Dept: CARE COORDINATION | Age: 81
End: 2021-08-12

## 2021-08-12 NOTE — CARE COORDINATION
Ambulatory Care Coordination Note  8/12/2021  CM Risk Score: 0  Charlson 10 Year Mortality Risk Score: 100%     ACC: Carmelo Koo, JESSICA    Summary Note:     Everton Mcmillan is very engaged with her providers and overall health. She did speak with [facility] regarding her high potassium serum results. She is interested to know TDR along with limitations. She would like to have information regarding potassium amounts as they relate to serving sizes   She has not received this information yet and I will send [facility] a message to ask for her to resend via mail or e-mail. She did have her follow up with Dr Yaneli Leavitt. Jovana's BP has been low and she is already off of the indapamide. She is taking 1/2 tablet of the amlodipine. She tells me that her BP is 101-129/56-71. She will have a \"funny sensation in the head\"  She denies any lightheadedness, or dizziness. She denies any issue with change in gait. She is very good about being vigilant with her overall health and will monitor her BP is she decides to stop taking the amlodipine. I have made her aware that once she goes above 018 mmHg Systolic she may need to resume the amlodipine but she should call the office or myself with these changes  We spoke at length about urinary urgency and incontinence,  She tells me that she has to urinate hourly and when she does urinate it is a large amount. She did see Dr Chanelle Nobles and they spoke medications for [facility] but she does not feel she should take these medications because of the side effects. I spoke with her about Kegel exercises and she has done this in the past.  I spoke about other issues related to the bladder dropping   I suggested that she follow up with Dr Francisco Paiz OB/GYN for options and she is agreeable to this option. PLAN:  Everton Mcmillan will monitor her BP closely as she tries to wean herself off of the amlodipine. Everton Mcmillan will call the office or myself if she has two consecutive readings with systolic BP is > 642 mmHg.   I will reach out to [employer] to ask her to resend the potassium information. I will check with Dr Paty Zazueta for a referral to Dr Arvind Phipps regarding [facility]    Care Coordination Interventions    Program Enrollment: Complex Care  Referral from Primary Care Provider: No  Suggested Interventions and Community Resources  Registered Dietician: In Process  Other Services or Interventions: Pharmacy referral declined. Dietician referral initiated. Walk in Clinic hours and usage discussed. Unable to obtain vaccines due to allergies          Goals Addressed                 This Visit's Progress     Conditions and Symptoms   On track     I will schedule office visits, as directed by my provider. I will keep my appointment or reschedule if I have to cancel. I will notify my provider of any barriers to my plan of care. I will notify my provider of any symptoms that indicate a worsening of my condition. Barriers: lack of education  Plan for overcoming my barriers: I will work with my ACM and health care team to increase my knowledge and self care management skills  Confidence: 8/10  Anticipated Goal Completion Date: 3/15/2022              Prior to Admission medications    Medication Sig Start Date End Date Taking?  Authorizing Provider   amLODIPine (NORVASC) 5 MG tablet Take 1 tablet by mouth daily 8/9/21   Yoly Kinney MD   LUTEIN PO Take 40 mg by mouth daily    Historical Provider, MD   vitamin B-12 (CYANOCOBALAMIN) 1000 MCG tablet Take 1,000 mcg by mouth daily Takes 1500 mcg daily    Historical Provider, MD   Zinc Sulfate (ZINC 15 PO) Take 25 mg by mouth     Historical Provider, MD   vitamin D 25 MCG (1000 UT) CAPS Take by mouth 2 times daily    Historical Provider, MD RUBIO THYROID 30 MG tablet take 1 & 1/2 tablets (45mg) by mouth once daily 5/7/21   Yoly Kinney MD   Pumpkin Seed-Soy Germ (AZO BLADDER CONTROL/GO-LESS PO) Take by mouth 2 times daily    Historical Provider, MD   Handicap Placard MISC by Does not apply route Exp 5 years 12/11/18   Mckinley Reyna MD   Multiple Vitamins-Minerals (THERAPEUTIC MULTIVITAMIN-MINERALS) tablet Take 1 tablet by mouth daily    Historical Provider, MD   b complex vitamins capsule Take 1 capsule by mouth daily    Historical Provider, MD   MAGNESIUM PO Take 500 mg by mouth     Historical Provider, MD   Glucosamine-Chondroitin (GLUCOSAMINE CHONDR COMPLEX PO) Take 2 tablets by mouth daily     Historical Provider, MD   aspirin 81 MG tablet Take 81 mg by mouth daily    Historical Provider, MD   L-Tryptophan 500 MG TABS Take 1,000 mg by mouth daily At      Historical Provider, MD   sotalol (BETAPACE) 80 MG tablet 1/2 tablet bid 10/12/16   Historical Provider, MD       Future Appointments   Date Time Provider Velvet Vallejo   11/9/2021  8:00 AM Mckinley Reyna MD [facility] [place]   11/23/2021 12:30 PM Jesus Orlando MD [facility]   1/25/2022  8:00 AM Mckinley Reyna MD [facility] [place]       .

## 2021-08-19 ENCOUNTER — CARE COORDINATION (OUTPATIENT)
Dept: CARE COORDINATION | Age: 81
End: 2021-08-19

## 2021-08-19 DIAGNOSIS — N39.41 URGENCY INCONTINENCE: Primary | ICD-10-CM

## 2021-08-19 NOTE — CARE COORDINATION
Ambulatory Care Coordination Note  8/19/2021  CM Risk Score: 0  Charlson 10 Year Mortality Risk Score: 100%     ACC: Bar Maravilla, JESSICA    Summary Note:     Efraín Hawk says that she is dong well overall. She did have a minor episode in which her BP was slightly elevated. She had not been taking the amlodipine for a about a week. In general her BP is ranging between 119-131/72-80 mmHg  She continues to watch her diet including sodium and potassium intake. She did receive the information regarding potassium from Bertha Mcdowell   She has not received any calls for follow up regarding urgency. I will resend for Dr Nick Peguero approval.    Idalia Lopez:  Efraín Hawk will continue to monitor her BP  She will call with any two consecutive BP readings >888 systolic. I will follow up with Dr Bibiana Magana regarding a referral to Dr Heena Swan regarding urgency. Care Coordination Interventions    Program Enrollment: Complex Care  Referral from Primary Care Provider: No  Suggested Interventions and Community Resources  Registered Dietician: In Process  Other Services or Interventions: Pharmacy referral declined. Dietician referral initiated. Walk in Clinic hours and usage discussed. Unable to obtain vaccines due to allergies          Goals Addressed    None         Prior to Admission medications    Medication Sig Start Date End Date Taking?  Authorizing Provider   amLODIPine (NORVASC) 5 MG tablet Take 1 tablet by mouth daily 8/9/21   John Jones MD   LUTEIN PO Take 40 mg by mouth daily    Historical Provider, MD   vitamin B-12 (CYANOCOBALAMIN) 1000 MCG tablet Take 1,000 mcg by mouth daily Takes 1500 mcg daily    Historical Provider, MD   Zinc Sulfate (ZINC 15 PO) Take 25 mg by mouth     Historical Provider, MD   vitamin D 25 MCG (1000 UT) CAPS Take by mouth 2 times daily    Historical Provider, MD RUBIO THYROID 30 MG tablet take 1 & 1/2 tablets (45mg) by mouth once daily 5/7/21   John Jones MD   Pumpkin Seed-Soy Germ (AZO BLADDER CONTROL/GO-LESS PO) Take by mouth 2 times daily    Historical Provider, MD   Handicap Placard MISC by Does not apply route Exp 5 years 12/11/18   Kellen Fisher MD   Multiple Vitamins-Minerals (THERAPEUTIC MULTIVITAMIN-MINERALS) tablet Take 1 tablet by mouth daily    Historical Provider, MD   b complex vitamins capsule Take 1 capsule by mouth daily    Historical Provider, MD   MAGNESIUM PO Take 500 mg by mouth     Historical Provider, MD   Glucosamine-Chondroitin (GLUCOSAMINE CHONDR COMPLEX PO) Take 2 tablets by mouth daily     Historical Provider, MD   aspirin 81 MG tablet Take 81 mg by mouth daily    Historical Provider, MD   L-Tryptophan 500 MG TABS Take 1,000 mg by mouth daily At      Historical Provider, MD   sotalol (BETAPACE) 80 MG tablet 1/2 tablet bid 10/12/16   Historical Provider, MD       Future Appointments   Date Time Provider Velvet Vallejo   11/9/2021  8:00 AM Kellen Fisher MD [facility]   11/23/2021 12:30 PM Bonner Merlin, MD [facility]   1/25/2022  8:00 AM Kellen Fisher MD [facility]       .

## 2021-08-20 ENCOUNTER — CARE COORDINATION (OUTPATIENT)
Dept: CARE COORDINATION | Age: 81
End: 2021-08-20

## 2021-08-26 ENCOUNTER — CARE COORDINATION (OUTPATIENT)
Dept: CARE COORDINATION | Age: 81
End: 2021-08-26

## 2021-08-26 NOTE — CARE COORDINATION
Sade Michel  8/26/2021    Registered Dietitian Progress Note for Care Coordination    Assessment: Aniya Jacinto is a [de-identified] y.o. female. RD referred for hyperkalemia. RD spoke with patient for initial nutrition assessment on 8/6. RD outreached 8/20- left VM this outreach. RD called to follow up with pt today 8/26. Pt states she received the educational handouts- found the information helpful and very appreciative. RD discussed previous goals with pt. Pt is following a low potassium diet. Pt is utilizing the handout regarding potassium content of food. Pt has no nutrition related questions at this time. Barriers to meeting goals: lack of education    Action:  Reiterated the importance of eating balanced meals consistently and watching potassium intake closely. See assessment note above. Nutrition Monitoring and Evaluation  Indicator/Goal Criteria Progress   #1 Eat balanced meals consistently throughout the day. #1 Focus on making meals balanced using the MyPlate [id number]-0/4 plate fruits and/or vegetables, 1/4 plate protein and 1/4 plate starchy carbohydrates with 8 oz glass of low fat milk if desired. #1 Pt is eating balanced meals consistently. #2  Monitor potassium intake daily. Limit potassium from food and drinks to no more than 2000 mg/day. #2 Utilize handout and choose foods lower in potassium. #2 Pt is watching her daily potassium intake closely. Plan of Care:  RD encouraged pt to keep working toward goals set. RD explained to pt this is final follow up call and provided contact information to pt. Encouraged pt to call RD in future with any nutrition related questions or concerns. Follow Up:    Final follow up call today 8/26/21. RD will continue to follow/assist with patient return call.         [address] [facility], [address] [facility]

## 2021-09-08 ENCOUNTER — CARE COORDINATION (OUTPATIENT)
Dept: CARE COORDINATION | Age: 81
End: 2021-09-08

## 2021-09-08 NOTE — CARE COORDINATION
Attempted to contact patient for care coordination follow up. Unable to reach patient by phone. Message left regarding purpose of call. Number provided and call back requested.

## 2021-09-09 ENCOUNTER — CARE COORDINATION (OUTPATIENT)
Dept: CARE COORDINATION | Age: 81
End: 2021-09-09

## 2021-09-09 NOTE — CARE COORDINATION
Ambulatory Care Coordination Note  9/9/2021  CM Risk Score: 0  Charlson 10 Year Mortality Risk Score: 100%     ACC: Mp Quiroga, JESSICA    Summary Note:     Dave Queen returned my call. She states that she did receive a call from Dr Seay's office   They will be scheduling for a consult regarding urinary urgency/incontinence. I will follow up with Carloz next week. Care Coordination Interventions    Program Enrollment: Complex Care  Referral from Primary Care Provider: No  Suggested Interventions and Community Resources  Registered Dietician: In Process  Other Services or Interventions: Pharmacy referral declined. Dietician referral initiated. Walk in Clinic hours and usage discussed. Unable to obtain vaccines due to allergies          Goals Addressed    None         Prior to Admission medications    Medication Sig Start Date End Date Taking?  Authorizing Provider   amLODIPine (NORVASC) 5 MG tablet Take 1 tablet by mouth daily 8/9/21   Ilda Hernandez MD   LUTEIN PO Take 40 mg by mouth daily    Historical Provider, MD   vitamin B-12 (CYANOCOBALAMIN) 1000 MCG tablet Take 1,000 mcg by mouth daily Takes 1500 mcg daily    Historical Provider, MD   Zinc Sulfate (ZINC 15 PO) Take 25 mg by mouth     Historical Provider, MD   vitamin D 25 MCG (1000 UT) CAPS Take by mouth 2 times daily    Historical ProviderMD RUBIO THYROID 30 MG tablet take 1 & 1/2 tablets (45mg) by mouth once daily 5/7/21   Ilda Hernandez MD   Pumpkin Seed-Soy Germ (AZO BLADDER CONTROL/GO-LESS PO) Take by mouth 2 times daily    Historical Provider, MD   Handicap Placard MISC by Does not apply route Exp 5 years 12/11/18   Ilda Hernandez MD   Multiple Vitamins-Minerals (THERAPEUTIC MULTIVITAMIN-MINERALS) tablet Take 1 tablet by mouth daily    Historical Provider, MD   b complex vitamins capsule Take 1 capsule by mouth daily    Historical Provider, MD   MAGNESIUM PO Take 500 mg by mouth     Historical Provider, MD   Glucosamine-Chondroitin (GLUCOSAMINE CHONDR COMPLEX PO) Take 2 tablets by mouth daily     Historical Provider, MD   aspirin 81 MG tablet Take 81 mg by mouth daily    Historical Provider, MD   L-Tryptophan 500 MG TABS Take 1,000 mg by mouth daily At      Historical Provider, MD   sotalol (BETAPACE) 80 MG tablet 1/2 tablet bid 10/12/16   Historical Provider, MD       Future Appointments   Date Time Provider Velvet Genevieve   9/29/2021  1:00 PM Tristen Field MD [address]   11/9/2021  8:00 AM Bo Kunz MD [facility] [place]   11/23/2021 12:30 PM Rosamaria Candelaria MD [facility] [place]   1/25/2022  8:00 AM Bo Kunz MD [facility] [place]

## 2021-09-13 ENCOUNTER — OFFICE VISIT (OUTPATIENT)
Dept: FAMILY MEDICINE CLINIC | Age: 81
End: 2021-09-13
Payer: MEDICARE

## 2021-09-13 VITALS — WEIGHT: 176 LBS | TEMPERATURE: 98.1 F | BODY MASS INDEX: 29.32 KG/M2 | HEIGHT: 65 IN

## 2021-09-13 DIAGNOSIS — L57.0 ACTINIC KERATOSES: ICD-10-CM

## 2021-09-13 DIAGNOSIS — R51.9 SCALP PAIN: Primary | ICD-10-CM

## 2021-09-13 DIAGNOSIS — L21.9 SEBORRHEIC DERMATITIS: ICD-10-CM

## 2021-09-13 PROCEDURE — G8427 DOCREV CUR MEDS BY ELIG CLIN: HCPCS | Performed by: FAMILY MEDICINE

## 2021-09-13 PROCEDURE — G8400 PT W/DXA NO RESULTS DOC: HCPCS | Performed by: FAMILY MEDICINE

## 2021-09-13 PROCEDURE — 4040F PNEUMOC VAC/ADMIN/RCVD: CPT | Performed by: FAMILY MEDICINE

## 2021-09-13 PROCEDURE — 1036F TOBACCO NON-USER: CPT | Performed by: FAMILY MEDICINE

## 2021-09-13 PROCEDURE — 1123F ACP DISCUSS/DSCN MKR DOCD: CPT | Performed by: FAMILY MEDICINE

## 2021-09-13 PROCEDURE — G8417 CALC BMI ABV UP PARAM F/U: HCPCS | Performed by: FAMILY MEDICINE

## 2021-09-13 PROCEDURE — 99214 OFFICE O/P EST MOD 30 MIN: CPT | Performed by: FAMILY MEDICINE

## 2021-09-13 PROCEDURE — 1090F PRES/ABSN URINE INCON ASSESS: CPT | Performed by: FAMILY MEDICINE

## 2021-09-13 ASSESSMENT — ENCOUNTER SYMPTOMS: COLOR CHANGE: 0

## 2021-09-13 NOTE — PROGRESS NOTES
loratadine (Claritin). Read and follow all instructions on the label. When should you call for help? Call your doctor now or seek immediate medical care if:    · You have signs of infection, such as:  ? Increased pain, swelling, warmth, or redness. ? Red streaks leading from the rash. ? Pus draining from the rash. ? A fever. Watch closely for changes in your health, and be sure to contact your doctor if:    · The rash gets worse or spreads to other parts of your body.     · You do not get better as expected. Where can you learn more? Go to [email or web address] [email or web address].Everyday.[email or web address] org and sign in to your mYwindow account. Enter K473 in the CIVICO box to learn more about \"Seborrheic Dermatitis: Care Instructions. \"     If you do not have an account, please click on the \"Sign Up Now\" link. Current as of: March 3, 2021               Content Version: 12.9  © 2006-2021 LiquiGlide. Care instructions adapted under license by [facility] [employer] [facility] ([facility]). If you have questions about a medical condition or this instruction, always ask your healthcare professional. [place] [address] any warranty or liability for your use of this information. Patient Education        Actinic Keratosis: Care Instructions  Your Care Instructions  Actinic keratosis is a skin growth caused by sun damage. It can turn into skin cancer, but this isn't common. Actinic keratoses, also called solar keratoses, are small red, brown, or skin-colored scaly patches. They are most common on the face, neck, hands, and forearms. Your doctor can remove these growths by freezing or scraping them off or by putting medicines on them. Follow-up care is a key part of your treatment and safety. Be sure to make and go to all appointments, and call your doctor if you are having problems. It's also a good idea to know your test results and keep a list of the medicines you take.   How can you care for yourself at home?  · If your doctor told you how to care for the treated area, follow your doctor's instructions. If you did not get instructions, follow this general advice:  ? Wash around the area with clean water 2 times a day. Don't use hydrogen peroxide or alcohol, which can slow healing. ? You may cover the area with a thin layer of petroleum jelly, such as Vaseline, and a nonstick bandage. ? Apply more petroleum jelly and replace the bandage as needed. To prevent actinic keratosis  · Always wear sunscreen on exposed skin. Make sure to use a broad-spectrum sunscreen that has a sun protection factor (SPF) of 30 or higher. Use it every day, even when it is cloudy. · Wear long sleeves, a hat, and pants if you are going to be outdoors for a long time. · Avoid the sun between 10 a.m. and 4 p.m., the peak time for UV rays. · Do not use tanning booths or sunlamps. When should you call for help? Watch closely for changes in your health, and be sure to contact your doctor if:    · You have symptoms of infection, such as:  ? Increased pain, swelling, warmth, or redness. ? Red streaks leading from the area. ? Pus draining from the area. ? A fever. Where can you learn more? Go to https://Blue [email or web address] [email or web address].[email or web address] org and sign in to your FriendFeed account. Enter L364 in the [facility] box to learn more about \"Actinic Keratosis: Care Instructions. \"     If you do not have an account, please click on the \"Sign Up Now\" link. Current as of: March 3, 2021               Content Version: 12.9  © 2423-0945 ZoomForth. Care instructions adapted under license by [facility] ([facility]). If you have questions about a medical condition or this instruction, always ask your healthcare professional. [address] any warranty or liability for your use of this information.              Subjective:      Patient ID: Cecilia Harrell is a [de-identified] y.o. female who presents for:  Chief Complaint   Patient presents with    Skin Exam    Skin Problem     scalp        Patient states she comes to the office because she is having scalp pain. Denies any new lesions. Just hurts in different spots at different times. She had been using [facility] for her scalp and it cleared up the problem but became overdrying so she stopped it. She has had no return of the relevant lesions at that time. She denies any irritant to the scalp. She denies any trauma to the scalp. She denies fever or chills. Current Outpatient Medications on File Prior to Visit   Medication Sig Dispense Refill    amLODIPine (NORVASC) 5 MG tablet Take 1 tablet by mouth daily 90 tablet 2    LUTEIN PO Take 40 mg by mouth daily      vitamin B-12 (CYANOCOBALAMIN) 1000 MCG tablet Take 1,000 mcg by mouth daily Takes 1500 mcg daily      Zinc Sulfate (ZINC 15 PO) Take 25 mg by mouth       vitamin D 25 MCG (1000 UT) CAPS Take by mouth 2 times daily      ARMOUR THYROID 30 MG tablet take 1 & 1/2 tablets (45mg) by mouth once daily 45 tablet 5    Pumpkin Seed-Soy Germ (AZO BLADDER CONTROL/GO-LESS PO) Take by mouth 2 times daily      Handicap Placard MISC by Does not apply route Exp 5 years 1 each 0    Multiple Vitamins-Minerals (THERAPEUTIC MULTIVITAMIN-MINERALS) tablet Take 1 tablet by mouth daily      b complex vitamins capsule Take 1 capsule by mouth daily      MAGNESIUM PO Take 500 mg by mouth       Glucosamine-Chondroitin (GLUCOSAMINE CHONDR COMPLEX PO) Take 2 tablets by mouth daily       aspirin 81 MG tablet Take 81 mg by mouth daily      L-Tryptophan 500 MG TABS Take 1,000 mg by mouth daily At hs       sotalol (BETAPACE) 80 MG tablet 1/2 tablet bid  3     No current facility-administered medications on file prior to visit.      Past Medical History:   Diagnosis Date    Atrial fibrillation (HCC)     no coumadin pt preference    Colon cancer [facility])      H/O COLON AND UTERINE [id number]    Constipation     Fatigue     History of blood transfusion     x2 1996    History of uterine cancer 4/5/2018    Hyperlipidemia     Hypertension     BENIGN ESSENTIAL    Hypothyroidism 1/23/2014    Obesity     Pacemaker     Sick sinus syndrome (HCC)     Sleep apnea     noncompliant with cpap    TIA (transient ischemic attack)     Uterine cancer [facility])      Past Surgical History:   Procedure Laterality Date    CARDIAC CATHETERIZATION      COLON SURGERY  2001    RESECTION    COLONOSCOPY  08-05-11,10/8/2013    RANDOM BIOPSIES W/COLITIS    HERNIA REPAIR      HYSTERECTOMY      PACEMAKER INSERTION      [place] COLON CA SCRN NOT  [address] [facility] N/A 7/11/2017    COLONOSCOPY performed by Edgar Rowland MD at [facility]      age 9   Genevieve Me TUMOR REMOVAL      fatty tumor lower back     Social History     Socioeconomic History    Marital status: Single     Spouse name: Not on file    Number of children: 0    Years of education: 12    Highest education level: Bachelor's degree (e.g., BA, AB, BS)   Occupational History    Occupation: [employer]   Tobacco Use    Smoking status: Never Smoker    Smokeless tobacco: Never Used   Vaping Use    Vaping Use: Never used   Substance and Sexual Activity    Alcohol use: No    Drug use: No    Sexual activity: Never   Other Topics Concern    Not on file   Social History Narrative    One story double wide mobile home    4 steps to get into the house. Working smoke detector and one CO2 detector     Independent with daily ADL's and general self care      Social Determinants of Health     Financial Resource Strain: Low Risk     Difficulty of Paying Living Expenses: Not hard at all   Food Insecurity: No Food Insecurity    Worried About Running Out of Food in the Last Year: Never true    Gabriela of Food in the Last Year: Never true   Transportation Needs: No Transportation Needs    Lack of Transportation (Medical): No    Lack of Transportation (Non-Medical):  No   Physical Activity:     Days of Exercise per Week:     Minutes of Exercise per Session:    Stress:     Feeling of Stress :    Social Connections:     Frequency of Communication with Friends and Family:     Frequency of Social Gatherings with Friends and Family:     Attends [employer] Services:     Active Member of Clubs or Organizations:     Attends Club or Organization Meetings:     Marital Status:    Intimate Partner Violence:     Fear of Current or Ex-Partner:     Emotionally Abused:     Physically Abused:     Sexually Abused:      Family History   Problem Relation Age of Onset    Heart Disease Maternal Grandmother     Emphysema Paternal Grandmother     Stroke Mother     High Blood Pressure Father      Allergies:  Hydralazine, Influenza vaccines, Cortisone, Coumadin [warfarin sodium], Levothyroxine, and Pneumococcal vaccine    Review of Systems   Constitutional: Negative for chills and fever. Skin: Negative for color change, rash and wound. Allergic/Immunologic: Negative for environmental allergies, food allergies and immunocompromised state. Hematological: Negative for adenopathy. Does not bruise/bleed easily. Psychiatric/Behavioral: Negative for behavioral problems and sleep disturbance. Objective:   Temp 98.1 °F (36.7 °C)   Ht 5' 5\" (1.651 m)   Wt 176 lb (79.8 kg)   LMP 01/01/1996   BMI 29.29 kg/m²     Physical Exam  Constitutional:       General: She is not in acute distress. Appearance: She is well-developed. She is not diaphoretic. HENT:      Head: Normocephalic and atraumatic. Right Ear: External ear normal.      Left Ear: External ear normal.      Nose: Nose normal.   Eyes:      General:         Right eye: No discharge. Left eye: No discharge. Conjunctiva/sclera: Conjunctivae normal.      Pupils: Pupils are equal, round, and reactive to light. Neck:      Thyroid: No thyromegaly. Trachea: No tracheal deviation.    Cardiovascular:      Rate and Rhythm: Normal rate and regular Misc  by Does not apply route Exp 5 years     L-Tryptophan 500 MG Tabs     LUTEIN PO     MAGNESIUM PO     sotalol 80 MG tablet  Commonly known as: BETAPACE     therapeutic multivitamin-minerals tablet     vitamin B-12 1000 MCG tablet  Commonly known as: CYANOCOBALAMIN     vitamin D 25 MCG (1000 UT) Caps     ZINC 15 PO              Plan:   No follow-ups on file. Patient Instructions       Patient Education        Seborrheic Dermatitis: Care Instructions  Your Care Instructions  Seborrheic dermatitis (say \"zwo-lle-JGQ-ick sfs-vod-IY-tus\") is a skin problem that causes a reddish rash with greasy, flaky, yellow skin patches. The rash may appear on many parts of the body. It may be on the scalp, face (especially the eyebrow area and between the nose and mouth), ears, breasts, underarms, and genital area. The flaky skin on the scalp is called dandruff. This rash is often a long-term (chronic) condition. It may last for years. But the symptoms may come and go. Symptoms can be treated with special creams, shampoos, or other skin care. The cause of seborrheic dermatitis is not fully understood. It may occur when skin glands make too much oil. It may get worse in cold weather or with stress. A type of skin fungus, or yeast, may also be linked with this condition. Follow-up care is a key part of your treatment and safety. Be sure to make and go to all appointments, and call your doctor if you are having problems. It's also a good idea to know your test results and keep a list of the medicines you take. How can you care for yourself at home? · If your doctor prescribes a steroid cream, dandruff shampoo, or antifungal cream or medicine, use it as directed. If your doctor prescribes other medicine, take it as directed. · Use a dandruff shampoo if seborrheic dermatitis affects your scalp. This includes Head & Shoulders, Sebulex, and Selsun Blue.  You may need to try a few kinds of shampoo to find the one that works best for you. · To help with itching:  ? Use hydrocortisone cream. Follow the directions on the label. ? Use cold, wet cloths. ? Take an over-the-counter antihistamine, such as diphenhydramine (Benadryl) or loratadine (Claritin). Read and follow all instructions on the label. When should you call for help? Call your doctor now or seek immediate medical care if:    · You have signs of infection, such as:  ? Increased pain, swelling, warmth, or redness. ? Red streaks leading from the rash. ? Pus draining from the rash. ? A fever. Watch closely for changes in your health, and be sure to contact your doctor if:    · The rash gets worse or spreads to other parts of your body.     · You do not get better as expected. Where can you learn more? Go to [email or web address]://[email or web address].[email or web address] org and sign in to your Televerde account. Enter F450 in the [facility] box to learn more about \"Seborrheic Dermatitis: Care Instructions. \"     If you do not have an account, please click on the \"Sign Up Now\" link. Current as of: March 3, 2021               Content Version: 12.9  © 2006-2021 GlobaTrek. Care instructions adapted under license by [facility] ([facility]). If you have questions about a medical condition or this instruction, always ask your healthcare professional. [place] [address] any warranty or liability for your use of this information. Patient Education        Actinic Keratosis: Care Instructions  Your Care Instructions  Actinic keratosis is a skin growth caused by sun damage. It can turn into skin cancer, but this isn't common. Actinic keratoses, also called solar keratoses, are small red, brown, or skin-colored scaly patches. They are most common on the face, neck, hands, and forearms. Your doctor can remove these growths by freezing or scraping them off or by putting medicines on them. Follow-up care is a key part of your treatment and safety.  Be sure to make and go to all appointments, and call your doctor if you are having problems. It's also a good idea to know your test results and keep a list of the medicines you take. How can you care for yourself at home? · If your doctor told you how to care for the treated area, follow your doctor's instructions. If you did not get instructions, follow this general advice:  ? Wash around the area with clean water 2 times a day. Don't use hydrogen peroxide or alcohol, which can slow healing. ? You may cover the area with a thin layer of petroleum jelly, such as Vaseline, and a nonstick bandage. ? Apply more petroleum jelly and replace the bandage as needed. To prevent actinic keratosis  · Always wear sunscreen on exposed skin. Make sure to use a broad-spectrum sunscreen that has a sun protection factor (SPF) of 30 or higher. Use it every day, even when it is cloudy. · Wear long sleeves, a hat, and pants if you are going to be outdoors for a long time. · Avoid the sun between 10 a.m. and 4 p.m., the peak time for UV rays. · Do not use tanning booths or sunlamps. When should you call for help? Watch closely for changes in your health, and be sure to contact your doctor if:    · You have symptoms of infection, such as:  ? Increased pain, swelling, warmth, or redness. ? Red streaks leading from the area. ? Pus draining from the area. ? A fever. Where can you learn more? Go to https://Anesco.Chauffeur [email or web address] org and sign in to your [employer] account. Enter L364 in the [facility] box to learn more about \"Actinic Keratosis: Care Instructions. \"     If you do not have an account, please click on the \"Sign Up Now\" link. Current as of: March 3, 2021               Content Version: 12.9  © 8348-2692 Healthwise, Incorporated. Care instructions adapted under license by [facility] ([facility]).  If you have questions about a medical condition or this instruction, always ask your healthcare professional. Patti Page disclaims any warranty or liability for your use of this information. This note was partially created with the assistance of dictation. This may lead to grammatical or spelling errors. Eran Dos Santos M.D.

## 2021-09-13 NOTE — PATIENT INSTRUCTIONS
Patient Education        Seborrheic Dermatitis: Care Instructions  Your Care Instructions  Seborrheic dermatitis (say \"hvp-sfv-WXD-ick btz-ufq-KK-tus\") is a skin problem that causes a reddish rash with greasy, flaky, yellow skin patches. The rash may appear on many parts of the body. It may be on the scalp, face (especially the eyebrow area and between the nose and mouth), ears, breasts, underarms, and genital area. The flaky skin on the scalp is called dandruff. This rash is often a long-term (chronic) condition. It may last for years. But the symptoms may come and go. Symptoms can be treated with special creams, shampoos, or other skin care. The cause of seborrheic dermatitis is not fully understood. It may occur when skin glands make too much oil. It may get worse in cold weather or with stress. A type of skin fungus, or yeast, may also be linked with this condition. Follow-up care is a key part of your treatment and safety. Be sure to make and go to all appointments, and call your doctor if you are having problems. It's also a good idea to know your test results and keep a list of the medicines you take. How can you care for yourself at home? · If your doctor prescribes a steroid cream, dandruff shampoo, or antifungal cream or medicine, use it as directed. If your doctor prescribes other medicine, take it as directed. · Use a dandruff shampoo if seborrheic dermatitis affects your scalp. This includes Head & Shoulders, Sebulex, and Selsun Blue. You may need to try a few kinds of shampoo to find the one that works best for you. · To help with itching:  ? Use hydrocortisone cream. Follow the directions on the label. ? Use cold, wet cloths. ? Take an over-the-counter antihistamine, such as diphenhydramine (Benadryl) or loratadine (Claritin). Read and follow all instructions on the label. When should you call for help?    Call your doctor now or seek immediate medical care if:    · You have signs of infection, such as:  ? Increased pain, swelling, warmth, or redness. ? Red streaks leading from the rash. ? Pus draining from the rash. ? A fever. Watch closely for changes in your health, and be sure to contact your doctor if:    · The rash gets worse or spreads to other parts of your body.     · You do not get better as expected. Where can you learn more? Go to [email or web address] [email or web address].[email or web address] org and sign in to your Keystone Mobile Partner account. Enter K252 in the Octapoly box to learn more about \"Seborrheic Dermatitis: Care Instructions. \"     If you do not have an account, please click on the \"Sign Up Now\" link. Current as of: March 3, 2021               Content Version: 12.9  © 2006-2021 Intelligent Portal Systems. Care instructions adapted under license by [facility] ([facility]). If you have questions about a medical condition or this instruction, always ask your healthcare professional. [place] [address] any warranty or liability for your use of this information. Patient Education        Actinic Keratosis: Care Instructions  Your Care Instructions  Actinic keratosis is a skin growth caused by sun damage. It can turn into skin cancer, but this isn't common. Actinic keratoses, also called solar keratoses, are small red, brown, or skin-colored scaly patches. They are most common on the face, neck, hands, and forearms. Your doctor can remove these growths by freezing or scraping them off or by putting medicines on them. Follow-up care is a key part of your treatment and safety. Be sure to make and go to all appointments, and call your doctor if you are having problems. It's also a good idea to know your test results and keep a list of the medicines you take. How can you care for yourself at home? · If your doctor told you how to care for the treated area, follow your doctor's instructions. If you did not get instructions, follow this general advice:  ?  Wash around the area with clean water 2 times a day. Don't use hydrogen peroxide or alcohol, which can slow healing. ? You may cover the area with a thin layer of petroleum jelly, such as Vaseline, and a nonstick bandage. ? Apply more petroleum jelly and replace the bandage as needed. To prevent actinic keratosis  · Always wear sunscreen on exposed skin. Make sure to use a broad-spectrum sunscreen that has a sun protection factor (SPF) of 30 or higher. Use it every day, even when it is cloudy. · Wear long sleeves, a hat, and pants if you are going to be outdoors for a long time. · Avoid the sun between 10 a.m. and 4 p.m., the peak time for UV rays. · Do not use tanning booths or sunlamps. When should you call for help? Watch closely for changes in your health, and be sure to contact your doctor if:    · You have symptoms of infection, such as:  ? Increased pain, swelling, warmth, or redness. ? Red streaks leading from the area. ? Pus draining from the area. ? A fever. Where can you learn more? Go to https://OPPRTUNITYpeAppfluent Technology.Scrybe. org and sign in to your Attune account. Enter L364 in the [facility] box to learn more about \"Actinic Keratosis: Care Instructions. \"     If you do not have an account, please click on the \"Sign Up Now\" link. Current as of: March 3, 2021               Content Version: 12.9  © 2006-2021 Healthwise, Incorporated. Care instructions adapted under license by [facility] ([facility]). If you have questions about a medical condition or this instruction, always ask your healthcare professional. [place] [address] any warranty or liability for your use of this information.

## 2021-09-15 ENCOUNTER — CARE COORDINATION (OUTPATIENT)
Dept: CARE COORDINATION | Age: 81
End: 2021-09-15

## 2021-09-15 NOTE — CARE COORDINATION
Ambulatory Care Coordination Note  9/15/2021  CM Risk Score: 0  Charlson 10 Year Mortality Risk Score: 100%     ACC: Dayana Suarez RN    Summary Note:     Shahbaz Casarez tells me that she is doing well. She has followed up with Dr Mikel Street regarding skin issues. She adds that she will be seeing neurology for her scalp pain. She adds that she has been scheduled to see Dr Marek Nicholson at the end of the month. She continues to check her BP on a regular basis   She is taking 1/2 of the amlodipine in the morning and 1/2 tablet in the evening to keep her BP controled. She is eating a healthy diet and has made changes regarding her potassium     PLAN:  Shahbaz Casarez will complete all scheduled appointments and procedures. Shahbaz Casarez will continue to monitor her BP and make adjustments if able with her amlodipine. She will call me with any two consecutive BP readings > 267 systolic. Care Coordination Interventions    Program Enrollment: Complex Care  Referral from Primary Care Provider: No  Suggested Interventions and Community Resources  Registered Dietician: In Process  Other Services or Interventions: Pharmacy referral declined. Dietician referral initiated. Walk in Clinic hours and usage discussed. Unable to obtain vaccines due to allergies          Goals Addressed    None         Prior to Admission medications    Medication Sig Start Date End Date Taking?  Authorizing Provider   amLODIPine (NORVASC) 5 MG tablet Take 1 tablet by mouth daily 8/9/21   Rainelle Riedel, MD   LUTEIN PO Take 40 mg by mouth daily    Historical Provider, MD   vitamin B-12 (CYANOCOBALAMIN) 1000 MCG tablet Take 1,000 mcg by mouth daily Takes 1500 mcg daily    Historical Provider, MD   Zinc Sulfate (ZINC 15 PO) Take 25 mg by mouth     Historical Provider, MD   vitamin D 25 MCG (1000 UT) CAPS Take by mouth 2 times daily    Historical ProviderMD RUBIO THYROID 30 MG tablet take 1 & 1/2 tablets (45mg) by mouth once daily 5/7/21   Rainelle Riedel, MD Pumpkin Seed-Soy Germ (AZO BLADDER CONTROL/GO-LESS PO) Take by mouth 2 times daily    Historical Provider, MD   Handicap Placard MISC by Does not apply route Exp 5 years 12/11/18   Javier Romero MD   Multiple Vitamins-Minerals (THERAPEUTIC MULTIVITAMIN-MINERALS) tablet Take 1 tablet by mouth daily    Historical Provider, MD   b complex vitamins capsule Take 1 capsule by mouth daily    Historical Provider, MD   MAGNESIUM PO Take 500 mg by mouth     Historical Provider, MD   Glucosamine-Chondroitin (GLUCOSAMINE CHONDR COMPLEX PO) Take 2 tablets by mouth daily     Historical Provider, MD   aspirin 81 MG tablet Take 81 mg by mouth daily    Historical Provider, MD   L-Tryptophan 500 MG TABS Take 1,000 mg by mouth daily At      Historical Provider, MD   sotalol (BETAPACE) 80 MG tablet 1/2 tablet bid 10/12/16   Historical Provider, MD       Future Appointments   Date Time Provider Velvet Vallejo   9/29/2021  1:00 PM Chandana Childs MD [facility] [address]   10/14/2021  8:00 AM [address]   11/9/2021  8:00 AM Javier Romero MD [facility] [place]   12/14/2021 12:15 PM Eric Dangelo MD [facility] [place]   1/25/2022  8:00 AM Javier Romero MD [facility] [place]

## 2021-10-05 ENCOUNTER — OFFICE VISIT (OUTPATIENT)
Dept: OBGYN CLINIC | Age: 81
End: 2021-10-05
Payer: MEDICARE

## 2021-10-05 VITALS
WEIGHT: 176 LBS | DIASTOLIC BLOOD PRESSURE: 74 MMHG | HEIGHT: 65 IN | SYSTOLIC BLOOD PRESSURE: 138 MMHG | HEART RATE: 76 BPM | BODY MASS INDEX: 29.32 KG/M2

## 2021-10-05 DIAGNOSIS — N39.41 URGE URINARY INCONTINENCE: Primary | ICD-10-CM

## 2021-10-05 PROCEDURE — 1036F TOBACCO NON-USER: CPT | Performed by: OBSTETRICS & GYNECOLOGY

## 2021-10-05 PROCEDURE — G8400 PT W/DXA NO RESULTS DOC: HCPCS | Performed by: OBSTETRICS & GYNECOLOGY

## 2021-10-05 PROCEDURE — 99203 OFFICE O/P NEW LOW 30 MIN: CPT | Performed by: OBSTETRICS & GYNECOLOGY

## 2021-10-05 PROCEDURE — G8417 CALC BMI ABV UP PARAM F/U: HCPCS | Performed by: OBSTETRICS & GYNECOLOGY

## 2021-10-05 PROCEDURE — 1123F ACP DISCUSS/DSCN MKR DOCD: CPT | Performed by: OBSTETRICS & GYNECOLOGY

## 2021-10-05 PROCEDURE — 0509F URINE INCON PLAN DOCD: CPT | Performed by: OBSTETRICS & GYNECOLOGY

## 2021-10-05 PROCEDURE — G8483 FLU IMM NO ADMIN DOC REA: HCPCS | Performed by: OBSTETRICS & GYNECOLOGY

## 2021-10-05 PROCEDURE — 1090F PRES/ABSN URINE INCON ASSESS: CPT | Performed by: OBSTETRICS & GYNECOLOGY

## 2021-10-05 PROCEDURE — G8427 DOCREV CUR MEDS BY ELIG CLIN: HCPCS | Performed by: OBSTETRICS & GYNECOLOGY

## 2021-10-05 PROCEDURE — 4040F PNEUMOC VAC/ADMIN/RCVD: CPT | Performed by: OBSTETRICS & GYNECOLOGY

## 2021-10-05 NOTE — Clinical Note
Scheduled for trial for sacral neuromodulation for refractory urge incontinence. Thanks for referring .

## 2021-10-05 NOTE — PROGRESS NOTES
Tuan Gordon is a [de-identified] y.o. female who presents here today for complaints of Incontinence (Referred by Dr. Bunny River.)    Severe urge symptoms , with frequency every 1 hr during the day with leakage, nocturia every 1 hr . Wears pads continously. Denies leakage with laughing , or sneezing , mentions can leak only occasionally. Patient has tried several medications. Including mirabegron. Patient was not able to tolerate side effects especially visual side effects due to macular degeneration, as well as cognitive function alteration associated with the anti cholinergics she has trialed in the past .   .       Vitals:  /74 (Site: Right Upper Arm, Position: Sitting, Cuff Size: Medium Adult)   Pulse 76   Ht 5' 5\" (1.651 m)   Wt 176 lb (79.8 kg)   LMP 1996   BMI 29.29 kg/m²   Allergies:  Hydralazine, Influenza vaccines, Cortisone, Coumadin [warfarin sodium], Levothyroxine, and Pneumococcal vaccine  Past Medical History:   Diagnosis Date    Atrial fibrillation (HCC)     no coumadin pt preference    Colon cancer ([address]      H/O COLON AND UTERINE [id number]    Constipation     Fatigue     High potassium     History of blood transfusion     x2     History of uterine cancer 2018    Hyperlipidemia     Hypertension     BENIGN ESSENTIAL    Hypothyroidism 2014    Obesity     Pacemaker     Sick sinus syndrome ([address]     Sleep apnea     noncompliant with cpap    TIA (transient ischemic attack)     Uterine cancer [facility])      Past Surgical History:   Procedure Laterality Date    CARDIAC CATHETERIZATION      COLON SURGERY      RESECTION    COLONOSCOPY  11,10/8/2013    RANDOM BIOPSIES W/COLITIS    HERNIA REPAIR      HYSTERECTOMY      PACEMAKER INSERTION      DC COLON CA SCRN NOT  [address] IND N/A 2017    COLONOSCOPY performed by Stephen Arriaga MD at [facility]      age 9   Tito Epperson TUMOR REMOVAL      fatty tumor lower back     OB History        0 Para   0    Term   0       0    AB   0    Living   0       SAB   0    TAB   0    Ectopic   0    Molar   0    Multiple   0    Live Births   0              Family History   Problem Relation Age of Onset    Heart Disease Maternal Grandmother     Emphysema Paternal Grandmother     Stroke Mother     High Blood Pressure Father     Breast Cancer Neg Hx     Cancer Neg Hx     Colon Cancer Neg Hx     Diabetes Neg Hx     Eclampsia Neg Hx     Hypertension Neg Hx     Ovarian Cancer Neg Hx      Labor Neg Hx     Spont Abortions Neg Hx      Social History     Socioeconomic History    Marital status: Single     Spouse name: Not on file    Number of children: 0    Years of education: 12    Highest education level: Bachelor's degree (e.g., BA, AB, BS)   Occupational History    Occupation: [employer]   Tobacco Use    Smoking status: Never Smoker    Smokeless tobacco: Never Used   Vaping Use    Vaping Use: Never used   Substance and Sexual Activity    Alcohol use: No    Drug use: No    Sexual activity: Never   Other Topics Concern    Not on file   Social History Narrative    One story double wide mobile home    4 steps to get into the house. Working smoke detector and one CO2 detector     Independent with daily ADL's and general self care      Social Determinants of Health     Financial Resource Strain: Low Risk     Difficulty of Paying Living Expenses: Not hard at all   Food Insecurity: No Food Insecurity    Worried About Running Out of Food in the Last Year: Never true    Gabriela of Food in the Last Year: Never true   Transportation Needs: No Transportation Needs    Lack of Transportation (Medical): No    Lack of Transportation (Non-Medical):  No   Physical Activity:     Days of Exercise per Week:     Minutes of Exercise per Session:    Stress:     Feeling of Stress :    Social Connections:     Frequency of Communication with Friends and Family:     Frequency of Social Gatherings with Friends and Family:     Attends [facility] Services:     Active Member of Clubs or Organizations:     Attends Club or Organization Meetings:     Marital Status:    Intimate Partner Violence:     Fear of Current or Ex-Partner:     Emotionally Abused:     Physically Abused:     Sexually Abused:        Contraceptive method:  none    Patient's medications, allergies, past medical, surgical, social and family histories were reviewed and updated as appropriate. Review of Systems  As per chief complaint   All other systems reviewed and are negative. Physical Exam:  Vitals:  /74 (Site: Right Upper Arm, Position: Sitting, Cuff Size: Medium Adult)   Pulse 76   Ht 5' 5\" (1.651 m)   Wt 176 lb (79.8 kg)   LMP 01/01/1996   BMI 29.29 kg/m²   Lungs: CTAB   Heart : Regular S1/S2, no M/R/G  Abdomen: Soft , NT, ND , + BS   Pelvic exam : deferred    Assessment:      Diagnosis Orders   1. Urge urinary incontinence         Plan:     Failed several medical treatments   Cannot tolerate side effects due to macular degeneration , as well as cognitive changes related to age   Severe urge symptoms. Discussed Botox bladder injections vs Interstim sacral neuromodulation     Opted for interstim     Sacral neuromodulation and PNE counseling    What I s It? A Percutaneous Nerve Evaluation (PNE) is a test done in the office to determine if InterStim therapy  will improve your urinary symptoms. InterStim therapy is an FDA-approved treatment for urinary  urgency, frequency, urge incontinence, and retention. It is a small device that is implanted under the  skin of the upper buttocks. It works by gently stimulating the sacral nerves to help the bladder function  more normally. Percutaneous Nerve Evaluation: For the PNE, a temporary wire will be placed along side the third sacral nerve in your lower back. This  nerve controls bladder function.  The wire is the size of a thick hair and will carry gentle electrical  pulses to the nerve. The wire will be inserted by myself Dr Luis Crump using a local anesthetic. I  will know that the lead is in the correct position when you feel a tapping, pulsing, vibrating or tingling  sensation in the vaginal or rectal area. Once the wire is in the correct position, it will exit the skin and  be connected to a portable stimulator box that can be clipped to the waistband of your clothing. You  will be able to turn the stimulation up, down, on, and off with the portable stimulator. For the next week, you will monitor changes in your symptoms. You will then follow-up in the office  to discuss changes in your urinary symptoms and to have the temporary wire removed. If your  symptoms have improved by at least 50 percent, you will be given the option to proceed with  InterStim placement. This procedure will be scheduled at the next available date convenient for you. InterStim Placement:  A permanent wire (lead) will be inserted and attached to a small neurostimulator battery that will be  implanted under the skin in the upper buttock. This will be done in the operating room as an  outpatient procedure with a local anesthetic and I.V. sedation. After the InterStim is implanted, you  will be given a patient  to adjust the stimulation from outside of the body. InterStim  batteries last from three to six years. You may need to be seen in the office occasionally to have your  InterStim reprogrammed based on changes in your urinary symptoms. Patient was seen with total face to face time of 30 minutes. More than 50% of this visit was counseling and education regarding The encounter diagnosis was Urge urinary incontinence. and Incontinence (Referred by Dr. Stacey Millan.)   as well as  counseling on preventative health maintenance follow-up. No orders of the defined types were placed in this encounter. No orders of the defined types were placed in this encounter.       Follow Up:  No follow-ups on

## 2021-10-14 ENCOUNTER — HOSPITAL ENCOUNTER (OUTPATIENT)
Dept: CARDIOLOGY | Age: 81
Discharge: HOME | End: 2021-10-14
Payer: MEDICARE

## 2021-10-14 PROCEDURE — 93296 REM INTERROG EVL PM/IDS: CPT

## 2021-10-15 ENCOUNTER — CARE COORDINATION (OUTPATIENT)
Dept: CARE COORDINATION | Age: 81
End: 2021-10-15

## 2021-10-15 NOTE — CARE COORDINATION
Ambulatory Care Coordination Note  10/15/2021  CM Risk Score: 0  Charlson 10 Year Mortality Risk Score: 100%     ACC: Krystal Price, JESSICA    Summary Note:     Kev Childers says that she is doing well. She has been busy with follow up appointments. She states that she has been discussing her incontinence issues with Dr Yamileth Hi. She is discussing options to come up with a plan. She adds that she has broken down her daily potassium intake into units of 200 mg each. She is limiting her intake to 2000 mg per day as recommended by Nithya Jack RD. She feels that she is managing this well. She did see a neurologist per recommendations of Dr Ruiz Khan but this individual did not find anything related to her scalp issues. She did tell her that she has carpal tunnel and a pinched nerve in her neck. She is taking all medication as prescribed    PLAN:  Kev Childers continues to manage her overall health very well and is very detailed with the knowledge of her health. I will follow up with her for one more month, if she remains stable I feel she will be ready for graduation from care coordination. Care Coordination Interventions    Program Enrollment: Complex Care  Referral from Primary Care Provider: No  Suggested Interventions and Community Resources  Registered Dietician: In Process  Other Services or Interventions: Pharmacy referral declined. Dietician referral initiated. Walk in Clinic hours and usage discussed. Unable to obtain vaccines due to allergies          Goals Addressed    None         Prior to Admission medications    Medication Sig Start Date End Date Taking?  Authorizing Provider   amLODIPine (NORVASC) 5 MG tablet Take 1 tablet by mouth daily 8/9/21   Dimitri Neal MD   LUTEIN PO Take 40 mg by mouth daily    Historical Provider, MD   vitamin B-12 (CYANOCOBALAMIN) 1000 MCG tablet Take 1,000 mcg by mouth daily Takes 1500 mcg daily    Historical Provider, MD   Zinc Sulfate (ZINC 15 PO) Take 25 mg by mouth Historical Provider, MD   vitamin D 25 MCG (1000 UT) CAPS Take by mouth 2 times daily    Historical Provider, MD RUBIO THYROID 30 MG tablet take 1 & 1/2 tablets (45mg) by mouth once daily 5/7/21   Brunilda Ruiz MD   Pumpkin Seed-Soy Germ (AZO BLADDER CONTROL/GO-LESS PO) Take by mouth 2 times daily    Historical Provider, MD   Handicap Placard MISC by Does not apply route Exp 5 years 12/11/18   Brunilda Ruiz MD   Multiple Vitamins-Minerals (THERAPEUTIC MULTIVITAMIN-MINERALS) tablet Take 1 tablet by mouth daily    Historical Provider, MD   b complex vitamins capsule Take 1 capsule by mouth daily    Historical Provider, MD   MAGNESIUM PO Take 500 mg by mouth     Historical Provider, MD   Glucosamine-Chondroitin (GLUCOSAMINE CHONDR COMPLEX PO) Take 2 tablets by mouth daily     Historical Provider, MD   aspirin 81 MG tablet Take 81 mg by mouth daily    Historical Provider, MD   L-Tryptophan 500 MG TABS Take 1,000 mg by mouth daily At hs     Historical Provider, MD   sotalol (BETAPACE) 80 MG tablet 1/2 tablet bid 10/12/16   Historical Provider, MD       Future Appointments   Date Time Provider Velvet Vallejo   10/25/2021 11:15 AM Ar Garcia MD [facility] [address]   11/9/2021  8:00 AM Brunilda Ruiz MD [facility] [place]

## 2021-10-22 ENCOUNTER — NURSE ONLY (OUTPATIENT)
Dept: FAMILY MEDICINE CLINIC | Age: 81
End: 2021-10-22
Payer: MEDICARE

## 2021-10-22 ENCOUNTER — VIRTUAL VISIT (OUTPATIENT)
Dept: FAMILY MEDICINE CLINIC | Age: 81
End: 2021-10-22
Payer: MEDICARE

## 2021-10-22 DIAGNOSIS — Z20.822 ENCOUNTER FOR LABORATORY TESTING FOR COVID-19 VIRUS: Primary | ICD-10-CM

## 2021-10-22 DIAGNOSIS — R53.83 FATIGUE, UNSPECIFIED TYPE: ICD-10-CM

## 2021-10-22 DIAGNOSIS — Z20.822 EXPOSURE TO CONFIRMED CASE OF COVID-19: Primary | ICD-10-CM

## 2021-10-22 DIAGNOSIS — R05.9 COUGH: ICD-10-CM

## 2021-10-22 LAB
INFLUENZA A ANTIBODY: NORMAL
INFLUENZA B ANTIBODY: NORMAL
Lab: NORMAL
PERFORMING INSTRUMENT: NORMAL
QC PASS/FAIL: NORMAL
SARS-COV-2, POC: NORMAL

## 2021-10-22 PROCEDURE — 99000 SPECIMEN HANDLING OFFICE-LAB: CPT | Performed by: NURSE PRACTITIONER

## 2021-10-22 PROCEDURE — 87804 INFLUENZA ASSAY W/OPTIC: CPT | Performed by: NURSE PRACTITIONER

## 2021-10-22 PROCEDURE — 1090F PRES/ABSN URINE INCON ASSESS: CPT | Performed by: NURSE PRACTITIONER

## 2021-10-22 PROCEDURE — G8427 DOCREV CUR MEDS BY ELIG CLIN: HCPCS | Performed by: NURSE PRACTITIONER

## 2021-10-22 PROCEDURE — 1123F ACP DISCUSS/DSCN MKR DOCD: CPT | Performed by: NURSE PRACTITIONER

## 2021-10-22 PROCEDURE — 99213 OFFICE O/P EST LOW 20 MIN: CPT | Performed by: NURSE PRACTITIONER

## 2021-10-22 PROCEDURE — 4040F PNEUMOC VAC/ADMIN/RCVD: CPT | Performed by: NURSE PRACTITIONER

## 2021-10-22 PROCEDURE — 87426 SARSCOV CORONAVIRUS AG IA: CPT | Performed by: NURSE PRACTITIONER

## 2021-10-22 PROCEDURE — G8400 PT W/DXA NO RESULTS DOC: HCPCS | Performed by: NURSE PRACTITIONER

## 2021-10-22 ASSESSMENT — ENCOUNTER SYMPTOMS
SHORTNESS OF BREATH: 0
DIARRHEA: 0
RHINORRHEA: 1
WHEEZING: 0
NAUSEA: 0
COUGH: 1
ABDOMINAL PAIN: 0
CHEST TIGHTNESS: 1
SORE THROAT: 1
SINUS PRESSURE: 0

## 2021-10-22 NOTE — PROGRESS NOTES
TELEHEALTH EVALUATION -- Audio/Visual (During HOOAO-89 public health emergency)    -   Neeru Vergara is a [de-identified] y.o. female being evaluated by a Virtual Visit (video visit) encounter to address concerns as mentioned above. A caregiver was present when appropriate. Due to this being a TeleHealth encounter (During PBYNQ-18 public health emergency), evaluation of the following organ systems was limited: Vitals/Constitutional/EENT/Resp/CV/GI//MS/Neuro/Skin/Heme-Lymph-Imm. Pursuant to the emergency declaration under the [address] [place] [address], [address] [place] [address] authority and the [employer] Act, this Virtual Visit was conducted with patient's (and/or legal guardian's) consent, to reduce the patient's risk of exposure to COVID-19 and provide necessary medical care. The patient (and/or legal guardian) has also been advised to contact this office for worsening conditions or problems, and seek emergency medical treatment and/or call 911 if deemed necessary. Patient was contacted and agreed to proceed with a virtual visit via Telephone Visit  The risks and benefits of converting to a virtual visit were discussed in light of the current infectious disease epidemic. Patient also understood that insurance coverage and co-pays are up to their individual insurance plans. Patient was located at their home. Provider was located at their office. 10/22/2021  Neeru Vergara (:  1940) has requested an audio/video evaluation for the following concern(s):    HPI   VV audio for COVID-19 testing   Not vaccinated COVID-19 d/t prior reaction with Flu vaccine  Was exposed to COVID-19 in last 2 weeks. Neighbor, outside setting   Symptoms started last week  Friday seemed to worsen   Sinus pressure, nasal congestion, cough and sneezing   Cough is dry   Minimal nasal drainage.  Clear   Fatigued   Feels \"shaky\" in the mornings   Lessened appetite  Denies change to taste or smell  Hydrating well  Tmax 98.9F  Using mucinex and cough medication dymetapp Q12   Cough drops soothing  Sleeping upright to sleep. Able to sleep after taking               Review of Systems   Constitutional: Positive for activity change, appetite change, chills and fatigue. Negative for diaphoresis and fever. HENT: Positive for congestion, rhinorrhea and sore throat. Negative for ear pain and sinus pressure. Respiratory: Positive for cough and chest tightness (congestion ). Negative for shortness of breath and wheezing. Cardiovascular: Negative for chest pain and palpitations. Gastrointestinal: Negative for abdominal pain, diarrhea and nausea. Musculoskeletal: Negative for myalgias. Skin: Negative for rash. Neurological: Negative for dizziness, weakness, light-headedness and headaches. Psychiatric/Behavioral: Positive for sleep disturbance. Prior to Visit Medications    Medication Sig Taking?  Authorizing Provider   amLODIPine (NORVASC) 5 MG tablet Take 1 tablet by mouth daily Yes Evelia Cardoso MD   LUTEIN PO Take 40 mg by mouth daily Yes Historical Provider, MD   vitamin B-12 (CYANOCOBALAMIN) 1000 MCG tablet Take 1,000 mcg by mouth daily Takes 1500 mcg daily Yes Historical Provider, MD   Zinc Sulfate (ZINC 15 PO) Take 25 mg by mouth  Yes Historical Provider, MD   vitamin D 25 MCG (1000 UT) CAPS Take by mouth 2 times daily Yes Historical Provider, MD RUBIO THYROID 30 MG tablet take 1 & 1/2 tablets (45mg) by mouth once daily Yes Evelia Cardoso MD   Pumpkin Seed-Soy Germ (AZO BLADDER CONTROL/GO-LESS PO) Take by mouth 2 times daily Yes Historical Provider, MD   Handicap Placard MISC by Does not apply route Exp 5 years Yes Evelia Cardoso MD   Multiple Vitamins-Minerals (THERAPEUTIC MULTIVITAMIN-MINERALS) tablet Take 1 tablet by mouth daily Yes Historical Provider, MD   b complex vitamins capsule Take 1 capsule by mouth daily Yes Historical Provider, MD   MAGNESIUM PO Take  Alcohol use: No    Drug use: No    Sexual activity: Never   Other Topics Concern    Not on file   Social History Narrative    One story double wide mobile home    4 steps to get into the house. Working smoke detector and one CO2 detector     Independent with daily ADL's and general self care      Social Determinants of Health     Financial Resource Strain: Low Risk     Difficulty of Paying Living Expenses: Not hard at all   Food Insecurity: No Food Insecurity    Worried About Running Out of Food in the Last Year: Never true    Gabriela of Food in the Last Year: Never true   Transportation Needs: No Transportation Needs    Lack of Transportation (Medical): No    Lack of Transportation (Non-Medical): No   Physical Activity:     Days of Exercise per Week:     Minutes of Exercise per Session:    Stress:     Feeling of Stress :    Social Connections:     Frequency of Communication with Friends and Family:     Frequency of Social Gatherings with Friends and Family:     Attends [employer]ist Services:     Active Member of Clubs or Organizations:     Attends Club or Organization Meetings:     Marital Status:    Intimate Partner Violence:     Fear of Current or Ex-Partner:     Emotionally Abused:     Physically Abused:     Sexually Abused:      Family History   Problem Relation Age of Onset    Heart Disease Maternal Grandmother     Emphysema Paternal Grandmother     Stroke Mother     High Blood Pressure Father     Breast Cancer Neg Hx     Cancer Neg Hx     Colon Cancer Neg Hx     Diabetes Neg Hx     Eclampsia Neg Hx     Hypertension Neg Hx     Ovarian Cancer Neg Hx      Labor Neg Hx     Spont Abortions Neg Hx      Allergies   Allergen Reactions    Hydralazine Other (See Comments)     Ran fever and chills. Face got real hot.     Influenza Vaccines Hives    Cortisone Other (See Comments)     Affects eyes- blurry vision    Coumadin [Warfarin Sodium]      Capillary swelling/inflammation    Levothyroxine     Pneumococcal Vaccine Hives     Pt states has an allergy to any vaccines that have egg embryo in them. PMH, Surgical Hx, Family Hx, and Social Hx reviewed and updated. Health Maintenance reviewed. PHYSICAL EXAMINATION: N/A. VV Audio    Oriented and conversant   No audible distress   No cough throughout visit                Other pertinent observable physical exam findings-   Results for orders placed or performed in visit on 10/22/21   POCT Influenza A/B   Result Value Ref Range    Influenza A Ab NEG     Influenza B Ab NEG    POCT COVID-19, Antigen   Result Value Ref Range    SARS-COV-2, POC Not-Detected Not Detected    Lot Number [id number]     QC Pass/Fail P     Performing Instrument BD Veritor        ASSESSMENT/PLAN:  Assessment & Plan   Leda Hurtado was seen today for cough. Diagnoses and all orders for this visit:    Exposure to confirmed case of COVID-19    Cough  -     Covid-19 Ambulatory; Future  -     POCT Influenza A/B  -     POCT COVID-19, Antigen    Fatigue, unspecified type      Orders Placed This Encounter   Procedures    Covid-19 Ambulatory     Standing Status:   Future     Number of Occurrences:   1     Standing Expiration Date:   10/22/2022     Scheduling Instructions:      Saline media preferred given current shortage of viral transport media but both acceptable     Order Specific Question:   Is this test for diagnosis or screening? Answer:   Diagnosis of ill patient     Order Specific Question:   Symptomatic for COVID-19 as defined by CDC? Answer:   Yes     Order Specific Question:   Date of Symptom Onset     Answer:   10/15/2021     Order Specific Question:   Hospitalized for COVID-19? Answer:   No     Order Specific Question:   Admitted to ICU for COVID-19? Answer:   No     Order Specific Question:   Employed in healthcare setting? Answer:   No     Order Specific Question:   Resident in a congregate (group) care setting? Answer:   No     Order Specific Question:   Pregnant? Answer:   No     Order Specific Question:   Previously tested for COVID-19? Answer:   No    POCT Influenza A/B    POCT COVID-19, Antigen     Order Specific Question:   Is this test for diagnosis or screening? Answer:   Diagnosis of ill patient     Order Specific Question:   Symptomatic for COVID-19 as defined by CDC? Answer:   Yes     Order Specific Question:   Date of Symptom Onset     Answer:   10/15/2021     Order Specific Question:   Hospitalized for COVID-19? Answer:   No     Order Specific Question:   Admitted to ICU for COVID-19? Answer:   No     Order Specific Question:   Employed in healthcare setting? Answer:   No     Order Specific Question:   Resident in a congregate (group) care setting? Answer:   No     Order Specific Question:   Pregnant? Answer:   No     Order Specific Question:   Previously tested for COVID-19? Answer:   No     No orders of the defined types were placed in this encounter. There are no discontinued medications. If you experience any of the red flag s/s, seek care at the ER        Reviewed with the patient: current clinical status. Discussed with patient COVID-19 s/s. She is aware both rapid antigen COVID-19 and Influenza both resulted negative today at the office. Pt aware to remain home until she hears from us about the PCR result. Pt instructed on red flag s/s to go to the ER for or to call 911. Pt verbalized understanding. Will update pt with result when it is available. When to call for help  Call 911 anytime you think you may need emergency care. For example, call if:  · You have severe trouble breathing. · You have severe dehydration.           Patient identification was verified at the start of the visit: Yes  Total time spent on this encounter: 20 minutes  >50% of 20 minutes was spent spent on counseling, answering questions, instructions on meds & testing &

## 2021-10-23 LAB
SARS-COV-2: NOT DETECTED
SOURCE: NORMAL

## 2021-11-05 LAB
ALBUMIN SERPL-MCNC: 4.1 G/DL (ref 3.5–4.6)
ALP BLD-CCNC: 77 U/L (ref 40–130)
ALT SERPL-CCNC: 6 U/L (ref 0–33)
ANION GAP SERPL CALCULATED.3IONS-SCNC: 9 MEQ/L (ref 9–15)
AST SERPL-CCNC: 20 U/L (ref 0–35)
BILIRUB SERPL-MCNC: 0.4 MG/DL (ref 0.2–0.7)
BILIRUBIN DIRECT: <0.2 MG/DL (ref 0–0.4)
BILIRUBIN, INDIRECT: NORMAL MG/DL (ref 0–0.6)
BUN BLDV-MCNC: 27 MG/DL (ref 8–23)
C-REACTIVE PROTEIN, HIGH SENSITIVITY: 2.7 MG/L (ref 0–5)
CALCIUM SERPL-MCNC: 10.1 MG/DL (ref 8.5–9.9)
CHLORIDE BLD-SCNC: 104 MEQ/L (ref 95–107)
CHOLESTEROL, TOTAL: 225 MG/DL (ref 0–199)
CO2: 29 MEQ/L (ref 20–31)
CREAT SERPL-MCNC: 0.89 MG/DL (ref 0.5–0.9)
GFR AFRICAN AMERICAN: >60
GFR NON-AFRICAN AMERICAN: >60
GLUCOSE BLD-MCNC: 85 MG/DL (ref 70–99)
HBA1C MFR BLD: 5.6 % (ref 4.8–5.9)
HCT VFR BLD CALC: 41 % (ref 37–47)
HDLC SERPL-MCNC: 70 MG/DL (ref 40–59)
HEMOGLOBIN: 13.5 G/DL (ref 12–16)
LDL CHOLESTEROL CALCULATED: 141 MG/DL (ref 0–129)
MAGNESIUM: 2.6 MG/DL (ref 1.7–2.4)
MCH RBC QN AUTO: 29.2 PG (ref 27–31.3)
MCHC RBC AUTO-ENTMCNC: 32.8 % (ref 33–37)
MCV RBC AUTO: 88.9 FL (ref 82–100)
PDW BLD-RTO: 14.5 % (ref 11.5–14.5)
PLATELET # BLD: 210 K/UL (ref 130–400)
POTASSIUM SERPL-SCNC: 5.6 MEQ/L (ref 3.4–4.9)
RBC # BLD: 4.61 M/UL (ref 4.2–5.4)
SODIUM BLD-SCNC: 142 MEQ/L (ref 135–144)
TOTAL PROTEIN: 6.9 G/DL (ref 6.3–8)
TRIGL SERPL-MCNC: 69 MG/DL (ref 0–150)
TSH SERPL DL<=0.05 MIU/L-ACNC: 1.74 UIU/ML (ref 0.44–3.86)
WBC # BLD: 4.8 K/UL (ref 4.8–10.8)

## 2021-11-07 DIAGNOSIS — E03.9 HYPOTHYROIDISM, UNSPECIFIED TYPE: ICD-10-CM

## 2021-11-07 NOTE — TELEPHONE ENCOUNTER
Future Appointments    Encounter Information    Provider Department Appt Notes   11/9/2021 Santos Fontenot MD [facility] [place] [facility] three month follow up       Recent Visits    10/22/2021 Exposure to confirmed case of COVID-19   [facility] Halina Robert, APRN - NP   09/13/2021 Scalp pain   [facility] Allie Lua MD   08/09/2021 Hyperkalemia   [facility] [place] [facility] Santos Fontenot MD     Last fill 05/07/21

## 2021-11-08 ENCOUNTER — TELEPHONE (OUTPATIENT)
Dept: GASTROENTEROLOGY | Age: 81
End: 2021-11-08

## 2021-11-08 DIAGNOSIS — E87.5 HYPERKALEMIA: ICD-10-CM

## 2021-11-08 LAB
ANION GAP SERPL CALCULATED.3IONS-SCNC: 16 MEQ/L (ref 9–15)
BUN BLDV-MCNC: 25 MG/DL (ref 8–23)
CALCIUM SERPL-MCNC: 10.1 MG/DL (ref 8.5–9.9)
CHLORIDE BLD-SCNC: 103 MEQ/L (ref 95–107)
CO2: 22 MEQ/L (ref 20–31)
CREAT SERPL-MCNC: 0.89 MG/DL (ref 0.5–0.9)
GFR AFRICAN AMERICAN: >60
GFR NON-AFRICAN AMERICAN: >60
GLUCOSE BLD-MCNC: 75 MG/DL (ref 70–99)
POTASSIUM SERPL-SCNC: 5.3 MEQ/L (ref 3.4–4.9)
SODIUM BLD-SCNC: 141 MEQ/L (ref 135–144)

## 2021-11-08 NOTE — TELEPHONE ENCOUNTER
The patient wants to know if she is due in July of 2022 for a colon, she is not having any issues should she schedule a follow visit.  Please advise

## 2021-11-09 ENCOUNTER — OFFICE VISIT (OUTPATIENT)
Dept: FAMILY MEDICINE CLINIC | Age: 81
End: 2021-11-09
Payer: MEDICARE

## 2021-11-09 VITALS
SYSTOLIC BLOOD PRESSURE: 114 MMHG | DIASTOLIC BLOOD PRESSURE: 64 MMHG | HEART RATE: 85 BPM | HEIGHT: 65 IN | TEMPERATURE: 98.8 F | WEIGHT: 174 LBS | OXYGEN SATURATION: 96 % | BODY MASS INDEX: 28.99 KG/M2

## 2021-11-09 DIAGNOSIS — E03.9 HYPOTHYROIDISM, UNSPECIFIED TYPE: Primary | ICD-10-CM

## 2021-11-09 DIAGNOSIS — I10 ESSENTIAL HYPERTENSION: ICD-10-CM

## 2021-11-09 DIAGNOSIS — I48.91 ATRIAL FIBRILLATION, UNSPECIFIED TYPE (HCC): ICD-10-CM

## 2021-11-09 DIAGNOSIS — E87.5 HYPERKALEMIA: ICD-10-CM

## 2021-11-09 DIAGNOSIS — Z29.8 NEED FOR MALARIA PROPHYLAXIS: ICD-10-CM

## 2021-11-09 PROCEDURE — 99213 OFFICE O/P EST LOW 20 MIN: CPT | Performed by: FAMILY MEDICINE

## 2021-11-09 PROCEDURE — G8427 DOCREV CUR MEDS BY ELIG CLIN: HCPCS | Performed by: FAMILY MEDICINE

## 2021-11-09 PROCEDURE — G8400 PT W/DXA NO RESULTS DOC: HCPCS | Performed by: FAMILY MEDICINE

## 2021-11-09 PROCEDURE — G8417 CALC BMI ABV UP PARAM F/U: HCPCS | Performed by: FAMILY MEDICINE

## 2021-11-09 PROCEDURE — 4040F PNEUMOC VAC/ADMIN/RCVD: CPT | Performed by: FAMILY MEDICINE

## 2021-11-09 PROCEDURE — 1123F ACP DISCUSS/DSCN MKR DOCD: CPT | Performed by: FAMILY MEDICINE

## 2021-11-09 PROCEDURE — 1090F PRES/ABSN URINE INCON ASSESS: CPT | Performed by: FAMILY MEDICINE

## 2021-11-09 PROCEDURE — G8483 FLU IMM NO ADMIN DOC REA: HCPCS | Performed by: FAMILY MEDICINE

## 2021-11-09 PROCEDURE — 1036F TOBACCO NON-USER: CPT | Performed by: FAMILY MEDICINE

## 2021-11-09 RX ORDER — HYDROXYCHLOROQUINE SULFATE 200 MG/1
TABLET, FILM COATED ORAL
Qty: 12 TABLET | Refills: 0 | Status: SHIPPED | OUTPATIENT
Start: 2021-11-09 | End: 2022-01-12 | Stop reason: ALTCHOICE

## 2021-11-09 RX ORDER — THYROID 30 MG/1
TABLET ORAL
Qty: 45 TABLET | Refills: 5 | Status: SHIPPED | OUTPATIENT
Start: 2021-11-09 | End: 2021-12-06 | Stop reason: SDUPTHER

## 2021-11-09 NOTE — PROGRESS NOTES
Hyperlipidemia     Hypertension     BENIGN ESSENTIAL    Hypothyroidism 2014    Obesity     Pacemaker     Sick sinus syndrome (HCC)     Sleep apnea     noncompliant with cpap    TIA (transient ischemic attack)     Uterine cancer [facility])      Past Surgical History:   Procedure Laterality Date    CARDIAC CATHETERIZATION      COLON SURGERY  2001    RESECTION    COLONOSCOPY  11,10/8/2013    RANDOM BIOPSIES W/COLITIS    HERNIA REPAIR      HYSTERECTOMY      PACEMAKER INSERTION      CO COLON CA SCRN NOT  [address] IND N/A 2017    COLONOSCOPY performed by Kemar Tay MD at [facility]      age 9   [facility] TUMOR REMOVAL      fatty tumor lower back     Family History   Problem Relation Age of Onset    Heart Disease Maternal Grandmother     Emphysema Paternal Grandmother     Stroke Mother     High Blood Pressure Father     Breast Cancer Neg Hx     Cancer Neg Hx     Colon Cancer Neg Hx     Diabetes Neg Hx     Eclampsia Neg Hx     Hypertension Neg Hx     Ovarian Cancer Neg Hx      Labor Neg Hx     Spont Abortions Neg Hx      Social History     Socioeconomic History    Marital status: Single     Spouse name: None    Number of children: 0    Years of education: 12    Highest education level: Bachelor's degree (e.g., BA, AB, BS)   Occupational History    Occupation: [employer]   Tobacco Use    Smoking status: Never Smoker    Smokeless tobacco: Never Used   Vaping Use    Vaping Use: Never used   Substance and Sexual Activity    Alcohol use: No    Drug use: No    Sexual activity: Never   Other Topics Concern    None   Social History Narrative    One story double wide mobile home    4 steps to get into the house.     Working smoke detector and one CO2 detector     Independent with daily ADL's and general self care      Social Determinants of Health     Financial Resource Strain: Low Risk     Difficulty of Paying Living Expenses: Not hard at all   Food Insecurity: No Food Insecurity    Worried About Running Out of Food in the Last Year: Never true    Ran Out of Food in the Last Year: Never true   Transportation Needs: No Transportation Needs    Lack of Transportation (Medical): No    Lack of Transportation (Non-Medical):  No   Physical Activity:     Days of Exercise per Week: Not on file    Minutes of Exercise per Session: Not on file   Stress:     Feeling of Stress : Not on file   Social Connections:     Frequency of Communication with Friends and Family: Not on file    Frequency of Social Gatherings with Friends and Family: Not on file    Attends Mosque Services: Not on file    Active Member of [address] [place] [address] [employer] or Organizations: Not on file    Attends Club or Organization Meetings: Not on file    Marital Status: Not on file   Intimate Partner Violence:     Fear of Current or Ex-Partner: Not on file    Emotionally Abused: Not on file    Physically Abused: Not on file    Sexually Abused: Not on file   Housing Stability: Unknown    Unable to Pay for Housing in the Last Year: No    Number of [place] in the Last Year: Not on file    Unstable Housing in the Last Year: No     Current Outpatient Medications   Medication Sig Dispense Refill    amLODIPine (NORVASC) 5 MG tablet Take 1 tablet by mouth daily 90 tablet 2    LUTEIN PO Take 40 mg by mouth daily      vitamin B-12 (CYANOCOBALAMIN) 1000 MCG tablet Take 1,000 mcg by mouth daily Takes 1500 mcg daily      Zinc Sulfate (ZINC 15 PO) Take 25 mg by mouth       vitamin D 25 MCG (1000 UT) CAPS Take by mouth 2 times daily      ARMOUR THYROID 30 MG tablet take 1 & 1/2 tablets (45mg) by mouth once daily 45 tablet 5    Pumpkin Seed-Soy Germ (AZO BLADDER CONTROL/GO-LESS PO) Take by mouth 2 times daily      Handicap Placard MISC by Does not apply route Exp 5 years 1 each 0    Multiple Vitamins-Minerals (THERAPEUTIC MULTIVITAMIN-MINERALS) tablet Take 1 tablet by mouth daily      b complex vitamins non-displaced PMI   Ext: No C/C/E Bilaterally. Lab Results   Component Value Date    WBC 4.8 11/05/2021    HGB 13.5 11/05/2021    HCT 41.0 11/05/2021     11/05/2021    CHOL 225 (H) 11/05/2021    TRIG 69 11/05/2021    HDL 70 (H) 11/05/2021    ALT 6 11/05/2021    AST 20 11/05/2021     11/08/2021    K 5.3 (H) 11/08/2021     11/08/2021    CREATININE 0.89 11/08/2021    BUN 25 (H) 11/08/2021    CO2 22 11/08/2021    TSH 1.740 11/05/2021    INR 1.0 06/14/2017    LABA1C 5.6 11/05/2021         A&P   Diagnosis Orders   1. Hypothyroidism, unspecified type     2. Hyperkalemia     3. Essential hypertension     4. Atrial fibrillation, unspecified type ([address]     5.  Need for malaria prophylaxis  hydroxychloroquine (PLAQUENIL) 200 MG tablet        Periodic monitoring of potassium    Using apple cider vinegar     One amlodipine        Continue as below  Current Outpatient Medications   Medication Sig Dispense Refill    amLODIPine (NORVASC) 5 MG tablet Take 1 tablet by mouth daily 90 tablet 2    LUTEIN PO Take 40 mg by mouth daily      vitamin B-12 (CYANOCOBALAMIN) 1000 MCG tablet Take 1,000 mcg by mouth daily Takes 1500 mcg daily      Zinc Sulfate (ZINC 15 PO) Take 25 mg by mouth       vitamin D 25 MCG (1000 UT) CAPS Take by mouth 2 times daily      ARMOUR THYROID 30 MG tablet take 1 & 1/2 tablets (45mg) by mouth once daily 45 tablet 5    Pumpkin Seed-Soy Germ (AZO BLADDER CONTROL/GO-LESS PO) Take by mouth 2 times daily      Handicap Placard MISC by Does not apply route Exp 5 years 1 each 0    Multiple Vitamins-Minerals (THERAPEUTIC MULTIVITAMIN-MINERALS) tablet Take 1 tablet by mouth daily      b complex vitamins capsule Take 1 capsule by mouth daily      MAGNESIUM PO Take 500 mg by mouth       Glucosamine-Chondroitin (GLUCOSAMINE CHONDR COMPLEX PO) Take 2 tablets by mouth daily       aspirin 81 MG tablet Take 81 mg by mouth daily      L-Tryptophan 500 MG TABS Take 1,000 mg by mouth daily At hs       sotalol (BETAPACE) 80 MG tablet 1/2 tablet bid  3    hydroxychloroquine (PLAQUENIL) 200 MG tablet 400mg PO BID for one day, then 200mg BID for 4 days 12 tablet 0     No current facility-administered medications for this visit.          Ongoing f/u with cardiology     Allergic to flu shot, egg allergy  She will avoid the covid shot    Overall doing very well    Patient has asked for plaquenil given unable to take covid vaccine      Nayeli Kerr MD

## 2021-11-15 ENCOUNTER — CARE COORDINATION (OUTPATIENT)
Dept: CARE COORDINATION | Age: 81
End: 2021-11-15

## 2021-11-15 NOTE — LETTER
11/15/2021       Cris Martin  [address] [address]     Congratulations on the progress you have made improving and taking charge of your health! Your recent follow up with Yulissa Mullins MD finds you doing well and are no longer in need of Care Coordination services. I know you will continue to use the knowledge and tools you have gained to continue down a healthy path. As you have demonstrated that you are able to successfully manage your health and wellness, I will no longer contact you on a regular basis. Again, congratulations and please know if there are any changes or you have a need for my services in the future, you may always contact me for questions or concerns.       In good health,       Shannon Brantley RN,BSN    Genesis Jim RN

## 2021-11-15 NOTE — CARE COORDINATION
Ambulatory Care Coordination Note  11/15/2021  CM Risk Score: 0  Charlson 10 Year Mortality Risk Score: 100%     ACC: Luis Catherine, RN    Summary Note:     Denver Sanchez tells me that she is doing well overall  She did have a scare with COVID exposure and did complete testing which was negative. She continues to have intermittent issues with hyperkalemia  She is very vigilant about eating limited amounts of potassium daily  She continues to have frequency with urination  She is waiting to see Dr Katina Orona to discuss treatment plans regarding urgency along with pacemaker interference  Once she has this visit completed she will follow up with Dr Chito Steven:  Denver Sanchez will continue to monitor symptoms for any changes or worsening  Denver Sanchez has met all goals for care coordination and is ready for graduation  Denver Sanchez will call me with any questions or concerns. Care Coordination Interventions    Program Enrollment: Complex Care  Referral from Primary Care Provider: No  Suggested Interventions and Community Resources  Registered Dietician: In Process  Other Services or Interventions: Pharmacy referral declined. Dietician referral initiated. Walk in Clinic hours and usage discussed. Unable to obtain vaccines due to allergies          Goals Addressed    None         Prior to Admission medications    Medication Sig Start Date End Date Taking?  Authorizing Provider   RAMIRO THYROID 30 MG tablet take 1 & 1/2 tablets (45mg) by mouth once daily 11/9/21   Geneva Lombardo MD   hydroxychloroquine (PLAQUENIL) 200 MG tablet 400mg PO BID for one day, then 200mg BID for 4 days 11/9/21   Geneva Lombardo MD   amLODIPine (NORVASC) 5 MG tablet Take 1 tablet by mouth daily 8/9/21   Geneva Lombardo MD   LUTEIN PO Take 40 mg by mouth daily    Historical Provider, MD   vitamin B-12 (CYANOCOBALAMIN) 1000 MCG tablet Take 1,000 mcg by mouth daily Takes 1500 mcg daily    Historical Provider, MD   Zinc Sulfate (ZINC 15 PO) Take 25 mg by mouth     Historical Provider, MD   vitamin D 25 MCG (1000 UT) CAPS Take by mouth 2 times daily    Historical Provider, MD   Pumpkin Seed-Soy Germ (AZO BLADDER CONTROL/GO-LESS PO) Take by mouth 2 times daily    Historical Provider, MD   Handicap Placard MISC by Does not apply route Exp 5 years 12/11/18   Milena Lyon MD   Multiple Vitamins-Minerals (THERAPEUTIC MULTIVITAMIN-MINERALS) tablet Take 1 tablet by mouth daily    Historical Provider, MD   b complex vitamins capsule Take 1 capsule by mouth daily    Historical Provider, MD   MAGNESIUM PO Take 500 mg by mouth     Historical Provider, MD   Glucosamine-Chondroitin (GLUCOSAMINE CHONDR COMPLEX PO) Take 2 tablets by mouth daily     Historical Provider, MD   aspirin 81 MG tablet Take 81 mg by mouth daily    Historical Provider, MD   L-Tryptophan 500 MG TABS Take 1,000 mg by mouth daily At hs     Historical Provider, MD   sotalol (BETAPACE) 80 MG tablet 1/2 tablet bid 10/12/16   Historical Provider, MD       Future Appointments   Date Time Provider Velvet Vallejo   2/8/2022  8:15 AM Milena Lyon MD [facility] [place]

## 2021-12-05 ENCOUNTER — PATIENT MESSAGE (OUTPATIENT)
Dept: FAMILY MEDICINE CLINIC | Age: 81
End: 2021-12-05

## 2021-12-05 DIAGNOSIS — E03.9 HYPOTHYROIDISM, UNSPECIFIED TYPE: ICD-10-CM

## 2021-12-06 RX ORDER — LEVOTHYROXINE AND LIOTHYRONINE 19; 4.5 UG/1; UG/1
30 TABLET ORAL DAILY
Qty: 135 TABLET | Refills: 1 | Status: SHIPPED | OUTPATIENT
Start: 2021-12-06 | End: 2022-06-06

## 2021-12-06 NOTE — TELEPHONE ENCOUNTER
From: Jermaine Lundberg  To: Dr. Sales Perfect: 12/5/2021 3:39 PM EST  Subject: Prescription refill    I am about due for a refill of my [place] Thyroid. Usually you have given me a refill for 90 days. This last time it was for only 30 days. [place] Thyroid is my most expensive medication ($122.87 for 90 days), but getting it for just 30 days each time would make the total $140. 87. Could I please have the quantity of 135 tablets (30 mg) for 90 days? Same dosage 1 and 1/2 tablets once daily. Much appreciated.

## 2021-12-14 ENCOUNTER — OFFICE VISIT (OUTPATIENT)
Dept: OBGYN CLINIC | Age: 81
End: 2021-12-14
Payer: MEDICARE

## 2021-12-14 VITALS
WEIGHT: 174 LBS | DIASTOLIC BLOOD PRESSURE: 82 MMHG | HEIGHT: 65 IN | HEART RATE: 92 BPM | BODY MASS INDEX: 28.99 KG/M2 | SYSTOLIC BLOOD PRESSURE: 128 MMHG

## 2021-12-14 DIAGNOSIS — N39.41 URGE URINARY INCONTINENCE: Primary | ICD-10-CM

## 2021-12-14 PROCEDURE — 1090F PRES/ABSN URINE INCON ASSESS: CPT | Performed by: OBSTETRICS & GYNECOLOGY

## 2021-12-14 PROCEDURE — G8417 CALC BMI ABV UP PARAM F/U: HCPCS | Performed by: OBSTETRICS & GYNECOLOGY

## 2021-12-14 PROCEDURE — G8483 FLU IMM NO ADMIN DOC REA: HCPCS | Performed by: OBSTETRICS & GYNECOLOGY

## 2021-12-14 PROCEDURE — 99214 OFFICE O/P EST MOD 30 MIN: CPT | Performed by: OBSTETRICS & GYNECOLOGY

## 2021-12-14 PROCEDURE — 1123F ACP DISCUSS/DSCN MKR DOCD: CPT | Performed by: OBSTETRICS & GYNECOLOGY

## 2021-12-14 PROCEDURE — G8427 DOCREV CUR MEDS BY ELIG CLIN: HCPCS | Performed by: OBSTETRICS & GYNECOLOGY

## 2021-12-14 PROCEDURE — G8400 PT W/DXA NO RESULTS DOC: HCPCS | Performed by: OBSTETRICS & GYNECOLOGY

## 2021-12-14 PROCEDURE — 0509F URINE INCON PLAN DOCD: CPT | Performed by: OBSTETRICS & GYNECOLOGY

## 2021-12-14 PROCEDURE — 1036F TOBACCO NON-USER: CPT | Performed by: OBSTETRICS & GYNECOLOGY

## 2021-12-14 PROCEDURE — 4040F PNEUMOC VAC/ADMIN/RCVD: CPT | Performed by: OBSTETRICS & GYNECOLOGY

## 2021-12-14 NOTE — PROGRESS NOTES
Brandi Reilly is a [de-identified] y.o. female who presents here today for complaints of Follow-up  Urge urinary incontinence. Upon her last visit due to previous several medication failures as well as medical risks associated with Myrbetriq due to patient's cardiac history, had discussed sacral neuromodulation with patient is one of her treatment options. Patient has discussed the use of sacral nerve neuromodulation with her cardiologist which have advised her for other treatment options . Patient here to discuss her treatment options.   .      Vitals:  /82 (Site: Left Upper Arm, Position: Sitting, Cuff Size: Medium Adult)   Pulse 92   Ht 5' 5\" (1.651 m)   Wt 174 lb (78.9 kg)   LMP 1996   BMI 28.96 kg/m²   Allergies:  Hydralazine, Influenza vaccines, Cortisone, Coumadin [warfarin sodium], Levothyroxine, and Pneumococcal vaccine  Past Medical History:   Diagnosis Date    Atrial fibrillation (HCC)     no coumadin pt preference    Colon cancer ([address]      H/O COLON AND UTERINE 6947-1434    Constipation     Fatigue     High potassium     History of blood transfusion     x2     History of uterine cancer 2018    Hyperlipidemia     Hypertension     BENIGN ESSENTIAL    Hypothyroidism 2014    Obesity     Pacemaker     Sick sinus syndrome ([address]     Sleep apnea     noncompliant with cpap    TIA (transient ischemic attack)     Uterine cancer [facility])      Past Surgical History:   Procedure Laterality Date    CARDIAC CATHETERIZATION      COLON SURGERY      RESECTION    COLONOSCOPY  11,10/8/2013    RANDOM BIOPSIES W/COLITIS    HERNIA REPAIR      HYSTERECTOMY      PACEMAKER INSERTION      LA COLON CA SCRN NOT  [address] IND N/A 2017    COLONOSCOPY performed by Melburn Buerger, MD at [facility]      age 9   [facility] TUMOR REMOVAL      fatty tumor lower back     OB History        0    Para   0    Term   0       0    AB   0    Living   0       SAB 0    IAB   0    Ectopic   0    Molar   0    Multiple   0    Live Births   0              Family History   Problem Relation Age of Onset    Heart Disease Maternal Grandmother     Emphysema Paternal Grandmother     Stroke Mother     High Blood Pressure Father     Breast Cancer Neg Hx     Cancer Neg Hx     Colon Cancer Neg Hx     Diabetes Neg Hx     Eclampsia Neg Hx     Hypertension Neg Hx     Ovarian Cancer Neg Hx      Labor Neg Hx     Spont Abortions Neg Hx      Social History     Socioeconomic History    Marital status: Single     Spouse name: Not on file    Number of children: 0    Years of education: 12    Highest education level: Bachelor's degree (e.g., BA, AB, BS)   Occupational History    Occupation: [employer]   Tobacco Use    Smoking status: Never Smoker    Smokeless tobacco: Never Used   Vaping Use    Vaping Use: Never used   Substance and Sexual Activity    Alcohol use: No    Drug use: No    Sexual activity: Never   Other Topics Concern    Not on file   Social History Narrative    One story double wide mobile home    4 steps to get into the house. Working smoke detector and one CO2 detector     Independent with daily ADL's and general self care      Social Determinants of Health     Financial Resource Strain: Low Risk     Difficulty of Paying Living Expenses: Not hard at all   Food Insecurity: No Food Insecurity    Worried About Running Out of Food in the Last Year: Never true    Gabriela of Food in the Last Year: Never true   Transportation Needs: No Transportation Needs    Lack of Transportation (Medical): No    Lack of Transportation (Non-Medical):  No   Physical Activity:     Days of Exercise per Week: Not on file    Minutes of Exercise per Session: Not on file   Stress:     Feeling of Stress : Not on file   Social Connections:     Frequency of Communication with Friends and Family: Not on file    Frequency of Social Gatherings with Friends and Family: Not on file    Attends Congregation Services: Not on file    Active Member of Clubs or Organizations: Not on file    Attends Club or Organization Meetings: Not on file    Marital Status: Not on file   Intimate Partner Violence:     Fear of Current or Ex-Partner: Not on file    Emotionally Abused: Not on file    Physically Abused: Not on file    Sexually Abused: Not on file   Housing Stability: Unknown    Unable to Pay for Housing in the Last Year: No    Number of [place] in the Last Year: Not on file    Unstable Housing in the Last Year: No       Contraceptive method:  none    Patient's medications, allergies, past medical, surgical, social and family histories were reviewed and updated as appropriate. Review of Systems  As per chief complaint   All other systems reviewed and are negative. Urinary incontinence  Physical Exam:  Vitals:  /82 (Site: Left Upper Arm, Position: Sitting, Cuff Size: Medium Adult)   Pulse 92   Ht 5' 5\" (1.651 m)   Wt 174 lb (78.9 kg)   LMP 01/01/1996   BMI 28.96 kg/m²   Lungs: CTAB   Heart : Regular S1/S2, no M/R/G  Abdomen: Soft , NT, ND , + BS   Pelvic exam : stage 1-2 cystocele. Assessment:      Diagnosis Orders   1. Urge urinary incontinence         Plan:     Trial of mirabegron 50 mg for the next 3 weeks  I did explain to patient that level of prolapse demonstrated on exam does not explain her urge incontinence symptoms, I did offer her bladder prolapse repair but could not guarantee resolution of her urinary incontinence symptoms with that repair. I did explain to patient that according to separate discussion I had with the [employer] representative regarding using the InterStim device for sacral neuromodulation in cases of refractory urge incontinence, and the [employer] rep had advised that this should not be any contraindication despite the patient using a defibrillator, which does not coincide with what her cardiologist advised her.   I did advise the patient to follow advice of her cardiologist, however that being said her remaining options are limited and would include Botox bladder injections regularly every 6 to 8-month depending on patient's response to treatment. I did inform patient that I would incorporate her biotics treatment with the bladder repair surgery if we decide to go that route. The patient would like to continue to try medical treatment before making her decision and sample of Myrbetriq 50 mg p.o. daily was given for the next 3 weeks patient follow-up report on treatment effectiveness. Patient was seen with total face to face time of 30 minutes. More than 50% of this visit was counseling and education regarding The encounter diagnosis was Urge urinary incontinence. and Follow-up   as well as  counseling on preventative health maintenance follow-up. No orders of the defined types were placed in this encounter. Orders Placed This Encounter   Medications    mirabegron (MYRBETRIQ) 50 MG TB24     Sig: Lot: [id number] Exp: 7/2023     Dispense:  21 tablet     Refill:  0       Follow Up:  Return in about 4 weeks (around 1/11/2022) for medication assessment.         Chavez Landeros MD

## 2021-12-22 DIAGNOSIS — E87.5 HYPERKALEMIA: ICD-10-CM

## 2021-12-22 LAB
ANION GAP SERPL CALCULATED.3IONS-SCNC: 8 MEQ/L (ref 9–15)
BUN BLDV-MCNC: 23 MG/DL (ref 8–23)
CALCIUM SERPL-MCNC: 9.8 MG/DL (ref 8.5–9.9)
CHLORIDE BLD-SCNC: 103 MEQ/L (ref 95–107)
CO2: 30 MEQ/L (ref 20–31)
CREAT SERPL-MCNC: 0.77 MG/DL (ref 0.5–0.9)
GFR AFRICAN AMERICAN: >60
GFR NON-AFRICAN AMERICAN: >60
GLUCOSE BLD-MCNC: 76 MG/DL (ref 70–99)
POTASSIUM SERPL-SCNC: 5.2 MEQ/L (ref 3.4–4.9)
SODIUM BLD-SCNC: 141 MEQ/L (ref 135–144)

## 2022-01-04 ENCOUNTER — OFFICE VISIT (OUTPATIENT)
Dept: OBGYN CLINIC | Age: 82
End: 2022-01-04
Payer: MEDICARE

## 2022-01-04 VITALS
WEIGHT: 175 LBS | BODY MASS INDEX: 29.16 KG/M2 | HEART RATE: 84 BPM | HEIGHT: 65 IN | DIASTOLIC BLOOD PRESSURE: 62 MMHG | SYSTOLIC BLOOD PRESSURE: 112 MMHG

## 2022-01-04 DIAGNOSIS — N39.41 URGE URINARY INCONTINENCE: Primary | ICD-10-CM

## 2022-01-04 PROCEDURE — G8427 DOCREV CUR MEDS BY ELIG CLIN: HCPCS | Performed by: OBSTETRICS & GYNECOLOGY

## 2022-01-04 PROCEDURE — 0509F URINE INCON PLAN DOCD: CPT | Performed by: OBSTETRICS & GYNECOLOGY

## 2022-01-04 PROCEDURE — G8483 FLU IMM NO ADMIN DOC REA: HCPCS | Performed by: OBSTETRICS & GYNECOLOGY

## 2022-01-04 PROCEDURE — 1090F PRES/ABSN URINE INCON ASSESS: CPT | Performed by: OBSTETRICS & GYNECOLOGY

## 2022-01-04 PROCEDURE — 99213 OFFICE O/P EST LOW 20 MIN: CPT | Performed by: OBSTETRICS & GYNECOLOGY

## 2022-01-04 PROCEDURE — G8417 CALC BMI ABV UP PARAM F/U: HCPCS | Performed by: OBSTETRICS & GYNECOLOGY

## 2022-01-04 PROCEDURE — 1123F ACP DISCUSS/DSCN MKR DOCD: CPT | Performed by: OBSTETRICS & GYNECOLOGY

## 2022-01-04 PROCEDURE — 4040F PNEUMOC VAC/ADMIN/RCVD: CPT | Performed by: OBSTETRICS & GYNECOLOGY

## 2022-01-04 PROCEDURE — G8400 PT W/DXA NO RESULTS DOC: HCPCS | Performed by: OBSTETRICS & GYNECOLOGY

## 2022-01-04 PROCEDURE — 1036F TOBACCO NON-USER: CPT | Performed by: OBSTETRICS & GYNECOLOGY

## 2022-01-04 NOTE — PROGRESS NOTES
Medication FollowUp     Shoaib Coronado is a [de-identified]y.o. year old female here to discuss symptoms after use of medications prescribed last visit. Symptoms  urgency, frequency, incontinence, nocturia   . Medication/s prescribed mirabegron . Side effects reported : none. Mentions symptomsimproved : no. Did not take because cannot afford.     Vitals:  /62 (Site: Right Upper Arm, Position: Sitting, Cuff Size: Medium Adult)   Pulse 84   Ht 5' 5\" (1.651 m)   Wt 175 lb (79.4 kg)   LMP 1996   BMI 29.12 kg/m²   Allergies:  Hydralazine, Influenza vaccines, Cortisone, Coumadin [warfarin sodium], Levothyroxine, and Pneumococcal vaccine  Past Medical History:   Diagnosis Date    Atrial fibrillation (HCC)     no coumadin pt preference    Colon cancer ([address]      H/O COLON AND UTERINE 1194-4921    Constipation     Fatigue     High potassium     History of blood transfusion     1996    History of uterine cancer 2018    Hyperlipidemia     Hypertension     BENIGN ESSENTIAL    Hypothyroidism 2014    Obesity     Pacemaker     Sick [facility] syndrome ([facility]     Sleep apnea     noncompliant with cpap    TIA (transient ischemic attack)     Uterine cancer [facility])         Past Surgical History:   Procedure Laterality Date    CARDIAC CATHETERIZATION      COLON SURGERY      RESECTION    COLONOSCOPY  11,10/8/2013    RANDOM BIOPSIES W/COLITIS    HERNIA REPAIR      HYSTERECTOMY      PACEMAKER INSERTION      ID COLON CA SCRN NOT  [address] IND N/A 2017    COLONOSCOPY performed by Silvino Yan MD at [facility] [place]      age 9   [facility] TUMOR REMOVAL      fatty tumor lower back     OB History        0    Para   0    Term   0       0    AB   0    Living   0       SAB   0    IAB   0    Ectopic   0    Molar   0    Multiple   0    Live Births   0              Family History   Problem Relation Age of Onset    Heart Disease Maternal Grandmother     Emphysema Paternal Grandmother     Stroke Mother     High Blood Pressure Father     Breast Cancer Neg Hx     Cancer Neg Hx     Colon Cancer Neg Hx     Diabetes Neg Hx     Eclampsia Neg Hx     Hypertension Neg Hx     Ovarian Cancer Neg Hx      Labor Neg Hx     Spont Abortions Neg Hx      Social History     Socioeconomic History    Marital status: Single     Spouse name: Not on file    Number of children: 0    Years of education: 12    Highest education level: Bachelor's degree (e.g., BA, AB, BS)   Occupational History    Occupation: [employer]   Tobacco Use    Smoking status: Never Smoker    Smokeless tobacco: Never Used   Vaping Use    Vaping Use: Never used   Substance and Sexual Activity    Alcohol use: No    Drug use: No    Sexual activity: Never   Other Topics Concern    Not on file   Social History Narrative    One story double wide mobile home    4 steps to get into the house. Working smoke detector and one CO2 detector     Independent with daily ADL's and general self care      Social Determinants of Health     Financial Resource Strain: Low Risk     Difficulty of Paying Living Expenses: Not hard at all   Food Insecurity: No Food Insecurity    Worried About Running Out of Food in the Last Year: Never true    Gabriela of Food in the Last Year: Never true   Transportation Needs: No Transportation Needs    Lack of Transportation (Medical): No    Lack of Transportation (Non-Medical):  No   Physical Activity:     Days of Exercise per Week: Not on file    Minutes of Exercise per Session: Not on file   Stress:     Feeling of Stress : Not on file   Social Connections:     Frequency of Communication with Friends and Family: Not on file    Frequency of Social Gatherings with Friends and Family: Not on file    Attends Taoist Services: Not on file    Active Member of Clubs or Organizations: Not on file    Attends Club or Organization Meetings: Not on file    Marital Status: Not on file Intimate Partner Violence:     Fear of Current or Ex-Partner: Not on file    Emotionally Abused: Not on file    Physically Abused: Not on file    Sexually Abused: Not on file   Housing Stability: Unknown    Unable to Pay for Housing in the Last Year: No    Number of [place] in the Last Year: Not on file    Unstable Housing in the Last Year: No         Patient's medications,allergies, past medical, surgical, social and family histories were reviewed andupdated as appropriate.       Current Outpatient Medications:     mirabegron (MYRBETRIQ) 50 MG TB24, Lot: [id number] Exp: 7/2023, Disp: 21 tablet, Rfl: 0    thyroid (ARMOUR THYROID) 30 MG tablet, Take 1 tablet by mouth daily, Disp: 135 tablet, Rfl: 1    hydroxychloroquine (PLAQUENIL) 200 MG tablet, 400mg PO BID for one day, then 200mg BID for 4 days, Disp: 12 tablet, Rfl: 0    amLODIPine (NORVASC) 5 MG tablet, Take 1 tablet by mouth daily, Disp: 90 tablet, Rfl: 2    LUTEIN PO, Take 40 mg by mouth daily, Disp: , Rfl:     vitamin B-12 (CYANOCOBALAMIN) 1000 MCG tablet, Take 1,000 mcg by mouth daily Takes 1500 mcg daily, Disp: , Rfl:     Zinc Sulfate (ZINC 15 PO), Take 50 mg by mouth 2 times daily , Disp: , Rfl:     vitamin D 25 MCG (1000 UT) CAPS, Take by mouth 2 times daily, Disp: , Rfl:     Handicap Placard MISC, by Does not apply route Exp 5 years, Disp: 1 each, Rfl: 0    Multiple Vitamins-Minerals (THERAPEUTIC MULTIVITAMIN-MINERALS) tablet, Take 1 tablet by mouth daily, Disp: , Rfl:     b complex vitamins capsule, Take 1 capsule by mouth daily, Disp: , Rfl:     MAGNESIUM PO, Take 500 mg by mouth , Disp: , Rfl:     Glucosamine-Chondroitin (GLUCOSAMINE CHONDR COMPLEX PO), Take 2 tablets by mouth daily , Disp: , Rfl:     aspirin 81 MG tablet, Take 81 mg by mouth daily, Disp: , Rfl:     L-Tryptophan 500 MG TABS, Take 1,000 mg by mouth daily At hs , Disp: , Rfl:     sotalol (BETAPACE) 80 MG tablet, 1/2 tablet bid, Disp: , Rfl: 3    Review of Systems  Review of Systems    All other systems reviewed and are negative. Physical exam :   General Appearance: alert and oriented to person, placeand time, well-developed and well-nourished, in no acute distress  Pulmonary/Chest:clear to auscultation bilaterally- no wheezes, rales or rhonchi, normal air movement,no respiratory distress  Cardiovascular: normal rate, normal S1 and S2, no gallops,intact distal pulses and no carotid bruits  Abdomen: soft, non-tender  Pelvic:deferred    Assessment:      Diagnosis Orders   1. Urge urinary incontinence         Was medication effective in resolving symptomsfor patient ?   no  Plan:     Cannot afford   Would like to continue expectant management    No orders of the defined types were placed in this encounter. No orders of the defined types were placed in this encounter. Follow up:  No follow-ups on file.         Santiago Ron MD

## 2022-01-12 ENCOUNTER — OFFICE VISIT (OUTPATIENT)
Dept: FAMILY MEDICINE CLINIC | Age: 82
End: 2022-01-12
Payer: MEDICARE

## 2022-01-12 VITALS
OXYGEN SATURATION: 97 % | HEART RATE: 62 BPM | DIASTOLIC BLOOD PRESSURE: 70 MMHG | SYSTOLIC BLOOD PRESSURE: 130 MMHG | BODY MASS INDEX: 28.32 KG/M2 | HEIGHT: 65 IN | TEMPERATURE: 97.8 F | WEIGHT: 170 LBS

## 2022-01-12 DIAGNOSIS — J06.9 VIRAL URI: Primary | ICD-10-CM

## 2022-01-12 DIAGNOSIS — U07.1 COVID-19: ICD-10-CM

## 2022-01-12 LAB
INFLUENZA A ANTIBODY: NORMAL
INFLUENZA B ANTIBODY: NORMAL
Lab: ABNORMAL
PERFORMING INSTRUMENT: ABNORMAL
QC PASS/FAIL: ABNORMAL
SARS-COV-2, POC: DETECTED

## 2022-01-12 PROCEDURE — G8427 DOCREV CUR MEDS BY ELIG CLIN: HCPCS

## 2022-01-12 PROCEDURE — G8483 FLU IMM NO ADMIN DOC REA: HCPCS

## 2022-01-12 PROCEDURE — 1123F ACP DISCUSS/DSCN MKR DOCD: CPT

## 2022-01-12 PROCEDURE — 87804 INFLUENZA ASSAY W/OPTIC: CPT

## 2022-01-12 PROCEDURE — 1090F PRES/ABSN URINE INCON ASSESS: CPT

## 2022-01-12 PROCEDURE — 99213 OFFICE O/P EST LOW 20 MIN: CPT

## 2022-01-12 PROCEDURE — 87426 SARSCOV CORONAVIRUS AG IA: CPT

## 2022-01-12 PROCEDURE — G8417 CALC BMI ABV UP PARAM F/U: HCPCS

## 2022-01-12 PROCEDURE — 1036F TOBACCO NON-USER: CPT

## 2022-01-12 PROCEDURE — 4040F PNEUMOC VAC/ADMIN/RCVD: CPT

## 2022-01-12 PROCEDURE — G8400 PT W/DXA NO RESULTS DOC: HCPCS

## 2022-01-12 ASSESSMENT — ENCOUNTER SYMPTOMS
SORE THROAT: 0
EYE ITCHING: 0
FACIAL SWELLING: 0
VOMITING: 0
NAUSEA: 0
CHEST TIGHTNESS: 0
BACK PAIN: 0
ABDOMINAL PAIN: 0
RHINORRHEA: 0
APNEA: 0
SINUS PRESSURE: 1
EYE PAIN: 0
COLOR CHANGE: 0
SHORTNESS OF BREATH: 0
COUGH: 0
WHEEZING: 0
TROUBLE SWALLOWING: 0
SINUS PAIN: 0
EYE DISCHARGE: 0
DIARRHEA: 0

## 2022-01-12 NOTE — PATIENT INSTRUCTIONS
Patient Education        Learning How To Care for Someone Who Has COVID-19  Things to know  Most people who get COVID-19 will recover with time and home care. Here are some things to know if you're caring for someone who's sick. · Treat the symptoms. Common symptoms include a fever, coughing, and feeling short of breath. Urge the person to get extra rest and drink plenty of fluids to replace fluids lost from fever. To reduce a fever, offer acetaminophen (Tylenol) or ibuprofen (Advil, Motrin). It may also help with muscle aches. Read and follow all instructions on the label. · Watch for signs that the illness is getting worse. The person may need medical care if they're getting sicker (for example, if it's hard to breathe). But call the doctor's office before you go. They can tell you what to do. Call 911  or emergency services if the person has any of these symptoms:  ? Severe trouble breathing or shortness of breath. ? Constant pain or pressure in their chest.  ? Confusion, or trouble thinking clearly. ? Pale, gray, or blue-colored skin or lips. Some people are more likely to get very sick and need medical care. Call the doctor as soon as symptoms start if the person you're caring for is over 72, smokes, or has a serious health problem, like asthma, heart disease, diabetes, or an immune system problem. · Protect yourself and others. The virus spreads easily from person to person, so take extra care to avoid catching or spreading the infection. ? Keep the sick person away from others as much as you can. § Have the person stay in one room. If you can, give them their own bathroom to use. § Have only one person take care of them. Keep other peopleand petsout of the sickroom. § Have the person wear a mask around other people. This includes when anyone is in the room with them or if they leave their room (for example, to go to the bathroom). § Don't share personal items.  These include dishes, cups, towels, and bedding. ? Wash your hands often and well. Use soap and water, and scrub for at least 20 seconds. This is especially important after you've been around the sick person or touched things they've touched. If soap and water aren't handy, use an alcohol-based hand . ? Wear a mask when caring for someone who is sick. And wear a mask when you're around other people after you've cared for someone who's sick. ? Avoid touching your mouth, nose, and eyes. ? Take care with the person's laundry. It's okay to wash the sick person's laundry with yours. If you have them, wear disposable gloves when handling their dirty laundry, and wash your hands well after you touch it. Wash items in the warmest water allowed for the fabric type, and dry them completely. ? Clean high-touch items every day and anytime the sick person touches them. These include doorknobs, light switches, toilets, counters, and remote controls. Use a household disinfectant or a homemade bleach solution. (Follow the directions on the label.) If the sick person has their own room, have them disinfect it every day. ? Avoid having visitors. If you have to have visitors, everyone needs to wear a mask and stay at least 6 feet (2 meters) away from you. And keep the visit as short as possible. To help protect family and friends, stay in touch with them only by phone or computer. ? If you haven't had COVID-19 in the past 3 months or aren't fully vaccinated, you may need to quarantine. Where can you learn more? Go to [email or web address] and sign in to your [employer] account. Enter Z492 in the Jinni box to learn more about \"Learning How To Care for Someone Who Has COVID-19. \"     If you do not have an account, please click on the \"Sign Up Now\" link. Current as of: March 26, 2021               Content Version: 13.1  © 5094-2146 [employer].    Care instructions adapted under license by [facility]. If you have questions about a medical condition or this instruction, always ask your healthcare professional. [place] [address] any warranty or liability for your use of this information.

## 2022-01-12 NOTE — PROGRESS NOTES
[address] Encounter  CHIEF COMPLAINT       Chief Complaint   Patient presents with    URI     X 1 week, fatigue, body aches, fever, nasal congestion. unknown exposure       HISTORY OF PRESENT ILLNESS   Majel Severe is a [de-identified] y.o. female who presents with:  HPI  Patient reporting fevers body aches and fatigue that began last Tuesday. She then began having some sinus congestion. She denies cough denies shortness of breath  REVIEW OF SYSTEMS     Review of Systems   Constitutional: Positive for fatigue and fever. Negative for appetite change, chills and diaphoresis. HENT: Positive for congestion and sinus pressure. Negative for ear discharge, ear pain, facial swelling, hearing loss, mouth sores, postnasal drip, rhinorrhea, sinus pain, sore throat and trouble swallowing. Eyes: Negative for pain, discharge and itching. Respiratory: Negative for apnea, cough, chest tightness, shortness of breath and wheezing. Cardiovascular: Negative for chest pain and palpitations. Gastrointestinal: Negative for abdominal pain, diarrhea, nausea and vomiting. Endocrine: Negative for cold intolerance and heat intolerance. Musculoskeletal: Negative for arthralgias, back pain and myalgias. Skin: Negative for color change, pallor and rash. Neurological: Negative for dizziness, syncope, weakness, light-headedness and headaches. Hematological: Negative for adenopathy. Psychiatric/Behavioral: Negative for behavioral problems, confusion and sleep disturbance.      PAST MEDICAL HISTORY         Diagnosis Date    Atrial fibrillation [facility])     no coumadin pt preference    Colon cancer [facility])      H/O COLON AND UTERINE [id number]    Constipation     Fatigue     High potassium     History of blood transfusion     x2 1996    History of uterine cancer 4/5/2018    Hyperlipidemia     Hypertension     BENIGN ESSENTIAL    Hypothyroidism 1/23/2014    Obesity     Pacemaker     Sick sinus syndrome (HCC)     Sleep apnea     noncompliant with cpap    TIA (transient ischemic attack)     Uterine cancer [facility])      SURGICAL HISTORY     Patient  has a past surgical history that includes Colon surgery (2001); Pacemaker insertion; Hysterectomy; Colonoscopy (08-05-11,10/8/2013); Tonsillectomy; hernia repair; tumor removal; Cardiac catheterization; and pr colon ca scrn not hi rsk ind (N/A, 7/11/2017). CURRENT MEDICATIONS       Previous Medications    AMLODIPINE (NORVASC) 5 MG TABLET    Take 1 tablet by mouth daily    ASPIRIN 81 MG TABLET    Take 81 mg by mouth daily    B COMPLEX VITAMINS CAPSULE    Take 1 capsule by mouth daily    GLUCOSAMINE-CHONDROITIN (GLUCOSAMINE CHONDR COMPLEX PO)    Take 2 tablets by mouth daily     HANDICAP PLACARD MISC    by Does not apply route Exp 5 years    L-TRYPTOPHAN 500 MG TABS    Take 1,000 mg by mouth daily At hs     LUTEIN PO    Take 40 mg by mouth daily    MAGNESIUM PO    Take 500 mg by mouth     MULTIPLE VITAMINS-MINERALS (THERAPEUTIC MULTIVITAMIN-MINERALS) TABLET    Take 1 tablet by mouth daily    SOTALOL (BETAPACE) 80 MG TABLET    1/2 tablet bid    THYROID (ARMOUR THYROID) 30 MG TABLET    Take 1 tablet by mouth daily    VITAMIN B-12 (CYANOCOBALAMIN) 1000 MCG TABLET    Take 1,000 mcg by mouth daily Takes 1500 mcg daily    VITAMIN D 25 MCG (1000 UT) CAPS    Take by mouth 2 times daily    ZINC SULFATE (ZINC 15 PO)    Take 50 mg by mouth 2 times daily      ALLERGIES     Patient is is allergic to hydralazine, influenza vaccines, cortisone, coumadin [warfarin sodium], levothyroxine, and pneumococcal vaccine. FAMILY HISTORY     Patient'sfamily history includes Emphysema in her paternal grandmother; Heart Disease in her maternal grandmother; High Blood Pressure in her father; Stroke in her mother. HISTORY     Patient  reports that she has never smoked. She has never used smokeless tobacco. She reports that she does not drink alcohol and does not use drugs.   PHYSICAL EXAM     VITALS  BP: 130/70, Temp: 97.8 °F (36.6 °C), Pulse: 62,  , SpO2: 97 %  Physical Exam  Constitutional:       General: She is not in acute distress. Appearance: Normal appearance. She is not ill-appearing, toxic-appearing or diaphoretic. HENT:      Head: Normocephalic. Right Ear: Tympanic membrane, ear canal and external ear normal. No middle ear effusion. There is no impacted cerumen. No mastoid tenderness. Tympanic membrane is not perforated, erythematous or bulging. Left Ear: Tympanic membrane, ear canal and external ear normal.  No middle ear effusion. There is no impacted cerumen. No mastoid tenderness. Tympanic membrane is not perforated, erythematous or bulging. Nose: No congestion or rhinorrhea. Mouth/Throat:      Mouth: Mucous membranes are moist.      Pharynx: Oropharynx is clear. No pharyngeal swelling, oropharyngeal exudate or posterior oropharyngeal erythema. Tonsils: No tonsillar exudate or tonsillar abscesses. 0 on the right. 0 on the left. Eyes:      General:         Right eye: No discharge. Left eye: No discharge. Cardiovascular:      Rate and Rhythm: Normal rate and regular rhythm. Pulses: Normal pulses. Heart sounds: Normal heart sounds. No murmur heard. No gallop. Pulmonary:      Effort: Pulmonary effort is normal. No respiratory distress. Breath sounds: Normal breath sounds. No stridor. No wheezing, rhonchi or rales. Chest:      Chest wall: No tenderness. Abdominal:      General: Abdomen is flat. There is no distension. Palpations: Abdomen is soft. Musculoskeletal:         General: Normal range of motion. Cervical back: No rigidity or tenderness. Lymphadenopathy:      Cervical: No cervical adenopathy. Skin:     General: Skin is warm and dry. Capillary Refill: Capillary refill takes less than 2 seconds. Coloration: Skin is not pale. Neurological:      General: No focal deficit present.       Mental Status: She is alert and oriented to person, place, and time. Mental status is at baseline. Psychiatric:         Mood and Affect: Mood normal.         Behavior: Behavior normal.       READY CARE COURSE     Orders Placed This Encounter   Procedures    Covid-19 Ambulatory     Standing Status:   Future     Standing Expiration Date:   1/12/2023     Scheduling Instructions:      Saline media preferred given current shortage of viral transport media but both acceptable     Order Specific Question:   Is this test for diagnosis or screening? Answer:   Diagnosis of ill patient     Order Specific Question:   Symptomatic for COVID-19 as defined by CDC? Answer:   Yes     Order Specific Question:   Date of Symptom Onset     Answer:   1/5/2022     Order Specific Question:   Hospitalized for COVID-19? Answer:   No     Order Specific Question:   Admitted to ICU for COVID-19? Answer:   No     Order Specific Question:   Employed in healthcare setting? Answer:   No     Order Specific Question:   Resident in a congregate (group) care setting? Answer:   No     Order Specific Question:   Pregnant? Answer:   No     Order Specific Question:   Previously tested for COVID-19? Answer: Yes    POCT COVID-19, Antigen     Order Specific Question:   Is this test for diagnosis or screening? Answer:   Diagnosis of ill patient     Order Specific Question:   Symptomatic for COVID-19 as defined by CDC? Answer:   Yes     Order Specific Question:   Date of Symptom Onset     Answer:   1/5/2022     Order Specific Question:   Hospitalized for COVID-19? Answer:   No     Order Specific Question:   Admitted to ICU for COVID-19? Answer:   No     Order Specific Question:   Employed in healthcare setting? Answer:   Unknown     Order Specific Question:   Resident in a congregate (group) care setting? Answer:   Unknown     Order Specific Question:   Pregnant?      Answer:   No     Order Specific Question: Previously tested for COVID-19? Answer: Yes    POCT Influenza A/B        Labs:  Results for POC orders placed in visit on 01/12/22   POCT Influenza A/B   Result Value Ref Range    Influenza A Ab NEG     Influenza B Ab NEG      IMAGING:  No orders to display     Scheduled Meds:  Continuous Infusions:  PRN Meds:. PROCEDURES:  FINAL IMPRESSION      1. Viral URI    2. COVID-19        DISPOSITION/PLAN     HISTORY OF PRESENT ILLNESS   Henok Ring is a [de-identified] y.o. female who presents with fevers body aches and fatigue that began last Tuesday. She then began having some sinus congestion. She denies cough denies shortness of breath. Pt is afebrile has nontoxic appearance and VS are stable. On examination ears are bilaterally normal, no bulging of the TM no tenia. Neck is supple no masses detected. Pharynx is pink moist no tonsillar enlargement. Lung sounds are clear throughout. Heart sounds normal and regular. Patient denies need of any additional supportive therapies. Reports symptoms are mild she just wants to have COVID and flu testing performed. COVID is positive. Patient educated on quarantine    PATIENT REFERRED TO:  Return if symptoms worsen or fail to improve, for Follow up with PCP. DISCHARGE MEDICATIONS:  New Prescriptions    No medications on file     Cannot display discharge medications since this is not an admission.        LANDY Mitchell - CNP

## 2022-01-14 ENCOUNTER — HOSPITAL ENCOUNTER (OUTPATIENT)
Dept: CARDIOLOGY | Age: 82
Discharge: HOME | End: 2022-01-14
Payer: MEDICARE

## 2022-01-14 PROCEDURE — 93296 REM INTERROG EVL PM/IDS: CPT

## 2022-01-19 ENCOUNTER — VIRTUAL VISIT (OUTPATIENT)
Dept: FAMILY MEDICINE CLINIC | Age: 82
End: 2022-01-19
Payer: MEDICARE

## 2022-01-19 DIAGNOSIS — H35.3211 EXUDATIVE AGE-RELATED MACULAR DEGENERATION OF RIGHT EYE WITH ACTIVE CHOROIDAL NEOVASCULARIZATION (HCC): ICD-10-CM

## 2022-01-19 DIAGNOSIS — I48.91 ATRIAL FIBRILLATION, UNSPECIFIED TYPE (HCC): ICD-10-CM

## 2022-01-19 DIAGNOSIS — J01.90 ACUTE SINUSITIS, RECURRENCE NOT SPECIFIED, UNSPECIFIED LOCATION: ICD-10-CM

## 2022-01-19 DIAGNOSIS — C18.4 MALIGNANT NEOPLASM OF TRANSVERSE COLON (HCC): ICD-10-CM

## 2022-01-19 PROCEDURE — 99442 PR PHYS/QHP TELEPHONE EVALUATION 11-20 MIN: CPT | Performed by: FAMILY MEDICINE

## 2022-01-19 RX ORDER — AZITHROMYCIN 250 MG/1
TABLET, FILM COATED ORAL
Qty: 1 PACKET | Refills: 0 | Status: SHIPPED | OUTPATIENT
Start: 2022-01-19 | End: 2022-01-29

## 2022-01-19 NOTE — PROGRESS NOTES
History of blood transfusion     x2 1996    History of uterine cancer 4/5/2018    Hyperlipidemia     Hypertension     BENIGN ESSENTIAL    Hypothyroidism 1/23/2014    Obesity     Pacemaker     Sick sinus syndrome (HCC)     Sleep apnea     noncompliant with cpap    TIA (transient ischemic attack)     Uterine [facility] ([facility] [address] [facility]          Review of Systems    Otherwise  without sob/palpitations/chest pain/f/c/n/v/weight gain/weight loss/abd pain      Prior to Visit Medications    Medication Sig Taking?  Authorizing Provider   azithromycin (ZITHROMAX) 250 MG tablet 2 tabs orally on first day, then one tab daily for four days Yes Bentley Alvarez MD   thyroid (ARMOUR THYROID) 30 MG tablet Take 1 tablet by mouth daily Yes Bentley Alvarez MD   amLODIPine (NORVASC) 5 MG tablet Take 1 tablet by mouth daily Yes Bentley Alvarez MD   LUTEIN PO Take 40 mg by mouth daily Yes Historical Provider, MD   vitamin B-12 (CYANOCOBALAMIN) 1000 MCG tablet Take 1,000 mcg by mouth daily Takes 1500 mcg daily Yes Historical Provider, MD   Zinc Sulfate (ZINC 15 PO) Take 50 mg by mouth 2 times daily  Yes Historical Provider, MD   vitamin D 25 MCG (1000 UT) CAPS Take by mouth 2 times daily Yes Historical Provider, MD   Handicap Pl[address]az [address] [facility] by Does not apply route Exp 5 years Yes Bentley Alvarez MD   Multiple Vitamins-Minerals (THERAPEUTIC MULTIVITAMIN-MINERALS) tablet Take 1 tablet by mouth daily Yes Historical Provider, MD   b complex vitamins capsule Take 1 capsule by mouth daily Yes Historical Provider, MD   MAGNESIUM PO Take 500 mg by mouth  Yes Historical Provider, MD   Glucosamine-Chondroitin (GLUCOSAMINE CHONDR COMPLEX PO) Take 2 tablets by mouth daily  Yes Historical Provider, MD   aspirin 81 MG tablet Take 81 mg by mouth daily Yes Historical Provider, MD   L-Tryptophan 500 MG TABS Take 1,000 mg by mouth daily At hs  Yes Historical Provider, MD   sotalol (BETAPACE) 80 MG tablet 1/2 tablet bid Yes Historical Provider, MD       Social History Tobacco Use    Smoking status: Never Smoker    Smokeless tobacco: Never Used   Vaping Use    Vaping Use: Never used   Substance Use Topics    Alcohol use: No    Drug use: No            PHYSICAL EXAMINATION:  Patient-Reported Vitals 1/19/2022   Patient-Reported Weight 166   Patient-Reported Height 5'5\"   Patient-Reported Systolic 538   Patient-Reported Diastolic 72   Patient-Reported Pulse 62 irregular   Patient-Reported Temperature 97.3         No exam  Phone visit  Patient in no significant distress, conversant    Due to this being a TeleHealth encounter, evaluation of the following organ systems is limited: Vitals/Constitutional/EENT/Resp/CV/GI//MS/Neuro/Skin/Heme-Lymph-Imm. Lab Results   Component Value Date    WBC 4.8 11/05/2021    HGB 13.5 11/05/2021    HCT 41.0 11/05/2021     11/05/2021    CHOL 225 (H) 11/05/2021    TRIG 69 11/05/2021    HDL 70 (H) 11/05/2021    ALT 6 11/05/2021    AST 20 11/05/2021     12/22/2021    K 5.2 (H) 12/22/2021     12/22/2021    CREATININE 0.77 12/22/2021    BUN 23 12/22/2021    CO2 30 12/22/2021    TSH 1.740 11/05/2021    INR 1.0 06/14/2017    LABA1C 5.6 11/05/2021         ASSESSMENT/PLAN:     Diagnosis Orders   1. Exudative age-related macular degeneration of right eye with active choroidal neovascularization ([address]     2. Malignant neoplasm of transverse colon ([address]     3. Atrial fibrillation, unspecified type ([address]     4. Acute sinusitis, recurrence not specified, unspecified location  azithromycin (ZITHROMAX) 250 MG tablet     zpak  Suggest coricidin  Delsym for cough   Call or return to clinic prn if these symptoms worsen or fail to improve as anticipated. No follow-ups on file. An  electronic signature was used to authenticate this note.     --Susana Harmon MD on 1/19/2022 at 11:17 AM        Pursuant to the emergency declaration under the [address] [place] [address], [address] [place] [address] and the [facility] and Response Supplemental Appropriations Act, this Virtual  Visit was conducted, with patient's consent, to reduce the patient's risk of exposure to COVID-19 and provide continuity of care for an established patient.     11 minute call

## 2022-02-01 DIAGNOSIS — E87.5 HYPERKALEMIA: ICD-10-CM

## 2022-02-01 LAB
ANION GAP SERPL CALCULATED.3IONS-SCNC: 11 MEQ/L (ref 9–15)
BUN BLDV-MCNC: 21 MG/DL (ref 8–23)
CALCIUM SERPL-MCNC: 9.8 MG/DL (ref 8.5–9.9)
CHLORIDE BLD-SCNC: 103 MEQ/L (ref 95–107)
CO2: 25 MEQ/L (ref 20–31)
CREAT SERPL-MCNC: 0.8 MG/DL (ref 0.5–0.9)
GFR AFRICAN AMERICAN: >60
GFR NON-AFRICAN AMERICAN: >60
GLUCOSE BLD-MCNC: 86 MG/DL (ref 70–99)
POTASSIUM SERPL-SCNC: 4.5 MEQ/L (ref 3.4–4.9)
SODIUM BLD-SCNC: 139 MEQ/L (ref 135–144)

## 2022-02-08 ENCOUNTER — OFFICE VISIT (OUTPATIENT)
Dept: FAMILY MEDICINE CLINIC | Age: 82
End: 2022-02-08
Payer: MEDICARE

## 2022-02-08 VITALS
WEIGHT: 170 LBS | HEIGHT: 65 IN | OXYGEN SATURATION: 98 % | TEMPERATURE: 97.1 F | DIASTOLIC BLOOD PRESSURE: 78 MMHG | SYSTOLIC BLOOD PRESSURE: 120 MMHG | BODY MASS INDEX: 28.32 KG/M2 | HEART RATE: 84 BPM

## 2022-02-08 DIAGNOSIS — I10 ESSENTIAL HYPERTENSION: ICD-10-CM

## 2022-02-08 DIAGNOSIS — E03.9 HYPOTHYROIDISM, UNSPECIFIED TYPE: ICD-10-CM

## 2022-02-08 DIAGNOSIS — I48.91 ATRIAL FIBRILLATION, UNSPECIFIED TYPE (HCC): Primary | ICD-10-CM

## 2022-02-08 DIAGNOSIS — E87.5 HYPERKALEMIA: ICD-10-CM

## 2022-02-08 PROCEDURE — 1123F ACP DISCUSS/DSCN MKR DOCD: CPT | Performed by: FAMILY MEDICINE

## 2022-02-08 PROCEDURE — G8427 DOCREV CUR MEDS BY ELIG CLIN: HCPCS | Performed by: FAMILY MEDICINE

## 2022-02-08 PROCEDURE — 1036F TOBACCO NON-USER: CPT | Performed by: FAMILY MEDICINE

## 2022-02-08 PROCEDURE — 4040F PNEUMOC VAC/ADMIN/RCVD: CPT | Performed by: FAMILY MEDICINE

## 2022-02-08 PROCEDURE — G8483 FLU IMM NO ADMIN DOC REA: HCPCS | Performed by: FAMILY MEDICINE

## 2022-02-08 PROCEDURE — G8417 CALC BMI ABV UP PARAM F/U: HCPCS | Performed by: FAMILY MEDICINE

## 2022-02-08 PROCEDURE — 1090F PRES/ABSN URINE INCON ASSESS: CPT | Performed by: FAMILY MEDICINE

## 2022-02-08 PROCEDURE — G8400 PT W/DXA NO RESULTS DOC: HCPCS | Performed by: FAMILY MEDICINE

## 2022-02-08 PROCEDURE — 99213 OFFICE O/P EST LOW 20 MIN: CPT | Performed by: FAMILY MEDICINE

## 2022-02-08 RX ORDER — CHOLECALCIFEROL (VITAMIN D3) 1250 MCG
CAPSULE ORAL
COMMUNITY

## 2022-02-08 NOTE — PROGRESS NOTES
Chief Complaint   Patient presents with    Hyperthyroidism     3 month       HPI:  Jaquan Faria is a [de-identified] y.o. female     Follow up bp and potassium  Hyperkalemia    Last potassium 4.5  Lowest it has been in some time    Currently on 1 amlodipine daily     BP has been controlled    Recovering from covid  Positive on 1/12/22     Head has cleared up  Some cough/throat congestion/mucous      Wt Readings from Last 3 Encounters:   02/08/22 170 lb (77.1 kg)   01/12/22 170 lb (77.1 kg)   01/04/22 175 lb (79.4 kg)           Patient Active Problem List   Diagnosis    Constipation    Fatigue    Atrial fibrillation ([address]    Hypertension    Sleep apnea    Sick sinus syndrome ([address]    Pacemaker    Hyperlipidemia    Hypothyroidism    Bunion of great toe of right foot    Hammer toe of right foot    Fatigue due to exposure    History of uterine cancer    Malignant neoplasm of transverse colon (HCC)    AK (actinic keratosis)    Exudative senile macular degeneration of retina ([address]    ABMD (anterior basement membrane dystrophy)    Nuclear sclerotic cataract of both eyes    Posterior vitreous detachment of left eye    Punctate keratitis    Senile cataract, unspecified    Vitreous degeneration and detachment of both eyes       Reviewed labs in [facility] and Dr. Frances Salgado note. No statin therapy despite high cholesterol.        PAF  No anticoag   Was on coumadin in past, had reaction  Has pacemaker  Takes low 81mg asa    Wt Readings from Last 3 Encounters:   02/08/22 170 lb (77.1 kg)   01/12/22 170 lb (77.1 kg)   01/04/22 175 lb (79.4 kg)     Ongoing fatigue has improved   She does have mild MACK, couldn't tolerate        Past Medical History:   Diagnosis Date    Atrial fibrillation (HCC)     no coumadin pt preference    Colon cancer ([address]      H/O COLON AND UTERINE 9628-0040    Constipation     Fatigue     High potassium     History of blood transfusion     x2 1996    History of uterine cancer 4/5/2018    Hyperlipidemia     Hypertension     BENIGN ESSENTIAL    Hypothyroidism 2014    Obesity     Pacemaker     Sick sinus syndrome (HCC)     Sleep apnea     noncompliant with cpap    TIA (transient ischemic attack)     Uterine cancer [facility])      Past Surgical History:   Procedure Laterality Date    CARDIAC CATHETERIZATION      COLON SURGERY  2001    RESECTION    COLONOSCOPY  11,10/8/2013    RANDOM BIOPSIES W/COLITIS    HERNIA REPAIR      HYSTERECTOMY      PACEMAKER INSERTION      [place] COLON CA SCRN NOT  [address] IND N/A 2017    COLONOSCOPY performed by Niyah Salazar MD at [facility]      age 9   [place] TUMOR REMOVAL      fatty tumor lower back     Family History   Problem Relation Age of Onset    Heart Disease Maternal Grandmother     Emphysema Paternal Grandmother     Stroke Mother     High Blood Pressure Father     Breast Cancer Neg Hx     Cancer Neg Hx     Colon Cancer Neg Hx     Diabetes Neg Hx     Eclampsia Neg Hx     Hypertension Neg Hx     Ovarian Cancer Neg Hx      Labor Neg Hx     Spont Abortions Neg Hx      Social History     Socioeconomic History    Marital status: Single     Spouse name: None    Number of children: 0    Years of education: 12    Highest education level: Bachelor's degree (e.g., BA, AB, BS)   Occupational History    Occupation: [employer]   Tobacco Use    Smoking status: Never Smoker    Smokeless tobacco: Never Used   Vaping Use    Vaping Use: Never used   Substance and Sexual Activity    Alcohol use: No    Drug use: No    Sexual activity: Never   Other Topics Concern    None   Social History Narrative    One story double wide mobile home    4 steps to get into the house.     Working smoke detector and one CO2 detector     Independent with daily ADL's and general self care      Social Determinants of Health     Financial Resource Strain: Low Risk     Difficulty of Paying Living Expenses: Not hard at all   Food Insecurity: No Food Insecurity    Worried About Running Out of Food in the Last Year: Never true    Ran Out of Food in the Last Year: Never true   Transportation Needs: No Transportation Needs    Lack of Transportation (Medical): No    Lack of Transportation (Non-Medical):  No   Physical Activity:     Days of Exercise per Week: Not on file    Minutes of Exercise per Session: Not on file   Stress:     Feeling of Stress : Not on file   Social Connections:     Frequency of Communication with Friends and Family: Not on file    Frequency of Social Gatherings with Friends and Family: Not on file    Attends Jehovah's witness Services: Not on file    Active Member of [address] [place] [address] [employer] or Organizations: Not on file    Attends Club or Organization Meetings: Not on file    Marital Status: Not on file   Intimate Partner Violence:     Fear of Current or Ex-Partner: Not on file    Emotionally Abused: Not on file    Physically Abused: Not on file    Sexually Abused: Not on file   Housing Stability: Unknown    Unable to Pay for Housing in the Last Year: No    Number of [place] in the Last Year: Not on file    Unstable Housing in the Last Year: No     Current Outpatient Medications   Medication Sig Dispense Refill    Cholecalciferol (VITAMIN D3) 1.25 MG (57470 UT) CAPS Take by mouth      thyroid (ARMOUR THYROID) 30 MG tablet Take 1 tablet by mouth daily (Patient taking differently: Take 45 mg by mouth daily ) 135 tablet 1    amLODIPine (NORVASC) 5 MG tablet Take 1 tablet by mouth daily (Patient taking differently: Take 2.5 mg by mouth 2 times daily ) 90 tablet 2    LUTEIN PO Take 40 mg by mouth daily      vitamin B-12 (CYANOCOBALAMIN) 1000 MCG tablet Take 1,000 mcg by mouth daily Takes 1500 mcg daily      Zinc Sulfate (ZINC 15 PO) Take 50 mg by mouth 2 times daily       Handicap Placard MISC by Does not apply route Exp 5 years 1 each 0    Multiple Vitamins-Minerals (THERAPEUTIC MULTIVITAMIN-MINERALS) tablet Take 1 tablet by mouth daily      b complex vitamins capsule Take 1 capsule by mouth daily      MAGNESIUM PO Take 500 mg by mouth       Glucosamine-Chondroitin (GLUCOSAMINE CHONDR COMPLEX PO) Take 2 tablets by mouth daily       aspirin 81 MG tablet Take 81 mg by mouth daily      L-Tryptophan 500 MG TABS Take 1,000 mg by mouth daily At hs       sotalol (BETAPACE) 80 MG tablet 1/2 tablet bid  3     No current facility-administered medications for this visit. Allergies   Allergen Reactions    Hydralazine Other (See Comments)     Ran fever and chills. Face got real hot.  Influenza Vaccines Hives    Cortisone Other (See Comments)     Affects eyes- blurry vision    Coumadin [Warfarin Sodium]      Capillary swelling/inflammation    Levothyroxine     Pneumococcal Vaccine Hives     Pt states has an allergy to any vaccines that have egg embryo in them.        Review of Systems:   General ROS: per HPI  ENT ROS: negative for - headaches, hearing change, oral lesions, vertigo or visual changes  Hematological and Lymphatic ROS: negative for - bleeding problems, bruising, fatigue or night sweats  Endocrine ROS: negative for - polydipsia/polyuria or temperature intolerance  Respiratory ROS: no cough, shortness of breath, or wheezing  Cardiovascular ROS: no chest pain or dyspnea on exertion  Gastrointestinal ROS: no abdominal pain, no bloody or black stool - hx of colon CA -  She tends to have some constipation  Genito-Urinary ROS: incontinence         Physical Exam:  /78 (Site: Right Upper Arm)   Pulse 84   Temp 97.1 °F (36.2 °C)   Ht 5' 5\" (1.651 m)   Wt 170 lb (77.1 kg)   LMP 01/01/1996   SpO2 98%   Breastfeeding No   BMI 28.29 kg/m²     Gen: Well, NAD, Alert, Oriented x 3   HEENT: EOMI, eyes clear, MMM  Skin: no rash or jaundice     Neck: no significant lymphadenopathy or thyromegaly  Lungs: CTA B w/out Rales/Wheezes/Rhonchi, Good respiratory effort   Heart: RRR, S1S2, w/out M/R/G, non-displaced PMI   Ext: No C/C/E Bilaterally. Lab Results   Component Value Date    WBC 4.8 11/05/2021    HGB 13.5 11/05/2021    HCT 41.0 11/05/2021     11/05/2021    CHOL 225 (H) 11/05/2021    TRIG 69 11/05/2021    HDL 70 (H) 11/05/2021    ALT 6 11/05/2021    AST 20 11/05/2021     02/01/2022    K 4.5 02/01/2022     02/01/2022    CREATININE 0.80 02/01/2022    BUN 21 02/01/2022    CO2 25 02/01/2022    TSH 1.740 11/05/2021    INR 1.0 06/14/2017    LABA1C 5.6 11/05/2021         A&P   Diagnosis Orders   1. Atrial fibrillation, unspecified type ([facility] [address]     2. Hyperkalemia     3. Hypothyroidism, unspecified type     4.  Essential hypertension          Periodic monitoring of potassium    Overall doing very well    Maintaining healthy weight     Continue as below  Current Outpatient Medications   Medication Sig Dispense Refill    Cholecalciferol (VITAMIN D3) 1.25 MG (65672 UT) CAPS Take by mouth      thyroid (ARMOUR THYROID) 30 MG tablet Take 1 tablet by mouth daily (Patient taking differently: Take 45 mg by mouth daily ) 135 tablet 1    amLODIPine (NORVASC) 5 MG tablet Take 1 tablet by mouth daily (Patient taking differently: Take 2.5 mg by mouth 2 times daily ) 90 tablet 2    LUTEIN PO Take 40 mg by mouth daily      vitamin B-12 (CYANOCOBALAMIN) 1000 MCG tablet Take 1,000 mcg by mouth daily Takes 1500 mcg daily      Zinc Sulfate (ZINC 15 PO) Take 50 mg by mouth 2 times daily       Handicap Placard MISC by Does not apply route Exp 5 years 1 each 0    Multiple Vitamins-Minerals (THERAPEUTIC MULTIVITAMIN-MINERALS) tablet Take 1 tablet by mouth daily      b complex vitamins capsule Take 1 capsule by mouth daily      MAGNESIUM PO Take 500 mg by mouth       Glucosamine-Chondroitin (GLUCOSAMINE CHONDR COMPLEX PO) Take 2 tablets by mouth daily       aspirin 81 MG tablet Take 81 mg by mouth daily      L-Tryptophan 500 MG TABS Take 1,000 mg by mouth daily At hs       sotalol (BETAPACE) 80 MG tablet 1/2 tablet bid  3 No current facility-administered medications for this visit.          Ongoing f/u with cardiology       Carloz Carter MD

## 2022-03-10 ENCOUNTER — OFFICE VISIT (OUTPATIENT)
Dept: FAMILY MEDICINE CLINIC | Age: 82
End: 2022-03-10
Payer: MEDICARE

## 2022-03-10 VITALS
TEMPERATURE: 98.5 F | HEIGHT: 65 IN | SYSTOLIC BLOOD PRESSURE: 130 MMHG | WEIGHT: 171 LBS | HEART RATE: 65 BPM | BODY MASS INDEX: 28.49 KG/M2 | OXYGEN SATURATION: 96 % | DIASTOLIC BLOOD PRESSURE: 62 MMHG

## 2022-03-10 DIAGNOSIS — M79.10 POST-COVID CHRONIC MUSCLE PAIN: ICD-10-CM

## 2022-03-10 DIAGNOSIS — E87.5 HYPERKALEMIA: ICD-10-CM

## 2022-03-10 DIAGNOSIS — U09.9 POST-COVID CHRONIC MUSCLE PAIN: ICD-10-CM

## 2022-03-10 DIAGNOSIS — G89.29 POST-COVID CHRONIC MUSCLE PAIN: ICD-10-CM

## 2022-03-10 DIAGNOSIS — M54.2 CERVICALGIA: ICD-10-CM

## 2022-03-10 DIAGNOSIS — M79.10 MYALGIA: Primary | ICD-10-CM

## 2022-03-10 LAB
ANION GAP SERPL CALCULATED.3IONS-SCNC: 11 MEQ/L (ref 9–15)
BUN BLDV-MCNC: 27 MG/DL (ref 8–23)
CALCIUM SERPL-MCNC: 9.5 MG/DL (ref 8.5–9.9)
CHLORIDE BLD-SCNC: 103 MEQ/L (ref 95–107)
CO2: 28 MEQ/L (ref 20–31)
CREAT SERPL-MCNC: 0.92 MG/DL (ref 0.5–0.9)
GFR AFRICAN AMERICAN: >60
GFR NON-AFRICAN AMERICAN: 58.6
GLUCOSE BLD-MCNC: 73 MG/DL (ref 70–99)
POTASSIUM SERPL-SCNC: 4.4 MEQ/L (ref 3.4–4.9)
SODIUM BLD-SCNC: 142 MEQ/L (ref 135–144)

## 2022-03-10 PROCEDURE — 4040F PNEUMOC VAC/ADMIN/RCVD: CPT | Performed by: NURSE PRACTITIONER

## 2022-03-10 PROCEDURE — 1090F PRES/ABSN URINE INCON ASSESS: CPT | Performed by: NURSE PRACTITIONER

## 2022-03-10 PROCEDURE — G8417 CALC BMI ABV UP PARAM F/U: HCPCS | Performed by: NURSE PRACTITIONER

## 2022-03-10 PROCEDURE — 99213 OFFICE O/P EST LOW 20 MIN: CPT | Performed by: NURSE PRACTITIONER

## 2022-03-10 PROCEDURE — G8483 FLU IMM NO ADMIN DOC REA: HCPCS | Performed by: NURSE PRACTITIONER

## 2022-03-10 PROCEDURE — G8427 DOCREV CUR MEDS BY ELIG CLIN: HCPCS | Performed by: NURSE PRACTITIONER

## 2022-03-10 PROCEDURE — 1036F TOBACCO NON-USER: CPT | Performed by: NURSE PRACTITIONER

## 2022-03-10 PROCEDURE — G8400 PT W/DXA NO RESULTS DOC: HCPCS | Performed by: NURSE PRACTITIONER

## 2022-03-10 PROCEDURE — 1123F ACP DISCUSS/DSCN MKR DOCD: CPT | Performed by: NURSE PRACTITIONER

## 2022-03-10 RX ORDER — TIZANIDINE 2 MG/1
2 TABLET ORAL 3 TIMES DAILY PRN
Qty: 30 TABLET | Refills: 0 | Status: SHIPPED | OUTPATIENT
Start: 2022-03-10 | End: 2022-04-29 | Stop reason: SINTOL

## 2022-03-10 ASSESSMENT — ENCOUNTER SYMPTOMS
SHORTNESS OF BREATH: 0
BACK PAIN: 0
TROUBLE SWALLOWING: 0
COLOR CHANGE: 0
COUGH: 0
EYE PAIN: 0
ABDOMINAL PAIN: 0
CHEST TIGHTNESS: 0
DIARRHEA: 0
CONSTIPATION: 0

## 2022-03-10 NOTE — PROGRESS NOTES
Subjective  Chief Complaint   Patient presents with    Post-COVID Symptoms     muscle aches and fatigue since 1/4/22       HPI    Discuss muscle aches that have been ongoing since she had covid in January. Mainly c/o muscle aches in upper body, neck bilaterally going down bilateral arms, worse on the left than the right. Wants to eliminate the possibility of this being cardiac related because she has had some pain in her left jaw and down left arm; although admits that it is unlikely given how long it has been going on. Pain is worse with movement of left arm, hurts to use her left arm.      Past Medical History:   Diagnosis Date    Atrial fibrillation (HCC)     no coumadin pt preference    Colon cancer ([address]      H/O COLON AND UTERINE [id number]    Constipation     Fatigue     High potassium     History of blood transfusion     x2 1996    History of uterine cancer 4/5/2018    Hyperlipidemia     Hypertension     BENIGN ESSENTIAL    Hypothyroidism 1/23/2014    Obesity     Pacemaker     Sick sinus syndrome (HCC)     Sleep apnea     noncompliant with cpap    TIA (transient ischemic attack)     Uterine cancer [facility])      Patient Active Problem List    Diagnosis Date Noted    AK (actinic keratosis) 12/09/2020    Malignant neoplasm of transverse colon ([facility] [address] 03/21/2019    History of uterine cancer 04/05/2018    Fatigue due to exposure 07/31/2017    ABMD (anterior basement membrane dystrophy) 10/18/2016    Nuclear sclerotic cataract of both eyes 10/18/2016    Punctate keratitis 10/18/2016    Vitreous degeneration and detachment of both eyes 10/18/2016    Bunion of great toe of right foot 08/19/2015    Hammer toe of right foot 08/19/2015    Posterior vitreous detachment of left eye 07/15/2015    Exudative senile macular degeneration of retina ([facility] [address] 09/18/2014    Senile cataract, unspecified 09/18/2014    Hypothyroidism 01/23/2014    Hyperlipidemia     Constipation     Fatigue     Atrial fibrillation ([address]     Hypertension     Sleep apnea     Sick sinus syndrome [facility])     Pacemaker      Past Surgical History:   Procedure Laterality Date    CARDIAC CATHETERIZATION      COLON SURGERY  2001    RESECTION    COLONOSCOPY  11,10/8/2013    RANDOM BIOPSIES W/COLITIS    HERNIA REPAIR      HYSTERECTOMY      PACEMAKER INSERTION      DC COLON CA [address]CRN NOT  [address]  IND N/A 2017    COLONOSCOPY performed by Charito Koch MD at [facility]      age 9   [facility] TUMOR REMOVAL      fatty tumor lower back     Family History   Problem Relation Age of Onset    Heart Disease Maternal Grandmother     Emphysema Paternal Grandmother     Stroke Mother     High Blood Pressure Father     Breast Cancer Neg Hx     Cancer Neg Hx     Colon Cancer Neg Hx     Diabetes Neg Hx     Eclampsia Neg Hx     Hypertension Neg Hx     Ovarian Cancer Neg Hx      Labor Neg Hx     Spont Abortions Neg Hx      Social History     Socioeconomic History    Marital status: Single     Spouse name: None    Number of children: 0    Years of education: 12    Highest education level: Bachelor's degree (e.g., BA, AB, BS)   Occupational History    Occupation: Missionary   Tobacco Use    Smoking status: Never Smoker    Smokeless tobacco: Never Used   Vaping Use    Vaping Use: Never used   Substance and Sexual Activity    Alcohol use: No    Drug use: No    Sexual activity: Never   Other Topics Concern    None   Social History Narrative    One story double wide mobile home    4 steps to get into the house.     Working smoke detector and one CO2 detector     Independent with daily ADL's and general self care      Social Determinants of Health     Financial Resource Strain: Low Risk     Difficulty of Paying Living Expenses: Not hard at all   Food Insecurity: No Food Insecurity    Worried About Running Out of Food in the Last Year: Never true    Gabriela of Food in the Last Year: Never true Transportation Needs: No Transportation Needs    Lack of Transportation (Medical): No    Lack of Transportation (Non-Medical):  No   Physical Activity:     Days of Exercise per Week: Not on file    Minutes of Exercise per Session: Not on file   Stress:     Feeling of Stress : Not on file   Social Connections:     Frequency of Communication with Friends and Family: Not on file    Frequency of Social Gatherings with Friends and Family: Not on file    Attends Tenriism Services: Not on file    Active Member of [address] [place] [address] or Organizations: Not on file    Attends Club or Organization Meetings: Not on file    Marital Status: Not on file   Intimate Partner Violence:     Fear of Current or Ex-Partner: Not on file    Emotionally Abused: Not on file    Physically Abused: Not on file    Sexually Abused: Not on file   Housing Stability: Unknown    Unable to Pay for Housing in the Last Year: No    Number of [place] in the Last Year: Not on file    Unstable Housing in the Last Year: No     Current Outpatient Medications on File Prior to Visit   Medication Sig Dispense Refill    Cholecalciferol (VITAMIN D3) 1.25 MG (97890 UT) CAPS Take by mouth      thyroid (ARMOUR THYROID) 30 MG tablet Take 1 tablet by mouth daily (Patient taking differently: Take 45 mg by mouth daily ) 135 tablet 1    amLODIPine (NORVASC) 5 MG tablet Take 1 tablet by mouth daily (Patient taking differently: Take 2.5 mg by mouth 2 times daily ) 90 tablet 2    LUTEIN PO Take 40 mg by mouth daily      vitamin B-12 (CYANOCOBALAMIN) 1000 MCG tablet Take 1,000 mcg by mouth daily Takes 1500 mcg daily      Zinc Sulfate (ZINC 15 PO) Take 50 mg by mouth 2 times daily       Handicap Placard MISC by Does not apply route Exp 5 years 1 each 0    Multiple Vitamins-Minerals (THERAPEUTIC MULTIVITAMIN-MINERALS) tablet Take 1 tablet by mouth daily      b complex vitamins capsule Take 1 capsule by mouth daily      MAGNESIUM PO Take 500 mg by mouth  Glucosamine-Chondroitin (GLUCOSAMINE CHONDR COMPLEX PO) Take 2 tablets by mouth daily       aspirin 81 MG tablet Take 81 mg by mouth daily      L-Tryptophan 500 MG TABS Take 1,000 mg by mouth daily At hs       sotalol (BETAPACE) 80 MG tablet 1/2 tablet bid  3     No current facility-administered medications on file prior to visit. Allergies   Allergen Reactions    Hydralazine Other (See Comments)     Ran fever and chills. Face got real hot.  Influenza Vaccines Hives    Cortisone Other (See Comments)     Affects eyes- blurry vision    Coumadin [Warfarin Sodium]      Capillary swelling/inflammation    Levothyroxine     Pneumococcal Vaccine Hives     Pt states has an allergy to any vaccines that have egg embryo in them. Review of Systems   Constitutional: Negative for activity change, appetite change, chills, diaphoresis, fatigue and fever. HENT: Negative for congestion, ear pain, hearing loss and trouble swallowing. Eyes: Negative for pain and visual disturbance. Respiratory: Negative for cough, chest tightness and shortness of breath. Cardiovascular: Negative for chest pain, palpitations and leg swelling. Gastrointestinal: Negative for abdominal pain, constipation and diarrhea. Endocrine: Negative for polydipsia, polyphagia and polyuria. Genitourinary: Negative for difficulty urinating and dysuria. Musculoskeletal: Positive for myalgias. Negative for arthralgias and back pain. Skin: Negative for color change and rash. Neurological: Negative for dizziness and light-headedness. Psychiatric/Behavioral: Negative for dysphoric mood. The patient is not nervous/anxious.         Objective  Vitals:    03/10/22 1614   BP: 130/62   Site: Left Upper Arm   Position: Sitting   Cuff Size: Medium Adult   Pulse: 65   Temp: 98.5 °F (36.9 °C)   TempSrc: Infrared   SpO2: 96%   Weight: 171 lb (77.6 kg)   Height: 5' 5\" (1.651 m)     Physical Exam  Constitutional:       General: She is not in acute distress. Appearance: Normal appearance. She is normal weight. She is not ill-appearing, toxic-appearing or diaphoretic. HENT:      Head: Normocephalic and atraumatic. Right Ear: External ear normal.      Left Ear: External ear normal.      Nose: Nose normal. No congestion or rhinorrhea. Eyes:      Extraocular Movements: Extraocular movements intact. Conjunctiva/sclera: Conjunctivae normal.      Pupils: Pupils are equal, round, and reactive to light. Cardiovascular:      Rate and Rhythm: Normal rate and regular rhythm. Pulses: Normal pulses. Heart sounds: Normal heart sounds. No murmur heard. Pulmonary:      Effort: Pulmonary effort is normal. No respiratory distress. Breath sounds: Normal breath sounds. No stridor. No wheezing, rhonchi or rales. Chest:      Chest wall: No tenderness. Musculoskeletal:         General: Normal range of motion. Cervical back: Normal range of motion and neck supple. No tenderness. Right lower leg: No edema. Left lower leg: No edema. Lymphadenopathy:      Cervical: No cervical adenopathy. Skin:     General: Skin is warm. Capillary Refill: Capillary refill takes less than 2 seconds. Coloration: Skin is not jaundiced. Findings: No erythema or lesion. Neurological:      General: No focal deficit present. Mental Status: She is alert and oriented to person, place, and time. Mental status is at baseline. Cranial Nerves: No cranial nerve deficit. Coordination: Coordination normal.      Gait: Gait normal.   Psychiatric:         Mood and Affect: Mood normal.         Behavior: Behavior normal.         Thought Content: Thought content normal.         Judgment: Judgment normal.         Assessment& Plan     Diagnosis Orders   1. Myalgia  tiZANidine (ZANAFLEX) 2 MG tablet   2. Cervicalgia  tiZANidine (ZANAFLEX) 2 MG tablet   3.  Post-COVID chronic muscle pain  tiZANidine (ZANAFLEX) 2 MG tablet Zanaflex PRN for muscle pain. Will f/u with cardiology; although discussed very low likelihood of cardiac etiology given presentation of symptoms. Will see Dr. Rubens Silver as scheduled in 2 weeks or sooner PRN. Side effects, adverse effects of the medication prescribed today, as well as treatment plan/ rationale and result expectations have been discussed with the patient who expresses understanding and desires to proceed. Close follow up to evaluate treatment results and for coordination of care. I have reviewed the patient's medical history in detail and updated the computerized patient record. As always, patient is advised that if symptoms worsen in any way they will proceed to the nearest emergency room. No orders of the defined types were placed in this encounter. Orders Placed This Encounter   Medications    tiZANidine (ZANAFLEX) 2 MG tablet     Sig: Take 1 tablet by mouth 3 times daily as needed (myalgia)     Dispense:  30 tablet     Refill:  0       Medications Discontinued During This Encounter   Medication Reason    B COMPLEX VITAMINS PO LIST CLEANUP       Return if symptoms worsen or fail to improve, for as scheduled or sooner if needed.     Raman Cantrell, APRN - CNP

## 2022-03-16 ENCOUNTER — OFFICE VISIT (OUTPATIENT)
Dept: GASTROENTEROLOGY | Age: 82
End: 2022-03-16

## 2022-03-16 VITALS — HEIGHT: 65 IN | OXYGEN SATURATION: 97 % | HEART RATE: 68 BPM | WEIGHT: 170 LBS | BODY MASS INDEX: 28.32 KG/M2

## 2022-03-16 DIAGNOSIS — Z85.038 HISTORY OF COLON CANCER: Primary | ICD-10-CM

## 2022-03-16 PROCEDURE — 1090F PRES/ABSN URINE INCON ASSESS: CPT | Performed by: SPECIALIST

## 2022-03-16 PROCEDURE — 4040F PNEUMOC VAC/ADMIN/RCVD: CPT | Performed by: SPECIALIST

## 2022-03-16 PROCEDURE — 1123F ACP DISCUSS/DSCN MKR DOCD: CPT | Performed by: SPECIALIST

## 2022-03-16 PROCEDURE — 1036F TOBACCO NON-USER: CPT | Performed by: SPECIALIST

## 2022-03-16 PROCEDURE — G8417 CALC BMI ABV UP PARAM F/U: HCPCS | Performed by: SPECIALIST

## 2022-03-16 PROCEDURE — G8483 FLU IMM NO ADMIN DOC REA: HCPCS | Performed by: SPECIALIST

## 2022-03-16 PROCEDURE — G8400 PT W/DXA NO RESULTS DOC: HCPCS | Performed by: SPECIALIST

## 2022-03-16 PROCEDURE — 99999 PR OFFICE/OUTPT VISIT,PROCEDURE ONLY: CPT | Performed by: SPECIALIST

## 2022-03-16 PROCEDURE — G8427 DOCREV CUR MEDS BY ELIG CLIN: HCPCS | Performed by: SPECIALIST

## 2022-03-16 RX ORDER — SODIUM, POTASSIUM,MAG SULFATES 17.5-3.13G
SOLUTION, RECONSTITUTED, ORAL ORAL
Qty: 354 ML | Refills: 0 | Status: SHIPPED | OUTPATIENT
Start: 2022-03-16 | End: 2022-08-09 | Stop reason: ALTCHOICE

## 2022-03-16 ASSESSMENT — ENCOUNTER SYMPTOMS
VOMITING: 0
ABDOMINAL PAIN: 0
RECTAL PAIN: 0
NAUSEA: 0
GASTROINTESTINAL NEGATIVE: 1
BLOOD IN STOOL: 0
CONSTIPATION: 0
EYES NEGATIVE: 1
DIARRHEA: 0
RESPIRATORY NEGATIVE: 1
ABDOMINAL DISTENTION: 0
ANAL BLEEDING: 0

## 2022-03-16 NOTE — PROGRESS NOTES
Gastroenterology Clinic Visit    Shikha Horn  [id number]  Chief Complaint   Patient presents with    Follow-up     Patient is here today for a colonoscopy consult. Patient states that she has a history of polyps and colon cancer in 2001. HPI: [de-identified] y.o. female presents to the clinic with history of colon cancer status post resection comes for surveillance colonoscopy. Did not receive chemo. Has been doing okay no abdominal pain no rectal bleeding, no change in bowel habits. Patient has history of uterine cancer and had hysterectomy. Social history does not smoke does not drink alcohol. Surgically history includes bowel resection and hysterectomy    Review of Systems   Constitutional: Negative. HENT: Negative. Eyes: Negative. Respiratory: Negative. Cardiovascular: Negative. History of atrial fibrillation. No symptoms of congestive heart failure or angina. Gastrointestinal: Negative. Negative for abdominal distention, abdominal pain, anal bleeding, blood in stool, constipation, diarrhea, nausea, rectal pain and vomiting. No change in bowel habits   Endocrine: Negative. Genitourinary: Negative. Musculoskeletal: Negative. Skin: Negative. Allergic/Immunologic: Negative for food allergies. Neurological: Negative. Hematological: Negative. Psychiatric/Behavioral: Negative.          Past Medical History:   Diagnosis Date    Atrial fibrillation (HCC)     no coumadin pt preference    Colon cancer ([facility] [address]      H/O COLON AND UTERINE [id number]    Constipation     Fatigue     High potassium     History of blood transfusion     x2 1996    History of uterine cancer 4/5/2018    Hyperlipidemia     Hypertension     BENIGN ESSENTIAL    Hypothyroidism 1/23/2014    Obesity     Pacemaker     Sick sinus syndrome (HCC)     Sleep apnea     noncompliant with cpap    TIA (transient ischemic attack)     Uterine cancer [facility])       Past Surgical History:   Procedure Laterality Date    CARDIAC CATHETERIZATION      COLON SURGERY  2001    RESECTION    COLONOSCOPY  08-05-11,10/8/2013    RANDOM BIOPSIES W/COLITIS    HERNIA REPAIR      HYSTERECTOMY      PACEMAKER INSERTION      ME COLON CA SCRN NOT  [address] IND N/A 7/11/2017    COLONOSCOPY performed by Tomasz Nagy MD at [facility]      age 9   [facility] TUMOR REMOVAL      fatty tumor lower back     Current Outpatient Medications on File Prior to Visit   Medication Sig Dispense Refill    tiZANidine (ZANAFLEX) 2 MG tablet Take 1 tablet by mouth 3 times daily as needed (myalgia) 30 tablet 0    Cholecalciferol (VITAMIN D3) 1.25 MG (69907 UT) CAPS Take by mouth      thyroid (ARMOUR THYROID) 30 MG tablet Take 1 tablet by mouth daily (Patient taking differently: Take 45 mg by mouth daily ) 135 tablet 1    amLODIPine (NORVASC) 5 MG tablet Take 1 tablet by mouth daily (Patient taking differently: Take 2.5 mg by mouth 2 times daily ) 90 tablet 2    LUTEIN PO Take 40 mg by mouth daily      vitamin B-12 (CYANOCOBALAMIN) 1000 MCG tablet Take 1,000 mcg by mouth daily Takes 1500 mcg daily      Zinc Sulfate (ZINC 15 PO) Take 50 mg by mouth 2 times daily       Handicap Placard MISC by Does not apply route Exp 5 years 1 each 0    Multiple Vitamins-Minerals (THERAPEUTIC MULTIVITAMIN-MINERALS) tablet Take 1 tablet by mouth daily      b complex vitamins capsule Take 1 capsule by mouth daily      MAGNESIUM PO Take 500 mg by mouth       Glucosamine-Chondroitin (GLUCOSAMINE CHONDR COMPLEX PO) Take 2 tablets by mouth daily       aspirin 81 MG tablet Take 81 mg by mouth daily      L-Tryptophan 500 MG TABS Take 1,000 mg by mouth daily At hs       sotalol (BETAPACE) 80 MG tablet 1/2 tablet bid  3     No current facility-administered medications on file prior to visit.      Family History   Problem Relation Age of Onset    Heart Disease Maternal Grandmother     Emphysema Paternal Grandmother     Stroke Mother     High Blood Pressure Father     Breast Cancer Neg Hx     Cancer Neg Hx     Colon Cancer Neg Hx     Diabetes Neg Hx     Eclampsia Neg Hx     Hypertension Neg Hx     Ovarian Cancer Neg Hx      Labor Neg Hx     Spont Abortions Neg Hx       Social History     Socioeconomic History    Marital status: Single     Spouse name: None    Number of children: 0    Years of education: 12    Highest education level: Bachelor's degree (e.g., BA, AB, BS)   Occupational History    Occupation: [employer]   Tobacco Use    Smoking status: Never Smoker    Smokeless tobacco: Never Used   Vaping Use    Vaping Use: Never used   Substance and Sexual Activity    Alcohol use: No    Drug use: No    Sexual activity: Never   Other Topics Concern    None   Social History Narrative    One story double wide mobile home    4 steps to get into the house. Working smoke detector and one CO2 detector     Independent with daily ADL's and general self care      Social Determinants of Health     Financial Resource Strain: Low Risk     Difficulty of Paying Living Expenses: Not hard at all   Food Insecurity: No Food Insecurity    Worried About Running Out of Food in the Last Year: Never true    Gabriela of Food in the Last Year: Never true   Transportation Needs: No Transportation Needs    Lack of Transportation (Medical): No    Lack of Transportation (Non-Medical):  No   Physical Activity:     Days of Exercise per Week: Not on file    Minutes of Exercise per Session: Not on file   Stress:     Feeling of Stress : Not on file   Social Connections:     Frequency of Communication with Friends and Family: Not on file    Frequency of Social Gatherings with Friends and Family: Not on file    Attends Jain Services: Not on file    Active Member of Clubs or Organizations: Not on file    Attends Club or Organization Meetings: Not on file    Marital Status: Not on file   Intimate Partner Violence:     Fear of Current or Ex-Partner: Not on file    Emotionally Abused: Not on file    Physically Abused: Not on file    Sexually Abused: Not on file   Housing Stability: Unknown    Unable to Pay for Housing in the Last Year: No    Number of [place] in the Last Year: Not on file    Unstable Housing in the Last Year: No       Pulse 68, height 5' 5\" (1.651 m), weight 170 lb (77.1 kg), last menstrual period 01/01/1996, SpO2 97 %, not currently breastfeeding. Physical Exam  Constitutional:       Appearance: She is well-developed. HENT:      Head: Normocephalic and atraumatic. Eyes:      Conjunctiva/sclera: Conjunctivae normal.      Pupils: Pupils are equal, round, and reactive to light. Cardiovascular:      Rate and Rhythm: Normal rate and regular rhythm. Pulmonary:      Effort: Pulmonary effort is normal.   Abdominal:      General: Bowel sounds are normal.      Palpations: Abdomen is soft. Comments: Soft nontender no palpable mass surgical scar seen   Musculoskeletal:         General: Normal range of motion. Cervical back: Normal range of motion. Skin:     General: Skin is warm. Neurological:      Mental Status: She is alert. Laboratory, Pathology, Radiology reviewed indetail with relevant important investigations summarized below:  Lab Results   Component Value Date    WBC 4.8 11/05/2021    HGB 13.5 11/05/2021    HCT 41.0 11/05/2021    MCV 88.9 11/05/2021     11/05/2021     Lab Results   Component Value Date    ALT 6 11/05/2021    AST 20 11/05/2021    ALKPHOS 77 11/05/2021    BILITOT 0.4 11/05/2021       No results found. Endoscopic investigations:     Assessmentand Plan:  [de-identified] y.o. female with history of colon cancer status post resection. For surveillance colonoscopy   Diagnosis Orders   1. History of colon cancer  Endoscopy, colon, diagnostic     No follow-ups on file.     Marquis Chacho MD   Staff Gastroenterologist  [facility]    Please note this report has been partially produced using speech recognition software and may cause contain errors related to thatsystem including grammar, punctuation and spelling as well as words and phrases that may seem inappropriate. If there are questions or concerns please feel free to contact me to clarify.

## 2022-04-19 ENCOUNTER — HOSPITAL ENCOUNTER (OUTPATIENT)
Dept: CARDIOLOGY | Age: 82
Discharge: HOME | End: 2022-04-19
Payer: MEDICARE

## 2022-04-19 PROCEDURE — 93296 REM INTERROG EVL PM/IDS: CPT

## 2022-04-26 DIAGNOSIS — E87.5 HYPERKALEMIA: ICD-10-CM

## 2022-04-26 LAB
ANION GAP SERPL CALCULATED.3IONS-SCNC: 11 MEQ/L (ref 9–15)
BUN BLDV-MCNC: 21 MG/DL (ref 8–23)
CALCIUM SERPL-MCNC: 9.5 MG/DL (ref 8.5–9.9)
CHLORIDE BLD-SCNC: 103 MEQ/L (ref 95–107)
CO2: 26 MEQ/L (ref 20–31)
CREAT SERPL-MCNC: 0.75 MG/DL (ref 0.5–0.9)
GFR AFRICAN AMERICAN: >60
GFR NON-AFRICAN AMERICAN: >60
GLUCOSE BLD-MCNC: 91 MG/DL (ref 70–99)
POTASSIUM SERPL-SCNC: 4.7 MEQ/L (ref 3.4–4.9)
SODIUM BLD-SCNC: 140 MEQ/L (ref 135–144)

## 2022-04-29 ENCOUNTER — OFFICE VISIT (OUTPATIENT)
Dept: FAMILY MEDICINE CLINIC | Age: 82
End: 2022-04-29
Payer: MEDICARE

## 2022-04-29 VITALS
DIASTOLIC BLOOD PRESSURE: 70 MMHG | OXYGEN SATURATION: 98 % | HEIGHT: 65 IN | HEART RATE: 71 BPM | WEIGHT: 167.2 LBS | SYSTOLIC BLOOD PRESSURE: 120 MMHG | BODY MASS INDEX: 27.86 KG/M2 | TEMPERATURE: 98.3 F

## 2022-04-29 DIAGNOSIS — E55.9 VITAMIN D DEFICIENCY: ICD-10-CM

## 2022-04-29 DIAGNOSIS — R53.83 FATIGUE, UNSPECIFIED TYPE: ICD-10-CM

## 2022-04-29 DIAGNOSIS — H00.011 HORDEOLUM EXTERNUM OF RIGHT UPPER EYELID: Primary | ICD-10-CM

## 2022-04-29 LAB
HCT VFR BLD CALC: 39.6 % (ref 37–47)
HEMOGLOBIN: 13.1 G/DL (ref 12–16)
MCH RBC QN AUTO: 29.6 PG (ref 27–31.3)
MCHC RBC AUTO-ENTMCNC: 33.1 % (ref 33–37)
MCV RBC AUTO: 89.5 FL (ref 82–100)
PDW BLD-RTO: 14.2 % (ref 11.5–14.5)
PLATELET # BLD: 230 K/UL (ref 130–400)
RBC # BLD: 4.43 M/UL (ref 4.2–5.4)
TSH SERPL DL<=0.05 MIU/L-ACNC: 2.59 UIU/ML (ref 0.44–3.86)
WBC # BLD: 4.9 K/UL (ref 4.8–10.8)

## 2022-04-29 PROCEDURE — 1036F TOBACCO NON-USER: CPT | Performed by: FAMILY MEDICINE

## 2022-04-29 PROCEDURE — G8427 DOCREV CUR MEDS BY ELIG CLIN: HCPCS | Performed by: FAMILY MEDICINE

## 2022-04-29 PROCEDURE — G8400 PT W/DXA NO RESULTS DOC: HCPCS | Performed by: FAMILY MEDICINE

## 2022-04-29 PROCEDURE — G8417 CALC BMI ABV UP PARAM F/U: HCPCS | Performed by: FAMILY MEDICINE

## 2022-04-29 PROCEDURE — 1090F PRES/ABSN URINE INCON ASSESS: CPT | Performed by: FAMILY MEDICINE

## 2022-04-29 PROCEDURE — 1123F ACP DISCUSS/DSCN MKR DOCD: CPT | Performed by: FAMILY MEDICINE

## 2022-04-29 PROCEDURE — 99213 OFFICE O/P EST LOW 20 MIN: CPT | Performed by: FAMILY MEDICINE

## 2022-04-29 PROCEDURE — 4040F PNEUMOC VAC/ADMIN/RCVD: CPT | Performed by: FAMILY MEDICINE

## 2022-04-29 RX ORDER — TOBRAMYCIN AND DEXAMETHASONE 3; 1 MG/ML; MG/ML
1 SUSPENSION/ DROPS OPHTHALMIC
Qty: 2.5 ML | Refills: 0 | Status: SHIPPED | OUTPATIENT
Start: 2022-04-29 | End: 2022-05-06

## 2022-04-29 ASSESSMENT — PATIENT HEALTH QUESTIONNAIRE - PHQ9
SUM OF ALL RESPONSES TO PHQ QUESTIONS 1-9: 0
1. LITTLE INTEREST OR PLEASURE IN DOING THINGS: 0
2. FEELING DOWN, DEPRESSED OR HOPELESS: 0
SUM OF ALL RESPONSES TO PHQ QUESTIONS 1-9: 0
SUM OF ALL RESPONSES TO PHQ9 QUESTIONS 1 & 2: 0
SUM OF ALL RESPONSES TO PHQ QUESTIONS 1-9: 0
SUM OF ALL RESPONSES TO PHQ QUESTIONS 1-9: 0

## 2022-04-29 NOTE — PROGRESS NOTES
Chief Complaint   Patient presents with    Post-COVID Symptoms     still having muscle ache, fatigue    Other     eye lid issues, right eye  redness under and on top of eye lid        HPI:  Sari Lentz is a [de-identified] y.o. female     Follow up bp and potassium  Recent lab normal    Currently on 1 amlodipine daily   BP has been controlled    Still recovering from covid  Positive on 1/12/22   Still with aches and fatigue  Sleeps ok, will wake up 3 times    She has made an appt with eye doctor    Right eyelid is sore    Wt Readings from Last 3 Encounters:   04/29/22 167 lb 3.2 oz (75.8 kg)   03/16/22 170 lb (77.1 kg)   03/10/22 171 lb (77.6 kg)           Patient Active Problem List   Diagnosis    Constipation    Fatigue    Atrial fibrillation ([address]    Hypertension    Sleep apnea    Sick sinus syndrome ([address]    Pacemaker    Hyperlipidemia    Hypothyroidism    Bunion of great toe of right foot    Hammer toe of right foot    Fatigue due to exposure    History of uterine cancer    Malignant neoplasm of transverse colon (HCC)    AK (actinic keratosis)    Exudative senile macular degeneration of retina ([address]    ABMD (anterior basement membrane dystrophy)    Nuclear sclerotic cataract of both eyes    Posterior vitreous detachment of left eye    Punctate keratitis    Senile cataract, unspecified    Vitreous degeneration and detachment of both eyes       Reviewed [facility] in [facility] and Dr. Diane Chisholm note. No statin therapy despite high cholesterol.        PAF  No anticoag   Was on coumadin in past, had reaction  Has pacemaker  Takes low 81mg asa    Wt Readings from Last 3 Encounters:   04/29/22 167 lb 3.2 oz (75.8 kg)   03/16/22 170 lb (77.1 kg)   03/10/22 171 lb (77.6 kg)     Ongoing fatigue has improved   She does have mild MACK, couldn't tolerate        Past Medical History:   Diagnosis Date    Atrial fibrillation ([address]     no coumadin pt preference    Colon cancer ([address]      H/O COLON AND UTERINE [id number]  Constipation     Fatigue     High potassium     History of blood transfusion     x2 1996    History of uterine cancer 2018    Hyperlipidemia     Hypertension     BENIGN ESSENTIAL    Hypothyroidism 2014    Obesity     Pacemaker     Sick sinus syndrome (HCC)     Sleep apnea     noncompliant with cpap    TIA (transient ischemic attack)     Uterine cancer [facility])      Past Surgical History:   Procedure Laterality Date    CARDIAC CATHETERIZATION      COLON SURGERY      RESECTION    COLONOSCOPY  11,10/8/2013    RANDOM BIOPSIES W/COLITIS    HERNIA REPAIR      HYSTERECTOMY      PACEMAKER INSERTION      NH COLON CA SCRN NOT  [address] IND N/A 2017    COLONOSCOPY performed by Stephanie Dang MD at [facility]      age 9   [facility] TUMOR REMOVAL      fatty tumor lower back     Family History   Problem Relation Age of Onset    Heart Disease Maternal Grandmother     Emphysema Paternal Grandmother     Stroke Mother     High Blood Pressure Father     Breast Cancer Neg Hx     Cancer Neg Hx     Colon Cancer Neg Hx     Diabetes Neg Hx     Eclampsia Neg Hx     Hypertension Neg Hx     Ovarian Cancer Neg Hx      Labor Neg Hx     Spont Abortions Neg Hx      Social History     Socioeconomic History    Marital status: Single     Spouse name: None    Number of children: 0    Years of education: 12    Highest education level: Bachelor's degree (e.g., BA, AB, BS)   Occupational History    Occupation: [employer]   Tobacco Use    Smoking status: Never Smoker    Smokeless tobacco: Never Used   Vaping Use    Vaping Use: Never used   Substance and Sexual Activity    Alcohol use: No    Drug use: No    Sexual activity: Never   Other Topics Concern    None   Social History Narrative    One story double wide mobile home    4 steps to get into the house.     Working smoke detector and one CO2 detector     Independent with daily ADL's and general self care Social Determinants of Health     Financial Resource Strain: Low Risk     Difficulty of Paying Living Expenses: Not hard at all   Food Insecurity: No Food Insecurity    Worried About Running Out of Food in the Last Year: Never true    Gabriela of Food in the Last Year: Never true   Transportation Needs: No Transportation Needs    Lack of Transportation (Medical): No    Lack of Transportation (Non-Medical): No   Physical Activity:     Days of Exercise per Week: Not on file    Minutes of Exercise per Session: Not on file   Stress:     Feeling of Stress : Not on file   Social Connections:     Frequency of Communication with Friends and Family: Not on file    Frequency of Social Gatherings with Friends and Family: Not on file    Attends Zoroastrian Services: Not on file    Active Member of [address] [place] [address] or Organizations: Not on file    Attends Club or Organization Meetings: Not on file    Marital Status: Not on file   Intimate Partner Violence:     Fear of Current or Ex-Partner: Not on file    Emotionally Abused: Not on file    Physically Abused: Not on file    Sexually Abused: Not on file   Housing Stability: Unknown    Unable to Pay for Housing in the Last Year: No    Number of [place] in the Last Year: Not on file    Unstable Housing in the Last Year: No     Current Outpatient Medications   Medication Sig Dispense Refill    tobramycin-dexamethasone (TOBRADEX) 0.3-0.1 % ophthalmic ointment 3 times daily.  3.5 g 0    Na Sulfate-K Sulfate-Mg Sulf (SUPREP) 17.5-3.13-1.6 GM/177ML SOLN solution As directed 354 mL 0    Cholecalciferol (VITAMIN D3) 1.25 MG (48045 UT) CAPS Take by mouth      thyroid (ARMOUR THYROID) 30 MG tablet Take 1 tablet by mouth daily (Patient taking differently: Take 45 mg by mouth daily ) 135 tablet 1    amLODIPine (NORVASC) 5 MG tablet Take 1 tablet by mouth daily (Patient taking differently: Take 2.5 mg by mouth 2 times daily ) 90 tablet 2    LUTEIN PO Take 40 mg by mouth daily      vitamin B-12 (CYANOCOBALAMIN) 1000 MCG tablet Take 1,000 mcg by mouth daily Takes 1500 mcg daily      Zinc Sulfate (ZINC 15 PO) Take 50 mg by mouth 2 times daily       Handicap Placard MISC by Does not apply route Exp 5 years 1 each 0    Multiple Vitamins-Minerals (THERAPEUTIC MULTIVITAMIN-MINERALS) tablet Take 1 tablet by mouth daily      b complex vitamins capsule Take 1 capsule by mouth daily      MAGNESIUM PO Take 500 mg by mouth       Glucosamine-Chondroitin (GLUCOSAMINE CHONDR COMPLEX PO) Take 2 tablets by mouth daily       aspirin 81 MG tablet Take 81 mg by mouth daily      L-Tryptophan 500 MG TABS Take 1,000 mg by mouth daily At hs       sotalol (BETAPACE) 80 MG tablet 1/2 tablet bid  3     No current facility-administered medications for this visit. Allergies   Allergen Reactions    Hydralazine Other (See Comments)     Ran fever and chills. Face got real hot.  Influenza Vaccines Hives    Cortisone Other (See Comments)     Affects eyes- blurry vision    Coumadin [Warfarin Sodium]      Capillary swelling/inflammation    Levothyroxine     Pneumococcal Vaccine Hives     Pt states has an allergy to any vaccines that have egg embryo in them.        Review of Systems:   General ROS: per HPI  ENT ROS: negative for - headaches, hearing change, oral lesions, vertigo or visual changes  Hematological and Lymphatic ROS: negative for - bleeding problems, bruising, fatigue or night sweats  Endocrine ROS: negative for - polydipsia/polyuria or temperature intolerance  Respiratory ROS: no cough, shortness of breath, or wheezing  Cardiovascular ROS: no chest pain or dyspnea on exertion  Gastrointestinal ROS: no abdominal pain, no bloody or black stool - hx of colon CA -  She tends to have some constipation  Genito-Urinary ROS: incontinence         Physical Exam:  /70 (Site: Left Upper Arm)   Pulse 71   Temp 98.3 °F (36.8 °C)   Ht 5' 5\" (1.651 m)   Wt 167 lb 3.2 oz (75.8 kg)   LMP 01/01/1996   SpO2 98%   Breastfeeding No   BMI 27.82 kg/m²     Gen: Well, NAD, Alert, Oriented x 3   HEENT: EOMI, right upper eyelid with stye  Skin: no rash or jaundice     Neck: no significant lymphadenopathy or thyromegaly  Lungs: CTA B w/out Rales/Wheezes/Rhonchi, Good respiratory effort   Heart: RRR, S1S2, w/out M/R/G, non-displaced PMI   Ext: No C/C/E Bilaterally. Lab Results   Component Value Date    WBC 4.8 11/05/2021    HGB 13.5 11/05/2021    HCT 41.0 11/05/2021     11/05/2021    CHOL 225 (H) 11/05/2021    TRIG 69 11/05/2021    HDL 70 (H) 11/05/2021    ALT 6 11/05/2021    AST 20 11/05/2021     04/26/2022    K 4.7 04/26/2022     04/26/2022    CREATININE 0.75 04/26/2022    BUN 21 04/26/2022    CO2 26 04/26/2022    TSH 1.740 11/05/2021    INR 1.0 06/14/2017    LABA1C 5.6 11/05/2021         A&P   Diagnosis Orders   1. Hordeolum externum of right upper eyelid  tobramycin-dexamethasone (TOBRADEX) 0.3-0.1 % ophthalmic ointment   2. Vitamin D deficiency  Vitamin D 25 Hydroxy   3. Fatigue, unspecified type  CBC    TSH        Drops for eyes    Overall doing very well    Maintaining healthy weight     Continue as below  Current Outpatient Medications   Medication Sig Dispense Refill    tobramycin-dexamethasone (TOBRADEX) 0.3-0.1 % ophthalmic ointment 3 times daily.  3.5 g 0    Na Sulfate-K Sulfate-Mg Sulf (SUPREP) 17.5-3.13-1.6 GM/177ML SOLN solution As directed 354 mL 0    Cholecalciferol (VITAMIN D3) 1.25 MG (33845 UT) CAPS Take by mouth      thyroid (ARMOUR THYROID) 30 MG tablet Take 1 tablet by mouth daily (Patient taking differently: Take 45 mg by mouth daily ) 135 tablet 1    amLODIPine (NORVASC) 5 MG tablet Take 1 tablet by mouth daily (Patient taking differently: Take 2.5 mg by mouth 2 times daily ) 90 tablet 2    LUTEIN PO Take 40 mg by mouth daily      vitamin B-12 (CYANOCOBALAMIN) 1000 MCG tablet Take 1,000 mcg by mouth daily Takes 1500 mcg daily      Zinc Sulfate (ZINC 15 PO) Take 50 mg by mouth 2 times daily       Handicap Placard MISC by Does not apply route Exp 5 years 1 each 0    Multiple Vitamins-Minerals (THERAPEUTIC MULTIVITAMIN-MINERALS) tablet Take 1 tablet by mouth daily      b complex vitamins capsule Take 1 capsule by mouth daily      MAGNESIUM PO Take 500 mg by mouth       Glucosamine-Chondroitin (GLUCOSAMINE CHONDR COMPLEX PO) Take 2 tablets by mouth daily       aspirin 81 MG tablet Take 81 mg by mouth daily      L-Tryptophan 500 MG TABS Take 1,000 mg by mouth daily At hs       sotalol (BETAPACE) 80 MG tablet 1/2 tablet bid  3     No current facility-administered medications for this visit.          Ongoing f/u with cardiology       Brittany Benitez MD

## 2022-04-30 LAB — VITAMIN D 25-HYDROXY: 72.3 NG/ML

## 2022-05-09 DIAGNOSIS — I10 ESSENTIAL HYPERTENSION: ICD-10-CM

## 2022-05-09 RX ORDER — AMLODIPINE BESYLATE 5 MG/1
2.5 TABLET ORAL 2 TIMES DAILY
Qty: 90 TABLET | Refills: 2 | Status: SHIPPED | OUTPATIENT
Start: 2022-05-09

## 2022-05-20 LAB
ALBUMIN SERPL-MCNC: 4 G/DL (ref 3.5–4.6)
ALP BLD-CCNC: 76 U/L (ref 40–130)
ALT SERPL-CCNC: 11 U/L (ref 0–33)
ANION GAP SERPL CALCULATED.3IONS-SCNC: 9 MEQ/L (ref 9–15)
AST SERPL-CCNC: 25 U/L (ref 0–35)
BILIRUB SERPL-MCNC: 0.4 MG/DL (ref 0.2–0.7)
BILIRUBIN DIRECT: <0.2 MG/DL (ref 0–0.4)
BILIRUBIN, INDIRECT: NORMAL MG/DL (ref 0–0.6)
BUN BLDV-MCNC: 26 MG/DL (ref 8–23)
CALCIUM SERPL-MCNC: 9.8 MG/DL (ref 8.5–9.9)
CHLORIDE BLD-SCNC: 105 MEQ/L (ref 95–107)
CHOLESTEROL, TOTAL: 244 MG/DL (ref 0–199)
CO2: 27 MEQ/L (ref 20–31)
CREAT SERPL-MCNC: 0.89 MG/DL (ref 0.5–0.9)
GFR AFRICAN AMERICAN: >60
GFR NON-AFRICAN AMERICAN: >60
GLUCOSE BLD-MCNC: 82 MG/DL (ref 70–99)
HCT VFR BLD CALC: 39.4 % (ref 37–47)
HDLC SERPL-MCNC: 70 MG/DL (ref 40–59)
HEMOGLOBIN: 13 G/DL (ref 12–16)
LDL CHOLESTEROL CALCULATED: 159 MG/DL (ref 0–129)
MAGNESIUM: 2.4 MG/DL (ref 1.7–2.4)
MCH RBC QN AUTO: 29.3 PG (ref 27–31.3)
MCHC RBC AUTO-ENTMCNC: 33.1 % (ref 33–37)
MCV RBC AUTO: 88.5 FL (ref 82–100)
PDW BLD-RTO: 14 % (ref 11.5–14.5)
PLATELET # BLD: 198 K/UL (ref 130–400)
POTASSIUM SERPL-SCNC: 5.5 MEQ/L (ref 3.4–4.9)
RBC # BLD: 4.45 M/UL (ref 4.2–5.4)
SODIUM BLD-SCNC: 141 MEQ/L (ref 135–144)
TOTAL PROTEIN: 6.5 G/DL (ref 6.3–8)
TRIGL SERPL-MCNC: 77 MG/DL (ref 0–150)
WBC # BLD: 4.6 K/UL (ref 4.8–10.8)

## 2022-05-27 ENCOUNTER — COMMUNITY OUTREACH (OUTPATIENT)
Dept: FAMILY MEDICINE CLINIC | Age: 82
End: 2022-05-27

## 2022-06-05 DIAGNOSIS — E03.9 HYPOTHYROIDISM, UNSPECIFIED TYPE: ICD-10-CM

## 2022-06-06 RX ORDER — THYROID 30 MG/1
TABLET ORAL
Qty: 135 TABLET | Refills: 1 | Status: SHIPPED | OUTPATIENT
Start: 2022-06-06 | End: 2022-06-08 | Stop reason: SDUPTHER

## 2022-06-06 NOTE — TELEPHONE ENCOUNTER
8/9/2022 Dyan Lesches, MD [facility] & Return in about 6 months (around 8/8/2022).

## 2022-06-07 ENCOUNTER — PATIENT MESSAGE (OUTPATIENT)
Dept: FAMILY MEDICINE CLINIC | Age: 82
End: 2022-06-07

## 2022-06-07 DIAGNOSIS — E03.9 HYPOTHYROIDISM, UNSPECIFIED TYPE: ICD-10-CM

## 2022-06-08 RX ORDER — LEVOTHYROXINE AND LIOTHYRONINE 19; 4.5 UG/1; UG/1
45 TABLET ORAL DAILY
Qty: 135 TABLET | Refills: 1 | Status: SHIPPED | OUTPATIENT
Start: 2022-06-08

## 2022-06-08 NOTE — TELEPHONE ENCOUNTER
From: Leena Richardson  To: Dr. Sadiq Moseley: 2022 8:28 PM EDT  Subject: Need corrected prescription refill    Greetings, Dr. Bibiana Magana. For several years I have been taking 1 1/2 30 mg [place] Thyroid daily. I get 135 tablets for 90 days. Somewhere along the line someone changed the instructions to 1 tablet daily. I have asked before that this be corrected, but that has not happened. My refill has . I contacted [facility], and they contacted you to renew the Rx. Now they have refused to refill it because the dosage instructions do not match the number of tablets. Would you please instruct them to provide 135 tablets at 1 1/2 tablets daily? Thank you.

## 2022-06-14 ENCOUNTER — OFFICE VISIT (OUTPATIENT)
Dept: FAMILY MEDICINE CLINIC | Age: 82
End: 2022-06-14
Payer: MEDICARE

## 2022-06-14 VITALS
DIASTOLIC BLOOD PRESSURE: 72 MMHG | HEIGHT: 65 IN | BODY MASS INDEX: 27.96 KG/M2 | TEMPERATURE: 98.1 F | SYSTOLIC BLOOD PRESSURE: 128 MMHG | WEIGHT: 167.8 LBS | OXYGEN SATURATION: 98 % | HEART RATE: 92 BPM

## 2022-06-14 DIAGNOSIS — C18.4 MALIGNANT NEOPLASM OF TRANSVERSE COLON (HCC): ICD-10-CM

## 2022-06-14 DIAGNOSIS — R30.0 DYSURIA: Primary | ICD-10-CM

## 2022-06-14 DIAGNOSIS — E87.5 HYPERKALEMIA: ICD-10-CM

## 2022-06-14 DIAGNOSIS — R30.0 DYSURIA: ICD-10-CM

## 2022-06-14 DIAGNOSIS — N30.01 ACUTE CYSTITIS WITH HEMATURIA: ICD-10-CM

## 2022-06-14 LAB
ANION GAP SERPL CALCULATED.3IONS-SCNC: 12 MEQ/L (ref 9–15)
BILIRUBIN, POC: NORMAL
BLOOD URINE, POC: 25
BUN BLDV-MCNC: 30 MG/DL (ref 8–23)
CALCIUM SERPL-MCNC: 9.7 MG/DL (ref 8.5–9.9)
CHLORIDE BLD-SCNC: 100 MEQ/L (ref 95–107)
CLARITY, POC: NORMAL
CO2: 26 MEQ/L (ref 20–31)
COLOR, POC: NORMAL
CREAT SERPL-MCNC: 0.76 MG/DL (ref 0.5–0.9)
GFR AFRICAN AMERICAN: >60
GFR NON-AFRICAN AMERICAN: >60
GLUCOSE BLD-MCNC: 93 MG/DL (ref 70–99)
GLUCOSE URINE, POC: NORMAL
KETONES, POC: NORMAL
LEUKOCYTE EST, POC: 125
NITRITE, POC: NORMAL
PH, POC: 6
POTASSIUM SERPL-SCNC: 4.7 MEQ/L (ref 3.4–4.9)
PROTEIN, POC: NORMAL
SODIUM BLD-SCNC: 138 MEQ/L (ref 135–144)
SPECIFIC GRAVITY, POC: 1.01
UROBILINOGEN, POC: 0.2

## 2022-06-14 PROCEDURE — 1090F PRES/ABSN URINE INCON ASSESS: CPT | Performed by: FAMILY MEDICINE

## 2022-06-14 PROCEDURE — G8417 CALC BMI ABV UP PARAM F/U: HCPCS | Performed by: FAMILY MEDICINE

## 2022-06-14 PROCEDURE — 99213 OFFICE O/P EST LOW 20 MIN: CPT | Performed by: FAMILY MEDICINE

## 2022-06-14 PROCEDURE — 81003 URINALYSIS AUTO W/O SCOPE: CPT | Performed by: FAMILY MEDICINE

## 2022-06-14 PROCEDURE — 1123F ACP DISCUSS/DSCN MKR DOCD: CPT | Performed by: FAMILY MEDICINE

## 2022-06-14 PROCEDURE — 1036F TOBACCO NON-USER: CPT | Performed by: FAMILY MEDICINE

## 2022-06-14 PROCEDURE — G8427 DOCREV CUR MEDS BY ELIG CLIN: HCPCS | Performed by: FAMILY MEDICINE

## 2022-06-14 PROCEDURE — G8400 PT W/DXA NO RESULTS DOC: HCPCS | Performed by: FAMILY MEDICINE

## 2022-06-14 RX ORDER — NITROFURANTOIN 25; 75 MG/1; MG/1
100 CAPSULE ORAL 2 TIMES DAILY
Qty: 20 CAPSULE | Refills: 0 | Status: SHIPPED | OUTPATIENT
Start: 2022-06-14 | End: 2022-10-19 | Stop reason: SDUPTHER

## 2022-06-14 SDOH — ECONOMIC STABILITY: FOOD INSECURITY: WITHIN THE PAST 12 MONTHS, THE FOOD YOU BOUGHT JUST DIDN'T LAST AND YOU DIDN'T HAVE MONEY TO GET MORE.: NEVER TRUE

## 2022-06-14 SDOH — ECONOMIC STABILITY: FOOD INSECURITY: WITHIN THE PAST 12 MONTHS, YOU WORRIED THAT YOUR FOOD WOULD RUN OUT BEFORE YOU GOT MONEY TO BUY MORE.: NEVER TRUE

## 2022-06-14 ASSESSMENT — SOCIAL DETERMINANTS OF HEALTH (SDOH): HOW HARD IS IT FOR YOU TO PAY FOR THE VERY BASICS LIKE FOOD, HOUSING, MEDICAL CARE, AND HEATING?: NOT HARD AT ALL

## 2022-06-14 NOTE — PROGRESS NOTES
SUBJECTIVE:  Chief Complaint   Patient presents with    Dysuria     incontinance issues x 2-3 weeks - and frequency         Anthony Bowling is a 81 y.o. female who complains of urinary frequency, urgency and dysuria x 14 days, without flank pain, fever, chills, or abnormal vaginal discharge or bleeding. She does have some underlying OAB/incontinence     OBJECTIVE:   /72   Pulse 92   Temp 98.1 °F (36.7 °C)   Ht 5' 5\" (1.651 m)   Wt 167 lb 12.8 oz (76.1 kg)   LMP 01/01/1996   SpO2 98%   BMI 27.92 kg/m²   Appears well, in no apparent distress. The abdomen is soft without tenderness, guarding, mass, rebound or organomegaly. No CVA tenderness noted. Results for POC orders placed in visit on 06/14/22   POCT Urinalysis No Micro (Auto)   Result Value Ref Range    Color, UA neon yellow     Clarity, UA cloudy     Glucose, UA POC n     Bilirubin, UA n     Ketones, UA n     Spec Grav, UA 1.015     Blood, UA POC 25     pH, UA 6.0     Protein, UA POC n     Urobilinogen, UA 0.2     Leukocytes,      Nitrite, UA n          ASSESSMENT:    Diagnosis Orders   1. Dysuria  POCT Urinalysis No Micro (Auto)    Culture, Urine   2. Malignant neoplasm of transverse colon ([facility]           PLAN: Treatment per orders - also push fluids, may use Pyridium OTC prn. Call or return to clinic prn if these symptoms worsen or fail to improve as anticipated.      She has had consultation with Dr. Jackson Check previously     Chavez Garrett MD

## 2022-06-16 LAB
ORGANISM: ABNORMAL
URINE CULTURE, ROUTINE: ABNORMAL
URINE CULTURE, ROUTINE: ABNORMAL

## 2022-07-18 ENCOUNTER — TELEPHONE (OUTPATIENT)
Dept: FAMILY MEDICINE CLINIC | Age: 82
End: 2022-07-18

## 2022-07-18 ENCOUNTER — HOSPITAL ENCOUNTER (EMERGENCY)
Age: 82
Discharge: HOME | End: 2022-07-18
Attending: EMERGENCY MEDICINE
Payer: MEDICARE

## 2022-07-18 VITALS
SYSTOLIC BLOOD PRESSURE: 160 MMHG | HEIGHT: 65 IN | RESPIRATION RATE: 18 BRPM | OXYGEN SATURATION: 96 % | TEMPERATURE: 98.3 F | BODY MASS INDEX: 27.49 KG/M2 | HEART RATE: 84 BPM | DIASTOLIC BLOOD PRESSURE: 87 MMHG | WEIGHT: 165 LBS

## 2022-07-18 DIAGNOSIS — M79.10 MYALGIA: Primary | ICD-10-CM

## 2022-07-18 PROCEDURE — 93005 ELECTROCARDIOGRAM TRACING: CPT | Performed by: EMERGENCY MEDICINE

## 2022-07-18 PROCEDURE — 99283 EMERGENCY DEPT VISIT LOW MDM: CPT

## 2022-07-18 PROCEDURE — 6370000000 HC RX 637 (ALT 250 FOR IP): Performed by: EMERGENCY MEDICINE

## 2022-07-18 RX ORDER — ACETAMINOPHEN 325 MG/1
650 TABLET ORAL ONCE
Status: COMPLETED | OUTPATIENT
Start: 2022-07-18 | End: 2022-07-18

## 2022-07-18 RX ADMIN — ACETAMINOPHEN 650 MG: 325 TABLET ORAL at 10:17

## 2022-07-18 ASSESSMENT — PAIN - FUNCTIONAL ASSESSMENT: PAIN_FUNCTIONAL_ASSESSMENT: 0-10

## 2022-07-18 ASSESSMENT — PAIN SCALES - GENERAL
PAINLEVEL_OUTOF10: 2
PAINLEVEL_OUTOF10: 2

## 2022-07-18 ASSESSMENT — PAIN DESCRIPTION - LOCATION
LOCATION: JAW
LOCATION: NECK

## 2022-07-18 NOTE — TELEPHONE ENCOUNTER
Pt called stating that she was having some chest and jaw pain last night that then went to leg and arm. Pt states that the pain is better this AM, more of an ache. Pt aware that we would prefer her to be in ED. I placed her on hold to discuss with PCP, Briana Arriola. She agreed that pt should be in ED. Pt is home alone and declined squad, I explained that she should NOT be driving herself. We do not know if symptoms will start again and become more severe when driving. Pt agreed and stated that she would ask someone to take. Requested that I call ED. I said I would but will also be calling her back on her cell [phone]. Called ED and NO ANSWER. Called ED again, spoke to them and gave pt INFO. Called pt back and she states that she is heading out the door, neighbor is taking her.

## 2022-07-18 NOTE — ED PROVIDER NOTES
[address] [facility] ED  EMERGENCY DEPARTMENT ENCOUNTER      Pt Name: Francisco Nixon  MRN: [id number]  [place] 1940  Date of evaluation: 7/18/2022  Provider: Lizzie Elliott, [address]       Chief Complaint   Patient presents with    Jaw Pain     And left arm pain, and left leg pain (pt had covid in January)         HISTORY OF PRESENT ILLNESS   (Location/Symptom, Timing/Onset, Context/Setting, Quality, Duration, Modifying Factors, Severity)  Note limiting factors. Patient presents to ED by ambulance with complaint of muscle aches and weakness of left jaw, left arm, and left leg. She admits the muscle aches have improved since taking a tylenol last night. She called her cardiologist's office who recommended she present to the ED. She describes her aches as 2/10 pain that is not sharp or tender in nature. She is unable to reproduce the pain she was feeling last evening. She explains that Tylenol routinely improves her sporadic pain. Nursing Notes were reviewed. REVIEW OF SYSTEMS    (2-9 systems for level 4, 10 or more for level 5)     Review of Systems   Musculoskeletal:  Positive for myalgias (left arm, left leg, and left jaw/neck pain). Neurological:  Positive for weakness. All other systems reviewed and are negative. Except as noted above the remainder of the review of systems was reviewed and negative.        PAST MEDICAL HISTORY     Past Medical History:   Diagnosis Date    Atrial fibrillation (HCC)     no coumadin pt preference    Colon cancer ([facility]      H/O COLON AND UTERINE [id number]    Constipation     Fatigue     High potassium     History of blood transfusion     x2 1996    History of uterine cancer 4/5/2018    Hyperlipidemia     Hypertension     BENIGN ESSENTIAL    Hypothyroidism 1/23/2014    Obesity     Pacemaker     Sick sinus syndrome (HCC)     Sleep apnea     noncompliant with cpap    TIA (transient ischemic attack)     Uterine cancer ([facility] SURGICAL HISTORY       Past Surgical History:   Procedure Laterality Date    CARDIAC CATHETERIZATION      COLON SURGERY  2001    RESECTION    COLONOSCOPY  08-05-11,10/8/2013    RANDOM BIOPSIES W/COLITIS    HERNIA REPAIR      HYSTERECTOMY (CERVIX STATUS UNKNOWN)      PACEMAKER INSERTION      [place] COLON CA SCRN NOT  [address] IND N/A 7/11/2017    COLONOSCOPY performed by Froylan Munoz MD at [facility]      age 9   [facility] TUMOR REMOVAL      fatty tumor lower back         CURRENT MEDICATIONS       Previous Medications    AMLODIPINE (NORVASC) 5 MG TABLET    Take 0.5 tablets by mouth 2 times daily    ASPIRIN 81 MG TABLET    Take 81 mg by mouth daily    B COMPLEX VITAMINS CAPSULE    Take 1 capsule by mouth daily    CHOLECALCIFEROL (VITAMIN D3) 1.25 MG (77352 UT) CAPS    Take by mouth    GLUCOSAMINE-CHONDROITIN (GLUCOSAMINE CHONDR COMPLEX PO)    Take 2 tablets by mouth daily     HANDICAP PLACARD MISC    by Does not apply route Exp 5 years    L-TRYPTOPHAN 500 MG TABS    Take 1,000 mg by mouth daily At hs     LUTEIN PO    Take 40 mg by mouth daily    MAGNESIUM PO    Take 500 mg by mouth     MULTIPLE VITAMINS-MINERALS (THERAPEUTIC MULTIVITAMIN-MINERALS) TABLET    Take 1 tablet by mouth daily    NA SULFATE-K SULFATE-MG SULF (SUPREP) 17.5-3.13-1.6 GM/177ML SOLN SOLUTION    As directed    SOTALOL (BETAPACE) 80 MG TABLET    1/2 tablet bid    THYROID (ARMOUR THYROID) 30 MG TABLET    Take 1.5 tablets by mouth daily    VITAMIN B-12 (CYANOCOBALAMIN) 1000 MCG TABLET    Take 1,000 mcg by mouth daily Takes 1500 mcg daily    ZINC SULFATE (ZINC 15 PO)    Take 50 mg by mouth 2 times daily        ALLERGIES     Hydralazine, Influenza vaccines, Cortisone, Coumadin [warfarin sodium], Levothyroxine, and Pneumococcal vaccine    FAMILY HISTORY       Family History   Problem Relation Age of Onset    Heart Disease Maternal Grandmother     Emphysema Paternal Grandmother     Stroke Mother     High Blood Pressure Father     Breast Cancer Neg Hx     Cancer Neg Hx     Colon Cancer Neg Hx     Diabetes Neg Hx     Eclampsia Neg Hx     Hypertension Neg Hx     Ovarian Cancer Neg Hx      Labor Neg Hx     Spont Abortions Neg Hx           SOCIAL HISTORY       Social History     Socioeconomic History    Marital status: Single     Spouse name: None    Number of children: 0    Years of education: 12    Highest education level: Bachelor's degree (e.g., BA, AB, BS)   Occupational History    Occupation: [employer]   Tobacco Use    Smoking status: Never    Smokeless tobacco: Never   Vaping Use    Vaping Use: Never used   Substance and Sexual Activity    Alcohol use: No    Drug use: No    Sexual activity: Never   Social History Narrative    One story double wide mobile home    4 steps to get into the house. Working smoke detector and one CO2 detector     Independent with daily ADL's and general self care      Social Determinants of Health     Financial Resource Strain: Low Risk     Difficulty of Paying Living Expenses: Not hard at all   Food Insecurity: No Food Insecurity    Worried About Running Out of Food in the Last Year: Never true    [address] in the Last Year: Never true       SCREENINGS    [place] Coma Scale  Best Verbal Response: Oriented  Best Motor Response: Obeys commands          PHYSICAL EXAM    (up to 7 for level 4, 8 or more for level 5)     ED Triage Vitals [22 0919]   BP Temp Temp Source Heart Rate Resp SpO2 Height Weight   (!) 160/87 98.3 °F (36.8 °C) Temporal 84 18 96 % 5' 5\" (1.651 m) 165 lb (74.8 kg)       Physical Exam  Constitutional:       Appearance: Normal appearance. HENT:      Head: Normocephalic and atraumatic. Nose: Nose normal.      Mouth/Throat:      Mouth: Mucous membranes are moist.      Pharynx: Oropharynx is clear. Cardiovascular:      Rate and Rhythm: Normal rate and regular rhythm. Pulses: Normal pulses. Heart sounds: Normal heart sounds. Pulmonary:      Effort: Pulmonary effort is normal.      Breath sounds: Normal breath sounds. Abdominal:      General: Bowel sounds are normal.      Palpations: Abdomen is soft. Musculoskeletal:         General: No swelling, tenderness or signs of injury. Normal range of motion. Right upper arm: Normal.      Left upper arm: Normal. No tenderness or bony tenderness. Left hand: Normal. No tenderness. Normal range of motion. Normal strength. Cervical back: Normal range of motion and neck supple. No tenderness. Thoracic back: Normal.      Lumbar back: Normal.      Right upper leg: Normal.      Left upper leg: Normal. No swelling or tenderness. Right lower leg: Normal.      Left lower leg: Normal. No swelling or tenderness. No edema. Skin:     General: Skin is warm and dry. Neurological:      General: No focal deficit present. Mental Status: She is alert and oriented to person, place, and time. DIAGNOSTIC RESULTS     EKG:     Atrial paced rhythm with prolonged AV conduction 61 bpm left axis deviation no ischemia      RADIOLOGY:   N/a    Interpretation per the Radiologist below, if available at the time of this note:    No orders to display       LABS:  Labs Reviewed - No data to display    All other labs were within normal range or not returned as of this dictation. EMERGENCY DEPARTMENT COURSE and DIFFERENTIAL DIAGNOSIS/MDM:   Vitals:    Vitals:    07/18/22 0919   BP: (!) 160/87   Pulse: 84   Resp: 18   Temp: 98.3 °F (36.8 °C)   TempSrc: Temporal   SpO2: 96%   Weight: 165 lb (74.8 kg)   Height: 5' 5\" (1.651 m)       MDM  Number of Diagnoses or Management Options  Myalgia  Diagnosis management comments: Patient felt a good bit of pain relief with 250mg of Tylenol. Patient rx 650mg tablets of tylenol to alleviate the residual jaw/neck pain. EKG ordered to rule out any problems with pacemaker. Dr Redding Page MDM:  Patient came in with multiple complaints.   Patient has some pain near the left occipital area that she has had on and off since getting [facility] in January. She had pain in both shoulders. She had pain in her left leg. She took one 200 mg Tylenol per patient history last night which helped a little bit. Patient tells me that she called her primary care doctor's office and they told her to come by ambulance. There is nothing about her complaints that are cardiac. EKG was performed and did not show anything acute. I did give patient a dose of Tylenol 650 mg for her myalgias. Patient can follow-up in primary care doctor's office. Perhaps patient has long haulers from [facility] 3 myalgias of left side of body    DISPOSITION/PLAN   DISPOSITION    Patient rx 650mg oral tylenol for pain relief. Patient to follow up with PCP. Patient evaluated by Resident Tu Schofield and attending physician Dr. Nicolette Lamas. Patient deemed safe for discharge at this time. Discharge instructions provided to patient. Patient instructed to return if symptoms worsen or new symptoms present.      (Please note that portions of this note were completed with a voice recognition program.  Efforts were made to edit the dictations but occasionally words are mis-transcribed.)    Gloria Lu DPM (electronically signed)  Attending Emergency Physician      Ernesto Guidry, DO  07/18/22 1058       Ernesto Guidry, DO  07/18/22 235 [facility],   07/18/22 1724

## 2022-07-18 NOTE — ED TRIAGE NOTES
Pt's doctor's office sent pt over for left sided jaw pain, left arm pain, left leg, and left neck pain   Pt denies chest pain   Pt states her pain is a 2 out of 10  Pt has a steady gait  Pt is afebrile  Pt is A&O x 4  Pt states she had covid in January

## 2022-07-19 ENCOUNTER — HOSPITAL ENCOUNTER (OUTPATIENT)
Dept: CARDIOLOGY | Age: 82
Discharge: HOME | End: 2022-07-19
Payer: MEDICARE

## 2022-07-19 ENCOUNTER — CARE COORDINATION (OUTPATIENT)
Dept: CARE COORDINATION | Age: 82
End: 2022-07-19

## 2022-07-19 LAB
EKG ATRIAL RATE: 61 BPM
EKG P AXIS: 90 DEGREES
EKG P-R INTERVAL: 268 MS
EKG Q-T INTERVAL: 432 MS
EKG QRS DURATION: 92 MS
EKG QTC CALCULATION (BAZETT): 434 MS
EKG R AXIS: -47 DEGREES
EKG T AXIS: 60 DEGREES
EKG VENTRICULAR RATE: 61 BPM

## 2022-07-19 PROCEDURE — 93280 PM DEVICE PROGR EVAL DUAL: CPT

## 2022-07-19 PROCEDURE — 93010 ELECTROCARDIOGRAM REPORT: CPT | Performed by: INTERNAL MEDICINE

## 2022-07-19 NOTE — CARE COORDINATION
I called to complete an ER follow up and to discuss care coordination with Fabian Jerez. I have worked with Fabian Jerez in the past in care coordination  She is very knowledgeable about her health  she is always compliant with her healthcare. She tells me that she had tried to get into the office yesterday as she had left jaw, left arm, and left leg pain. She was directed to go to the ER  I did explain if there is something life threatening or even potentially life threatening she will always be directed to the ER  I also provided her with a list of symptoms in which she should go directly to the ER (Breathing issues, chest pain or chest related pain and bleeding)  She verbalizes understanding. She is concerned about the manner in which her \"emergency\" situation was handled   She states she had an EKG and was provided tylenol and sent home. She is taking tylenol 350 mg prn. I also spoke with her about calling Dr Lizz Nielsen office to make him aware of her symptoms and to let him decide and potential treatment or actions. She does not feel that she needs care coordination at this time. She does have my contact information and she will call me with any questions or concerns.

## 2022-07-21 DIAGNOSIS — E87.5 HYPERKALEMIA: ICD-10-CM

## 2022-07-21 LAB
ANION GAP SERPL CALCULATED.3IONS-SCNC: 11 MEQ/L (ref 9–15)
BUN BLDV-MCNC: 28 MG/DL (ref 8–23)
CALCIUM SERPL-MCNC: 9.9 MG/DL (ref 8.5–9.9)
CHLORIDE BLD-SCNC: 103 MEQ/L (ref 95–107)
CO2: 27 MEQ/L (ref 20–31)
CREAT SERPL-MCNC: 0.87 MG/DL (ref 0.5–0.9)
GFR AFRICAN AMERICAN: >60
GFR NON-AFRICAN AMERICAN: >60
GLUCOSE BLD-MCNC: 98 MG/DL (ref 70–99)
POTASSIUM SERPL-SCNC: 4.7 MEQ/L (ref 3.4–4.9)
SODIUM BLD-SCNC: 141 MEQ/L (ref 135–144)

## 2022-07-28 ENCOUNTER — HOSPITAL ENCOUNTER (OUTPATIENT)
Age: 82
Setting detail: OUTPATIENT SURGERY
Discharge: HOME | End: 2022-07-28
Attending: SPECIALIST | Admitting: SPECIALIST
Payer: MEDICARE

## 2022-07-28 ENCOUNTER — ANESTHESIA (OUTPATIENT)
Dept: ENDOSCOPY | Age: 82
End: 2022-07-28
Payer: MEDICARE

## 2022-07-28 ENCOUNTER — ANESTHESIA EVENT (OUTPATIENT)
Dept: ENDOSCOPY | Age: 82
End: 2022-07-28
Payer: MEDICARE

## 2022-07-28 VITALS
HEART RATE: 63 BPM | WEIGHT: 162 LBS | SYSTOLIC BLOOD PRESSURE: 168 MMHG | RESPIRATION RATE: 18 BRPM | TEMPERATURE: 97.3 F | OXYGEN SATURATION: 99 % | BODY MASS INDEX: 26.99 KG/M2 | HEIGHT: 65 IN | DIASTOLIC BLOOD PRESSURE: 70 MMHG

## 2022-07-28 DIAGNOSIS — Z85.038 PERSONAL HISTORY OF COLON CANCER: ICD-10-CM

## 2022-07-28 PROBLEM — D12.3 ADENOMATOUS POLYP OF TRANSVERSE COLON: Status: ACTIVE | Noted: 2022-07-28

## 2022-07-28 PROCEDURE — 88305 TISSUE EXAM BY PATHOLOGIST: CPT

## 2022-07-28 PROCEDURE — 2580000003 HC RX 258: Performed by: NURSE ANESTHETIST, CERTIFIED REGISTERED

## 2022-07-28 PROCEDURE — 45380 COLONOSCOPY AND BIOPSY: CPT | Performed by: SPECIALIST

## 2022-07-28 PROCEDURE — 2580000003 HC RX 258

## 2022-07-28 PROCEDURE — 3700000001 HC ADD 15 MINUTES (ANESTHESIA): Performed by: SPECIALIST

## 2022-07-28 PROCEDURE — 45385 COLONOSCOPY W/LESION REMOVAL: CPT | Performed by: SPECIALIST

## 2022-07-28 PROCEDURE — 3700000000 HC ANESTHESIA ATTENDED CARE: Performed by: SPECIALIST

## 2022-07-28 PROCEDURE — 2580000003 HC RX 258: Performed by: SPECIALIST

## 2022-07-28 PROCEDURE — 7100000011 HC PHASE II RECOVERY - ADDTL 15 MIN: Performed by: SPECIALIST

## 2022-07-28 PROCEDURE — 3609027000 HC COLONOSCOPY: Performed by: SPECIALIST

## 2022-07-28 PROCEDURE — 7100000010 HC PHASE II RECOVERY - FIRST 15 MIN: Performed by: SPECIALIST

## 2022-07-28 PROCEDURE — 2709999900 HC NON-CHARGEABLE SUPPLY: Performed by: SPECIALIST

## 2022-07-28 PROCEDURE — 6370000000 HC RX 637 (ALT 250 FOR IP): Performed by: SPECIALIST

## 2022-07-28 RX ORDER — SODIUM CHLORIDE 9 MG/ML
INJECTION, SOLUTION INTRAVENOUS
Status: DISCONTINUED
Start: 2022-07-28 | End: 2022-07-28 | Stop reason: HOSPADM

## 2022-07-28 RX ORDER — SODIUM CHLORIDE 9 MG/ML
INJECTION, SOLUTION INTRAVENOUS CONTINUOUS
Status: DISCONTINUED | OUTPATIENT
Start: 2022-07-28 | End: 2022-07-28 | Stop reason: HOSPADM

## 2022-07-28 RX ORDER — SODIUM CHLORIDE 9 MG/ML
INJECTION, SOLUTION INTRAVENOUS
Status: COMPLETED
Start: 2022-07-28 | End: 2022-07-28

## 2022-07-28 RX ORDER — MAGNESIUM HYDROXIDE 1200 MG/15ML
LIQUID ORAL PRN
Status: DISCONTINUED | OUTPATIENT
Start: 2022-07-28 | End: 2022-07-28 | Stop reason: ALTCHOICE

## 2022-07-28 RX ORDER — SODIUM CHLORIDE 9 MG/ML
INJECTION, SOLUTION INTRAVENOUS CONTINUOUS PRN
Status: DISCONTINUED | OUTPATIENT
Start: 2022-07-28 | End: 2022-07-28 | Stop reason: SDUPTHER

## 2022-07-28 RX ORDER — SIMETHICONE 20 MG/.3ML
EMULSION ORAL PRN
Status: DISCONTINUED | OUTPATIENT
Start: 2022-07-28 | End: 2022-07-28 | Stop reason: ALTCHOICE

## 2022-07-28 RX ORDER — PROPOFOL 10 MG/ML
INJECTION, EMULSION INTRAVENOUS CONTINUOUS PRN
Status: DISCONTINUED | OUTPATIENT
Start: 2022-07-28 | End: 2022-07-28 | Stop reason: SDUPTHER

## 2022-07-28 RX ADMIN — PROPOFOL 150 MCG/KG/MIN: 10 INJECTION, EMULSION INTRAVENOUS at 09:18

## 2022-07-28 RX ADMIN — SODIUM CHLORIDE: 9 INJECTION, SOLUTION INTRAVENOUS at 08:36

## 2022-07-28 RX ADMIN — SODIUM CHLORIDE: 9 INJECTION, SOLUTION INTRAVENOUS at 09:17

## 2022-07-28 ASSESSMENT — PAIN SCALES - GENERAL
PAINLEVEL_OUTOF10: 0

## 2022-07-28 ASSESSMENT — PAIN - FUNCTIONAL ASSESSMENT: PAIN_FUNCTIONAL_ASSESSMENT: 0-10

## 2022-07-28 NOTE — H&P
Patient Name: Valentino Pimentel  : 1940  MRN: [id number]  DATE: 22      ENDOSCOPY  History and Physical    Procedure:    [x] Diagnostic Colonoscopy       [] Screening Colonoscopy  [] EGD      [] ERCP      [] EUS       [] Other    [x] Previous office notes/History and Physical reviewed from the patients chart. Please see EMR for further details of HPI. I have examined the patient's status immediately prior to the procedure and:      Indications/HPI:    []Abdominal Pain  []Cancer- GI/Lung  []Fhx of colon CA/polyps  []History of Polyps  []Jordans   []Melena  []Abnormal Imaging  []Dysphagia    []Persistent Pneumonia  []Anemia  []Food Impaction  [x]History of Polyps  []GI Bleed  []Pulmonary nodule/Mass  []Change in bowel habits []Heartburn/Reflux  []Rectal Bleed (BRBPR)  []Chest Pain - Non Cardiac []Heme (+) Stoo  l[]Ulcers  []Constipation  []Hemoptysis   []Varices  []Diarrhea  []Hypoxemia  []Nausea/Vomiting  []Screening   []Crohns/Colitis  []Other: h/o colon cancer    Anesthesia:   [x] MAC [] Moderate Sedation   [] General   [] None     ROS: 12 pt Review of Symptoms was negative unless mentioned above    Medications:   Prior to Admission medications    Medication Sig Start Date End Date Taking?  Authorizing Provider   thyroid [facility] THYROID) 30 MG tablet Take 1.5 tablets by mouth daily 22   Christina Robbins MD   amLODIPine (NORVASC) 5 MG tablet Take 0.5 tablets by mouth 2 times daily 22   Christina Robbins MD   Na Sulfate-K Sulfate-Mg Sulf (SUPREP) 17.5-3.13-1.6 GM/177ML SOLN solution As directed 3/16/22   Tabatha Trevizo MD   Cholecalciferol (VITAMIN D3) 1.25 MG (95032 UT) CAPS Take by mouth    Historical Provider, MD   LUTEIN PO Take 40 mg by mouth daily    Historical Provider, MD   vitamin B-12 (CYANOCOBALAMIN) 1000 MCG tablet Take 1,000 mcg by mouth daily Takes 1500 mcg daily    Historical Provider, MD   Zinc Sulfate (ZINC 15 PO) Take 50 mg by mouth 2 times daily     Historical Provider, MD   Handicap Placard MISC by Does not apply route Exp 5 years 12/11/18   Jeremy Jimenez MD   Multiple Vitamins-Minerals (THERAPEUTIC MULTIVITAMIN-MINERALS) tablet Take 1 tablet by mouth daily    Historical Provider, MD   b complex vitamins capsule Take 1 capsule by mouth daily    Historical Provider, MD   MAGNESIUM PO Take 500 mg by mouth     Historical Provider, MD   Glucosamine-Chondroitin (GLUCOSAMINE CHONDR COMPLEX PO) Take 2 tablets by mouth daily     Historical Provider, MD   aspirin 81 MG tablet Take 81 mg by mouth daily    Historical Provider, MD   L-Tryptophan 500 MG TABS Take 1,000 mg by mouth daily At hs     Historical Provider, MD   sotalol (BETAPACE) 80 MG tablet 1/2 tablet bid 10/12/16   Historical Provider, MD       Allergies: Allergies   Allergen Reactions    Hydralazine Other (See Comments)     Ran fever and chills. Face got real hot. Influenza Vaccines Hives    Cortisone Other (See Comments)     Affects eyes- blurry vision    Coumadin [Warfarin Sodium]      Capillary swelling/inflammation    Levothyroxine     Pneumococcal Vaccine Hives     Pt states has an allergy to any vaccines that have egg embryo in them.         History of allergic reaction to anesthesia:  No    Past Medical History:  Past Medical History:   Diagnosis Date    Atrial fibrillation ([address]     no coumadin pt preference    Colon cancer ([address]      H/O COLON AND UTERINE 5676-8207    Constipation     Fatigue     High potassium     History of blood transfusion     x2 1996    History of uterine cancer 4/5/2018    Hyperlipidemia     Hypertension     BENIGN ESSENTIAL    Hypothyroidism 1/23/2014    Obesity     Pacemaker     Sick sinus syndrome (HCC)     Sleep apnea     noncompliant with cpap    TIA (transient ischemic attack)     Uterine cancer [facility])        Past Surgical History:  Past Surgical History:   Procedure Laterality Date    CARDIAC CATHETERIZATION      COLON SURGERY  2001    RESECTION    COLONOSCOPY  08-05-11,10/8/2013    RANDOM BIOPSIES W/COLITIS    HERNIA REPAIR      HYSTERECTOMY (CERVIX STATUS UNKNOWN)      PACEMAKER INSERTION      [place] COLON CA SCRN NOT HI RSK IND N/A 7/11/2017    COLONOSCOPY performed by Margo Dunham MD at [address]      age 9    TUMOR REMOVAL      fatty tumor lower back       Social History:  Social History     Tobacco Use    Smoking status: Never    Smokeless tobacco: Never   Vaping Use    Vaping Use: Never used   Substance Use Topics    Alcohol use: No    Drug use: No       Vital Signs:   Vitals:    07/28/22 0825   BP: (!) 175/82   Pulse: 94   Resp: 16   Temp: 97.3 °F (36.3 °C)   SpO2: 98%        Physical Exam:  Cardiac:  [x]WNL  []Comments:  Pulmonary:  [x]WNL   []Comments:   Neuro/Mental Status:  [x]WNL  []Comments:  Abdominal:  [x]WNL    []Comments:  Other:   []WNL  []Comments:    Informed Consent:  The risks and benefits of the procedure have been discussed with either the patient or if they cannot consent, their representative. Assessment:  Patient examined and appropriate for planned sedation and procedure. Plan:  Proceed with planned sedation and procedure as above.     Margo Dunham MD  9:14 AM

## 2022-07-28 NOTE — DISCHARGE INSTRUCTIONS
Lower Discharge Instructions    Patient Name: Rosa Maria Peck  Patient ID: [id number]  YOB: 1940  Procedure: Rosa Durbin  Referring Physician: [unfilled]  Procedure Date: 7/28/2022    Recommendations:  []   Follow-up appointment with family physician in 4 weeks. [x]   Colonoscopy recommended in 3years. []   Follow-up appointment with endoscopist in  Reports of your procedure and these recommendations have been sent to:  [unfilled]    Sedative medication given for procedures can slow your reaction time and coordination for many hours. If you receive medications, it is important for your safety to follow the instructions below for the remainder of the day:  BE TAKEN directly home from the center and rest quietly. DO NOT resume normal activities until tomorrow. Do NOT drive, return to work, or operate any machinery or power tools. Do NOT make any important personal or business decisions, sign any legal papers or perform any activity that depends on your full concentration power or mental judgement. Do NOT drink any alcohol or take nerve or sleeping drugs. They add to the effects of the medicine still present in your body.    [] (Checked if a biopsy or polyp removal was performed)  There is a slight risk of bleeding. If you had a biopsy or a polyp removed, we suggest that you follow the instructions below:  Do NOT take aspirin or similar anti-inflammatory medicine for ___ days. Do NOT exercise, jog, or do any heavy lifting or straining for 1 day. Potential common after effects and treatment following the procedure:  Mild abdominal pain, bloating, or excessive gas - rest, eat lightly, and use a heating pad. Redness and/or swelling where the IV was - apply heat and elevate. Symptoms to report to your physician:  Severe abdominal pain or bloating. Chills or fever above 101 degrees occurring within 24 hours after procedure. Large amounts of rectal bleeding that does not stop.  Small amount of rectal bleeding is not serious especially if hemorrhoids are present. Unable to keep down food or drink. IV site stays red and swollen for more than 2 days. In the case of an emergency, please go to the emergency room. If you are not having an emergency but are having some of the above symptoms please call the doctor's office at:  Dr. Mendel Babe [phone]   [email or web address]      I have read and understand the above instructions:            ____________________________         ____________________________  Patient or Patient Rep. Signature   Witness Signature      Date: 7/28/2022  Time:

## 2022-07-28 NOTE — ANESTHESIA POSTPROCEDURE EVALUATION
Department of Anesthesiology  Postprocedure Note    Patient: Ghassan Rockwell  MRN: [id number]  YOB: 1940  Date of evaluation: 7/28/2022      Procedure Summary     Date: 07/28/22 Room / Location: [facility] 01 / [facility]    Anesthesia Start: 3945 Anesthesia Stop: 0935    Procedure: COLORECTAL CANCER SCREENING, HIGH RISK Diagnosis:       Personal history of colon cancer      (history of colon cancer; Z85.038)    Surgeons: Abdifatah Jorge MD Responsible Provider: LANDY Navarro CRNA    Anesthesia Type: MAC ASA Status: 4          Anesthesia Type: No value filed.     Wali Phase I: Wali Score: 10    Wali Phase II:        Anesthesia Post Evaluation    Patient location during evaluation: PACU  Patient participation: complete - patient participated  Level of consciousness: awake and alert  Pain score: 0  Airway patency: patent  Nausea & Vomiting: no nausea and no vomiting  Complications: no  Cardiovascular status: blood pressure returned to baseline and hemodynamically stable  Respiratory status: acceptable, spontaneous ventilation, nonlabored ventilation and nasal cannula  Hydration status: euvolemic

## 2022-07-28 NOTE — ANESTHESIA PRE PROCEDURE
Department of Anesthesiology  Preprocedure Note       Name:  Liliam Shankar   Age:  81 y.o.  :  1940                                          MRN:  [id number]         Date:  2022      Surgeon: Miguel Angel Fraire):  Marlon Deutsch MD    Procedure: Procedure(s):  COLORECTAL CANCER SCREENING, HIGH RISK    Medications prior to admission:   Prior to Admission medications    Medication Sig Start Date End Date Taking?  Authorizing Provider   thyroid [facility] THYROID) 30 MG tablet Take 1.5 tablets by mouth daily 22   Naif Steward MD   amLODIPine (NORVASC) 5 MG tablet Take 0.5 tablets by mouth 2 times daily 22   Naif Steward MD   Na Sulfate-K Sulfate-Mg Sulf (SUPREP) 17.5-3.13-1.6 GM/177ML SOLN solution As directed 3/16/22   Marlon Deutsch MD   Cholecalciferol (VITAMIN D3) 1.25 MG (00665 UT) CAPS Take by mouth    Historical Provider, MD   LUTEIN PO Take 40 mg by mouth daily    Historical Provider, MD   vitamin B-12 (CYANOCOBALAMIN) 1000 MCG tablet Take 1,000 mcg by mouth daily Takes 1500 mcg daily    Historical Provider, MD   Zinc Sulfate (ZINC 15 PO) Take 50 mg by mouth 2 times daily     Historical Provider, MD   Handicap Placard MISC by Does not apply route Exp 5 years 18   Naif Steward MD   Multiple Vitamins-Minerals (THERAPEUTIC MULTIVITAMIN-MINERALS) tablet Take 1 tablet by mouth daily    Historical Provider, MD   b complex vitamins capsule Take 1 capsule by mouth daily    Historical Provider, MD   MAGNESIUM PO Take 500 mg by mouth     Historical Provider, MD   Glucosamine-Chondroitin (GLUCOSAMINE CHONDR COMPLEX PO) Take 2 tablets by mouth daily     Historical Provider, MD   aspirin 81 MG tablet Take 81 mg by mouth daily    Historical Provider, MD   L-Tryptophan 500 MG TABS Take 1,000 mg by mouth daily At hs     Historical Provider, MD   sotalol (BETAPACE) 80 MG tablet 1/2 tablet bid 10/12/16   Historical Provider, MD       Current medications:    Current Facility-Administered Medications   Medication Dose Route Frequency Provider Last Rate Last Admin    sodium chloride 0.9 % infusion             0.9 % sodium chloride infusion   IntraVENous Continuous Daren Fitting,  mL/hr at 07/28/22 0836 New Bag at 07/28/22 0836    sterile water for irrigation    PRN Daren Fitting, MD   500 mL at 07/28/22 0833    simethicone (MYLICON) 40 JR/2.4FE drops    PRN Daren MD Jon   40 mg at 07/28/22 0797       Allergies: Allergies   Allergen Reactions    Hydralazine Other (See Comments)     Ran fever and chills. Face got real hot.  Influenza Vaccines Hives    Cortisone Other (See Comments)     Affects eyes- blurry vision    Coumadin [Warfarin Sodium]      Capillary swelling/inflammation    Levothyroxine     Pneumococcal Vaccine Hives     Pt states has an allergy to any vaccines that have egg embryo in them. Problem List:    Patient Active Problem List   Diagnosis Code    Constipation K59.00    Fatigue R53.83    Atrial fibrillation (HCC) I48.91    Hypertension I10    Sleep apnea G47.30    Sick sinus syndrome ([address] I49.5    Pacemaker Z95.0    Hyperlipidemia E78.5    Hypothyroidism E03.9    Bunion of great toe of right foot M21.611    Hammer toe of right foot M20.41    Fatigue due to exposure T73. 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    Nuclear sclerotic cataract of both eyes H25.13    Posterior vitreous detachment of left eye H43.812    Punctate keratitis H16.149    Senile cataract, unspecified H25.9    Vitreous degeneration and detachment of both eyes H43.813       Past Medical History:        Diagnosis Date    Atrial fibrillation (HCC)     no coumadin pt preference    Colon cancer ([address]      H/O COLON AND UTERINE 5002-0649    Constipation     Fatigue     High potassium HGB 13.0 05/20/2022 08:02 AM    HCT 39.4 05/20/2022 08:02 AM    MCV 88.5 05/20/2022 08:02 AM    RDW 14.0 05/20/2022 08:02 AM     05/20/2022 08:02 AM       CMP:   Lab Results   Component Value Date/Time     07/21/2022 11:17 AM    K 4.7 07/21/2022 11:17 AM     07/21/2022 11:17 AM    CO2 27 07/21/2022 11:17 AM    BUN 28 07/21/2022 11:17 AM    CREATININE 0.87 07/21/2022 11:17 AM    GFRAA >60.0 07/21/2022 11:17 AM    LABGLOM >60.0 07/21/2022 11:17 AM    GLUCOSE 98 07/21/2022 11:17 AM    GLUCOSE 83 03/09/2012 07:10 AM    PROT 6.5 05/20/2022 08:02 AM    CALCIUM 9.9 07/21/2022 11:17 AM    BILITOT 0.4 05/20/2022 08:02 AM    ALKPHOS 76 05/20/2022 08:02 AM    AST 25 05/20/2022 08:02 AM    ALT 11 05/20/2022 08:02 AM       POC Tests: No results for input(s): POCGLU, POCNA, POCK, POCCL, POCBUN, POCHEMO, POCHCT in the last 72 hours. Coags:   Lab Results   Component Value Date/Time    PROTIME 10.3 06/14/2017 10:45 AM    INR 1.0 06/14/2017 10:45 AM    APTT 25.7 06/14/2017 10:45 AM       HCG (If Applicable): No results found for: PREGTESTUR, PREGSERUM, HCG, HCGQUANT     ABGs: No results found for: PHART, PO2ART, ZQQ5KWA, IUR5IWY, BEART, Q8AQXWCJ     Type & Screen (If Applicable):  No results found for: LABABO, [address]    Drug/Infectious Status (If Applicable):  No results found for: HIV, HEPCAB    COVID-19 Screening (If Applicable):   Lab Results   Component Value Date/Time    COVID19 Detected 01/12/2022 09:36 AM    COVID19 Not Detected 10/22/2021 01:20 PM           Anesthesia Evaluation  Patient summary reviewed and Nursing notes reviewed  Airway: Mallampati: II  TM distance: >3 FB   Neck ROM: full  Mouth opening: > = 3 FB   Dental: normal exam         Pulmonary:                              Cardiovascular:                      Neuro/Psych:               GI/Hepatic/Renal:             Endo/Other:                     Abdominal:             Vascular:           Other Findings:           Anesthesia Plan      MAC ASA 4             Anesthetic plan and risks discussed with patient. Use of blood products discussed with patient whom consented to blood products.                      LANDY Bone - CRNA   7/28/2022

## 2022-08-09 ENCOUNTER — OFFICE VISIT (OUTPATIENT)
Dept: FAMILY MEDICINE CLINIC | Age: 82
End: 2022-08-09
Payer: MEDICARE

## 2022-08-09 VITALS
OXYGEN SATURATION: 98 % | TEMPERATURE: 98.3 F | HEIGHT: 65 IN | DIASTOLIC BLOOD PRESSURE: 68 MMHG | BODY MASS INDEX: 27.82 KG/M2 | WEIGHT: 167 LBS | HEART RATE: 71 BPM | SYSTOLIC BLOOD PRESSURE: 130 MMHG

## 2022-08-09 DIAGNOSIS — E03.9 HYPOTHYROIDISM, UNSPECIFIED TYPE: ICD-10-CM

## 2022-08-09 DIAGNOSIS — E87.5 HYPERKALEMIA: ICD-10-CM

## 2022-08-09 DIAGNOSIS — N39.46 MIXED INCONTINENCE: ICD-10-CM

## 2022-08-09 DIAGNOSIS — Z00.00 MEDICARE ANNUAL WELLNESS VISIT, SUBSEQUENT: Primary | ICD-10-CM

## 2022-08-09 DIAGNOSIS — C18.4 MALIGNANT NEOPLASM OF TRANSVERSE COLON (HCC): ICD-10-CM

## 2022-08-09 PROCEDURE — G0439 PPPS, SUBSEQ VISIT: HCPCS | Performed by: FAMILY MEDICINE

## 2022-08-09 PROCEDURE — 1123F ACP DISCUSS/DSCN MKR DOCD: CPT | Performed by: FAMILY MEDICINE

## 2022-08-09 RX ORDER — OXYBUTYNIN CHLORIDE 5 MG/1
5 TABLET, EXTENDED RELEASE ORAL DAILY
Qty: 30 TABLET | Refills: 3 | Status: SHIPPED | OUTPATIENT
Start: 2022-08-09 | End: 2022-09-02 | Stop reason: SDUPTHER

## 2022-08-09 ASSESSMENT — PATIENT HEALTH QUESTIONNAIRE - PHQ9
2. FEELING DOWN, DEPRESSED OR HOPELESS: 0
SUM OF ALL RESPONSES TO PHQ QUESTIONS 1-9: 0
SUM OF ALL RESPONSES TO PHQ QUESTIONS 1-9: 0
1. LITTLE INTEREST OR PLEASURE IN DOING THINGS: 0
SUM OF ALL RESPONSES TO PHQ QUESTIONS 1-9: 0
SUM OF ALL RESPONSES TO PHQ QUESTIONS 1-9: 0
SUM OF ALL RESPONSES TO PHQ9 QUESTIONS 1 & 2: 0

## 2022-08-09 ASSESSMENT — LIFESTYLE VARIABLES
HOW MANY STANDARD DRINKS CONTAINING ALCOHOL DO YOU HAVE ON A TYPICAL DAY: PATIENT DOES NOT DRINK
HOW OFTEN DO YOU HAVE A DRINK CONTAINING ALCOHOL: NEVER

## 2022-08-09 NOTE — PROGRESS NOTES
Medicare Annual Wellness Visit    Aaron Strickland is here for Medicare AWV    Assessment & Plan   Medicare annual wellness visit, subsequent  Malignant neoplasm of transverse colon [facility])    Recommendations for Preventive Services Due: see orders and patient instructions/AVS.  Recommended screening schedule for the next 5-10 years is provided to the patient in written form: see Patient Instructions/AVS.     Return for Medicare Annual Wellness Visit in 1 year. Subjective     Chief Complaint   Patient presents with    Medicare AWV         Patient's complete Health Risk Assessment and screening values have been reviewed and are found in Flowsheets. The following problems were reviewed today and where indicated follow up appointments were made and/or referrals ordered.     Positive Risk Factor Screenings with Interventions:    Fall Risk:  Do you feel unsteady or are you worried about falling? : (!) yes  2 or more falls in past year?: no  Fall with injury in past year?: no   Fall Risk Interventions:    Home safety tips provided            General Health and ACP:  General  In general, how would you say your health is?: Fair  In the past 7 days, have you experienced any of the following: New or Increased Pain, New or Increased Fatigue, Loneliness, Social Isolation, Stress or Anger?: (!) Yes  Select all that apply: (!) Stress  Do you get the social and emotional support that you need?: Yes  Do you have a Living Will?: Yes    Advance Directives       Power of  Living Will ACP-Advance Directive ACP-Power of     Not on File Filed on 07/12/17 Filed Not on File          General Health Risk Interventions:  Has some stress because of water leak in the home     Hearing/Vision:  Do you or your family notice any trouble with your hearing that hasn't been managed with hearing aids?: (!) Yes  Do you have difficulty driving, watching TV, or doing any of your daily activities because of your eyesight?: No  Have you had an 1500 mcg daily Yes Historical Provider, MD   Zinc Sulfate (ZINC 15 PO) Take 50 mg by mouth 2 times daily  Yes Historical Provider, MD   Handicap Placard MISC by Does not apply route Exp 5 years Yes Rc Scanlon MD   Multiple Vitamins-Minerals (THERAPEUTIC MULTIVITAMIN-MINERALS) tablet Take 1 tablet by mouth daily Yes Historical Provider, MD   b complex vitamins capsule Take 1 capsule by mouth daily Yes Historical Provider, MD   MAGNESIUM PO Take 500 mg by mouth  Yes Historical Provider, MD   Glucosamine-Chondroitin (GLUCOSAMINE CHONDR COMPLEX PO) Take 2 tablets by mouth daily  Yes Historical Provider, MD   aspirin 81 MG tablet Take 81 mg by mouth daily Yes Historical Provider, MD   L-Tryptophan 500 MG TABS Take 1,000 mg by mouth daily At hs  Yes Historical Provider, MD   sotalol (BETAPACE) 80 MG tablet 1/2 tablet bid Yes Historical Provider, MD [place] (Including outside providers/suppliers regularly involved in providing care):   Patient Care Team:  Rc Scanlon MD as PCP - David Davalos MD as PCP - [facility] Empaneled Provider  Radha Meng MD as Consulting Physician (Oncology)  Marques Slater MD (Cardiology)     Reviewed and updated this visit:  Tobacco  Allergies  Meds  Problems  Med Hx  Surg Hx  Soc Hx  Fam Hx

## 2022-08-09 NOTE — PATIENT INSTRUCTIONS
Personalized Preventive Plan for Maninder Stanley - 8/9/2022  Medicare offers a range of preventive health benefits. Some of the tests and screenings are paid in full while other may be subject to a deductible, co-insurance, and/or copay. Some of these benefits include a comprehensive review of your medical history including lifestyle, illnesses that may run in your family, and various assessments and screenings as appropriate. After reviewing your medical record and screening and assessments performed today your provider may have ordered immunizations, labs, imaging, and/or referrals for you. A list of these orders (if applicable) as well as your Preventive Care list are included within your After Visit Summary for your review. Other Preventive Recommendations:    A preventive eye exam performed by an eye specialist is recommended every 1-2 years to screen for glaucoma; cataracts, macular degeneration, and other eye disorders. A preventive dental visit is recommended every 6 months. Try to get at least 150 minutes of exercise per week or 10,000 steps per day on a pedometer . Order or download the FREE \"Exercise & Physical Activity: Your Everyday Guide\" from [employer]. Call 4-185.899.6860 or search [employer] online. You need 6822-8117 mg of calcium and 7848-3152 IU of vitamin D per day. It is possible to meet your calcium requirement with diet alone, but a vitamin D supplement is usually necessary to meet this goal.  When exposed to the sun, use a sunscreen that protects against both UVA and UVB radiation with an SPF of 30 or greater. Reapply every 2 to 3 hours or after sweating, drying off with a towel, or swimming. Always wear a seat belt when traveling in a car. Always wear a helmet when riding a bicycle or motorcycle.

## 2022-08-11 ENCOUNTER — OFFICE VISIT (OUTPATIENT)
Dept: GASTROENTEROLOGY | Age: 82
End: 2022-08-11
Payer: MEDICARE

## 2022-08-11 VITALS — BODY MASS INDEX: 27.82 KG/M2 | WEIGHT: 167 LBS | OXYGEN SATURATION: 98 % | HEIGHT: 65 IN | HEART RATE: 66 BPM

## 2022-08-11 DIAGNOSIS — Z85.038 HISTORY OF COLON CANCER: ICD-10-CM

## 2022-08-11 DIAGNOSIS — D12.4 ADENOMATOUS POLYP OF DESCENDING COLON: Primary | ICD-10-CM

## 2022-08-11 PROCEDURE — 1123F ACP DISCUSS/DSCN MKR DOCD: CPT | Performed by: SPECIALIST

## 2022-08-11 PROCEDURE — G8400 PT W/DXA NO RESULTS DOC: HCPCS | Performed by: SPECIALIST

## 2022-08-11 PROCEDURE — G8427 DOCREV CUR MEDS BY ELIG CLIN: HCPCS | Performed by: SPECIALIST

## 2022-08-11 PROCEDURE — G8417 CALC BMI ABV UP PARAM F/U: HCPCS | Performed by: SPECIALIST

## 2022-08-11 PROCEDURE — 1036F TOBACCO NON-USER: CPT | Performed by: SPECIALIST

## 2022-08-11 PROCEDURE — 1090F PRES/ABSN URINE INCON ASSESS: CPT | Performed by: SPECIALIST

## 2022-08-11 PROCEDURE — 99212 OFFICE O/P EST SF 10 MIN: CPT | Performed by: SPECIALIST

## 2022-08-11 ASSESSMENT — ENCOUNTER SYMPTOMS
RESPIRATORY NEGATIVE: 1
BLOOD IN STOOL: 0
GASTROINTESTINAL NEGATIVE: 1
CONSTIPATION: 0
VOMITING: 0
NAUSEA: 0
ABDOMINAL DISTENTION: 0
RECTAL PAIN: 0
ABDOMINAL PAIN: 0
ANAL BLEEDING: 0
EYES NEGATIVE: 1
DIARRHEA: 0

## 2022-08-11 NOTE — PROGRESS NOTES
Gastroenterology Clinic Follow up Visit    Maninder Stanley  [id number]  Chief Complaint   Patient presents with    Follow-up       HPI and A/P at last visit summarized below:  Patient is here for follow-up, colonoscopy showed diverticulosis colon polyps and status post bowel resection, biopsy of the polyps were all tubular adenoma. Feels good, no change in bowel habits. No bleeding    Review of Systems   Constitutional: Negative. HENT: Negative. Eyes: Negative. Respiratory: Negative. Cardiovascular: Negative. Gastrointestinal: Negative. Negative for abdominal distention, abdominal pain, anal bleeding, blood in stool, constipation, diarrhea, nausea, rectal pain and vomiting. No GI issues   Endocrine: Negative. Genitourinary: Negative. Musculoskeletal: Negative. Skin: Negative. Allergic/Immunologic: Negative for food allergies. Neurological: Negative. Hematological: Negative. Psychiatric/Behavioral: Negative. Past medical history, past surgical history, medication list, social and familyhistory reviewed    Pulse 66, height 5' 5\" (1.651 m), weight 167 lb (75.8 kg), last menstrual period 01/01/1996, SpO2 98 %, not currently breastfeeding. Physical Exam  Constitutional:       Appearance: She is well-developed. HENT:      Head: Normocephalic and atraumatic. Eyes:      Conjunctiva/sclera: Conjunctivae normal.      Pupils: Pupils are equal, round, and reactive to light. Cardiovascular:      Rate and Rhythm: Normal rate. Pulmonary:      Effort: Pulmonary effort is normal.   Abdominal:      General: Bowel sounds are normal.      Palpations: Abdomen is soft. Comments: Soft, nontender no palpable mass surgical scar. Musculoskeletal:         General: Normal range of motion. Cervical back: Normal range of motion. Skin:     General: Skin is warm. Neurological:      Mental Status: She is alert.        Laboratory, Pathology, Radiology reviewed in detail with relevantimportant investigations summarized below:    No results for input(s): WBC, HGB, HCT, MCV, PLT in the last 720 hours. Lab Results   Component Value Date    ALT 11 05/20/2022    AST 25 05/20/2022    ALKPHOS 76 05/20/2022    BILITOT 0.4 05/20/2022     No results found. Endoscopic investigations:     Assessment and Plan:  Herson Grissom 81 y.o. female for follow up. History of colon cancer status postresection in 2001, recent colonoscopy showed few small polyps and they were tubular adenoma, and diverticulosis. Surveillance colonoscopy after 3 to 5 years depending on the functional status of the patient. No follow-ups on file. Tamanna Hoskins MD   StaffGastroenterologist  [facility]    Please note this report has been partially produced using speech recognitionsoftware  and may cause contain errors related to that system including grammar, punctuation and spelling as well as words andphrases that may seem inappropriate. If there are questions or concerns please feel free to contact me to clarify.

## 2022-09-02 ENCOUNTER — PATIENT MESSAGE (OUTPATIENT)
Dept: FAMILY MEDICINE CLINIC | Age: 82
End: 2022-09-02

## 2022-09-02 DIAGNOSIS — N39.46 MIXED INCONTINENCE: ICD-10-CM

## 2022-09-02 RX ORDER — OXYBUTYNIN CHLORIDE 5 MG/1
5 TABLET, EXTENDED RELEASE ORAL DAILY
Qty: 90 TABLET | Refills: 1 | Status: SHIPPED | OUTPATIENT
Start: 2022-09-02

## 2022-09-02 NOTE — TELEPHONE ENCOUNTER
From: Jermaine Lundberg  To: Dr. Sales Perfect: 9/2/2022 10:52 AM EDT  Subject: Oxybutynin 5 mg    Minna, Dr. Alyson Avitia. I have taken the Oxybutynin for three weeks and happily have seen some lessening of the incontinence issues: During the night, I get about two hours sleep at a time, instead of being up every one and a half as before, and the race (for the most part) to get to the bathroom is less urgent. In the daytime I have the every two hour time and sometimes longer. So I am relieved in more ways than one! Would you please send [facility] Pierson a prescription for 90 tablets as I will be due for a refill soon. Thank you many times over for prescribing this. It isn't great improvement, but maybe I'll see a bit more improvement. I'm thankful for what has happened.

## 2022-09-28 DIAGNOSIS — E87.5 HYPERKALEMIA: ICD-10-CM

## 2022-09-28 LAB
ANION GAP SERPL CALCULATED.3IONS-SCNC: 7 MEQ/L (ref 9–15)
BUN BLDV-MCNC: 29 MG/DL (ref 8–23)
CALCIUM SERPL-MCNC: 9.8 MG/DL (ref 8.5–9.9)
CHLORIDE BLD-SCNC: 102 MEQ/L (ref 95–107)
CO2: 30 MEQ/L (ref 20–31)
CREAT SERPL-MCNC: 0.81 MG/DL (ref 0.5–0.9)
GFR AFRICAN AMERICAN: >60
GFR NON-AFRICAN AMERICAN: >60
GLUCOSE BLD-MCNC: 81 MG/DL (ref 70–99)
POTASSIUM SERPL-SCNC: 5 MEQ/L (ref 3.4–4.9)
SODIUM BLD-SCNC: 139 MEQ/L (ref 135–144)

## 2022-10-19 ENCOUNTER — OFFICE VISIT (OUTPATIENT)
Dept: FAMILY MEDICINE CLINIC | Age: 82
End: 2022-10-19
Payer: MEDICARE

## 2022-10-19 VITALS
WEIGHT: 167 LBS | BODY MASS INDEX: 27.82 KG/M2 | DIASTOLIC BLOOD PRESSURE: 70 MMHG | HEART RATE: 68 BPM | OXYGEN SATURATION: 98 % | TEMPERATURE: 98.1 F | HEIGHT: 65 IN | SYSTOLIC BLOOD PRESSURE: 124 MMHG

## 2022-10-19 DIAGNOSIS — R35.0 FREQUENCY OF URINATION: ICD-10-CM

## 2022-10-19 DIAGNOSIS — N30.01 ACUTE CYSTITIS WITH HEMATURIA: Primary | ICD-10-CM

## 2022-10-19 DIAGNOSIS — C18.4 MALIGNANT NEOPLASM OF TRANSVERSE COLON (HCC): ICD-10-CM

## 2022-10-19 LAB
BILIRUBIN, POC: ABNORMAL
BLOOD URINE, POC: ABNORMAL
CLARITY, POC: CLEAR
COLOR, POC: ABNORMAL
GLUCOSE URINE, POC: ABNORMAL
KETONES, POC: ABNORMAL
LEUKOCYTE EST, POC: ABNORMAL
NITRITE, POC: ABNORMAL
PH, POC: 6
PROTEIN, POC: ABNORMAL
SPECIFIC GRAVITY, POC: 1.02
UROBILINOGEN, POC: 0.2

## 2022-10-19 PROCEDURE — G8417 CALC BMI ABV UP PARAM F/U: HCPCS | Performed by: PHYSICIAN ASSISTANT

## 2022-10-19 PROCEDURE — G8427 DOCREV CUR MEDS BY ELIG CLIN: HCPCS | Performed by: PHYSICIAN ASSISTANT

## 2022-10-19 PROCEDURE — 81002 URINALYSIS NONAUTO W/O SCOPE: CPT | Performed by: PHYSICIAN ASSISTANT

## 2022-10-19 PROCEDURE — 1036F TOBACCO NON-USER: CPT | Performed by: PHYSICIAN ASSISTANT

## 2022-10-19 PROCEDURE — G8400 PT W/DXA NO RESULTS DOC: HCPCS | Performed by: PHYSICIAN ASSISTANT

## 2022-10-19 PROCEDURE — 99213 OFFICE O/P EST LOW 20 MIN: CPT | Performed by: PHYSICIAN ASSISTANT

## 2022-10-19 PROCEDURE — 1090F PRES/ABSN URINE INCON ASSESS: CPT | Performed by: PHYSICIAN ASSISTANT

## 2022-10-19 PROCEDURE — G8483 FLU IMM NO ADMIN DOC REA: HCPCS | Performed by: PHYSICIAN ASSISTANT

## 2022-10-19 PROCEDURE — 1123F ACP DISCUSS/DSCN MKR DOCD: CPT | Performed by: PHYSICIAN ASSISTANT

## 2022-10-19 RX ORDER — SULFAMETHOXAZOLE AND TRIMETHOPRIM 800; 160 MG/1; MG/1
1 TABLET ORAL 2 TIMES DAILY
Qty: 20 TABLET | Refills: 0 | Status: SHIPPED | OUTPATIENT
Start: 2022-10-19 | End: 2022-10-29

## 2022-10-19 ASSESSMENT — ENCOUNTER SYMPTOMS
TROUBLE SWALLOWING: 0
SHORTNESS OF BREATH: 0
DIARRHEA: 0
CHEST TIGHTNESS: 0
COUGH: 0
ABDOMINAL PAIN: 0
BACK PAIN: 0
VOMITING: 0
SINUS PRESSURE: 0

## 2022-10-19 ASSESSMENT — PATIENT HEALTH QUESTIONNAIRE - PHQ9
SUM OF ALL RESPONSES TO PHQ9 QUESTIONS 1 & 2: 0
SUM OF ALL RESPONSES TO PHQ QUESTIONS 1-9: 0
1. LITTLE INTEREST OR PLEASURE IN DOING THINGS: 0
SUM OF ALL RESPONSES TO PHQ QUESTIONS 1-9: 0
2. FEELING DOWN, DEPRESSED OR HOPELESS: 0

## 2022-10-19 NOTE — PROGRESS NOTES
Subjective:      Patient ID: Anahy Bobby is a 81 y.o. female who presents today for:  Chief Complaint   Patient presents with    Urinary Tract Infection     This AM frequency and burning started. Nothing OTC pt states that she can get anything that is too expensive        HPI   81year old female who presents with complaints of burning with urination and urinary frequency since this morning. Past Medical History:   Diagnosis Date    Atrial fibrillation ([address]     no coumadin pt preference    Colon cancer ([address]      H/O COLON AND UTERINE [id number]    Constipation     Fatigue     High potassium     History of blood transfusion     x2 1996    History of uterine cancer 4/5/2018    Hyperlipidemia     Hypertension     BENIGN ESSENTIAL    Hypothyroidism 1/23/2014    Obesity     Pacemaker     Sick sinus syndrome (HCC)     Sleep apnea     noncompliant with cpap    TIA (transient ischemic attack)     Uterine cancer [facility])      Past Surgical History:   Procedure Laterality Date    CARDIAC CATHETERIZATION      COLON SURGERY  2001    RESECTION    COLONOSCOPY  08-05-11,10/8/2013    RANDOM BIOPSIES W/COLITIS    COLONOSCOPY N/A 7/28/2022    COLORECTAL CANCER SCREENING, HIGH RISK performed by Porsha Ma MD at [address] (CERVIX STATUS UNKNOWN)      PACEMAKER INSERTION      AK COLON CA SCRN NOT  [address] IND N/A 7/11/2017    [facility]      age 9    TUMOR REMOVAL      fatty tumor lower back     Social History     Socioeconomic History    Marital status: Single     Spouse name: Not on file    Number of children: 0    Years of education: 16    Highest education level:  Bachelor's degree (e.g., BA, AB, BS)   Occupational History    Occupation: [employer]   Tobacco Use    Smoking status: Never    Smokeless tobacco: Never   Vaping Use    Vaping Use: Never used   Substance and Sexual Activity    Alcohol use: No    Drug use: No    Sexual activity: Never   Other Topics Concern    Not on file   Social History Narrative    One story double wide mobile home    4 steps to get into the house. Working smoke detector and one CO2 detector     Independent with daily ADL's and general self care      Social Determinants of Health     Financial Resource Strain: Low Risk     Difficulty of Paying Living Expenses: Not hard at all   Food Insecurity: No Food Insecurity    Worried About Running Out of Food in the Last Year: Never true    Ran Out of Food in the Last Year: Never true   Transportation Needs: Not on file   Physical Activity: Insufficiently Active    Days of Exercise per Week: 3 days    Minutes of Exercise per Session: 30 min   Stress: Not on file   Social Connections: Not on file   Intimate Partner Violence: Not on file   Housing Stability: Not on file     Family History   Problem Relation Age of Onset    Heart Disease Maternal Grandmother     Emphysema Paternal Grandmother     Stroke Mother     High Blood Pressure Father     Breast Cancer Neg Hx     Cancer Neg Hx     Colon Cancer Neg Hx     Diabetes Neg Hx     Eclampsia Neg Hx     Hypertension Neg Hx     Ovarian Cancer Neg Hx      Labor Neg Hx     Spont Abortions Neg Hx      Allergies   Allergen Reactions    Hydralazine Other (See Comments)     Ran fever and chills. Face got real hot. Influenza Vaccines Hives    Cortisone Other (See Comments)     Affects eyes- blurry vision    Coumadin [Warfarin Sodium]      Capillary swelling/inflammation    Levothyroxine     Pneumococcal Vaccine Hives     Pt states has an allergy to any vaccines that have egg embryo in them.      Current Outpatient Medications   Medication Sig Dispense Refill    sulfamethoxazole-trimethoprim (BACTRIM DS) 800-160 MG per tablet Take 1 tablet by mouth 2 times daily for 10 days 20 tablet 0    thyroid (ARMOUR THYROID) 30 MG tablet Take 1.5 tablets by mouth daily 135 tablet 1    amLODIPine (NORVASC) 5 MG tablet Take 0.5 tablets by mouth 2 times daily (Patient taking differently: Take 5 mg by mouth 2 times daily) 90 tablet 2    Cholecalciferol (VITAMIN D3) 1.25 MG (51790 UT) CAPS Take by mouth      LUTEIN PO Take 40 mg by mouth daily      vitamin B-12 (CYANOCOBALAMIN) 1000 MCG tablet Take 1,000 mcg by mouth daily Takes 1500 mcg daily      Zinc Sulfate (ZINC 15 PO) Take 50 mg by mouth 2 times daily       Handicap Placard MISC by Does not apply route Exp 5 years 1 each 0    Multiple Vitamins-Minerals (THERAPEUTIC MULTIVITAMIN-MINERALS) tablet Take 1 tablet by mouth daily      b complex vitamins capsule Take 1 capsule by mouth daily      MAGNESIUM PO Take 500 mg by mouth       Glucosamine-Chondroitin (GLUCOSAMINE CHONDR COMPLEX PO) Take 2 tablets by mouth daily       aspirin 81 MG tablet Take 81 mg by mouth daily      L-Tryptophan 500 MG TABS Take 1,000 mg by mouth daily At hs       sotalol (BETAPACE) 80 MG tablet 1/2 tablet bid  3    oxybutynin (DITROPAN XL) 5 MG extended release tablet Take 1 tablet by mouth daily (Patient not taking: Reported on 10/19/2022) 90 tablet 1     No current facility-administered medications for this visit. Review of Systems   Constitutional:  Negative for activity change, appetite change, chills, fever and unexpected weight change. HENT:  Negative for drooling, ear pain, nosebleeds, sinus pressure and trouble swallowing. Respiratory:  Negative for cough, chest tightness and shortness of breath. Cardiovascular:  Negative for chest pain and leg swelling. Gastrointestinal:  Negative for abdominal pain, diarrhea and vomiting. Endocrine: Negative for polydipsia and polyphagia. Genitourinary:  Positive for dysuria, frequency and urgency. Negative for flank pain. Musculoskeletal:  Negative for back pain and myalgias. Skin:  Negative for pallor and rash. Neurological:  Negative for syncope, weakness and headaches. Hematological:  Does not bruise/bleed easily. Psychiatric/Behavioral:  Negative for agitation, behavioral problems and confusion. All other systems reviewed and are negative. Objective:   /70   Pulse 68   Temp 98.1 °F (36.7 °C)   Ht 5' 5\" (1.651 m)   Wt 167 lb (75.8 kg)   LMP 01/01/1996   SpO2 98%   BMI 27.79 kg/m²     Physical Exam  Vitals and nursing note reviewed. Constitutional:       General: She is awake. She is not in acute distress. Appearance: Normal appearance. She is well-developed and normal weight. She is not ill-appearing, toxic-appearing or diaphoretic. Comments: No photophobia. No phonophobia. HENT:      Head: Normocephalic and atraumatic. No Turner's sign. Right Ear: External ear normal.      Left Ear: External ear normal.      Nose: Nose normal. No congestion or rhinorrhea. Mouth/Throat:      Mouth: Mucous membranes are moist.      Pharynx: Oropharynx is clear. No oropharyngeal exudate or posterior oropharyngeal erythema. Eyes:      General: No scleral icterus. Right eye: No foreign body or discharge. Left eye: No discharge. Extraocular Movements: Extraocular movements intact. Conjunctiva/sclera: Conjunctivae normal.      Left eye: No exudate. Pupils: Pupils are equal, round, and reactive to light. Neck:      Vascular: No JVD. Trachea: No tracheal deviation. Comments: No meningismus. Cardiovascular:      Rate and Rhythm: Normal rate and regular rhythm. Heart sounds: Normal heart sounds. Heart sounds not distant. No murmur heard. No friction rub. No gallop. Pulmonary:      Effort: Pulmonary effort is normal. No respiratory distress. Breath sounds: Normal breath sounds. No stridor. No wheezing, rhonchi or rales. Chest:      Chest wall: No tenderness. Abdominal:      General: Abdomen is flat. Bowel sounds are normal. There is no distension. Palpations: Abdomen is soft. There is no mass. Tenderness:  There is no abdominal tenderness. There is no right CVA tenderness, left CVA tenderness, guarding or rebound. Hernia: No hernia is present. Musculoskeletal:         General: No swelling, tenderness, deformity or signs of injury. Normal range of motion. Cervical back: Normal range of motion and neck supple. No rigidity. Lymphadenopathy:      Head:      Right side of head: No submental adenopathy. Left side of head: No submental adenopathy. Skin:     General: Skin is warm and dry. Capillary Refill: Capillary refill takes less than 2 seconds. Coloration: Skin is not jaundiced or pale. Findings: No bruising, erythema, lesion or rash. Neurological:      General: No focal deficit present. Mental Status: She is alert and oriented to person, place, and time. Mental status is at baseline. Cranial Nerves: No cranial nerve deficit. Sensory: No sensory deficit. Motor: No weakness. Coordination: Coordination normal.      Deep Tendon Reflexes: Reflexes are normal and symmetric. Psychiatric:         Mood and Affect: Mood normal.         Behavior: Behavior normal. Behavior is cooperative. Thought Content: Thought content normal.         Judgment: Judgment normal.       Assessment:       Diagnosis Orders   1. Acute cystitis with hematuria        2. Frequency of urination  POCT Urinalysis no Micro    Culture, Urine      3. Malignant neoplasm of transverse colon ([address]          No results found for this visit on 10/19/22. Plan:     Assessment & Plan   Josh Ramos was seen today for urinary tract infection. Diagnoses and all orders for this visit:    Acute cystitis with hematuria    Frequency of urination  -     POCT Urinalysis no Micro  -     Culture, Urine; Future    Malignant neoplasm of transverse colon ([address]    Other orders  -     sulfamethoxazole-trimethoprim (BACTRIM DS) 800-160 MG per tablet;  Take 1 tablet by mouth 2 times daily for 10 days    Orders Placed This Encounter Procedures    Culture, Urine     Standing Status:   Future     Standing Expiration Date:   10/19/2023     Order Specific Question:   Specify (ex-cath, midstream, cysto, etc)? Answer:   midstream    POCT Urinalysis no Micro     Orders Placed This Encounter   Medications    sulfamethoxazole-trimethoprim (BACTRIM DS) 800-160 MG per tablet     Sig: Take 1 tablet by mouth 2 times daily for 10 days     Dispense:  20 tablet     Refill:  0     Medications Discontinued During This Encounter   Medication Reason    nitrofurantoin, macrocrystal-monohydrate, (MACROBID) 100 MG capsule Therapy completed     Return if symptoms worsen or fail to improve. Reviewed with the patient/family: current clinical status & medications. Side effects of the medication prescribed today, as well as treatment plan/rationale and result expectations have been discussed with the patient/family who expresses understanding. Patient will be discharged home in stable condition. Follow up with PCP to evaluate treatment results or return if symptoms worsen or fail to improve. Discussed signs and symptoms which require immediate follow-up in ED/call to 911. Understanding verbalized. I have reviewed the patient's medical history in detail and updated the computerized patient record.     MATTHEW Angel

## 2022-10-21 ENCOUNTER — HOSPITAL ENCOUNTER (OUTPATIENT)
Dept: CARDIOLOGY | Age: 82
Discharge: HOME | End: 2022-10-21
Payer: MEDICARE

## 2022-10-21 LAB — URINE CULTURE, ROUTINE: NORMAL

## 2022-10-21 PROCEDURE — 93296 REM INTERROG EVL PM/IDS: CPT

## 2022-10-22 ENCOUNTER — OFFICE VISIT (OUTPATIENT)
Dept: FAMILY MEDICINE CLINIC | Age: 82
End: 2022-10-22
Payer: MEDICARE

## 2022-10-22 VITALS
SYSTOLIC BLOOD PRESSURE: 122 MMHG | WEIGHT: 167 LBS | DIASTOLIC BLOOD PRESSURE: 62 MMHG | TEMPERATURE: 99.4 F | HEART RATE: 86 BPM | HEIGHT: 65 IN | BODY MASS INDEX: 27.82 KG/M2 | OXYGEN SATURATION: 98 %

## 2022-10-22 DIAGNOSIS — R52 BODY ACHES: ICD-10-CM

## 2022-10-22 DIAGNOSIS — R50.9 FEVER, UNSPECIFIED FEVER CAUSE: Primary | ICD-10-CM

## 2022-10-22 PROCEDURE — 1123F ACP DISCUSS/DSCN MKR DOCD: CPT | Performed by: NURSE PRACTITIONER

## 2022-10-22 PROCEDURE — G8417 CALC BMI ABV UP PARAM F/U: HCPCS | Performed by: NURSE PRACTITIONER

## 2022-10-22 PROCEDURE — 87804 INFLUENZA ASSAY W/OPTIC: CPT | Performed by: NURSE PRACTITIONER

## 2022-10-22 PROCEDURE — G8483 FLU IMM NO ADMIN DOC REA: HCPCS | Performed by: NURSE PRACTITIONER

## 2022-10-22 PROCEDURE — 1090F PRES/ABSN URINE INCON ASSESS: CPT | Performed by: NURSE PRACTITIONER

## 2022-10-22 PROCEDURE — 87426 SARSCOV CORONAVIRUS AG IA: CPT | Performed by: NURSE PRACTITIONER

## 2022-10-22 PROCEDURE — 99213 OFFICE O/P EST LOW 20 MIN: CPT | Performed by: NURSE PRACTITIONER

## 2022-10-22 PROCEDURE — G8400 PT W/DXA NO RESULTS DOC: HCPCS | Performed by: NURSE PRACTITIONER

## 2022-10-22 PROCEDURE — 1036F TOBACCO NON-USER: CPT | Performed by: NURSE PRACTITIONER

## 2022-10-22 PROCEDURE — G8427 DOCREV CUR MEDS BY ELIG CLIN: HCPCS | Performed by: NURSE PRACTITIONER

## 2022-10-22 ASSESSMENT — ENCOUNTER SYMPTOMS
NAUSEA: 0
DIARRHEA: 0
CONSTIPATION: 1
RHINORRHEA: 0
WHEEZING: 0
SORE THROAT: 0
VOMITING: 0
SHORTNESS OF BREATH: 0
COUGH: 0

## 2022-10-22 NOTE — PROGRESS NOTES
Subjective:      Patient ID: Nadia Bales is a 81 y.o. female who presents today for:  Chief Complaint   Patient presents with    Fever     X4 days Pt seen 10/19/22 for UTI Pt taking ibuprofen. Chills    Generalized Body Aches    Fatigue       Fever   Pertinent negatives include no congestion, coughing, diarrhea, nausea, rash, sore throat, vomiting or wheezing. Generalized Body Aches  Associated symptoms include chills, fatigue, a fever and myalgias. Pertinent negatives include no congestion, coughing, diaphoresis, nausea, rash, sore throat or vomiting. Fatigue  Associated symptoms include chills, fatigue, a fever and myalgias. Pertinent negatives include no congestion, coughing, diaphoresis, nausea, rash, sore throat or vomiting.      She has a UTI   She was here wends   She was placed on bactrim- she started that and is still on it  She thinks the symptoms are a little better   Shes concerned that she has covid though   She did a home covid test and was neg   Chills and temp and aches   She's uneasy that she might have covid and wants it checked   101 first time she had a temp  Shes taking ibuprofen   Shes 99.4 today   She had almost these same exact symptoms with covid       Past Medical History:   Diagnosis Date    Atrial fibrillation ([address]     no coumadin pt preference    Colon cancer ([address]      H/O COLON AND UTERINE 2465-0213    Constipation     Fatigue     High potassium     History of blood transfusion     x2 1996    History of uterine cancer 4/5/2018    Hyperlipidemia     Hypertension     BENIGN ESSENTIAL    Hypothyroidism 1/23/2014    Obesity     Pacemaker     Sick sinus syndrome ([address]     Sleep apnea     noncompliant with cpap    TIA (transient ischemic attack)     Uterine cancer [facility])      Past Surgical History:   Procedure Laterality Date    CARDIAC CATHETERIZATION      COLON SURGERY  2001    RESECTION    COLONOSCOPY  08-05-11,10/8/2013    RANDOM BIOPSIES W/COLITIS    COLONOSCOPY N/A 7/28/2022 COLORECTAL CANCER SCREENING, HIGH RISK performed by Catalina Copeland MD at [address] (CERVIX STATUS UNKNOWN)      PACEMAKER INSERTION      [place] COLON CA SCRN NOT  [address] IND N/A 7/11/2017    COLONOSCOPY [facility]      age 9    TUMOR REMOVAL      fatty tumor lower back     Social History     Socioeconomic History    Marital status: Single     Spouse name: Not on file    Number of children: 0    Years of education: 16    Highest education level: Bachelor's degree (e.g., BA, AB, BS)   Occupational History    Occupation: [employer]   Tobacco Use    Smoking status: Never    Smokeless tobacco: Never   Vaping Use    Vaping Use: Never used   Substance and Sexual Activity    Alcohol use: No    Drug use: No    Sexual activity: Never   Other Topics Concern    Not on file   Social History Narrative    One story double wide mobile home    4 steps to get into the house.     Working smoke detector and one CO2 detector     Independent with daily ADL's and general self care      Social Determinants of Health     Financial Resource Strain: Low Risk     Difficulty of Paying Living Expenses: Not hard at all   Food Insecurity: No Food Insecurity    Worried About Running Out of Food in the Last Year: Never true    Ran Out of Food in the Last Year: Never true   Transportation Needs: Not on file   Physical Activity: Insufficiently Active    Days of Exercise per Week: 3 days    Minutes of Exercise per Session: 30 min   Stress: Not on file   Social Connections: Not on file   Intimate Partner Violence: Not on file   Housing Stability: Not on file     Family History   Problem Relation Age of Onset    Heart Disease Maternal Grandmother     Emphysema Paternal Grandmother     Stroke Mother     High Blood Pressure Father     Breast Cancer Neg Hx     Cancer Neg Hx     Colon Cancer Neg Hx     Diabetes Neg Hx     Eclampsia Neg Hx     Hypertension Neg Hx     Ovarian Cancer Neg Hx      Labor Neg Hx     Spont Abortions Neg Hx      Allergies   Allergen Reactions    Hydralazine Other (See Comments)     Ran fever and chills. Face got real hot. Influenza Vaccines Hives    Cortisone Other (See Comments)     Affects eyes- blurry vision    Coumadin [Warfarin Sodium]      Capillary swelling/inflammation    Levothyroxine     Pneumococcal Vaccine Hives     Pt states has an allergy to any vaccines that have egg embryo in them. Current Outpatient Medications   Medication Sig Dispense Refill    sulfamethoxazole-trimethoprim (BACTRIM DS) 800-160 MG per tablet Take 1 tablet by mouth 2 times daily for 10 days 20 tablet 0    oxybutynin (DITROPAN XL) 5 MG extended release tablet Take 1 tablet by mouth daily 90 tablet 1    thyroid (ARMOUR THYROID) 30 MG tablet Take 1.5 tablets by mouth daily 135 tablet 1    amLODIPine (NORVASC) 5 MG tablet Take 0.5 tablets by mouth 2 times daily (Patient taking differently: Take 5 mg by mouth 2 times daily) 90 tablet 2    Cholecalciferol (VITAMIN D3) 1.25 MG (57577 UT) CAPS Take by mouth      LUTEIN PO Take 40 mg by mouth daily      vitamin B-12 (CYANOCOBALAMIN) 1000 MCG tablet Take 1,000 mcg by mouth daily Takes 1500 mcg daily      Zinc Sulfate (ZINC 15 PO) Take 50 mg by mouth 2 times daily       Handicap Placard MISC by Does not apply route Exp 5 years 1 each 0    Multiple Vitamins-Minerals (THERAPEUTIC MULTIVITAMIN-MINERALS) tablet Take 1 tablet by mouth daily      b complex vitamins capsule Take 1 capsule by mouth daily      MAGNESIUM PO Take 500 mg by mouth       Glucosamine-Chondroitin (GLUCOSAMINE CHONDR COMPLEX PO) Take 2 tablets by mouth daily       aspirin 81 MG tablet Take 81 mg by mouth daily      L-Tryptophan 500 MG TABS Take 1,000 mg by mouth daily At hs       sotalol (BETAPACE) 80 MG tablet 1/2 tablet bid  3     No current facility-administered medications for this visit.           Review of Systems   Constitutional: Positive for appetite change, chills, fatigue and fever. Negative for diaphoresis. HENT:  Negative for congestion, rhinorrhea and sore throat. Respiratory:  Negative for cough, shortness of breath and wheezing. Gastrointestinal:  Positive for constipation. Negative for diarrhea, nausea and vomiting. Musculoskeletal:  Positive for myalgias. Skin:  Negative for rash. Neurological:  Negative for dizziness and light-headedness. Psychiatric/Behavioral:  Negative for agitation, confusion and hallucinations. Objective:   /62   Pulse 86   Temp 99.4 °F (37.4 °C)   Ht 5' 5\" (1.651 m)   Wt 167 lb (75.8 kg)   LMP 01/01/1996   SpO2 98%   BMI 27.79 kg/m²     Physical Exam  Vitals reviewed. Constitutional:       Appearance: Normal appearance. HENT:      Head: Normocephalic and atraumatic. Right Ear: Hearing, tympanic membrane, ear canal and external ear normal. No middle ear effusion. No foreign body. Tympanic membrane is not injected, erythematous or bulging. Left Ear: Hearing, tympanic membrane, ear canal and external ear normal.  No middle ear effusion. No foreign body. Tympanic membrane is not injected, erythematous or bulging. Nose: Nose normal. No congestion or rhinorrhea. Right Nostril: No foreign body. Left Nostril: No foreign body. Right Turbinates: Not enlarged. Left Turbinates: Not enlarged. Right Sinus: No maxillary sinus tenderness or frontal sinus tenderness. Left Sinus: No maxillary sinus tenderness or frontal sinus tenderness. Mouth/Throat:      Lips: Pink. Mouth: Mucous membranes are moist.      Pharynx: Oropharynx is clear. Uvula midline. No pharyngeal swelling, oropharyngeal exudate, posterior oropharyngeal erythema or uvula swelling. Tonsils: No tonsillar exudate or tonsillar abscesses. Eyes:      General: Lids are normal.         Right eye: No foreign body. Left eye: No foreign body.       Extraocular Movements: Extraocular movements intact. Conjunctiva/sclera: Conjunctivae normal.   Cardiovascular:      Rate and Rhythm: Normal rate and regular rhythm. Heart sounds: Normal heart sounds. Pulmonary:      Effort: Pulmonary effort is normal. No tachypnea, accessory muscle usage or respiratory distress. Breath sounds: Normal breath sounds. No wheezing or rhonchi. Abdominal:      Tenderness: There is no abdominal tenderness. There is no guarding. Musculoskeletal:         General: Normal range of motion. Cervical back: Normal range of motion. Lymphadenopathy:      Cervical: No cervical adenopathy. Upper Body:      Right upper body: No supraclavicular adenopathy. Left upper body: No supraclavicular adenopathy. Skin:     General: Skin is warm and dry. Capillary Refill: Capillary refill takes less than 2 seconds. Neurological:      General: No focal deficit present. Mental Status: She is alert and oriented to person, place, and time. Gait: Gait is intact. Psychiatric:         Mood and Affect: Mood normal.         Speech: Speech normal.         Behavior: Behavior normal. Behavior is cooperative. Thought Content: Thought content normal.         Judgment: Judgment normal.       Assessment:       Diagnosis Orders   1. Fever, unspecified fever cause  POCT COVID-19, Antigen    POCT Influenza A/B      2. Body aches  POCT COVID-19, Antigen    POCT Influenza A/B        No results found for this visit on 10/22/22. Plan:   Covid test is neg, her urine culture had no growth. Continue Bactrim. Should follow up with PCP incase fevers continue. Assessment & Plan   Jay Hector was seen today for fever, chills, generalized body aches and fatigue.     Diagnoses and all orders for this visit:    Fever, unspecified fever cause  -     POCT COVID-19, Antigen  -     POCT Influenza A/B    Body aches  -     POCT COVID-19, Antigen  -     POCT Influenza A/B    Orders Placed This Encounter   Procedures    POCT COVID-19, Antigen     Order Specific Question:   Is this test for diagnosis or screening? Answer:   Diagnosis of ill patient     Order Specific Question:   Symptomatic for COVID-19 as defined by CDC? Answer:   Yes     Order Specific Question:   Date of Symptom Onset     Answer:   10/19/2022     Order Specific Question:   Hospitalized for COVID-19? Answer:   No     Order Specific Question:   Admitted to ICU for COVID-19? Answer:   No     Order Specific Question:   Employed in healthcare setting? Answer:   No     Order Specific Question:   Resident in a congregate (group) care setting? Answer:   No     Order Specific Question:   Pregnant? Answer:   No     Order Specific Question:   Previously tested for COVID-19? Answer:   Yes    POCT Influenza A/B     No orders of the defined types were placed in this encounter. There are no discontinued medications. Return in about 2 days (around 10/24/2022) for Follow up with PCP. Reviewed with the patient/family: current clinical status & medications. Side effects of the medication prescribed today, as well as treatment plan/rationale and result expectations have been discussed with the patient/family who expresses understanding. Patient will be discharged home in stable condition. Follow up with PCP to evaluate treatment results or return if symptoms worsen or fail to improve. Discussed signs and symptoms which require immediate follow-up in ED/call to 911. Understanding verbalized. I have reviewed the patient's medical history in detail and updated the computerized patient record.     Daphne Romero, APRN - CNP

## 2022-11-01 ENCOUNTER — OFFICE VISIT (OUTPATIENT)
Dept: FAMILY MEDICINE CLINIC | Age: 82
End: 2022-11-01
Payer: MEDICARE

## 2022-11-01 VITALS
DIASTOLIC BLOOD PRESSURE: 72 MMHG | WEIGHT: 166.4 LBS | TEMPERATURE: 97.6 F | SYSTOLIC BLOOD PRESSURE: 120 MMHG | HEIGHT: 65 IN | HEART RATE: 86 BPM | BODY MASS INDEX: 27.72 KG/M2 | OXYGEN SATURATION: 98 %

## 2022-11-01 DIAGNOSIS — E87.5 HYPERKALEMIA: ICD-10-CM

## 2022-11-01 DIAGNOSIS — R52 BODY ACHES: ICD-10-CM

## 2022-11-01 DIAGNOSIS — R50.9 FEVER, UNSPECIFIED FEVER CAUSE: ICD-10-CM

## 2022-11-01 DIAGNOSIS — R53.83 OTHER FATIGUE: Primary | ICD-10-CM

## 2022-11-01 LAB
ALBUMIN SERPL-MCNC: 3.9 G/DL (ref 3.5–4.6)
ALP BLD-CCNC: 150 U/L (ref 40–130)
ALT SERPL-CCNC: 59 U/L (ref 0–33)
ANION GAP SERPL CALCULATED.3IONS-SCNC: 12 MEQ/L (ref 9–15)
AST SERPL-CCNC: 31 U/L (ref 0–35)
BILIRUB SERPL-MCNC: 0.5 MG/DL (ref 0.2–0.7)
BILIRUBIN DIRECT: <0.2 MG/DL (ref 0–0.4)
BILIRUBIN, INDIRECT: ABNORMAL MG/DL (ref 0–0.6)
BUN BLDV-MCNC: 31 MG/DL (ref 8–23)
CALCIUM SERPL-MCNC: 9.7 MG/DL (ref 8.5–9.9)
CHLORIDE BLD-SCNC: 102 MEQ/L (ref 95–107)
CHOLESTEROL, TOTAL: 246 MG/DL (ref 0–199)
CO2: 27 MEQ/L (ref 20–31)
CREAT SERPL-MCNC: 0.79 MG/DL (ref 0.5–0.9)
GFR SERPL CREATININE-BSD FRML MDRD: >60 ML/MIN/{1.73_M2}
GLUCOSE BLD-MCNC: 87 MG/DL (ref 70–99)
HCT VFR BLD CALC: 38.8 % (ref 37–47)
HDLC SERPL-MCNC: 60 MG/DL (ref 40–59)
HEMOGLOBIN: 12.4 G/DL (ref 12–16)
LDL CHOLESTEROL CALCULATED: 169 MG/DL (ref 0–129)
MAGNESIUM: 2.8 MG/DL (ref 1.7–2.4)
MCH RBC QN AUTO: 28.6 PG (ref 27–31.3)
MCHC RBC AUTO-ENTMCNC: 32.1 % (ref 33–37)
MCV RBC AUTO: 89.1 FL (ref 79.4–94.8)
PDW BLD-RTO: 14.3 % (ref 11.5–14.5)
PLATELET # BLD: 280 K/UL (ref 130–400)
POTASSIUM SERPL-SCNC: 4.6 MEQ/L (ref 3.4–4.9)
RBC # BLD: 4.35 M/UL (ref 4.2–5.4)
SODIUM BLD-SCNC: 141 MEQ/L (ref 135–144)
TOTAL PROTEIN: 6.8 G/DL (ref 6.3–8)
TRIGL SERPL-MCNC: 85 MG/DL (ref 0–150)
WBC # BLD: 4.6 K/UL (ref 4.8–10.8)

## 2022-11-01 PROCEDURE — G8427 DOCREV CUR MEDS BY ELIG CLIN: HCPCS | Performed by: FAMILY MEDICINE

## 2022-11-01 PROCEDURE — 3078F DIAST BP <80 MM HG: CPT | Performed by: FAMILY MEDICINE

## 2022-11-01 PROCEDURE — 1090F PRES/ABSN URINE INCON ASSESS: CPT | Performed by: FAMILY MEDICINE

## 2022-11-01 PROCEDURE — G8400 PT W/DXA NO RESULTS DOC: HCPCS | Performed by: FAMILY MEDICINE

## 2022-11-01 PROCEDURE — G8483 FLU IMM NO ADMIN DOC REA: HCPCS | Performed by: FAMILY MEDICINE

## 2022-11-01 PROCEDURE — 99213 OFFICE O/P EST LOW 20 MIN: CPT | Performed by: FAMILY MEDICINE

## 2022-11-01 PROCEDURE — 1036F TOBACCO NON-USER: CPT | Performed by: FAMILY MEDICINE

## 2022-11-01 PROCEDURE — 1123F ACP DISCUSS/DSCN MKR DOCD: CPT | Performed by: FAMILY MEDICINE

## 2022-11-01 PROCEDURE — 3074F SYST BP LT 130 MM HG: CPT | Performed by: FAMILY MEDICINE

## 2022-11-01 PROCEDURE — G8417 CALC BMI ABV UP PARAM F/U: HCPCS | Performed by: FAMILY MEDICINE

## 2022-11-01 NOTE — PROGRESS NOTES
Chief Complaint   Patient presents with    Urinary Tract Infection     Finished antibiotics today, 3 negative covid tests, symptoms same as what she had in January when she was positive        HPI:  Brody James is a 81 y.o. female     Follow up of a few things    UTI symptoms  S/p two courses of abx  Seems to have improved     Some covid symptoms  Multiple negative tests     Stress of some home repairs       Patient Active Problem List   Diagnosis    Constipation    Fatigue    Atrial fibrillation (HCC)    Hypertension    Sleep apnea    Sick sinus syndrome ([address]    Pacemaker    Hyperlipidemia    Hypothyroidism    Bunion of great toe of right foot    Hammer toe of right foot    Fatigue due to exposure    History of uterine cancer    Malignant neoplasm of transverse colon (HCC)    AK (actinic keratosis)    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)    ABMD (anterior basement membrane dystrophy)    Nuclear sclerotic cataract of both eyes    Posterior vitreous detachment of left eye    Punctate keratitis    Senile cataract, unspecified    Vitreous degeneration and detachment of both eyes    Adenomatous polyp of transverse colon    Acute cystitis with hematuria    Frequency of urination       Current Outpatient Medications   Medication Sig Dispense Refill    oxybutynin (DITROPAN XL) 5 MG extended release tablet Take 1 tablet by mouth daily 90 tablet 1    thyroid (ARMOUR THYROID) 30 MG tablet Take 1.5 tablets by mouth daily 135 tablet 1    amLODIPine (NORVASC) 5 MG tablet Take 0.5 tablets by mouth 2 times daily (Patient taking differently: Take 5 mg by mouth 2 times daily) 90 tablet 2    Cholecalciferol (VITAMIN D3) 1.25 MG (32797 UT) CAPS Take by mouth      LUTEIN PO Take 40 mg by mouth daily      vitamin B-12 (CYANOCOBALAMIN) 1000 MCG tablet Take 1,000 mcg by mouth daily Takes 1500 mcg daily      Zinc Sulfate (ZINC 15 PO) Take 50 mg by mouth 2 times daily       Handicap Michelineard MISC by Does not apply route Exp 5 years 1 each 0    Multiple Vitamins-Minerals (THERAPEUTIC MULTIVITAMIN-MINERALS) tablet Take 1 tablet by mouth daily      b complex vitamins capsule Take 1 capsule by mouth daily      MAGNESIUM PO Take 500 mg by mouth       Glucosamine-Chondroitin (GLUCOSAMINE CHONDR COMPLEX PO) Take 2 tablets by mouth daily       aspirin 81 MG tablet Take 81 mg by mouth daily      L-Tryptophan 500 MG TABS Take 1,000 mg by mouth daily At hs       sotalol (BETAPACE) 80 MG tablet 1/2 tablet bid  3     No current facility-administered medications for this visit. Patient's medications, allergies, past medical, surgical, social and family histories were reviewed and updated as appropriate. Review of Systems:   General ROS: some residual aches and pains, rare low grade temperature   Respiratory ROS: no cough, shortness of breath, or wheezing  Cardiovascular ROS: no chest pain or dyspnea on exertion  Gastrointestinal ROS: no abdominal pain, change in bowel habits, or black or bloody stools  Genito-Urinary ROS: no dysuria, trouble voiding  Musculoskeletal ROS: negative for - gait disturbance, joint pain or joint stiffness  Neurological ROS: negative for - behavioral changes, memory loss, numbness/tingling, tremors or weakness    In general patient otherwise reports feeling well. Physical Exam:  /72 (Site: Left Upper Arm)   Pulse 86   Temp 97.6 °F (36.4 °C)   Ht 5' 5\" (1.651 m)   Wt 166 lb 6.4 oz (75.5 kg)   LMP 01/01/1996   SpO2 98%   BMI 27.69 kg/m²     Gen: Well, NAD, Alert, Oriented x 3   HEENT: EOMI, eyes clear, MMM  Skin: without rash or jaundice  Neck: no significant lymphadenopathy or thyromegaly  Lungs: CTA B w/out Rales/Wheezes/Rhonchi, Good respiratory effort   Heart: RRR, S1S2, w/out M/R/G, non-displaced PMI   Ext: No C/C/E Bilaterally. Neuro: Neurovascularly intact w/ Sensory/Motor intact UE/LE Bilaterally.     Lab Results   Component Value Date    WBC 4.6 (L) 05/20/2022    HGB 13.0 05/20/2022 HCT 39.4 05/20/2022     05/20/2022    CHOL 244 (H) 05/20/2022    TRIG 77 05/20/2022    HDL 70 (H) 05/20/2022    ALT 11 05/20/2022    AST 25 05/20/2022     09/28/2022    K 5.0 (H) 09/28/2022     09/28/2022    CREATININE 0.81 09/28/2022    BUN 29 (H) 09/28/2022    CO2 30 09/28/2022    TSH 2.590 04/29/2022    INR 1.0 06/14/2017    LABA1C 5.6 11/05/2021         A&P   Diagnosis Orders   1. Other fatigue        2. Body aches        3.  Fever, unspecified fever cause          She is currently stable/improving   Symptoms are minimal/manageable     Eleni Kern MD

## 2022-11-17 ENCOUNTER — HOSPITAL ENCOUNTER (OUTPATIENT)
Dept: ULTRASOUND IMAGING | Age: 82
Discharge: HOME | End: 2022-11-19
Payer: MEDICARE

## 2022-11-17 DIAGNOSIS — R74.01 ELEVATED ALT MEASUREMENT: ICD-10-CM

## 2022-11-17 DIAGNOSIS — R74.8 ELEVATED ALKALINE PHOSPHATASE LEVEL: ICD-10-CM

## 2022-11-17 PROCEDURE — 76705 ECHO EXAM OF ABDOMEN: CPT

## 2022-12-05 DIAGNOSIS — N39.46 MIXED INCONTINENCE: ICD-10-CM

## 2022-12-06 RX ORDER — OXYBUTYNIN CHLORIDE 5 MG/1
5 TABLET, EXTENDED RELEASE ORAL DAILY
Qty: 90 TABLET | Refills: 1 | Status: SHIPPED | OUTPATIENT
Start: 2022-12-06

## 2022-12-13 DIAGNOSIS — E03.9 HYPOTHYROIDISM, UNSPECIFIED TYPE: ICD-10-CM

## 2022-12-13 RX ORDER — THYROID 30 MG/1
TABLET ORAL
Qty: 135 TABLET | Refills: 1 | Status: SHIPPED | OUTPATIENT
Start: 2022-12-13

## 2022-12-14 DIAGNOSIS — E87.5 HYPERKALEMIA: ICD-10-CM

## 2022-12-14 DIAGNOSIS — R74.8 ELEVATED LIVER ENZYMES: ICD-10-CM

## 2022-12-14 DIAGNOSIS — R74.8 ELEVATED LIVER ENZYMES: Primary | ICD-10-CM

## 2022-12-14 LAB
ALBUMIN SERPL-MCNC: 4 G/DL (ref 3.5–4.6)
ALP BLD-CCNC: 81 U/L (ref 40–130)
ALT SERPL-CCNC: 14 U/L (ref 0–33)
ANION GAP SERPL CALCULATED.3IONS-SCNC: 11 MEQ/L (ref 9–15)
AST SERPL-CCNC: 24 U/L (ref 0–35)
BILIRUB SERPL-MCNC: <0.2 MG/DL (ref 0.2–0.7)
BILIRUBIN DIRECT: <0.2 MG/DL (ref 0–0.4)
BILIRUBIN, INDIRECT: NORMAL MG/DL (ref 0–0.6)
BUN BLDV-MCNC: 29 MG/DL (ref 8–23)
CALCIUM SERPL-MCNC: 9.6 MG/DL (ref 8.5–9.9)
CHLORIDE BLD-SCNC: 101 MEQ/L (ref 95–107)
CO2: 27 MEQ/L (ref 20–31)
CREAT SERPL-MCNC: 0.8 MG/DL (ref 0.5–0.9)
GFR SERPL CREATININE-BSD FRML MDRD: >60 ML/MIN/{1.73_M2}
GLUCOSE BLD-MCNC: 88 MG/DL (ref 70–99)
POTASSIUM SERPL-SCNC: 4.4 MEQ/L (ref 3.4–4.9)
SODIUM BLD-SCNC: 139 MEQ/L (ref 135–144)
TOTAL PROTEIN: 6.4 G/DL (ref 6.3–8)

## 2023-01-20 ENCOUNTER — HOSPITAL ENCOUNTER (OUTPATIENT)
Dept: CARDIOLOGY | Age: 83
Discharge: HOME | End: 2023-01-20
Payer: MEDICARE

## 2023-01-20 PROCEDURE — 93280 PM DEVICE PROGR EVAL DUAL: CPT

## 2023-02-12 DIAGNOSIS — I10 ESSENTIAL HYPERTENSION: ICD-10-CM

## 2023-02-13 RX ORDER — AMLODIPINE BESYLATE 5 MG/1
5 TABLET ORAL 2 TIMES DAILY
Qty: 90 TABLET | Refills: 2 | Status: SHIPPED | OUTPATIENT
Start: 2023-02-13

## 2023-02-13 NOTE — TELEPHONE ENCOUNTER
Comments:     Last Office Visit (last PCP visit):   11/1/2022    Next Visit Date:  Future Appointments   Date Time Provider Velvet Genevieve   2/14/2023  8:15 AM Karmen Mortimer, MD [facility] EMERGENCY [facility] AT [place]   5/1/2023  8:30 AM Karmen Mortimer, MD [facility] AT [place]       **If hasn't been seen in over a year OR hasn't followed up according to last diabetes/ADHD visit, make appointment for patient before sending refill to provider.     Rx requested:  Requested Prescriptions     Pending Prescriptions Disp Refills    amLODIPine (NORVASC) 5 MG tablet [Pharmacy Med Name: amlodipine 5 mg tablet] 90 tablet 2     Sig: Take 1/2 tablet by mouth 2 times daily

## 2023-02-14 ENCOUNTER — OFFICE VISIT (OUTPATIENT)
Dept: FAMILY MEDICINE CLINIC | Age: 83
End: 2023-02-14
Payer: MEDICARE

## 2023-02-14 VITALS
WEIGHT: 163.6 LBS | HEART RATE: 78 BPM | OXYGEN SATURATION: 97 % | DIASTOLIC BLOOD PRESSURE: 70 MMHG | HEIGHT: 65 IN | TEMPERATURE: 98.3 F | SYSTOLIC BLOOD PRESSURE: 120 MMHG | BODY MASS INDEX: 27.26 KG/M2

## 2023-02-14 DIAGNOSIS — I48.91 ATRIAL FIBRILLATION, UNSPECIFIED TYPE (HCC): ICD-10-CM

## 2023-02-14 DIAGNOSIS — E03.9 HYPOTHYROIDISM, UNSPECIFIED TYPE: Primary | ICD-10-CM

## 2023-02-14 DIAGNOSIS — H35.3211 EXUDATIVE AGE-RELATED MACULAR DEGENERATION OF RIGHT EYE WITH ACTIVE CHOROIDAL NEOVASCULARIZATION (HCC): ICD-10-CM

## 2023-02-14 DIAGNOSIS — C18.4 MALIGNANT NEOPLASM OF TRANSVERSE COLON (HCC): ICD-10-CM

## 2023-02-14 PROCEDURE — G8427 DOCREV CUR MEDS BY ELIG CLIN: HCPCS | Performed by: FAMILY MEDICINE

## 2023-02-14 PROCEDURE — 3078F DIAST BP <80 MM HG: CPT | Performed by: FAMILY MEDICINE

## 2023-02-14 PROCEDURE — 3074F SYST BP LT 130 MM HG: CPT | Performed by: FAMILY MEDICINE

## 2023-02-14 PROCEDURE — 99214 OFFICE O/P EST MOD 30 MIN: CPT | Performed by: FAMILY MEDICINE

## 2023-02-14 PROCEDURE — 1123F ACP DISCUSS/DSCN MKR DOCD: CPT | Performed by: FAMILY MEDICINE

## 2023-02-14 PROCEDURE — G8400 PT W/DXA NO RESULTS DOC: HCPCS | Performed by: FAMILY MEDICINE

## 2023-02-14 PROCEDURE — 1036F TOBACCO NON-USER: CPT | Performed by: FAMILY MEDICINE

## 2023-02-14 PROCEDURE — G8417 CALC BMI ABV UP PARAM F/U: HCPCS | Performed by: FAMILY MEDICINE

## 2023-02-14 PROCEDURE — G8483 FLU IMM NO ADMIN DOC REA: HCPCS | Performed by: FAMILY MEDICINE

## 2023-02-14 PROCEDURE — 1090F PRES/ABSN URINE INCON ASSESS: CPT | Performed by: FAMILY MEDICINE

## 2023-02-14 SDOH — ECONOMIC STABILITY: FOOD INSECURITY: WITHIN THE PAST 12 MONTHS, THE FOOD YOU BOUGHT JUST DIDN'T LAST AND YOU DIDN'T HAVE MONEY TO GET MORE.: NEVER TRUE

## 2023-02-14 SDOH — ECONOMIC STABILITY: INCOME INSECURITY: HOW HARD IS IT FOR YOU TO PAY FOR THE VERY BASICS LIKE FOOD, HOUSING, MEDICAL CARE, AND HEATING?: NOT HARD AT ALL

## 2023-02-14 SDOH — ECONOMIC STABILITY: HOUSING INSECURITY
IN THE LAST 12 MONTHS, WAS THERE A TIME WHEN YOU DID NOT HAVE A STEADY PLACE TO SLEEP OR SLEPT IN A SHELTER (INCLUDING NOW)?: NO

## 2023-02-14 SDOH — ECONOMIC STABILITY: FOOD INSECURITY: WITHIN THE PAST 12 MONTHS, YOU WORRIED THAT YOUR FOOD WOULD RUN OUT BEFORE YOU GOT MONEY TO BUY MORE.: NEVER TRUE

## 2023-02-14 ASSESSMENT — PATIENT HEALTH QUESTIONNAIRE - PHQ9
1. LITTLE INTEREST OR PLEASURE IN DOING THINGS: 0
2. FEELING DOWN, DEPRESSED OR HOPELESS: 0
SUM OF ALL RESPONSES TO PHQ QUESTIONS 1-9: 0
SUM OF ALL RESPONSES TO PHQ QUESTIONS 1-9: 0
SUM OF ALL RESPONSES TO PHQ9 QUESTIONS 1 & 2: 0
SUM OF ALL RESPONSES TO PHQ QUESTIONS 1-9: 0
SUM OF ALL RESPONSES TO PHQ QUESTIONS 1-9: 0

## 2023-02-14 NOTE — PROGRESS NOTES
Chief Complaint   Patient presents with    Annual Exam     6 month     Other     6 month after getting pacemaker had 25 minutes of atrial flutter        HPI:  Wendy Ferguson is a 81 y.o. female     Checkup  Overall has been feeling well    In-clinic pacer check     Nothing acute at this time    Due for labs       Patient Active Problem List   Diagnosis    Constipation    Fatigue    Atrial fibrillation (HCC)    Hypertension    Sleep apnea    Sick sinus syndrome ([address]    Pacemaker    Hyperlipidemia    Hypothyroidism    Bunion of great toe of right foot    Hammer toe of right foot    Fatigue due to exposure    History of uterine cancer    Malignant neoplasm of transverse colon (HCC)    AK (actinic keratosis)    Exudative senile macular degeneration of retina ([address]    ABMD (anterior basement membrane dystrophy)    Nuclear sclerotic cataract of both eyes    Posterior vitreous detachment of left eye    Punctate keratitis    Senile cataract, unspecified    Vitreous degeneration and detachment of both eyes    Adenomatous polyp of transverse colon    Acute cystitis with hematuria    Frequency of urination       Current Outpatient Medications   Medication Sig Dispense Refill    amLODIPine (NORVASC) 5 MG tablet Take 1 tablet by mouth 2 times daily 90 tablet 2    ARMOUR THYROID 30 MG tablet TAKE 1 & 1/2 (ONE AND ONE-HALF) TABLETS BY MOUTH DAILY 135 tablet 1    oxybutynin (DITROPAN XL) 5 MG extended release tablet Take 1 tablet by mouth daily 90 tablet 1    Cholecalciferol (VITAMIN D3) 1.25 MG (58685 UT) CAPS Take by mouth      LUTEIN PO Take 40 mg by mouth daily      vitamin B-12 (CYANOCOBALAMIN) 1000 MCG tablet Take 1,000 mcg by mouth daily Takes 1500 mcg daily      Zinc Sulfate (ZINC 15 PO) Take 50 mg by mouth 2 times daily       Handicap Placard MISC by Does not apply route Exp 5 years 1 each 0    Multiple Vitamins-Minerals (THERAPEUTIC MULTIVITAMIN-MINERALS) tablet Take 1 tablet by mouth daily      b complex vitamins capsule Take 1 capsule by mouth daily      MAGNESIUM PO Take 500 mg by mouth       Glucosamine-Chondroitin (GLUCOSAMINE CHONDR COMPLEX PO) Take 2 tablets by mouth daily       aspirin 81 MG tablet Take 81 mg by mouth daily      L-Tryptophan 500 MG TABS Take 1,000 mg by mouth daily At hs       sotalol (BETAPACE) 80 MG tablet 1/2 tablet bid  3     No current facility-administered medications for this visit. Patient's medications, allergies, past medical, surgical, social and family histories were reviewed and updated as appropriate. Review of Systems:   General ROS: some residual aches and pains, rare low grade temperature   Respiratory ROS: no cough, shortness of breath, or wheezing  Cardiovascular ROS: no chest pain or dyspnea on exertion  Gastrointestinal ROS: no abdominal pain, change in bowel habits, or black or bloody stools  Genito-Urinary ROS: no dysuria, trouble voiding  Musculoskeletal ROS: negative for - gait disturbance, joint pain or joint stiffness  Neurological ROS: negative for - behavioral changes, memory loss, numbness/tingling, tremors or weakness    In general patient otherwise reports feeling well. Physical Exam:  /70 (Site: Left Upper Arm)   Pulse 78   Temp 98.3 °F (36.8 °C)   Ht 5' 5\" (1.651 m)   Wt 163 lb 9.6 oz (74.2 kg)   LMP 01/01/1996   SpO2 97%   BMI 27.22 kg/m²     Gen: Well, NAD, Alert, Oriented x 3   HEENT: EOMI, eyes clear, MMM  Skin: without rash or jaundice  Neck: no significant lymphadenopathy or thyromegaly  Lungs: CTA B w/out Rales/Wheezes/Rhonchi, Good respiratory effort   Heart: RRR, S1S2, w/out M/R/G, non-displaced PMI   Ext: No C/C/E Bilaterally. Neuro: Neurovascularly intact w/ Sensory/Motor intact UE/LE Bilaterally.     Lab Results   Component Value Date    WBC 4.6 (L) 11/01/2022    HGB 12.4 11/01/2022    HCT 38.8 11/01/2022     11/01/2022    CHOL 246 (H) 11/01/2022    TRIG 85 11/01/2022    HDL 60 (H) 11/01/2022    ALT 14 12/14/2022    AST 24 12/14/2022     12/14/2022    K 4.4 12/14/2022     12/14/2022    CREATININE 0.80 12/14/2022    BUN 29 (H) 12/14/2022    CO2 27 12/14/2022    TSH 2.590 04/29/2022    INR 1.0 06/14/2017    LABA1C 5.6 11/05/2021         A&P   Diagnosis Orders   1. Hypothyroidism, unspecified type  TSH    T4, Free      2. Exudative age-related macular degeneration of right eye with active choroidal neovascularization ([facility]        3. Malignant neoplasm of transverse colon ([facility]        4.  Atrial fibrillation, unspecified type [facility])  Comprehensive Metabolic Panel    CBC          Check labs       Miguel Angel Cheung MD

## 2023-02-15 DIAGNOSIS — I48.91 ATRIAL FIBRILLATION, UNSPECIFIED TYPE (HCC): ICD-10-CM

## 2023-02-15 DIAGNOSIS — E03.9 HYPOTHYROIDISM, UNSPECIFIED TYPE: ICD-10-CM

## 2023-02-15 LAB
ALBUMIN SERPL-MCNC: 4.1 G/DL (ref 3.5–4.6)
ALP BLD-CCNC: 77 U/L (ref 40–130)
ALT SERPL-CCNC: 17 U/L (ref 0–33)
ANION GAP SERPL CALCULATED.3IONS-SCNC: 11 MEQ/L (ref 9–15)
AST SERPL-CCNC: 22 U/L (ref 0–35)
BILIRUB SERPL-MCNC: <0.2 MG/DL (ref 0.2–0.7)
BUN BLDV-MCNC: 29 MG/DL (ref 8–23)
CALCIUM SERPL-MCNC: 9.7 MG/DL (ref 8.5–9.9)
CHLORIDE BLD-SCNC: 102 MEQ/L (ref 95–107)
CO2: 26 MEQ/L (ref 20–31)
CREAT SERPL-MCNC: 0.86 MG/DL (ref 0.5–0.9)
GFR SERPL CREATININE-BSD FRML MDRD: >60 ML/MIN/{1.73_M2}
GLOBULIN: 2.6 G/DL (ref 2.3–3.5)
GLUCOSE BLD-MCNC: 100 MG/DL (ref 70–99)
HCT VFR BLD CALC: 41.2 % (ref 37–47)
HEMOGLOBIN: 13.4 G/DL (ref 12–16)
MCH RBC QN AUTO: 28.8 PG (ref 27–31.3)
MCHC RBC AUTO-ENTMCNC: 32.5 % (ref 33–37)
MCV RBC AUTO: 88.6 FL (ref 79.4–94.8)
PDW BLD-RTO: 14.6 % (ref 11.5–14.5)
PLATELET # BLD: 221 K/UL (ref 130–400)
POTASSIUM SERPL-SCNC: 4.4 MEQ/L (ref 3.4–4.9)
RBC # BLD: 4.65 M/UL (ref 4.2–5.4)
SODIUM BLD-SCNC: 139 MEQ/L (ref 135–144)
T4 FREE: 1.02 NG/DL (ref 0.84–1.68)
TOTAL PROTEIN: 6.7 G/DL (ref 6.3–8)
TSH SERPL DL<=0.05 MIU/L-ACNC: 3.13 UIU/ML (ref 0.44–3.86)
WBC # BLD: 4.5 K/UL (ref 4.8–10.8)

## 2023-04-28 DIAGNOSIS — E87.5 HYPERKALEMIA: ICD-10-CM

## 2023-04-28 LAB
ALBUMIN SERPL-MCNC: 4.1 G/DL (ref 3.5–4.6)
ALP SERPL-CCNC: 83 U/L (ref 40–130)
ALT SERPL-CCNC: 8 U/L (ref 0–33)
ANION GAP SERPL CALCULATED.3IONS-SCNC: 13 MEQ/L (ref 9–15)
AST SERPL-CCNC: 23 U/L (ref 0–35)
BILIRUB DIRECT SERPL-MCNC: <0.2 MG/DL (ref 0–0.4)
BILIRUB INDIRECT SERPL-MCNC: NORMAL MG/DL (ref 0–0.6)
BILIRUB SERPL-MCNC: 0.5 MG/DL (ref 0.2–0.7)
BUN SERPL-MCNC: 28 MG/DL (ref 8–23)
CALCIUM SERPL-MCNC: 10.1 MG/DL (ref 8.5–9.9)
CHLORIDE SERPL-SCNC: 102 MEQ/L (ref 95–107)
CHOLEST SERPL-MCNC: 247 MG/DL (ref 0–199)
CO2 SERPL-SCNC: 26 MEQ/L (ref 20–31)
CREAT SERPL-MCNC: 0.81 MG/DL (ref 0.5–0.9)
ERYTHROCYTE [DISTWIDTH] IN BLOOD BY AUTOMATED COUNT: 14.2 % (ref 11.5–14.5)
GLUCOSE SERPL-MCNC: 86 MG/DL (ref 70–99)
HCT VFR BLD AUTO: 42 % (ref 37–47)
HDLC SERPL-MCNC: 70 MG/DL (ref 40–59)
HGB BLD-MCNC: 13.7 G/DL (ref 12–16)
LDLC SERPL CALC-MCNC: 162 MG/DL (ref 0–129)
MAGNESIUM SERPL-MCNC: 2.5 MG/DL (ref 1.7–2.4)
MCH RBC QN AUTO: 29.2 PG (ref 27–31.3)
MCHC RBC AUTO-ENTMCNC: 32.7 % (ref 33–37)
MCV RBC AUTO: 89.4 FL (ref 79.4–94.8)
PLATELET # BLD AUTO: 208 K/UL (ref 130–400)
POTASSIUM SERPL-SCNC: 5.6 MEQ/L (ref 3.4–4.9)
PROT SERPL-MCNC: 7 G/DL (ref 6.3–8)
RBC # BLD AUTO: 4.7 M/UL (ref 4.2–5.4)
SODIUM SERPL-SCNC: 141 MEQ/L (ref 135–144)
TRIGL SERPL-MCNC: 74 MG/DL (ref 0–150)
TSH SERPL-MCNC: 2.45 UIU/ML (ref 0.44–3.86)
WBC # BLD AUTO: 4.5 K/UL (ref 4.8–10.8)

## 2023-05-11 ENCOUNTER — HOSPITAL ENCOUNTER (OUTPATIENT)
Dept: CARDIOLOGY | Age: 83
Discharge: HOME | End: 2023-05-11
Payer: MEDICARE

## 2023-05-11 PROCEDURE — 93296 REM INTERROG EVL PM/IDS: CPT

## 2023-06-07 DIAGNOSIS — N39.46 MIXED INCONTINENCE: ICD-10-CM

## 2023-06-07 RX ORDER — OXYBUTYNIN CHLORIDE 5 MG/1
5 TABLET, EXTENDED RELEASE ORAL DAILY
Qty: 90 TABLET | Refills: 1 | Status: SHIPPED | OUTPATIENT
Start: 2023-06-07

## 2023-06-07 NOTE — TELEPHONE ENCOUNTER
Comments:     Last Office Visit (last PCP visit):   2/14/2023    Next Visit Date:  Future Appointments   Date Time Provider Velvet Vallejo   8/15/2023  8:30 AM Valdez Lei MD [facility] AT [place]       **If hasn't been seen in over a year OR hasn't followed up according to last diabetes/ADHD visit, make appointment for patient before sending refill to provider.     Rx requested:  Requested Prescriptions     Pending Prescriptions Disp Refills    oxybutynin (DITROPAN-XL) 5 MG extended release tablet [Pharmacy Med Name: oxybutynin chloride ER 5 mg tablet,extended release 24 hr] 90 tablet 1     Sig: TAKE 1 TABLET BY MOUTH DAILY

## 2023-06-23 DIAGNOSIS — E03.9 HYPOTHYROIDISM, UNSPECIFIED TYPE: ICD-10-CM

## 2023-06-23 RX ORDER — THYROID 30 MG/1
TABLET ORAL
Qty: 135 TABLET | Refills: 1 | Status: SHIPPED | OUTPATIENT
Start: 2023-06-23

## 2023-08-11 DIAGNOSIS — I10 ESSENTIAL HYPERTENSION: Primary | ICD-10-CM

## 2023-08-11 DIAGNOSIS — I10 ESSENTIAL HYPERTENSION: ICD-10-CM

## 2023-08-11 LAB
ANION GAP SERPL CALCULATED.3IONS-SCNC: 9 MEQ/L (ref 9–15)
BUN SERPL-MCNC: 35 MG/DL (ref 8–23)
CALCIUM SERPL-MCNC: 9.8 MG/DL (ref 8.5–9.9)
CHLORIDE SERPL-SCNC: 103 MEQ/L (ref 95–107)
CO2 SERPL-SCNC: 27 MEQ/L (ref 20–31)
CREAT SERPL-MCNC: 0.93 MG/DL (ref 0.5–0.9)
GLUCOSE SERPL-MCNC: 69 MG/DL (ref 70–99)
POTASSIUM SERPL-SCNC: 5.5 MEQ/L (ref 3.4–4.9)
SODIUM SERPL-SCNC: 139 MEQ/L (ref 135–144)

## 2023-08-14 ENCOUNTER — HOSPITAL ENCOUNTER (OUTPATIENT)
Dept: CARDIOLOGY | Age: 83
Discharge: HOME | End: 2023-08-14
Payer: MEDICARE

## 2023-08-14 PROCEDURE — 93296 REM INTERROG EVL PM/IDS: CPT

## 2023-08-15 ENCOUNTER — OFFICE VISIT (OUTPATIENT)
Dept: FAMILY MEDICINE CLINIC | Age: 83
End: 2023-08-15
Payer: MEDICARE

## 2023-08-15 VITALS
WEIGHT: 159.6 LBS | DIASTOLIC BLOOD PRESSURE: 80 MMHG | HEART RATE: 68 BPM | TEMPERATURE: 97.8 F | SYSTOLIC BLOOD PRESSURE: 120 MMHG | BODY MASS INDEX: 26.59 KG/M2 | OXYGEN SATURATION: 99 % | HEIGHT: 65 IN

## 2023-08-15 DIAGNOSIS — I10 ESSENTIAL HYPERTENSION: ICD-10-CM

## 2023-08-15 DIAGNOSIS — I48.91 ATRIAL FIBRILLATION, UNSPECIFIED TYPE (HCC): ICD-10-CM

## 2023-08-15 DIAGNOSIS — E03.9 HYPOTHYROIDISM, UNSPECIFIED TYPE: ICD-10-CM

## 2023-08-15 DIAGNOSIS — H35.3211 EXUDATIVE AGE-RELATED MACULAR DEGENERATION OF RIGHT EYE WITH ACTIVE CHOROIDAL NEOVASCULARIZATION (HCC): ICD-10-CM

## 2023-08-15 DIAGNOSIS — E87.5 HYPERKALEMIA: Primary | ICD-10-CM

## 2023-08-15 DIAGNOSIS — N39.46 MIXED INCONTINENCE: ICD-10-CM

## 2023-08-15 PROCEDURE — 3074F SYST BP LT 130 MM HG: CPT | Performed by: FAMILY MEDICINE

## 2023-08-15 PROCEDURE — 1123F ACP DISCUSS/DSCN MKR DOCD: CPT | Performed by: FAMILY MEDICINE

## 2023-08-15 PROCEDURE — G8427 DOCREV CUR MEDS BY ELIG CLIN: HCPCS | Performed by: FAMILY MEDICINE

## 2023-08-15 PROCEDURE — 99213 OFFICE O/P EST LOW 20 MIN: CPT | Performed by: FAMILY MEDICINE

## 2023-08-15 PROCEDURE — G8400 PT W/DXA NO RESULTS DOC: HCPCS | Performed by: FAMILY MEDICINE

## 2023-08-15 PROCEDURE — 3079F DIAST BP 80-89 MM HG: CPT | Performed by: FAMILY MEDICINE

## 2023-08-15 PROCEDURE — G8417 CALC BMI ABV UP PARAM F/U: HCPCS | Performed by: FAMILY MEDICINE

## 2023-08-15 PROCEDURE — 1036F TOBACCO NON-USER: CPT | Performed by: FAMILY MEDICINE

## 2023-08-15 PROCEDURE — 0509F URINE INCON PLAN DOCD: CPT | Performed by: FAMILY MEDICINE

## 2023-08-15 PROCEDURE — 1090F PRES/ABSN URINE INCON ASSESS: CPT | Performed by: FAMILY MEDICINE

## 2023-08-15 NOTE — PROGRESS NOTES
Chief Complaint   Patient presents with    Hypothyroidism     6 month     Health Maintenance     Declined AWV       HPI:  Tima Hercules is a 81 y.o. 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)    ABMD (anterior basement membrane dystrophy)    Nuclear sclerotic cataract of both eyes    Posterior vitreous detachment of left eye    Punctate keratitis    Senile cataract, unspecified    Vitreous degeneration and detachment of both eyes    Adenomatous polyp of transverse colon    Acute cystitis with hematuria    Frequency of urination       Current Outpatient Medications   Medication Sig Dispense Refill    ARMOUR THYROID 30 MG tablet TAKE 1 & 1/2 (ONE AND ONE-HALF) TABLETS BY MOUTH DAILY 135 tablet 1    oxybutynin (DITROPAN-XL) 5 MG extended release tablet TAKE 1 TABLET BY MOUTH DAILY 90 tablet 1    amLODIPine (NORVASC) 5 MG tablet Take 1 tablet by mouth 2 times daily 90 tablet 2    Cholecalciferol (VITAMIN D3) 1.25 MG (05849 UT) CAPS Take by mouth      LUTEIN PO Take 40 mg by mouth daily      vitamin B-12 (CYANOCOBALAMIN) 1000 MCG tablet Take 1 tablet by mouth daily Takes 1500 mcg daily      Zinc Sulfate (ZINC 15 PO) Take 50 mg by mouth 2 times daily       Handicap Placard MISC by Does not apply route Exp 5 years 1 each 0    Multiple Vitamins-Minerals (THERAPEUTIC MULTIVITAMIN-MINERALS) tablet Take 1 tablet by mouth daily      b complex vitamins capsule Take 1

## 2023-08-30 ENCOUNTER — TELEPHONE (OUTPATIENT)
Dept: FAMILY MEDICINE CLINIC | Age: 83
End: 2023-08-30

## 2023-09-19 DIAGNOSIS — E87.5 HYPERKALEMIA: ICD-10-CM

## 2023-09-19 LAB
ANION GAP SERPL CALCULATED.3IONS-SCNC: 10 MEQ/L (ref 9–15)
BUN SERPL-MCNC: 30 MG/DL (ref 8–23)
CALCIUM SERPL-MCNC: 9.6 MG/DL (ref 8.5–9.9)
CHLORIDE SERPL-SCNC: 102 MEQ/L (ref 95–107)
CO2 SERPL-SCNC: 29 MEQ/L (ref 20–31)
CREAT SERPL-MCNC: 0.78 MG/DL (ref 0.5–0.9)
GLUCOSE SERPL-MCNC: 80 MG/DL (ref 70–99)
POTASSIUM SERPL-SCNC: 4.6 MEQ/L (ref 3.4–4.9)
SODIUM SERPL-SCNC: 141 MEQ/L (ref 135–144)

## 2023-09-24 SDOH — HEALTH STABILITY: PHYSICAL HEALTH: ON AVERAGE, HOW MANY MINUTES DO YOU ENGAGE IN EXERCISE AT THIS LEVEL?: 30 MIN

## 2023-09-24 SDOH — HEALTH STABILITY: PHYSICAL HEALTH: ON AVERAGE, HOW MANY DAYS PER WEEK DO YOU ENGAGE IN MODERATE TO STRENUOUS EXERCISE (LIKE A BRISK WALK)?: 3 DAYS

## 2023-09-24 ASSESSMENT — PATIENT HEALTH QUESTIONNAIRE - PHQ9
1. LITTLE INTEREST OR PLEASURE IN DOING THINGS: 0
SUM OF ALL RESPONSES TO PHQ QUESTIONS 1-9: 0
2. FEELING DOWN, DEPRESSED OR HOPELESS: 0
SUM OF ALL RESPONSES TO PHQ QUESTIONS 1-9: 0
SUM OF ALL RESPONSES TO PHQ9 QUESTIONS 1 & 2: 0
SUM OF ALL RESPONSES TO PHQ QUESTIONS 1-9: 0
SUM OF ALL RESPONSES TO PHQ QUESTIONS 1-9: 0

## 2023-09-24 ASSESSMENT — LIFESTYLE VARIABLES
HOW OFTEN DO YOU HAVE A DRINK CONTAINING ALCOHOL: 1
HOW MANY STANDARD DRINKS CONTAINING ALCOHOL DO YOU HAVE ON A TYPICAL DAY: PATIENT DOES NOT DRINK
HOW OFTEN DO YOU HAVE A DRINK CONTAINING ALCOHOL: NEVER
HOW OFTEN DO YOU HAVE SIX OR MORE DRINKS ON ONE OCCASION: 1
HOW MANY STANDARD DRINKS CONTAINING ALCOHOL DO YOU HAVE ON A TYPICAL DAY: 0

## 2023-09-26 ENCOUNTER — OFFICE VISIT (OUTPATIENT)
Dept: FAMILY MEDICINE CLINIC | Age: 83
End: 2023-09-26
Payer: MEDICARE

## 2023-09-26 VITALS
OXYGEN SATURATION: 96 % | BODY MASS INDEX: 26.49 KG/M2 | HEART RATE: 69 BPM | HEIGHT: 65 IN | SYSTOLIC BLOOD PRESSURE: 130 MMHG | TEMPERATURE: 98.3 F | WEIGHT: 159 LBS | DIASTOLIC BLOOD PRESSURE: 64 MMHG

## 2023-09-26 DIAGNOSIS — Z00.00 MEDICARE ANNUAL WELLNESS VISIT, SUBSEQUENT: Primary | ICD-10-CM

## 2023-09-26 DIAGNOSIS — R53.83 OTHER FATIGUE: ICD-10-CM

## 2023-09-26 DIAGNOSIS — E03.9 HYPOTHYROIDISM, UNSPECIFIED TYPE: ICD-10-CM

## 2023-09-26 DIAGNOSIS — I48.91 ATRIAL FIBRILLATION, UNSPECIFIED TYPE (HCC): ICD-10-CM

## 2023-09-26 DIAGNOSIS — I10 ESSENTIAL HYPERTENSION: ICD-10-CM

## 2023-09-26 DIAGNOSIS — N39.46 MIXED INCONTINENCE: ICD-10-CM

## 2023-09-26 PROCEDURE — 3078F DIAST BP <80 MM HG: CPT | Performed by: FAMILY MEDICINE

## 2023-09-26 PROCEDURE — 3075F SYST BP GE 130 - 139MM HG: CPT | Performed by: FAMILY MEDICINE

## 2023-09-26 PROCEDURE — 1123F ACP DISCUSS/DSCN MKR DOCD: CPT | Performed by: FAMILY MEDICINE

## 2023-09-26 PROCEDURE — G0439 PPPS, SUBSEQ VISIT: HCPCS | Performed by: FAMILY MEDICINE

## 2023-09-26 RX ORDER — AMLODIPINE BESYLATE 2.5 MG/1
2.5 TABLET ORAL 2 TIMES DAILY
Qty: 180 TABLET | Refills: 3 | Status: SHIPPED | OUTPATIENT
Start: 2023-09-26

## 2023-09-26 NOTE — PROGRESS NOTES
Medicare Annual Wellness Visit    Marya Snow is here for Medicare AWV (New oncologist Dr. Nigel Tate)    [id number] [address] Medicare annual wellness visit, subsequent  Atrial fibrillation, unspecified type ([address])  -     Handicap Placard MISC; Starting Tue 9/26/2023, Disp-1 each, R-0, PrintExp 5 years   -     Handicap Placard MISC; Starting Tue 9/26/2023, Disp-1 each, R-0, PrintExp 5 years   Essential hypertension  -     amLODIPine (NORVASC) 2.5 MG tablet; Take 1 tablet by mouth 2 times daily, Disp-180 tablet, R-3Normal  Hypothyroidism, unspecified type  Mixed incontinence  Other fatigue    Recommendations for Preventive Services Due: see orders and patient instructions/AVS.  Recommended screening schedule for the next 5-10 years is provided to the patient in written form: see Patient Instructions/AVS.     No follow-ups on file. Subjective   Chief Complaint   Patient presents with    Medicare [address] oncologist Dr. 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  The following problems were reviewed today and where indicated follow up

## 2023-11-09 LAB
ALBUMIN SERPL-MCNC: 4.2 G/DL (ref 3.5–4.6)
ALP SERPL-CCNC: 84 U/L (ref 40–130)
ALT SERPL-CCNC: 6 U/L (ref 0–33)
ANION GAP SERPL CALCULATED.3IONS-SCNC: 10 MEQ/L (ref 9–15)
AST SERPL-CCNC: 22 U/L (ref 0–35)
BILIRUB DIRECT SERPL-MCNC: <0.2 MG/DL (ref 0–0.4)
BILIRUB INDIRECT SERPL-MCNC: NORMAL MG/DL (ref 0–0.6)
BILIRUB SERPL-MCNC: 0.4 MG/DL (ref 0.2–0.7)
BUN SERPL-MCNC: 30 MG/DL (ref 8–23)
CALCIUM SERPL-MCNC: 9.9 MG/DL (ref 8.5–9.9)
CHLORIDE SERPL-SCNC: 105 MEQ/L (ref 95–107)
CHOLEST SERPL-MCNC: 244 MG/DL (ref 0–199)
CO2 SERPL-SCNC: 28 MEQ/L (ref 20–31)
CREAT SERPL-MCNC: 0.81 MG/DL (ref 0.5–0.9)
ERYTHROCYTE [DISTWIDTH] IN BLOOD BY AUTOMATED COUNT: 14.3 % (ref 11.5–14.5)
GLUCOSE SERPL-MCNC: 86 MG/DL (ref 70–99)
HCT VFR BLD AUTO: 43.6 % (ref 37–47)
HDLC SERPL-MCNC: 70 MG/DL (ref 40–59)
HGB BLD-MCNC: 13.8 G/DL (ref 12–16)
LDLC SERPL CALC-MCNC: 159 MG/DL (ref 0–129)
MAGNESIUM SERPL-MCNC: 2.5 MG/DL (ref 1.7–2.4)
MCH RBC QN AUTO: 29 PG (ref 27–31.3)
MCHC RBC AUTO-ENTMCNC: 31.7 % (ref 33–37)
MCV RBC AUTO: 91.6 FL (ref 79.4–94.8)
PLATELET # BLD AUTO: 226 K/UL (ref 130–400)
POTASSIUM SERPL-SCNC: 5.4 MEQ/L (ref 3.4–4.9)
PROT SERPL-MCNC: 6.9 G/DL (ref 6.3–8)
RBC # BLD AUTO: 4.76 M/UL (ref 4.2–5.4)
SODIUM SERPL-SCNC: 143 MEQ/L (ref 135–144)
TRIGL SERPL-MCNC: 77 MG/DL (ref 0–150)
TSH SERPL-MCNC: 2.64 UIU/ML (ref 0.44–3.86)
WBC # BLD AUTO: 3.9 K/UL (ref 4.8–10.8)

## 2023-11-10 PROBLEM — E78.5 HYPERLIPIDEMIA: Status: ACTIVE | Noted: 2023-11-10

## 2023-11-10 PROBLEM — G47.30 SLEEP APNEA: Status: ACTIVE | Noted: 2023-11-10

## 2023-11-10 PROBLEM — I48.0 PAROXYSMAL ATRIAL FIBRILLATION (MULTI): Status: ACTIVE | Noted: 2023-11-10

## 2023-11-10 PROBLEM — I44.0 FIRST-DEGREE ATRIOVENTRICULAR BLOCK: Status: ACTIVE | Noted: 2023-11-10

## 2023-11-10 PROBLEM — Z95.0 HISTORY OF CARDIAC PACEMAKER: Status: ACTIVE | Noted: 2023-11-10

## 2023-11-10 PROBLEM — E87.5 HYPERKALEMIA: Status: ACTIVE | Noted: 2023-11-10

## 2023-11-10 PROBLEM — R79.89 ELEVATED LIVER FUNCTION TESTS: Status: ACTIVE | Noted: 2023-11-10

## 2023-11-10 PROBLEM — I10 HTN (HYPERTENSION): Status: ACTIVE | Noted: 2023-11-10

## 2023-11-10 RX ORDER — LANOLIN ALCOHOL/MO/W.PET/CERES
1 CREAM (GRAM) TOPICAL DAILY
COMMUNITY

## 2023-11-10 RX ORDER — THYROID 30 MG/1
0.5 TABLET ORAL
COMMUNITY

## 2023-11-10 RX ORDER — GLUCOSAMINE/CHONDROITIN/C/MANG 500-400 MG
1 CAPSULE ORAL 2 TIMES DAILY
COMMUNITY

## 2023-11-10 RX ORDER — AMLODIPINE BESYLATE 5 MG/1
TABLET ORAL
COMMUNITY
End: 2023-11-14 | Stop reason: ALTCHOICE

## 2023-11-10 RX ORDER — ACETAMINOPHEN 500 MG
1 TABLET ORAL DAILY
COMMUNITY

## 2023-11-10 RX ORDER — BACLOFEN 20 MG
250 TABLET ORAL DAILY
COMMUNITY
End: 2024-05-13 | Stop reason: WASHOUT

## 2023-11-10 RX ORDER — SOTALOL HYDROCHLORIDE 80 MG/1
40 TABLET ORAL 2 TIMES DAILY
COMMUNITY
Start: 2022-08-15 | End: 2023-11-14 | Stop reason: SDUPTHER

## 2023-11-10 RX ORDER — OXYBUTYNIN CHLORIDE 5 MG/1
1 TABLET, EXTENDED RELEASE ORAL
COMMUNITY
Start: 2022-08-09

## 2023-11-10 RX ORDER — ASPIRIN 81 MG/1
81 TABLET ORAL DAILY
COMMUNITY

## 2023-11-14 ENCOUNTER — OFFICE VISIT (OUTPATIENT)
Dept: CARDIOLOGY | Facility: CLINIC | Age: 83
End: 2023-11-14
Payer: MEDICARE

## 2023-11-14 VITALS
BODY MASS INDEX: 26.49 KG/M2 | WEIGHT: 159 LBS | HEART RATE: 80 BPM | HEIGHT: 65 IN | DIASTOLIC BLOOD PRESSURE: 69 MMHG | SYSTOLIC BLOOD PRESSURE: 137 MMHG

## 2023-11-14 DIAGNOSIS — Z95.0 HISTORY OF CARDIAC PACEMAKER: ICD-10-CM

## 2023-11-14 DIAGNOSIS — I10 PRIMARY HYPERTENSION: ICD-10-CM

## 2023-11-14 DIAGNOSIS — E78.2 MIXED HYPERLIPIDEMIA: ICD-10-CM

## 2023-11-14 DIAGNOSIS — R79.89 ELEVATED LIVER FUNCTION TESTS: ICD-10-CM

## 2023-11-14 DIAGNOSIS — I44.0 FIRST-DEGREE ATRIOVENTRICULAR BLOCK: ICD-10-CM

## 2023-11-14 DIAGNOSIS — I48.0 PAROXYSMAL ATRIAL FIBRILLATION (MULTI): Primary | ICD-10-CM

## 2023-11-14 DIAGNOSIS — G47.33 OBSTRUCTIVE SLEEP APNEA SYNDROME: ICD-10-CM

## 2023-11-14 PROCEDURE — 99214 OFFICE O/P EST MOD 30 MIN: CPT | Performed by: INTERNAL MEDICINE

## 2023-11-14 PROCEDURE — 3075F SYST BP GE 130 - 139MM HG: CPT | Performed by: INTERNAL MEDICINE

## 2023-11-14 PROCEDURE — 3078F DIAST BP <80 MM HG: CPT | Performed by: INTERNAL MEDICINE

## 2023-11-14 PROCEDURE — 1036F TOBACCO NON-USER: CPT | Performed by: INTERNAL MEDICINE

## 2023-11-14 PROCEDURE — 1159F MED LIST DOCD IN RCRD: CPT | Performed by: INTERNAL MEDICINE

## 2023-11-14 RX ORDER — AMLODIPINE BESYLATE 2.5 MG/1
2.5 TABLET ORAL 2 TIMES DAILY
COMMUNITY
Start: 2023-10-30 | End: 2023-11-14 | Stop reason: SDUPTHER

## 2023-11-14 RX ORDER — AMLODIPINE BESYLATE 2.5 MG/1
2.5 TABLET ORAL 2 TIMES DAILY
Qty: 180 TABLET | Refills: 3 | Status: SHIPPED | OUTPATIENT
Start: 2023-11-14 | End: 2024-04-15 | Stop reason: SDUPTHER

## 2023-11-14 RX ORDER — SOTALOL HYDROCHLORIDE 80 MG/1
40 TABLET ORAL EVERY 12 HOURS
Qty: 90 TABLET | Refills: 3 | Status: SHIPPED | OUTPATIENT
Start: 2023-11-14 | End: 2024-04-04 | Stop reason: WASHOUT

## 2023-11-14 NOTE — PROGRESS NOTES
CARDIOLOGY OFFICE NOTE    Date:   11/14/2023    Patient:    Sharon Nova    YOB: 1940    Primary Physician: Yuri Goodson MD       Reason for Visit: No chief complaint on file.       HPI:     Sharon Nova was seen in cardiac evaluation at the  Cardiology office November 14, 2023.      The patients problems are listed as in the impression below.    Electronic medical records reviewed.    Patient returns.  She states she is active.  She still ministering.  She has occasional skipped beats.  She has had no runs of tachycardia.  She is tired all the time.  She has had no lightheadedness or dizziness.  No other significant cardiovascular complaints.    Patient denies Chest Pain, SOB, Lightheadedness, Dizziness, TIA or CVA symptoms.  No CHF or Edema.   No GI,  or Bleeding Issues. No Recent Fever or Chills.     Cardiovascular and general review of systems is otherwise negative.    A 14-system review is otherwise negative, other than noted.     PHYSICAL EXAMINATION:      Vitals:    11/14/23 1124   BP: 137/69   Pulse: 80     General: No acute distress. Vital signs as noted below. Alert  and oriented. Head and neck examination: No jugular venous distention, no  carotid bruits, no mass. Carotid upstrokes preserved. Oral mucosa moist. No  xanthelasma. Head and neck examination otherwise unremarkable. Lungs: Clear to  auscultation and percussion. No wheezes, no rales, and no rhonchi. Chest  excursion appeared to be normal. No chest wall tenderness on palpation.   Pacemaker left chest with well healed incision. Heart: Normal S1 and S2. No  S3. No S4. No rub. Grade 1/6 systolic murmur, best heard left sternal border.  Point of maximal impulse was decreased. Abdomen was soft, obese. Nontender.   No organomegaly. No bruits. No masses. Extremities: No edema. No clubbing.   No cyanosis. Pulses are strong throughout. No bruits. Musculoskeletal exam:   No ulcers, otherwise unremarkable. Neurologically appeared  grossly intact.   .  IMPRESSION:      Cardiovascular status stable  Palpitations, rare  Atrial fibrillation, maintained in sinus rhythm without recurrence.  First-degree AV block  Sick sinus syndrome, Status post pacemaker implantation. Medtronic and [employer]ta MRI, February 2008.   No anticoagulation therapy, by patient preference.   Normal left ventricular systolic function, LVEF 50%  Negative coronary cineangiography June 2017.  Negative Lexiscan myocardial perfusion stress test, ejection fraction of 64%, July 2014.  Hypertension.  Hyperlipidemia.  History of transient ischemic attack history.  Obesity.  Sleep apnea, noncompliant with CPAP.  COVID-19 infection, January 22.   Elevated liver function studies.  Macular degeneration.  Otherwise as per assessment below.    RECOMMENDATIONS:      Patient continues overall to do well.  She is asymptomatic for the most part.  She does have some rare palpitations.  Would suggest that she continue her current medications.  Refills were provided.    Exercise dietary program.  Hydration.    Pacemaker interrogations as scheduled.    [employer] portal use was encouraged.    We will plan to see back in 6 months with Laboratory Studies and ECG as ordered.     Patient will follow up with their primary physician for general care.    The patient knows to contact medical care earlier if need be.      ALLERGIES:     Allergies   Allergen Reactions    Levothyroxine Cardiac arrhythmia/arrest and Shortness of breath    Fludrocortisone Swelling    Hydralazine Unknown    Influenza Virus Vaccine Whole Unknown    Warfarin Unknown        MEDICATIONS:     Current Outpatient Medications   Medication Instructions    amLODIPine (NORVASC) 2.5 mg, oral, 2 times daily    aspirin 81 mg EC tablet TAKE 1 TABLET PO DAILY.    cholecalciferol (Vitamin D-3) 5,000 Units tablet 1 tablet, oral, Daily    cyanocobalamin (Vitamin B-12) 1,000 mcg tablet 1 tablet, oral, Daily    glucosamine-chondroit-vit C-Mn (Glucosamine  Chondroitin MaxStr) 500-400 mg capsule 1 capsule, oral, 2 times daily    magnesium oxide 500 mg tablet TAKE 1 TABLET PO DAILY.    oxybutynin XL (Ditropan-XL) 5 mg 24 hr tablet 1 tablet, oral, Daily before breakfast    sotalol (BETAPACE) 40 mg, oral, 2 times daily    thyroid, pork, ([place] Thyroid) 30 mg tablet TAKE 1 1/2 tablet daily       ELECTROCARDIOGRAM:      None, ECG machine not working today.    CARDIAC TESTING:      Pacemaker interrogation: Normal function per report.    LABORATORY DATA:      No laboratory this visit.    PROBLEM LIST:     Patient Active Problem List   Diagnosis    Elevated liver function tests    First-degree atrioventricular block    History of cardiac pacemaker    HTN (hypertension)    Hyperkalemia    Hyperlipidemia    Paroxysmal atrial fibrillation (CMS/HCC)    Sleep apnea             Jim Beal MD, [facility] / [facility] /  Cardiology      Of Note:  [employer] voice recognition dictation software was utilized partially in the preparation of this note, therefore, inaccuracies in spelling, word choice and punctuation may have occurred which were not recognized the time of signing.    Patient was seen and examined with total time of visit including chart preparation, rooming, and chart completion exceeding 40 minutes.      ----

## 2023-12-03 DIAGNOSIS — N39.46 MIXED INCONTINENCE: ICD-10-CM

## 2023-12-04 RX ORDER — OXYBUTYNIN CHLORIDE 5 MG/1
5 TABLET, EXTENDED RELEASE ORAL DAILY
Qty: 90 TABLET | Refills: 1 | Status: SHIPPED | OUTPATIENT
Start: 2023-12-04

## 2023-12-04 NOTE — TELEPHONE ENCOUNTER
Comments:     Last Office Visit (last PCP visit):   9/26/2023    Next Visit Date:  Future Appointments   Date Time Provider [address]   2/20/2024  8:00 AM Kia Yanez MD [facility] [place]       **If hasn't been seen in over a year OR hasn't followed up according to last diabetes/ADHD visit, make appointment for patient before sending refill to provider.     Rx requested:  Requested Prescriptions     Pending Prescriptions Disp Refills    oxybutynin (DITROPAN-XL) 5 MG extended release tablet [Pharmacy Med Name: oxybutynin chloride ER 5 mg tablet,extended release 24 hr] 90 tablet 1     Sig: TAKE 1 TABLET BY MOUTH DAILY

## 2024-01-01 DIAGNOSIS — E03.9 HYPOTHYROIDISM, UNSPECIFIED TYPE: ICD-10-CM

## 2024-01-02 DIAGNOSIS — E03.9 HYPOTHYROIDISM, UNSPECIFIED TYPE: ICD-10-CM

## 2024-01-02 RX ORDER — THYROID 30 MG/1
TABLET ORAL
Qty: 135 TABLET | Refills: 1 | Status: SHIPPED | OUTPATIENT
Start: 2024-01-02

## 2024-01-02 NOTE — TELEPHONE ENCOUNTER
Comments:     Last Office Visit (last PCP visit):   3/10/2022    Next Visit Date:  Future Appointments   Date Time Provider Department Center   2/20/2024  8:00 AM David Villanueva MD [facility]       **If hasn't been seen in over a year OR hasn't followed up according to last diabetes/ADHD visit, make appointment for patient before sending refill to provider.    Rx requested:  Requested Prescriptions     Pending Prescriptions Disp Refills    ARMOUR THYROID 30 MG tablet [Pharmacy Med Name: [place] Thyroid 30 mg tablet] 135 tablet 1     Sig: TAKE 1 & 1/2 (ONE AND ONE-HALF) TABLETS BY MOUTH DAILY

## 2024-02-05 ENCOUNTER — HOSPITAL ENCOUNTER (OUTPATIENT)
Dept: CARDIOLOGY | Age: 84
Discharge: HOME | End: 2024-02-05
Payer: MEDICARE

## 2024-02-05 PROCEDURE — 93296 REM INTERROG EVL PM/IDS: CPT

## 2024-02-05 PROCEDURE — 93294 REM INTERROG EVL PM/LDLS PM: CPT | Performed by: INTERNAL MEDICINE

## 2024-02-06 DIAGNOSIS — R19.5 CHANGE IN STOOL: ICD-10-CM

## 2024-02-06 DIAGNOSIS — E03.9 HYPOTHYROIDISM, UNSPECIFIED TYPE: ICD-10-CM

## 2024-02-06 DIAGNOSIS — E87.5 HYPERKALEMIA: ICD-10-CM

## 2024-02-06 LAB
ANION GAP SERPL CALCULATED.3IONS-SCNC: 9 MEQ/L (ref 9–15)
BUN SERPL-MCNC: 26 MG/DL (ref 8–23)
CALCIUM SERPL-MCNC: 9.5 MG/DL (ref 8.5–9.9)
CHLORIDE SERPL-SCNC: 105 MEQ/L (ref 95–107)
CO2 SERPL-SCNC: 28 MEQ/L (ref 20–31)
CREAT SERPL-MCNC: 0.82 MG/DL (ref 0.5–0.9)
GLUCOSE SERPL-MCNC: 78 MG/DL (ref 70–99)
POTASSIUM SERPL-SCNC: 4.9 MEQ/L (ref 3.4–4.9)
SODIUM SERPL-SCNC: 142 MEQ/L (ref 135–144)
TSH SERPL-MCNC: 3.05 UIU/ML (ref 0.44–3.86)

## 2024-02-20 ENCOUNTER — OFFICE VISIT (OUTPATIENT)
Dept: FAMILY MEDICINE CLINIC | Age: 84
End: 2024-02-20

## 2024-02-20 VITALS
WEIGHT: 159.6 LBS | OXYGEN SATURATION: 99 % | DIASTOLIC BLOOD PRESSURE: 80 MMHG | HEIGHT: 65 IN | BODY MASS INDEX: 26.59 KG/M2 | TEMPERATURE: 98.4 F | SYSTOLIC BLOOD PRESSURE: 128 MMHG | HEART RATE: 86 BPM

## 2024-02-20 DIAGNOSIS — C18.4 MALIGNANT NEOPLASM OF TRANSVERSE COLON (HCC): ICD-10-CM

## 2024-02-20 DIAGNOSIS — M23.52 RECURRENT LEFT KNEE INSTABILITY: ICD-10-CM

## 2024-02-20 DIAGNOSIS — R41.3 MEMORY LOSS: ICD-10-CM

## 2024-02-20 DIAGNOSIS — I10 ESSENTIAL HYPERTENSION: ICD-10-CM

## 2024-02-20 DIAGNOSIS — E87.5 HYPERKALEMIA: ICD-10-CM

## 2024-02-20 DIAGNOSIS — E03.9 HYPOTHYROIDISM, UNSPECIFIED TYPE: Primary | ICD-10-CM

## 2024-02-20 DIAGNOSIS — I48.91 ATRIAL FIBRILLATION, UNSPECIFIED TYPE (HCC): ICD-10-CM

## 2024-02-20 DIAGNOSIS — N39.46 MIXED INCONTINENCE: ICD-10-CM

## 2024-02-20 DIAGNOSIS — H35.3211 EXUDATIVE AGE-RELATED MACULAR DEGENERATION OF RIGHT EYE WITH ACTIVE CHOROIDAL NEOVASCULARIZATION (HCC): ICD-10-CM

## 2024-02-20 PROBLEM — T73.2XXA FATIGUE DUE TO EXPOSURE: Status: RESOLVED | Noted: 2017-07-31 | Resolved: 2024-02-20

## 2024-02-20 RX ORDER — OMEGA-3/DHA/EPA/FISH OIL 805-1000MG
CAPSULE ORAL
COMMUNITY

## 2024-02-20 RX ORDER — DONEPEZIL HYDROCHLORIDE 5 MG/1
5 TABLET, FILM COATED ORAL NIGHTLY
Qty: 30 TABLET | Refills: 5 | Status: SHIPPED | OUTPATIENT
Start: 2024-02-20

## 2024-02-20 SDOH — ECONOMIC STABILITY: FOOD INSECURITY: WITHIN THE PAST 12 MONTHS, YOU WORRIED THAT YOUR FOOD WOULD RUN OUT BEFORE YOU GOT MONEY TO BUY MORE.: NEVER TRUE

## 2024-02-20 SDOH — ECONOMIC STABILITY: FOOD INSECURITY: WITHIN THE PAST 12 MONTHS, THE FOOD YOU BOUGHT JUST DIDN'T LAST AND YOU DIDN'T HAVE MONEY TO GET MORE.: NEVER TRUE

## 2024-02-20 SDOH — ECONOMIC STABILITY: INCOME INSECURITY: HOW HARD IS IT FOR YOU TO PAY FOR THE VERY BASICS LIKE FOOD, HOUSING, MEDICAL CARE, AND HEATING?: NOT HARD AT ALL

## 2024-02-20 ASSESSMENT — PATIENT HEALTH QUESTIONNAIRE - PHQ9
1. LITTLE INTEREST OR PLEASURE IN DOING THINGS: 0
SUM OF ALL RESPONSES TO PHQ QUESTIONS 1-9: 1
SUM OF ALL RESPONSES TO PHQ QUESTIONS 1-9: 1
SUM OF ALL RESPONSES TO PHQ9 QUESTIONS 1 & 2: 1
2. FEELING DOWN, DEPRESSED OR HOPELESS: 1
SUM OF ALL RESPONSES TO PHQ QUESTIONS 1-9: 1
SUM OF ALL RESPONSES TO PHQ QUESTIONS 1-9: 1

## 2024-02-20 NOTE — PROGRESS NOTES
Chief Complaint   Patient presents with    Hypothyroidism     6 month        HPI:  Jovana Garcia is a 83 y.o. female     Checkup  Overall has been feeling \"fair to middling\"    Memory seems to be getting worse  Trying to retrain her brain     Left knee will lock up/buckle at night     In-clinic pacer check     Nothing acute at this time    BMPs regularly     Doing very well   Active       Patient Active Problem List   Diagnosis    Constipation    Fatigue    Atrial fibrillation (HCC)    Hypertension    Sleep apnea    Sick sinus syndrome (HCC)    Pacemaker    Hyperlipidemia    Hypothyroidism    Bunion of great toe of right foot    Hammer toe of right foot    History of uterine cancer    Malignant neoplasm of transverse colon (HCC)    AK (actinic keratosis)    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)    ABMD (anterior basement membrane dystrophy)    Nuclear sclerotic cataract of both eyes    Posterior vitreous detachment of left eye    Punctate keratitis    Senile cataract, unspecified    Vitreous degeneration and detachment of both eyes    Adenomatous polyp of transverse colon    Acute cystitis with hematuria    Frequency of urination       Current Outpatient Medications   Medication Sig Dispense Refill    [facility] Natural Products ([facility] MEMORY SUPPORT) TABS Take by mouth      donepezil (ARICEPT) 5 MG tablet Take 1 tablet by mouth nightly 30 tablet 5    ARMOUR THYROID 30 MG tablet TAKE 1 & 1/2 (ONE AND ONE-HALF) TABLETS BY MOUTH DAILY 135 tablet 1    oxybutynin (DITROPAN-XL) 5 MG extended release tablet TAKE 1 TABLET BY MOUTH DAILY 90 tablet 1    APPLE CIDER VINEGAR PO Take by mouth      Handicap Placard MISC by Does not apply route Exp 5 years 1 each 0    Handicap Placard MISC by Does not apply route Exp 5 years 1 each 0    amLODIPine (NORVASC) 2.5 MG tablet Take 1 tablet by mouth 2 times daily 180 tablet 3    Cholecalciferol (VITAMIN D3) 1.25 MG (65124 UT) CAPS Take by mouth      LUTEIN PO Take 40 mg

## 2024-03-27 ENCOUNTER — TELEPHONE (OUTPATIENT)
Dept: FAMILY MEDICINE CLINIC | Age: 84
End: 2024-03-27

## 2024-03-27 ENCOUNTER — HOSPITAL ENCOUNTER (EMERGENCY)
Age: 84
Discharge: HOME | End: 2024-03-27
Payer: MEDICARE

## 2024-03-27 VITALS
DIASTOLIC BLOOD PRESSURE: 81 MMHG | OXYGEN SATURATION: 96 % | SYSTOLIC BLOOD PRESSURE: 145 MMHG | WEIGHT: 160 LBS | BODY MASS INDEX: 26.63 KG/M2 | HEART RATE: 78 BPM | TEMPERATURE: 97.9 F | RESPIRATION RATE: 20 BRPM

## 2024-03-27 DIAGNOSIS — E87.5 HYPERKALEMIA: ICD-10-CM

## 2024-03-27 DIAGNOSIS — I10 ESSENTIAL HYPERTENSION: Primary | ICD-10-CM

## 2024-03-27 LAB
ANION GAP SERPL CALCULATED.3IONS-SCNC: 11 MEQ/L (ref 9–15)
ANION GAP SERPL CALCULATED.3IONS-SCNC: 12 MEQ/L (ref 9–15)
BASOPHILS # BLD: 0 K/UL (ref 0–0.2)
BASOPHILS NFR BLD: 0.4 %
BUN SERPL-MCNC: 25 MG/DL (ref 8–23)
BUN SERPL-MCNC: 28 MG/DL (ref 8–23)
CALCIUM SERPL-MCNC: 10.1 MG/DL (ref 8.5–9.9)
CALCIUM SERPL-MCNC: 9.4 MG/DL (ref 8.5–9.9)
CHLORIDE SERPL-SCNC: 102 MEQ/L (ref 95–107)
CHLORIDE SERPL-SCNC: 103 MEQ/L (ref 95–107)
CO2 SERPL-SCNC: 25 MEQ/L (ref 20–31)
CO2 SERPL-SCNC: 28 MEQ/L (ref 20–31)
CREAT SERPL-MCNC: 0.75 MG/DL (ref 0.5–0.9)
CREAT SERPL-MCNC: 0.85 MG/DL (ref 0.5–0.9)
EOSINOPHIL # BLD: 0.1 K/UL (ref 0–0.7)
EOSINOPHIL NFR BLD: 1.5 %
ERYTHROCYTE [DISTWIDTH] IN BLOOD BY AUTOMATED COUNT: 14 % (ref 11.5–14.5)
GLUCOSE SERPL-MCNC: 64 MG/DL (ref 70–99)
GLUCOSE SERPL-MCNC: 92 MG/DL (ref 70–99)
HCT VFR BLD AUTO: 39.1 % (ref 37–47)
HGB BLD-MCNC: 12.8 G/DL (ref 12–16)
LYMPHOCYTES # BLD: 0.8 K/UL (ref 1–4.8)
LYMPHOCYTES NFR BLD: 16.8 %
MCH RBC QN AUTO: 29 PG (ref 27–31.3)
MCHC RBC AUTO-ENTMCNC: 32.7 % (ref 33–37)
MCV RBC AUTO: 88.5 FL (ref 79.4–94.8)
MONOCYTES # BLD: 0.4 K/UL (ref 0.2–0.8)
MONOCYTES NFR BLD: 9.5 %
NEUTROPHILS # BLD: 3.3 K/UL (ref 1.4–6.5)
NEUTS SEG NFR BLD: 71.6 %
PLATELET # BLD AUTO: 231 K/UL (ref 130–400)
POTASSIUM SERPL-SCNC: 4.1 MEQ/L (ref 3.4–4.9)
POTASSIUM SERPL-SCNC: 6 MEQ/L (ref 3.4–4.9)
RBC # BLD AUTO: 4.42 M/UL (ref 4.2–5.4)
SODIUM SERPL-SCNC: 139 MEQ/L (ref 135–144)
SODIUM SERPL-SCNC: 142 MEQ/L (ref 135–144)
WBC # BLD AUTO: 4.6 K/UL (ref 4.8–10.8)

## 2024-03-27 PROCEDURE — 80048 BASIC METABOLIC PNL TOTAL CA: CPT

## 2024-03-27 PROCEDURE — 85025 COMPLETE CBC W/AUTO DIFF WBC: CPT

## 2024-03-27 PROCEDURE — 93005 ELECTROCARDIOGRAM TRACING: CPT | Performed by: PHYSICIAN ASSISTANT

## 2024-03-27 PROCEDURE — 36415 COLL VENOUS BLD VENIPUNCTURE: CPT

## 2024-03-27 PROCEDURE — 99284 EMERGENCY DEPT VISIT MOD MDM: CPT

## 2024-03-27 ASSESSMENT — ENCOUNTER SYMPTOMS
EYE DISCHARGE: 0
ABDOMINAL DISTENTION: 0
VOICE CHANGE: 0
ANAL BLEEDING: 0
COUGH: 0

## 2024-03-27 ASSESSMENT — PAIN - FUNCTIONAL ASSESSMENT: PAIN_FUNCTIONAL_ASSESSMENT: NONE - DENIES PAIN

## 2024-03-27 NOTE — ED PROVIDER NOTES
[facility] ED  EMERGENCY DEPARTMENT ENCOUNTER      Pt Name: Jovana Garcia  MRN: [id number]  Birthdate 1940  Date of evaluation: 3/27/2024  Provider: Ced Acharya PA-C  6:28 PM EDT    CHIEF COMPLAINT       Chief Complaint   Patient presents with    High Potassium          HISTORY OF PRESENT ILLNESS   (Location/Symptom, Timing/Onset, Context/Setting, Quality, Duration, Modifying Factors, Severity)  Note limiting factors.   Jovana Garcia is a 83 y.o. female who presents to the emergency department patient presents from primary care with high potassium notes she does have high potassium up to 5.6 today was 6.0 she notes she had a few PVCs her blood pressure slightly elevated 157/82.  Denies any fever chills nausea vomiting chest pain abdominal pain.  Amatory emergency room.  Symptoms mild severity to moderate severity nothing proves worsen symptoms.  She does not take any potassium supplements    HPI    Nursing Notes were reviewed.    REVIEW OF SYSTEMS    (2-9 systems for level 4, 10 or more for level 5)     Review of Systems   Constitutional:  Negative for activity change, appetite change and unexpected weight change.   HENT:  Negative for ear discharge, nosebleeds and voice change.    Eyes:  Negative for discharge.   Respiratory:  Negative for cough.    Cardiovascular:  Negative for chest pain.   Gastrointestinal:  Negative for abdominal distention and anal bleeding.   Genitourinary:  Negative for dysuria and hematuria.   Musculoskeletal:  Negative for joint swelling.   Skin:  Negative for pallor.   Neurological:  Negative for seizures and facial asymmetry.   Hematological:  Does not bruise/bleed easily.   Psychiatric/Behavioral:  Negative for behavioral problems, self-injury and sleep disturbance.    All other systems reviewed and are negative.      Except as noted above the remainder of the review of systems was reviewed and negative.       PAST MEDICAL HISTORY     Past Medical History:

## 2024-03-27 NOTE — ED NOTES
Patient given discharge instructions and verbalized understanding. Vital signs stable. Resp even and unlabored. Skin warm, dry and intact. Patient is alert and oriented. Patient doesn't have any questions at this time.

## 2024-03-27 NOTE — ED TRIAGE NOTES
Pt arrived to Er with report of being sent by Dr. Lepe office for high potassium   Pt had labs drawn this  morning   Pt states when she checks her pulse she is having PVCs   And noticed her BP is higher than normal

## 2024-03-27 NOTE — ED NOTES
Patient resting in bed with call light in reach. Breathes are even and unlabored. Skin is warm and dry. Vital signs are stable. Patient remains on bedside monitor. Patient denies any needs at this time.

## 2024-03-27 NOTE — TELEPHONE ENCOUNTER
Per Randa, pt called. She is struggling with over all not feeling well, PVCs and heart \"skipping\". Pt originally stated that she would \"flush \" out with apple cider vinegar.  Per Randa, pt advised to go to ED for cardiac monitoring.        NAT

## 2024-03-28 LAB
EKG ATRIAL RATE: 74 BPM
EKG P AXIS: 106 DEGREES
EKG P-R INTERVAL: 354 MS
EKG Q-T INTERVAL: 400 MS
EKG QRS DURATION: 96 MS
EKG QTC CALCULATION (BAZETT): 444 MS
EKG R AXIS: -47 DEGREES
EKG T AXIS: 57 DEGREES
EKG VENTRICULAR RATE: 74 BPM

## 2024-03-28 NOTE — TELEPHONE ENCOUNTER
Reviewed her ER note.  The specimen was likely hemolyzed. She does not have to have an in-person follow up as the problem was dealt with, her labs were likely false and her EKG/workup was normal.  She is in office often for checkups. Please have her just keep her routine checkup appts.

## 2024-03-29 ENCOUNTER — OFFICE VISIT (OUTPATIENT)
Dept: FAMILY MEDICINE CLINIC | Age: 84
End: 2024-03-29

## 2024-03-29 VITALS
OXYGEN SATURATION: 97 % | DIASTOLIC BLOOD PRESSURE: 76 MMHG | BODY MASS INDEX: 26.26 KG/M2 | SYSTOLIC BLOOD PRESSURE: 120 MMHG | TEMPERATURE: 97.4 F | HEIGHT: 65 IN | WEIGHT: 157.6 LBS | HEART RATE: 79 BPM

## 2024-03-29 DIAGNOSIS — E87.5 HYPERKALEMIA: ICD-10-CM

## 2024-03-29 DIAGNOSIS — E03.9 HYPOTHYROIDISM, UNSPECIFIED TYPE: ICD-10-CM

## 2024-03-29 DIAGNOSIS — E87.5 HYPERKALEMIA: Primary | ICD-10-CM

## 2024-03-29 DIAGNOSIS — I48.91 ATRIAL FIBRILLATION, UNSPECIFIED TYPE (HCC): ICD-10-CM

## 2024-03-29 LAB
ANION GAP SERPL CALCULATED.3IONS-SCNC: 9 MEQ/L (ref 9–15)
BUN SERPL-MCNC: 26 MG/DL (ref 8–23)
CALCIUM SERPL-MCNC: 10 MG/DL (ref 8.5–9.9)
CHLORIDE SERPL-SCNC: 104 MEQ/L (ref 95–107)
CO2 SERPL-SCNC: 27 MEQ/L (ref 20–31)
CREAT SERPL-MCNC: 0.78 MG/DL (ref 0.5–0.9)
GLUCOSE SERPL-MCNC: 98 MG/DL (ref 70–99)
POTASSIUM SERPL-SCNC: 5.4 MEQ/L (ref 3.4–4.9)
SODIUM SERPL-SCNC: 140 MEQ/L (ref 135–144)

## 2024-03-29 RX ORDER — MULTIVITAMIN WITH IRON
250 TABLET ORAL DAILY
Refills: 0 | COMMUNITY
Start: 2024-03-29

## 2024-03-29 NOTE — PROGRESS NOTES
Chief Complaint   Patient presents with    Other     ER follow up for high potassium, taking apple cider vinegar, head feels weird         HPI:  Jovana Garcia is a 83 y.o. female     ER follow up    Potassium was 6 on labs done here  Critical lab called to [facility] because I was out of office  Patient directed to ER    Redraw was normal    Apparently aricept led to an episode of worse urinary incontinence  Unfortunate because it was working  Has started prevagen     Having a slightly strange feeling in her head     Lab Results   Component Value Date/Time     03/27/2024 05:33 PM    K 4.1 03/27/2024 05:33 PM     03/27/2024 05:33 PM    CO2 25 03/27/2024 05:33 PM    BUN 28 03/27/2024 05:33 PM    CREATININE 0.75 03/27/2024 05:33 PM    GLUCOSE 92 03/27/2024 05:33 PM    GLUCOSE 83 03/09/2012 07:10 AM    CALCIUM 9.4 03/27/2024 05:33 PM    LABGLOM 79.0 03/27/2024 05:33 PM            Patient Active Problem List   Diagnosis    Constipation    Fatigue    Atrial fibrillation (HCC)    Hypertension    Sleep apnea    Sick sinus syndrome (HCC)    Pacemaker    Hyperlipidemia    Hypothyroidism    Bunion of great toe of right foot    Hammer toe of right foot    History of uterine cancer    Malignant neoplasm of transverse colon (HCC)    AK (actinic keratosis)    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)    ABMD (anterior basement membrane dystrophy)    Nuclear sclerotic cataract of both eyes    Posterior vitreous detachment of left eye    Punctate keratitis    Senile cataract, unspecified    Vitreous degeneration and detachment of both eyes    Adenomatous polyp of transverse colon    Acute cystitis with hematuria    Frequency of urination       Current Outpatient Medications   Medication Sig Dispense Refill    Apoaequorin (PREVAGEN PO) Take by mouth      magnesium (MAGNESIUM-OXIDE) 250 MG TABS tablet Take 1 tablet by mouth daily  0    Misc Natural Products ([facility] MEMORY SUPPORT) TABS Take by mouth

## 2024-04-02 ENCOUNTER — OFFICE VISIT (OUTPATIENT)
Dept: CARDIOLOGY | Facility: CLINIC | Age: 84
End: 2024-04-02
Payer: MEDICARE

## 2024-04-02 VITALS
WEIGHT: 158 LBS | HEART RATE: 82 BPM | DIASTOLIC BLOOD PRESSURE: 82 MMHG | BODY MASS INDEX: 26.29 KG/M2 | SYSTOLIC BLOOD PRESSURE: 134 MMHG

## 2024-04-02 DIAGNOSIS — R79.89 ELEVATED LIVER FUNCTION TESTS: ICD-10-CM

## 2024-04-02 DIAGNOSIS — I10 PRIMARY HYPERTENSION: ICD-10-CM

## 2024-04-02 DIAGNOSIS — Z86.73 HISTORY OF CARDIOEMBOLIC CEREBROVASCULAR ACCIDENT (CVA): ICD-10-CM

## 2024-04-02 DIAGNOSIS — E87.5 HYPERKALEMIA: Primary | ICD-10-CM

## 2024-04-02 DIAGNOSIS — E78.2 MIXED HYPERLIPIDEMIA: ICD-10-CM

## 2024-04-02 DIAGNOSIS — Z95.0 HISTORY OF CARDIAC PACEMAKER: Primary | ICD-10-CM

## 2024-04-02 DIAGNOSIS — I48.0 PAROXYSMAL ATRIAL FIBRILLATION (MULTI): ICD-10-CM

## 2024-04-02 DIAGNOSIS — G47.33 OSA (OBSTRUCTIVE SLEEP APNEA): ICD-10-CM

## 2024-04-02 DIAGNOSIS — H35.30 MACULAR DEGENERATION, UNSPECIFIED LATERALITY, UNSPECIFIED TYPE: ICD-10-CM

## 2024-04-02 DIAGNOSIS — I49.5 SSS (SICK SINUS SYNDROME) (MULTI): ICD-10-CM

## 2024-04-02 DIAGNOSIS — E87.5 HYPERKALEMIA: ICD-10-CM

## 2024-04-02 LAB
ALBUMIN SERPL-MCNC: 4.1 G/DL (ref 3.5–4.6)
ALP SERPL-CCNC: 87 U/L (ref 40–130)
ALT SERPL-CCNC: 15 U/L (ref 0–33)
ANION GAP SERPL CALCULATED.3IONS-SCNC: 9 MEQ/L (ref 9–15)
AST SERPL-CCNC: 16 U/L (ref 0–35)
BILIRUB DIRECT SERPL-MCNC: <0.2 MG/DL (ref 0–0.4)
BILIRUB INDIRECT SERPL-MCNC: NORMAL MG/DL (ref 0–0.6)
BILIRUB SERPL-MCNC: 0.4 MG/DL (ref 0.2–0.7)
BUN SERPL-MCNC: 22 MG/DL (ref 8–23)
CALCIUM SERPL-MCNC: 10.1 MG/DL (ref 8.5–9.9)
CHLORIDE SERPL-SCNC: 99 MEQ/L (ref 95–107)
CHOLEST SERPL-MCNC: 254 MG/DL (ref 0–199)
CO2 SERPL-SCNC: 28 MEQ/L (ref 20–31)
CREAT SERPL-MCNC: 0.76 MG/DL (ref 0.5–0.9)
ERYTHROCYTE [DISTWIDTH] IN BLOOD BY AUTOMATED COUNT: 13.9 % (ref 11.5–14.5)
GLUCOSE SERPL-MCNC: 96 MG/DL (ref 70–99)
HCT VFR BLD AUTO: 42.7 % (ref 37–47)
HDLC SERPL-MCNC: 75 MG/DL (ref 40–59)
HGB BLD-MCNC: 13.4 G/DL (ref 12–16)
LDLC SERPL CALC-MCNC: 160 MG/DL (ref 0–129)
MAGNESIUM SERPL-MCNC: 2.5 MG/DL (ref 1.7–2.4)
MCH RBC QN AUTO: 28.4 PG (ref 27–31.3)
MCHC RBC AUTO-ENTMCNC: 31.4 % (ref 33–37)
MCV RBC AUTO: 90.5 FL (ref 79.4–94.8)
PLATELET # BLD AUTO: 239 K/UL (ref 130–400)
POTASSIUM SERPL-SCNC: 5.2 MEQ/L (ref 3.4–4.9)
PROT SERPL-MCNC: 7.1 G/DL (ref 6.3–8)
RBC # BLD AUTO: 4.72 M/UL (ref 4.2–5.4)
SODIUM SERPL-SCNC: 136 MEQ/L (ref 135–144)
TRIGL SERPL-MCNC: 96 MG/DL (ref 0–150)
TSH SERPL-MCNC: 2.74 UIU/ML (ref 0.44–3.86)
WBC # BLD AUTO: 5.4 K/UL (ref 4.8–10.8)

## 2024-04-02 PROCEDURE — 3079F DIAST BP 80-89 MM HG: CPT | Performed by: INTERNAL MEDICINE

## 2024-04-02 PROCEDURE — 93000 ELECTROCARDIOGRAM COMPLETE: CPT | Performed by: INTERNAL MEDICINE

## 2024-04-02 PROCEDURE — 1159F MED LIST DOCD IN RCRD: CPT | Performed by: INTERNAL MEDICINE

## 2024-04-02 PROCEDURE — 3075F SYST BP GE 130 - 139MM HG: CPT | Performed by: INTERNAL MEDICINE

## 2024-04-02 PROCEDURE — 99214 OFFICE O/P EST MOD 30 MIN: CPT | Performed by: INTERNAL MEDICINE

## 2024-04-02 RX ORDER — METOPROLOL SUCCINATE 50 MG/1
25 TABLET, EXTENDED RELEASE ORAL 2 TIMES DAILY
Qty: 90 TABLET | Refills: 3 | Status: SHIPPED | OUTPATIENT
Start: 2024-04-02 | End: 2024-04-15 | Stop reason: SDUPTHER

## 2024-04-02 NOTE — PATIENT INSTRUCTIONS
DISCONTINUE SOTOLOL  START TOPROLOL XL 50 MG TAKE 1/2 (25 MG) TABLET TWICE DAILY     Exercise diet weight loss program.     Stay plenty HYDRATED     Use My Chart portal for reviewing records, testing and contacting office.      Please bring all medicines, vitamins, and herbal supplements with you in original bottles to every appointment!!!!    Prescriptions will not be filled unless you are compliant with your follow up appointments or have a follow up appointment scheduled as per instruction of your physician. Refills should be requested at the time of your visit.

## 2024-04-09 DIAGNOSIS — E87.5 HYPERKALEMIA: ICD-10-CM

## 2024-04-09 LAB
ANION GAP SERPL CALCULATED.3IONS-SCNC: 11 MEQ/L (ref 9–15)
BUN SERPL-MCNC: 25 MG/DL (ref 8–23)
CALCIUM SERPL-MCNC: 9.9 MG/DL (ref 8.5–9.9)
CHLORIDE SERPL-SCNC: 101 MEQ/L (ref 95–107)
CO2 SERPL-SCNC: 25 MEQ/L (ref 20–31)
CREAT SERPL-MCNC: 0.89 MG/DL (ref 0.5–0.9)
GLUCOSE SERPL-MCNC: 90 MG/DL (ref 70–99)
POTASSIUM SERPL-SCNC: 4.8 MEQ/L (ref 3.4–4.9)
SODIUM SERPL-SCNC: 137 MEQ/L (ref 135–144)

## 2024-04-15 ENCOUNTER — OFFICE VISIT (OUTPATIENT)
Dept: CARDIOLOGY | Facility: CLINIC | Age: 84
End: 2024-04-15
Payer: MEDICARE

## 2024-04-15 VITALS
SYSTOLIC BLOOD PRESSURE: 136 MMHG | HEART RATE: 86 BPM | BODY MASS INDEX: 26.16 KG/M2 | HEIGHT: 65 IN | DIASTOLIC BLOOD PRESSURE: 78 MMHG | WEIGHT: 157 LBS

## 2024-04-15 DIAGNOSIS — I49.5 SSS (SICK SINUS SYNDROME) (MULTI): Primary | ICD-10-CM

## 2024-04-15 DIAGNOSIS — I10 PRIMARY HYPERTENSION: ICD-10-CM

## 2024-04-15 DIAGNOSIS — G47.33 OSA (OBSTRUCTIVE SLEEP APNEA): ICD-10-CM

## 2024-04-15 DIAGNOSIS — E78.2 MIXED HYPERLIPIDEMIA: ICD-10-CM

## 2024-04-15 DIAGNOSIS — I44.0 FIRST-DEGREE ATRIOVENTRICULAR BLOCK: ICD-10-CM

## 2024-04-15 DIAGNOSIS — R79.89 ELEVATED LIVER FUNCTION TESTS: ICD-10-CM

## 2024-04-15 DIAGNOSIS — Z79.899 MEDICATION COURSE CHANGED: ICD-10-CM

## 2024-04-15 DIAGNOSIS — G47.33 OBSTRUCTIVE SLEEP APNEA SYNDROME: ICD-10-CM

## 2024-04-15 DIAGNOSIS — E87.5 HYPERKALEMIA: ICD-10-CM

## 2024-04-15 DIAGNOSIS — H35.30 MACULAR DEGENERATION, UNSPECIFIED LATERALITY, UNSPECIFIED TYPE: ICD-10-CM

## 2024-04-15 DIAGNOSIS — I48.0 PAROXYSMAL ATRIAL FIBRILLATION (MULTI): ICD-10-CM

## 2024-04-15 DIAGNOSIS — Z95.0 HISTORY OF CARDIAC PACEMAKER: ICD-10-CM

## 2024-04-15 DIAGNOSIS — Z86.73 HISTORY OF CARDIOEMBOLIC CEREBROVASCULAR ACCIDENT (CVA): ICD-10-CM

## 2024-04-15 LAB
ANION GAP SERPL CALCULATED.3IONS-SCNC: 11 MEQ/L (ref 9–15)
BUN SERPL-MCNC: 24 MG/DL (ref 8–23)
CALCIUM SERPL-MCNC: 9.8 MG/DL (ref 8.5–9.9)
CHLORIDE SERPL-SCNC: 102 MEQ/L (ref 95–107)
CO2 SERPL-SCNC: 27 MEQ/L (ref 20–31)
CREAT SERPL-MCNC: 0.81 MG/DL (ref 0.5–0.9)
ERYTHROCYTE [DISTWIDTH] IN BLOOD BY AUTOMATED COUNT: 14.1 % (ref 11.5–14.5)
GLUCOSE SERPL-MCNC: 76 MG/DL (ref 70–99)
HCT VFR BLD AUTO: 40.7 % (ref 37–47)
HGB BLD-MCNC: 13.3 G/DL (ref 12–16)
MAGNESIUM SERPL-MCNC: 2.4 MG/DL (ref 1.7–2.4)
MCH RBC QN AUTO: 29.2 PG (ref 27–31.3)
MCHC RBC AUTO-ENTMCNC: 32.7 % (ref 33–37)
MCV RBC AUTO: 89.3 FL (ref 79.4–94.8)
PLATELET # BLD AUTO: 231 K/UL (ref 130–400)
POTASSIUM SERPL-SCNC: 4.5 MEQ/L (ref 3.4–4.9)
RBC # BLD AUTO: 4.56 M/UL (ref 4.2–5.4)
SODIUM SERPL-SCNC: 140 MEQ/L (ref 135–144)
WBC # BLD AUTO: 4.3 K/UL (ref 4.8–10.8)

## 2024-04-15 PROCEDURE — 3078F DIAST BP <80 MM HG: CPT | Performed by: INTERNAL MEDICINE

## 2024-04-15 PROCEDURE — 99214 OFFICE O/P EST MOD 30 MIN: CPT | Performed by: INTERNAL MEDICINE

## 2024-04-15 PROCEDURE — 1159F MED LIST DOCD IN RCRD: CPT | Performed by: INTERNAL MEDICINE

## 2024-04-15 PROCEDURE — 3075F SYST BP GE 130 - 139MM HG: CPT | Performed by: INTERNAL MEDICINE

## 2024-04-15 PROCEDURE — 93000 ELECTROCARDIOGRAM COMPLETE: CPT | Performed by: INTERNAL MEDICINE

## 2024-04-15 PROCEDURE — 1036F TOBACCO NON-USER: CPT | Performed by: INTERNAL MEDICINE

## 2024-04-15 RX ORDER — AMLODIPINE BESYLATE 2.5 MG/1
5 TABLET ORAL DAILY
Qty: 180 TABLET | Refills: 1 | Status: SHIPPED | OUTPATIENT
Start: 2024-04-15

## 2024-04-15 RX ORDER — MULTIVITAMIN/IRON/FOLIC ACID 18MG-0.4MG
1 TABLET ORAL DAILY
COMMUNITY

## 2024-04-15 RX ORDER — METOPROLOL SUCCINATE 50 MG/1
25 TABLET, EXTENDED RELEASE ORAL DAILY
Qty: 45 TABLET | Refills: 1 | Status: SHIPPED | OUTPATIENT
Start: 2024-04-15 | End: 2024-05-01

## 2024-04-15 NOTE — PROGRESS NOTES
CARDIOLOGY OFFICE NOTE     Date:   4/15/2024    Patient:    Sharon Nova    YOB: 1940    Primary Physician: Yuri Goodson MD       Reason for Visit: 2-week follow-up.  Early visit.    HPI:     Sharon Nova was seen in cardiac evaluation at the  Cardiology office April 15, 2024.      The patients problems are listed as in the impression below.    Electronic medical records reviewed.    Patient returns.  On her last visit 2 weeks ago she had requested being off of Betapace and wished to start alternative medicines.  We stopped the Betapace and started her on Toprol-XL 25 mg twice daily.  She also is on amlodipine 2.5 mg twice daily.    She states that she is not well.  She is just feeling poor in general.  Her blood pressure is actually pretty good here today.  She has a diary and it shows that her blood pressure at times lowest is 106 systolic and 147 highest systolic.  Heart rates have been in the 60s to 80s.    She has no other significant symptomatology.    Patient denies Chest Pain, SOB, TIA or CVA symptoms.  No CHF or Edema.  No Palpitations.  No GI,  or Bleeding Issues. No Recent Fever or Chills.     Cardiovascular and general review of systems is otherwise negative.    A 14-system review is otherwise negative, other than noted.     PHYSICAL EXAMINATION:      Vitals:    04/15/24 1324   BP: 136/78   Pulse: 86     General: No acute distress. Vital signs as noted below. Alert and oriented. Head and neck examination: No jugular venous distention, no carotid bruits, no mass. Carotid upstrokes preserved. Oral mucosa moist. No xanthelasma.   Head and neck examination otherwise unremarkable.   Lungs: Clear to auscultation and percussion. No wheezes, no rales, and no rhonchi. Chest  excursion appeared to be normal. No chest wall tenderness on palpation.  Pacemaker left chest with well healed incision.   Heart: Normal S1 and S2. No S3. No S4. No rub. Grade 1/6 systolic murmur, best heard left  sternal border. Point of maximal impulse was decreased.   Abdomen was soft, obese. Nontender. No organomegaly. No bruits. No masses.   Extremities: No edema. No clubbing.  No cyanosis. Pulses are strong throughout. No bruits.   Musculoskeletal exam: No ulcers, otherwise unremarkable.   Neurologically appeared grossly intact.   .  IMPRESSION:       Cardiovascular status stable  Lightheadedness.    Atrial fibrillation, maintained in sinus rhythm without recurrence.  First-degree AV block  Sick sinus syndrome, Status post pacemaker implantation. Medtronic and [employer] MRI, February 2008.   No anticoagulation therapy, by patient preference.   Normal left ventricular systolic function, LVEF 50%  Negative coronary cineangiography June 2017.  Negative Lexiscan myocardial perfusion stress test, ejection fraction of 64%, July 2014.  Hypertension.  Hyperlipidemia.  History of transient ischemic attack history.  Obesity.  Sleep apnea, noncompliant with CPAP.  COVID-19 infection, January 22.   Elevated liver function studies.  Macular degeneration.  Otherwise as per assessment below.     RECOMMENDATIONS:      Patient overall is not feeling well with medication changes from Betapace to metoprolol.  Would suggest trying to adjust her medications and increasing her diet.  Will decrease Toprol to 25 mg each evening.  Will increase her amlodipine to 5 mg daily.  Possibly up to 5 mg twice daily if need be.  She will continue her other medications.  Refills were provided.    Exercise dietary program.  Hydration.    Pacemaker interrogations and follow-up as scheduled.    [employer] portal use was encouraged.    We will plan to see back in 2 weeks with Laboratory Studies and ECG as ordered.     Patient will follow up with their primary physician for general care.    The patient knows to contact medical care earlier if need be.      ALLERGIES:     Allergies   Allergen Reactions    Levothyroxine Cardiac arrhythmia/arrest and Shortness of breath     Fludrocortisone Swelling    Hydralazine Unknown    Influenza Virus Vaccine Whole Unknown    Warfarin Unknown        MEDICATIONS:     Current Outpatient Medications   Medication Instructions    amLODIPine (NORVASC) 2.5 mg, oral, 2 times daily    aspirin 81 mg EC tablet TAKE 1 TABLET PO DAILY.    b complex 0.4 mg tablet 1 tablet, oral, Daily    cholecalciferol (Vitamin D-3) 5,000 Units tablet 1 tablet, oral, Daily    cyanocobalamin (Vitamin B-12) 1,000 mcg tablet 1 tablet, oral, Daily    glucosamine-chondroit-vit C-Mn (Glucosamine Chondroitin MaxStr) 500-400 mg capsule 1 capsule, oral, 2 times daily    magnesium oxide 500 mg tablet 0.5 tablets.    metoprolol succinate XL (TOPROL-XL) 25 mg, oral, 2 times daily    oxybutynin XL (Ditropan-XL) 5 mg 24 hr tablet 1 tablet, oral, Daily before breakfast    thyroid, pork, ([place] Thyroid) 30 mg tablet TAKE 1 1/2 tablet daily    ZINC ACETATE ORAL Mouth/Throat       ELECTROCARDIOGRAM:      Atrial paced rhythm.  Rate 80.    CARDIAC TESTING:      None this visit    LABORATORY DATA:      None this visit.      PROBLEM LIST:     Patient Active Problem List   Diagnosis    Elevated liver function tests    First-degree atrioventricular block    History of cardiac pacemaker    HTN (hypertension)    Hyperkalemia    Hyperlipidemia    Paroxysmal atrial fibrillation (Multi)    EDINSON (obstructive sleep apnea)    History of cardioembolic cerebrovascular accident (CVA)    Macular degeneration    SSS (sick sinus syndrome) (Multi)             Jim Beal MD, [facility] / [facility] /  Cardiology      Of Note:  [employer] voice recognition dictation software was utilized partially in the preparation of this note, therefore, inaccuracies in spelling, word choice and punctuation may have occurred which were not recognized the time of signing.    Patient was seen and examined with total time of visit including chart preparation, rooming, and chart completion exceeding 40 minutes.      ----

## 2024-04-15 NOTE — PATIENT INSTRUCTIONS
DECREASE: METOPROLOL SUCCINATE 50 MG- TAKE 1/2 TABLET (25 MG) IN THE EVENING     INCREASE AMLODIPINE 2.5 MG- TAKE 2 TABLETS (5 MG) IN THE MORNING     HAVE LABS DONE IN ONE WEEK.

## 2024-04-16 ENCOUNTER — APPOINTMENT (OUTPATIENT)
Dept: CARDIOLOGY | Facility: CLINIC | Age: 84
End: 2024-04-16
Payer: MEDICARE

## 2024-04-18 ENCOUNTER — HOSPITAL ENCOUNTER (EMERGENCY)
Age: 84
Discharge: HOME | End: 2024-04-18
Attending: EMERGENCY MEDICINE
Payer: MEDICARE

## 2024-04-18 VITALS
DIASTOLIC BLOOD PRESSURE: 65 MMHG | WEIGHT: 155 LBS | RESPIRATION RATE: 17 BRPM | TEMPERATURE: 98.1 F | OXYGEN SATURATION: 99 % | SYSTOLIC BLOOD PRESSURE: 147 MMHG | HEIGHT: 65 IN | BODY MASS INDEX: 25.83 KG/M2 | HEART RATE: 71 BPM

## 2024-04-18 DIAGNOSIS — R00.2 PALPITATIONS: Primary | ICD-10-CM

## 2024-04-18 LAB
ALBUMIN SERPL-MCNC: 3.8 G/DL (ref 3.5–4.6)
ALP SERPL-CCNC: 68 U/L (ref 40–130)
ALT SERPL-CCNC: 15 U/L (ref 0–33)
ANION GAP SERPL CALCULATED.3IONS-SCNC: 10 MEQ/L (ref 9–15)
AST SERPL-CCNC: 31 U/L (ref 0–35)
BASOPHILS # BLD: 0 K/UL (ref 0–0.2)
BASOPHILS NFR BLD: 0.7 %
BILIRUB SERPL-MCNC: 0.3 MG/DL (ref 0.2–0.7)
BUN SERPL-MCNC: 24 MG/DL (ref 8–23)
CALCIUM SERPL-MCNC: 9.5 MG/DL (ref 8.5–9.9)
CHLORIDE SERPL-SCNC: 102 MEQ/L (ref 95–107)
CO2 SERPL-SCNC: 25 MEQ/L (ref 20–31)
CREAT SERPL-MCNC: 0.76 MG/DL (ref 0.5–0.9)
EOSINOPHIL # BLD: 0.1 K/UL (ref 0–0.7)
EOSINOPHIL NFR BLD: 1.1 %
ERYTHROCYTE [DISTWIDTH] IN BLOOD BY AUTOMATED COUNT: 14.1 % (ref 11.5–14.5)
GLOBULIN SER CALC-MCNC: 2.4 G/DL (ref 2.3–3.5)
GLUCOSE SERPL-MCNC: 114 MG/DL (ref 70–99)
HCT VFR BLD AUTO: 38.4 % (ref 37–47)
HGB BLD-MCNC: 12.6 G/DL (ref 12–16)
LYMPHOCYTES # BLD: 0.9 K/UL (ref 1–4.8)
LYMPHOCYTES NFR BLD: 18.9 %
MAGNESIUM SERPL-MCNC: 2.4 MG/DL (ref 1.7–2.4)
MCH RBC QN AUTO: 29.3 PG (ref 27–31.3)
MCHC RBC AUTO-ENTMCNC: 32.8 % (ref 33–37)
MCV RBC AUTO: 89.3 FL (ref 79.4–94.8)
MONOCYTES # BLD: 0.4 K/UL (ref 0.2–0.8)
MONOCYTES NFR BLD: 9 %
NEUTROPHILS # BLD: 3.2 K/UL (ref 1.4–6.5)
NEUTS SEG NFR BLD: 70.1 %
PLATELET # BLD AUTO: 211 K/UL (ref 130–400)
POTASSIUM SERPL-SCNC: 4.5 MEQ/L (ref 3.4–4.9)
PROT SERPL-MCNC: 6.2 G/DL (ref 6.3–8)
RBC # BLD AUTO: 4.3 M/UL (ref 4.2–5.4)
SODIUM SERPL-SCNC: 137 MEQ/L (ref 135–144)
WBC # BLD AUTO: 4.6 K/UL (ref 4.8–10.8)

## 2024-04-18 PROCEDURE — 83735 ASSAY OF MAGNESIUM: CPT

## 2024-04-18 PROCEDURE — 36415 COLL VENOUS BLD VENIPUNCTURE: CPT

## 2024-04-18 PROCEDURE — 93005 ELECTROCARDIOGRAM TRACING: CPT | Performed by: EMERGENCY MEDICINE

## 2024-04-18 PROCEDURE — 99284 EMERGENCY DEPT VISIT MOD MDM: CPT

## 2024-04-18 PROCEDURE — 80053 COMPREHEN METABOLIC PANEL: CPT

## 2024-04-18 PROCEDURE — 85025 COMPLETE CBC W/AUTO DIFF WBC: CPT

## 2024-04-18 PROCEDURE — 2580000003 HC RX 258: Performed by: EMERGENCY MEDICINE

## 2024-04-18 RX ORDER — METOPROLOL SUCCINATE 50 MG/1
25 TABLET, EXTENDED RELEASE ORAL DAILY
COMMUNITY
Start: 2024-04-02

## 2024-04-18 RX ORDER — MAGNESIUM SULFATE IN WATER 40 MG/ML
2000 INJECTION, SOLUTION INTRAVENOUS ONCE
Status: DISCONTINUED | OUTPATIENT
Start: 2024-04-18 | End: 2024-04-18 | Stop reason: HOSPADM

## 2024-04-18 RX ORDER — 0.9 % SODIUM CHLORIDE 0.9 %
1000 INTRAVENOUS SOLUTION INTRAVENOUS ONCE
Status: COMPLETED | OUTPATIENT
Start: 2024-04-18 | End: 2024-04-18

## 2024-04-18 RX ADMIN — SODIUM CHLORIDE 1000 ML: 9 INJECTION, SOLUTION INTRAVENOUS at 13:52

## 2024-04-18 ASSESSMENT — LIFESTYLE VARIABLES
HOW MANY STANDARD DRINKS CONTAINING ALCOHOL DO YOU HAVE ON A TYPICAL DAY: PATIENT DOES NOT DRINK
HOW MANY STANDARD DRINKS CONTAINING ALCOHOL DO YOU HAVE ON A TYPICAL DAY: PATIENT DOES NOT DRINK
HOW OFTEN DO YOU HAVE A DRINK CONTAINING ALCOHOL: NEVER

## 2024-04-18 ASSESSMENT — ENCOUNTER SYMPTOMS
ABDOMINAL DISTENTION: 0
VOMITING: 0
COUGH: 0
EYE DISCHARGE: 0
SHORTNESS OF BREATH: 0
WHEEZING: 0
ABDOMINAL PAIN: 0
SORE THROAT: 0
PHOTOPHOBIA: 0
CHEST TIGHTNESS: 0

## 2024-04-18 NOTE — ED TRIAGE NOTES
Pt to ED with complaints of not feeling \"right\" today, intermittent for several days. PT reports felling \"out of sorts\" and is unable to describe. PT reports \"shakiness\" today, new for patient. Pt has HX of PVC's and has \"senses PVC when checking my pulse\".   Pt denies nausea vomiting or shortness of breath. PT reports last BM today, states normal for her.   Pt denies dysuria, headache, hematuria or bloody stool.   Pt alert and oriented, calm and cooperative, skin WDI, respirations unlabored. No s/s of acute distress noted

## 2024-04-18 NOTE — ED PROVIDER NOTES
[facility] ED  eMERGENCY dEPARTMENT eNCOUnter      Pt Name: Jovana Garcia  MRN: [id number]  Birthdate 1940  Date of evaluation: 4/18/2024  Provider: Kane Díaz MD    CHIEF COMPLAINT       Chief Complaint   Patient presents with    Illness     States that she feels shaky and feels like she is getting PVC. Generally not feeling well         HISTORY OF PRESENT ILLNESS   (Location/Symptom, Timing/Onset,Context/Setting, Quality, Duration, Modifying Factors, Severity)  Note limiting factors.   Jovana Garcia is a 83 y.o. female who presents to the emergency department for evaluation of \"not feeling right.  Patient states she is felt out of sorts for a few weeks and her cardiologist has been trying to figure out the reason.  They have made some changes in her medications as recently as Tuesday.  She feels intermittent shakiness and intermittent PVCs.  Denies chest pain or shortness of breath.  She denies nausea vomiting.  She did have an episode of diarrhea today but not unusual for her to have that once a week.  No syncope or near syncope.    HPI    NursingNotes were reviewed.    REVIEW OF SYSTEMS    (2-9 systems for level 4, 10 or more for level 5)     Review of Systems   Constitutional:  Negative for chills and diaphoresis.   HENT:  Negative for congestion, ear pain, mouth sores and sore throat.    Eyes:  Negative for photophobia and discharge.   Respiratory:  Negative for cough, chest tightness, shortness of breath and wheezing.    Cardiovascular:  Positive for palpitations. Negative for chest pain.   Gastrointestinal:  Negative for abdominal distention, abdominal pain and vomiting.   Endocrine: Negative for cold intolerance.   Genitourinary:  Negative for difficulty urinating.   Musculoskeletal:  Negative for arthralgias.   Skin:  Negative for pallor and rash.   Allergic/Immunologic: Negative for immunocompromised state.   Neurological:  Negative for dizziness and syncope.   Hematological:

## 2024-04-18 NOTE — ED NOTES
Patient sitting up in the bed, with the lights off, call light with in reach, fluids running, waiting test results

## 2024-04-19 ENCOUNTER — OFFICE VISIT (OUTPATIENT)
Dept: FAMILY MEDICINE CLINIC | Age: 84
End: 2024-04-19

## 2024-04-19 VITALS
DIASTOLIC BLOOD PRESSURE: 72 MMHG | OXYGEN SATURATION: 98 % | HEART RATE: 81 BPM | TEMPERATURE: 99.3 F | BODY MASS INDEX: 25.99 KG/M2 | SYSTOLIC BLOOD PRESSURE: 114 MMHG | WEIGHT: 156 LBS | HEIGHT: 65 IN

## 2024-04-19 DIAGNOSIS — E87.5 HYPERKALEMIA: ICD-10-CM

## 2024-04-19 DIAGNOSIS — I10 ESSENTIAL HYPERTENSION: ICD-10-CM

## 2024-04-19 DIAGNOSIS — I48.91 ATRIAL FIBRILLATION, UNSPECIFIED TYPE (HCC): ICD-10-CM

## 2024-04-19 DIAGNOSIS — R09.89 LABILE BLOOD PRESSURE: Primary | ICD-10-CM

## 2024-04-19 DIAGNOSIS — E03.9 HYPOTHYROIDISM, UNSPECIFIED TYPE: ICD-10-CM

## 2024-04-19 LAB
EKG ATRIAL RATE: 60 BPM
EKG P AXIS: 86 DEGREES
EKG P-R INTERVAL: 274 MS
EKG Q-T INTERVAL: 418 MS
EKG QRS DURATION: 94 MS
EKG QTC CALCULATION (BAZETT): 418 MS
EKG R AXIS: -44 DEGREES
EKG T AXIS: 44 DEGREES
EKG VENTRICULAR RATE: 60 BPM

## 2024-04-19 PROCEDURE — 93010 ELECTROCARDIOGRAM REPORT: CPT | Performed by: INTERNAL MEDICINE

## 2024-04-19 NOTE — PROGRESS NOTES
loss, numbness/tingling, tremors or weakness    In general patient otherwise reports feeling well.     Physical Exam:  /72 (Site: Right Lower Arm)   Pulse 81   Temp 99.3 °F (37.4 °C)   Ht 1.651 m (5' 5\")   Wt 70.8 kg (156 lb)   LMP 01/01/1996   SpO2 98%   BMI 25.96 kg/m²     Gen: Well, NAD, Alert, Oriented x 3   HEENT: EOMI, eyes clear, MMM  Skin: without rash or jaundice  Neck: no significant lymphadenopathy or thyromegaly  Lungs: CTA B w/out Rales/Wheezes/Rhonchi, Good respiratory effort   Heart: RRR, S1S2, w/out M/R/G, non-displaced PMI   Ext: No C/C/E Bilaterally.   Neuro: Neurovascularly intact w/ Sensory/Motor intact UE/LE Bilaterally.    Lab Results   Component Value Date    WBC 4.6 (L) 04/18/2024    HGB 12.6 04/18/2024    HCT 38.4 04/18/2024     04/18/2024    CHOL 254 (H) 04/02/2024    TRIG 96 04/02/2024    HDL 75 (H) 04/02/2024    ALT 15 04/18/2024    AST 31 04/18/2024     04/18/2024    K 4.5 04/18/2024     04/18/2024    CREATININE 0.76 04/18/2024    BUN 24 (H) 04/18/2024    CO2 25 04/18/2024    TSH 2.740 04/02/2024    INR 1.0 06/14/2017    LABA1C 5.6 11/05/2021         A&P   Diagnosis Orders   1. Labile blood pressure        2. Hypothyroidism, unspecified type        3. Atrial fibrillation, unspecified type (HCC)        4. Hyperkalemia        5. Essential hypertension            She will continue on current bp med doses as suggested by Dr. Ji    Periodic monitoring of potassium      David Villanueva MD       Statement Selected

## 2024-04-22 ENCOUNTER — CARE COORDINATION (OUTPATIENT)
Dept: CARE COORDINATION | Age: 84
End: 2024-04-22

## 2024-04-22 SDOH — ECONOMIC STABILITY: HOUSING INSECURITY: IN THE LAST 12 MONTHS, HOW MANY PLACES HAVE YOU LIVED?: 1

## 2024-04-22 SDOH — ECONOMIC STABILITY: INCOME INSECURITY: IN THE LAST 12 MONTHS, WAS THERE A TIME WHEN YOU WERE NOT ABLE TO PAY THE MORTGAGE OR RENT ON TIME?: NO

## 2024-04-22 SDOH — ECONOMIC STABILITY: TRANSPORTATION INSECURITY
IN THE PAST 12 MONTHS, HAS LACK OF TRANSPORTATION KEPT YOU FROM MEETINGS, WORK, OR FROM GETTING THINGS NEEDED FOR DAILY LIVING?: NO

## 2024-04-22 SDOH — ECONOMIC STABILITY: TRANSPORTATION INSECURITY
IN THE PAST 12 MONTHS, HAS THE LACK OF TRANSPORTATION KEPT YOU FROM MEDICAL APPOINTMENTS OR FROM GETTING MEDICATIONS?: NO

## 2024-04-22 NOTE — CARE COORDINATION
Ambulatory Care Coordination Note  2024    Patient Current Location:  Home: [address] [place] [address] [place] [address]    ACM contacted the patient by telephone. Verified name and  with patient as identifiers. Provided introduction to self, and explanation of the ACM role.     ACM: Marquita Lee RN    Challenges to be reviewed by the provider   Additional needs identified to be addressed with provider: No  none               Method of communication with provider: none.    spoke with patient for care coordination enrollment for HTN, AFib, and education  Patient agreed to another follow up call.  Updated SDOH, goals, and education  Patient in ED on  for PVCs and feelings of \"internal shakiness\".  Followed up with PCP on .  BP was more stable.    Patient reports chills, shakiness, and weakness are improving.  Patient questions Metoprolol and needing to adjust.  Now taking Metoprolol in evening.  Advised to monitor BP daily and keep log.  States BP running 120s/60s and has been more stable.  Advised to also monitor BP if feelings of dizziness, weakness, shakiness and keep log of symptoms and BP.  Voiced understanding  ACM reviewed medications and patient states she is compliant.  Discussed best to take medications at same time daily.  Voiced understanding  Does have pacemaker  Denies CP, palpitations, headaches, or dizziness  Follows Dr. Ji cardiology  Advised to report any symptoms to aCM or PCP/cardiology      Offered patient enrollment in the Remote Patient Monitoring (RPM) program for in-home monitoring: Patient declined.    Ambulatory Care Coordination Assessment    Care Coordination Protocol  Referral from Primary Care Provider: No  Week 1 - Initial Assessment     Do you have all of your prescriptions and are they filled?: Yes  Barriers to medication adherence: None  Are you able to afford your medications?: No  How often do you have trouble taking your medications the way you have been told to

## 2024-04-29 ENCOUNTER — CARE COORDINATION (OUTPATIENT)
Dept: CARE COORDINATION | Age: 84
End: 2024-04-29

## 2024-04-29 NOTE — CARE COORDINATION
Ambulatory Care Coordination Note  2024    Patient Current Location:  Home: [address] [place] [address] [place] [id number]     ACM contacted the patient by telephone. Verified name and  with patient as identifiers. Provided introduction to self, and explanation of the ACM role.     Challenges to be reviewed by the provider   Additional needs identified to be addressed with provider: No  none               Method of communication with provider: none.    ACM: Marquita Lee, RN    spoke with patient for care coordination follow up for HTN, PVCs, and Afib  Patient states she is feeling worse.  Reports worsening weakness feeling.  States eye sight is blurry, and having occas incontinence.  Patient strongly feels like it is the medicine amlodipine.  Appt with Dr. Ji on  to discuss.    Appt with pacer clinic .  Still having feeling in internal shakiness and PVCs.  Denies CP, palpitations, or headaches.    BP is stable.  114/72, 120s/60s usually.    Encouraged patient to monitor BP and symptoms and keep journal to discuss Wednesday at cardio appt.  Voiced understanding.  Advised to report any new or worsening symptoms.  Encouraged patient to go to ED if chest pain/tightness/pressure, sob, or other alarming symptoms.  Voiced understanding  Will follow up next week for update on cardio appt    Offered patient enrollment in the Remote Patient Monitoring (RPM) program for in-home monitoring: Yes, but did not enroll at this time: controlled chronic disease management.    Lab Results       None            Care Coordination Interventions    Referral from Primary Care Provider: No  Suggested Interventions and Community Resources          Goals Addressed                   This Visit's Progress     Conditions and Symptoms   On track     I will schedule office visits, as directed by my provider.  I will keep my appointment or reschedule if I have to cancel.  I will notify my provider of any barriers to my plan of care.  I

## 2024-04-30 DIAGNOSIS — Z95.0 HISTORY OF CARDIAC PACEMAKER: ICD-10-CM

## 2024-05-01 ENCOUNTER — OFFICE VISIT (OUTPATIENT)
Dept: CARDIOLOGY | Facility: CLINIC | Age: 84
End: 2024-05-01
Payer: MEDICARE

## 2024-05-01 VITALS
WEIGHT: 157 LBS | SYSTOLIC BLOOD PRESSURE: 124 MMHG | BODY MASS INDEX: 26.16 KG/M2 | HEIGHT: 65 IN | HEART RATE: 84 BPM | DIASTOLIC BLOOD PRESSURE: 80 MMHG

## 2024-05-01 DIAGNOSIS — E78.2 MIXED HYPERLIPIDEMIA: ICD-10-CM

## 2024-05-01 DIAGNOSIS — Z86.73 HISTORY OF CARDIOEMBOLIC CEREBROVASCULAR ACCIDENT (CVA): ICD-10-CM

## 2024-05-01 DIAGNOSIS — I49.5 SSS (SICK SINUS SYNDROME) (MULTI): ICD-10-CM

## 2024-05-01 DIAGNOSIS — H35.30 MACULAR DEGENERATION, UNSPECIFIED LATERALITY, UNSPECIFIED TYPE: ICD-10-CM

## 2024-05-01 DIAGNOSIS — I44.0 FIRST-DEGREE ATRIOVENTRICULAR BLOCK: ICD-10-CM

## 2024-05-01 DIAGNOSIS — G47.33 OBSTRUCTIVE SLEEP APNEA SYNDROME: ICD-10-CM

## 2024-05-01 DIAGNOSIS — R06.09 DOE (DYSPNEA ON EXERTION): ICD-10-CM

## 2024-05-01 DIAGNOSIS — R79.89 ELEVATED LIVER FUNCTION TESTS: ICD-10-CM

## 2024-05-01 DIAGNOSIS — I10 PRIMARY HYPERTENSION: ICD-10-CM

## 2024-05-01 DIAGNOSIS — E87.5 HYPERKALEMIA: ICD-10-CM

## 2024-05-01 DIAGNOSIS — Z79.899 MEDICATION COURSE CHANGED: ICD-10-CM

## 2024-05-01 DIAGNOSIS — G47.33 OSA (OBSTRUCTIVE SLEEP APNEA): ICD-10-CM

## 2024-05-01 DIAGNOSIS — Z95.0 HISTORY OF CARDIAC PACEMAKER: ICD-10-CM

## 2024-05-01 DIAGNOSIS — I48.0 PAROXYSMAL ATRIAL FIBRILLATION (MULTI): ICD-10-CM

## 2024-05-01 PROCEDURE — 99214 OFFICE O/P EST MOD 30 MIN: CPT | Performed by: INTERNAL MEDICINE

## 2024-05-01 PROCEDURE — 3079F DIAST BP 80-89 MM HG: CPT | Performed by: INTERNAL MEDICINE

## 2024-05-01 PROCEDURE — 1159F MED LIST DOCD IN RCRD: CPT | Performed by: INTERNAL MEDICINE

## 2024-05-01 PROCEDURE — 3074F SYST BP LT 130 MM HG: CPT | Performed by: INTERNAL MEDICINE

## 2024-05-01 PROCEDURE — 1036F TOBACCO NON-USER: CPT | Performed by: INTERNAL MEDICINE

## 2024-05-01 RX ORDER — REGADENOSON 0.08 MG/ML
0.4 INJECTION, SOLUTION INTRAVENOUS
Status: CANCELLED | OUTPATIENT
Start: 2024-05-01

## 2024-05-01 NOTE — PATIENT INSTRUCTIONS
DISCONTINUE METOPROLOL   GET DEVICE CHECK 5/6/24    Exercise diet weight loss program.     Stay plenty HYDRATED     Use My Chart portal for reviewing records, testing and contacting office.      Please bring all medicines, vitamins, and herbal supplements with you in original bottles to every appointment!!!!    Prescriptions will not be filled unless you are compliant with your follow up appointments or have a follow up appointment scheduled as per instruction of your physician. Refills should be requested at the time of your visit.

## 2024-05-01 NOTE — PROGRESS NOTES
CARDIOLOGY OFFICE NOTE     Date:   5/1/2024    Patient:    Sharon Nova    YOB: 1940    Primary Physician: Yuri Goodson MD       Reason for Visit: 2-week follow-up.    HPI:     Sharon Nova was seen in cardiac evaluation at the  Cardiology office May 1, 2024.      The patients problems are listed as in the impression below.    Electronic medical records reviewed.    Patient has a history of paroxysmal atrial fibrillation post pacemaker for sick sinus syndrome in 2008.  She is on no anticoagulation except for aspirin per her preference.  She has had no recurrent atrial fibrillation significance.  She has a prior history of negative cardiac catheterization 2017 and prior negative Myoview perfusion study 2014.  Preserved left ventricular function.  She has hypertension, hyperlipidemia and prior history of mild sleep apnea not on CPAP due to her preference.  She does have macular degeneration which is significantly affecting her    She states that she has some lightheadedness and some dizziness and occasionally some shakiness.  She denies any palpitations.  She does have some dyspnea on exertion.    Patient denies Chest Pain, TIA or CVA symptoms.  No CHF or Edema.  No Palpitations.  No GI,  or Bleeding Issues. No Recent Fever or Chills.     Cardiovascular and general review of systems is otherwise negative.    A 14-system review is otherwise negative, other than noted.     PHYSICAL EXAMINATION:      Vitals:    05/01/24 1215   BP: 124/80   Pulse: 84     General: No acute distress. Vital signs as noted below. Alert and oriented. Head and neck examination: No jugular venous distention, no carotid bruits, no mass. Carotid upstrokes preserved. Oral mucosa moist. No xanthelasma.   Head and neck examination otherwise unremarkable.   Lungs: Clear to auscultation and percussion. No wheezes, no rales, and no rhonchi. Chest  excursion appeared to be normal. No chest wall tenderness on palpation.  Pacemaker  left chest with well healed incision.   Heart: Normal S1 and S2. No S3. No S4. No rub. Grade 1/6 systolic murmur, best heard left sternal border. Point of maximal impulse was decreased.   Abdomen was soft, obese. Nontender. No organomegaly. No bruits. No masses.   Extremities: No edema. No clubbing.  No cyanosis. Pulses are strong throughout. No bruits.   Musculoskeletal exam: No ulcers, otherwise unremarkable.   Neurologically appeared grossly intact.   .  IMPRESSION:       Cardiovascular status stable  Lightheadedness.  Dyspnea on exertion  Atrial fibrillation, maintained in sinus rhythm without recurrence.  Abnormal ECG  First-degree AV block  Sick sinus syndrome, Status post pacemaker implantation. [employer] and [employer] MRI, February 2008.   No anticoagulation therapy, by patient preference.   Normal left ventricular systolic function, LVEF 50%  Negative coronary cineangiography June 2017.  Negative Lexiscan myocardial perfusion stress test, ejection fraction of 64%, July 2014.  Hypertension.  Hyperlipidemia.  History of transient ischemic attack history.  Obesity.  Sleep apnea, noncompliant with CPAP.  COVID-19 infection, January 22.   Elevated liver function studies.  Macular degeneration.  Otherwise as per assessment below.    RECOMMENDATIONS:      Patient has above-noted issues.  She has multiple somatic complaints mostly with shakiness and weakness and she wishes to be off of her Toprol.  She had recently been discontinued off of Betapace for her long-term paroxysmal atrial fibrillation.  Given her dyspnea on exertion and abnormal ECG and prior cardiac history would suggest repeat Lexiscan perfusion stress testing and echocardiogram.  She will have her pacemaker interrogated as well.  We will discontinue her Toprol at this time.  She will continue her other medications.    Exercise dietary program.    Hydration.    Pacemaker interrogations and follow-up as scheduled.    [employer] portal use was encouraged.    We  will plan to see back in 2 weeks as scheduled with above testing, laboratory Studies and ECG as ordered.     Patient will follow up with their primary physician for general care.    The patient knows to contact medical care earlier if need be.      ALLERGIES:     Allergies   Allergen Reactions    Levothyroxine Cardiac arrhythmia/arrest and Shortness of breath    Fludrocortisone Swelling    Hydralazine Unknown    Influenza Virus Vaccine Whole Unknown    Warfarin Unknown        MEDICATIONS:     Current Outpatient Medications   Medication Instructions    amLODIPine (NORVASC) 5 mg, oral, Daily    aspirin 81 mg EC tablet TAKE 1 TABLET PO DAILY.    b complex 0.4 mg tablet 1 tablet, oral, Daily    cholecalciferol (Vitamin D-3) 5,000 Units tablet 1 tablet, oral, Daily    cyanocobalamin (Vitamin B-12) 1,000 mcg tablet 1 tablet, oral, Daily    glucosamine-chondroit-vit C-Mn (Glucosamine Chondroitin MaxStr) 500-400 mg capsule 1 capsule, oral, 2 times daily    magnesium oxide 500 mg tablet 0.5 tablets.    metoprolol succinate XL (TOPROL-XL) 25 mg, oral, Daily    oxybutynin XL (Ditropan-XL) 5 mg 24 hr tablet 1 tablet, oral, Daily before breakfast    thyroid, pork, ([employer] Thyroid) 30 mg tablet TAKE 1 1/2 tablet daily    ZINC ACETATE ORAL Mouth/Throat       ELECTROCARDIOGRAM:      None this visit    CARDIAC TESTING:      None    LABORATORY DATA:      Magnesium 2.4, Chem-7, CBC normal except for glucose 114 and BUN 24.    RADIOLOGY:     Nuclear Stress Test    (Results Pending)   Transthoracic Echo Complete    (Results Pending)                 PROBLEM LIST:     Patient Active Problem List   Diagnosis    Elevated liver function tests    First-degree atrioventricular block    History of cardiac pacemaker    HTN (hypertension)    Hyperkalemia    Hyperlipidemia    Paroxysmal atrial fibrillation (Multi)    EDINSON (obstructive sleep apnea)    History of cardioembolic cerebrovascular accident (CVA)    Macular degeneration    SSS (sick sinus  syndrome) (Multi)    Medication course changed             Jim Beal MD, [facility] /  Cardiology      Of Note:  [employer] voice recognition dictation software was utilized partially in the preparation of this note, therefore, inaccuracies in spelling, word choice and punctuation may have occurred which were not recognized the time of signing.    Patient was seen and examined with total time of visit including chart preparation, rooming, and chart completion exceeding 40 minutes.      ----

## 2024-05-03 DIAGNOSIS — E87.5 HYPERKALEMIA: ICD-10-CM

## 2024-05-03 LAB
ANION GAP SERPL CALCULATED.3IONS-SCNC: 11 MEQ/L (ref 9–15)
BUN SERPL-MCNC: 28 MG/DL (ref 8–23)
CALCIUM SERPL-MCNC: 9.5 MG/DL (ref 8.5–9.9)
CHLORIDE SERPL-SCNC: 103 MEQ/L (ref 95–107)
CO2 SERPL-SCNC: 28 MEQ/L (ref 20–31)
CREAT SERPL-MCNC: 0.82 MG/DL (ref 0.5–0.9)
GLUCOSE SERPL-MCNC: 69 MG/DL (ref 70–99)
POTASSIUM SERPL-SCNC: 5 MEQ/L (ref 3.4–4.9)
SODIUM SERPL-SCNC: 142 MEQ/L (ref 135–144)

## 2024-05-06 ENCOUNTER — HOSPITAL ENCOUNTER (OUTPATIENT)
Dept: CARDIOLOGY | Age: 84
Discharge: HOME | End: 2024-05-06
Payer: MEDICARE

## 2024-05-06 ENCOUNTER — CARE COORDINATION (OUTPATIENT)
Dept: CARE COORDINATION | Age: 84
End: 2024-05-06

## 2024-05-06 PROCEDURE — 93280 PM DEVICE PROGR EVAL DUAL: CPT

## 2024-05-06 NOTE — CARE COORDINATION
Onset: Gradually Worsening  Reported Symptoms: Fatigue, Other (Comment: feels \"ill\" and having PVCs)     , No linked episodes, and This patient was permanently screened out of Care Coordination on 5/6/2024 for the following reason:

## 2024-05-07 ENCOUNTER — APPOINTMENT (OUTPATIENT)
Dept: CARDIOLOGY | Facility: CLINIC | Age: 84
End: 2024-05-07
Payer: MEDICARE

## 2024-05-08 ENCOUNTER — LAB (OUTPATIENT)
Dept: LAB | Facility: LAB | Age: 84
End: 2024-05-08
Payer: MEDICARE

## 2024-05-08 DIAGNOSIS — I48.0 PAROXYSMAL ATRIAL FIBRILLATION (MULTI): ICD-10-CM

## 2024-05-08 DIAGNOSIS — E87.5 HYPERKALEMIA: ICD-10-CM

## 2024-05-08 DIAGNOSIS — G47.33 OBSTRUCTIVE SLEEP APNEA SYNDROME: ICD-10-CM

## 2024-05-08 DIAGNOSIS — G47.33 OSA (OBSTRUCTIVE SLEEP APNEA): ICD-10-CM

## 2024-05-08 DIAGNOSIS — E78.2 MIXED HYPERLIPIDEMIA: ICD-10-CM

## 2024-05-08 DIAGNOSIS — H35.30 MACULAR DEGENERATION, UNSPECIFIED LATERALITY, UNSPECIFIED TYPE: ICD-10-CM

## 2024-05-08 DIAGNOSIS — Z79.899 MEDICATION COURSE CHANGED: ICD-10-CM

## 2024-05-08 DIAGNOSIS — I10 PRIMARY HYPERTENSION: ICD-10-CM

## 2024-05-08 DIAGNOSIS — Z95.0 HISTORY OF CARDIAC PACEMAKER: ICD-10-CM

## 2024-05-08 DIAGNOSIS — I44.0 FIRST-DEGREE ATRIOVENTRICULAR BLOCK: ICD-10-CM

## 2024-05-08 DIAGNOSIS — Z86.73 HISTORY OF CARDIOEMBOLIC CEREBROVASCULAR ACCIDENT (CVA): ICD-10-CM

## 2024-05-08 DIAGNOSIS — R79.89 ELEVATED LIVER FUNCTION TESTS: ICD-10-CM

## 2024-05-08 DIAGNOSIS — I49.5 SSS (SICK SINUS SYNDROME) (MULTI): ICD-10-CM

## 2024-05-08 LAB
ALBUMIN SERPL BCP-MCNC: 4.3 G/DL (ref 3.4–5)
ALP SERPL-CCNC: 70 U/L (ref 33–136)
ALT SERPL W P-5'-P-CCNC: 15 U/L (ref 7–45)
ANION GAP SERPL CALC-SCNC: 12 MMOL/L (ref 10–20)
AST SERPL W P-5'-P-CCNC: 18 U/L (ref 9–39)
BILIRUB DIRECT SERPL-MCNC: 0.1 MG/DL (ref 0–0.3)
BILIRUB SERPL-MCNC: 0.5 MG/DL (ref 0–1.2)
BUN SERPL-MCNC: 23 MG/DL (ref 6–23)
CALCIUM SERPL-MCNC: 10 MG/DL (ref 8.6–10.3)
CHLORIDE SERPL-SCNC: 104 MMOL/L (ref 98–107)
CHOLEST SERPL-MCNC: 267 MG/DL (ref 0–199)
CHOLESTEROL/HDL RATIO: 3.3
CO2 SERPL-SCNC: 32 MMOL/L (ref 21–32)
CREAT SERPL-MCNC: 0.87 MG/DL (ref 0.5–1.05)
EGFRCR SERPLBLD CKD-EPI 2021: 67 ML/MIN/1.73M*2
ERYTHROCYTE [DISTWIDTH] IN BLOOD BY AUTOMATED COUNT: 14.5 % (ref 11.5–14.5)
GLUCOSE SERPL-MCNC: 88 MG/DL (ref 74–99)
HCT VFR BLD AUTO: 43.1 % (ref 36–46)
HDLC SERPL-MCNC: 81.8 MG/DL
HGB BLD-MCNC: 14 G/DL (ref 12–16)
LDLC SERPL CALC-MCNC: 168 MG/DL
MAGNESIUM SERPL-MCNC: 2.42 MG/DL (ref 1.6–2.4)
MCH RBC QN AUTO: 29.5 PG (ref 26–34)
MCHC RBC AUTO-ENTMCNC: 32.5 G/DL (ref 32–36)
MCV RBC AUTO: 91 FL (ref 80–100)
NON HDL CHOLESTEROL: 185 MG/DL (ref 0–149)
NRBC BLD-RTO: 0 /100 WBCS (ref 0–0)
PLATELET # BLD AUTO: 226 X10*3/UL (ref 150–450)
POTASSIUM SERPL-SCNC: 4.7 MMOL/L (ref 3.5–5.3)
PROT SERPL-MCNC: 6.7 G/DL (ref 6.4–8.2)
RBC # BLD AUTO: 4.74 X10*6/UL (ref 4–5.2)
SODIUM SERPL-SCNC: 143 MMOL/L (ref 136–145)
TRIGL SERPL-MCNC: 88 MG/DL (ref 0–149)
TSH SERPL-ACNC: 3.3 MIU/L (ref 0.44–3.98)
VLDL: 18 MG/DL (ref 0–40)
WBC # BLD AUTO: 5.5 X10*3/UL (ref 4.4–11.3)

## 2024-05-08 PROCEDURE — 82248 BILIRUBIN DIRECT: CPT

## 2024-05-08 PROCEDURE — 84443 ASSAY THYROID STIM HORMONE: CPT

## 2024-05-08 PROCEDURE — 80061 LIPID PANEL: CPT

## 2024-05-08 PROCEDURE — 83735 ASSAY OF MAGNESIUM: CPT

## 2024-05-08 PROCEDURE — 80053 COMPREHEN METABOLIC PANEL: CPT

## 2024-05-08 PROCEDURE — 36415 COLL VENOUS BLD VENIPUNCTURE: CPT

## 2024-05-08 PROCEDURE — 85027 COMPLETE CBC AUTOMATED: CPT

## 2024-05-13 ENCOUNTER — OFFICE VISIT (OUTPATIENT)
Dept: CARDIOLOGY | Facility: CLINIC | Age: 84
End: 2024-05-13
Payer: MEDICARE

## 2024-05-13 VITALS
HEIGHT: 65 IN | WEIGHT: 157.6 LBS | DIASTOLIC BLOOD PRESSURE: 62 MMHG | SYSTOLIC BLOOD PRESSURE: 132 MMHG | BODY MASS INDEX: 26.26 KG/M2 | HEART RATE: 84 BPM

## 2024-05-13 DIAGNOSIS — I10 PRIMARY HYPERTENSION: ICD-10-CM

## 2024-05-13 DIAGNOSIS — G47.33 OBSTRUCTIVE SLEEP APNEA SYNDROME: ICD-10-CM

## 2024-05-13 DIAGNOSIS — R79.89 ELEVATED LIVER FUNCTION TESTS: ICD-10-CM

## 2024-05-13 DIAGNOSIS — E78.2 MIXED HYPERLIPIDEMIA: ICD-10-CM

## 2024-05-13 DIAGNOSIS — R42 DIZZINESS: ICD-10-CM

## 2024-05-13 DIAGNOSIS — Z95.0 HISTORY OF CARDIAC PACEMAKER: ICD-10-CM

## 2024-05-13 DIAGNOSIS — I44.0 FIRST-DEGREE ATRIOVENTRICULAR BLOCK: ICD-10-CM

## 2024-05-13 DIAGNOSIS — I48.0 PAROXYSMAL ATRIAL FIBRILLATION (MULTI): ICD-10-CM

## 2024-05-13 PROCEDURE — 1159F MED LIST DOCD IN RCRD: CPT | Performed by: INTERNAL MEDICINE

## 2024-05-13 PROCEDURE — 1036F TOBACCO NON-USER: CPT | Performed by: INTERNAL MEDICINE

## 2024-05-13 PROCEDURE — 3078F DIAST BP <80 MM HG: CPT | Performed by: INTERNAL MEDICINE

## 2024-05-13 PROCEDURE — 93000 ELECTROCARDIOGRAM COMPLETE: CPT | Performed by: INTERNAL MEDICINE

## 2024-05-13 PROCEDURE — 99214 OFFICE O/P EST MOD 30 MIN: CPT | Performed by: INTERNAL MEDICINE

## 2024-05-13 PROCEDURE — 3075F SYST BP GE 130 - 139MM HG: CPT | Performed by: INTERNAL MEDICINE

## 2024-05-13 RX ORDER — LANOLIN ALCOHOL/MO/W.PET/CERES
400 CREAM (GRAM) TOPICAL DAILY
Qty: 90 TABLET | Refills: 3 | Status: SHIPPED | OUTPATIENT
Start: 2024-05-13 | End: 2024-05-15 | Stop reason: DRUGHIGH

## 2024-05-13 NOTE — PATIENT INSTRUCTIONS
TAKE ALL MEDICATION FIRST THING IN THE MORNING   CONTACT DR. LARSON REGARDING BLOOD SUGARS     Exercise diet weight loss program.     Stay plenty HYDRATED     Use My Chart portal for reviewing records, testing and contacting office.      Please bring all medicines, vitamins, and herbal supplements with you in original bottles to every appointment!!!!    Prescriptions will not be filled unless you are compliant with your follow up appointments or have a follow up appointment scheduled as per instruction of your physician. Refills should be requested at the time of your visit.

## 2024-05-13 NOTE — PROGRESS NOTES
CARDIOLOGY OFFICE NOTE     Date:   5/13/2024    Patient:    Sharon Nova    YOB: 1940    Primary Physician: Yuri Goodson MD       Reason for Visit: 2-week follow-up.    HPI:     Sharon Nova was seen in cardiac evaluation at the  Cardiology office May 13, 2024.      The patients problems are listed as in the impression below.    Electronic medical records reviewed.    Patient returns.  She is concerned about symptomatology of occasional weakness shaking and tingling.  She states that happens pretty much every morning before she eats.  It is also before she takes her medications.    She had been on Betapace but wanted off of this and we changed her to metoprolol and she was still having vague symptoms and therefore we eventually weaned off and discontinued metoprolol as well.     She did have pacemaker interrogation recently which was normal except for symptomatic PVCs.  I offered her beta-blocker in the form of atenolol which she again declined.  She is not on blood thinners nor statins at her request.      I offered her a visit with electrophysiology which she accepted.    Patient denies Chest Pain, SOB, TIA or CVA symptoms.  No CHF or Edema. No GI,  or Bleeding Issues. No Recent Fever or Chills.     Cardiovascular and general review of systems is otherwise negative.    A 14-system review is otherwise negative, other than noted.     PHYSICAL EXAMINATION:      Vitals:    05/13/24 1235   BP: 132/62       General: No acute distress. Vital signs as noted below. Alert and oriented. Head and neck examination: No jugular venous distention, no carotid bruits, no mass. Carotid upstrokes preserved. Oral mucosa moist. No xanthelasma.   Head and neck examination otherwise unremarkable.   Lungs: Clear to auscultation and percussion. No wheezes, no rales, and no rhonchi. Chest  excursion appeared to be normal. No chest wall tenderness on palpation.  Pacemaker left chest with well healed incision.    Heart: Normal S1 and S2. No S3. No S4. No rub. Grade 1/6 systolic murmur, best heard left sternal border. Point of maximal impulse was decreased.   Abdomen was soft, obese. Nontender. No organomegaly. No bruits. No masses.   Extremities: No edema. No clubbing.  No cyanosis. Pulses are strong throughout. No bruits.   Musculoskeletal exam: No ulcers, otherwise unremarkable.   Neurologically appeared grossly intact.   .  IMPRESSION:       Cardiovascular status stable  Lightheadedness.  Palpitations  Dyspnea on exertion  Atrial fibrillation, maintained in sinus rhythm without recurrence.  Abnormal ECG  First-degree AV block  Sick sinus syndrome, Status post pacemaker implantation. "[employer]tronic and [employer] MRI, February 2008.   Premature ventricular contractions, symptomatic  No anticoagulation therapy, by patient preference.   Normal left ventricular systolic function, LVEF 50%  Negative coronary cineangiography June 2017.  Negative Lexiscan myocardial perfusion stress test, ejection fraction of 64%, July 2014.  Hypertension.  Hyperlipidemia.  History of transient ischemic attack history.  Obesity.  Sleep apnea, noncompliant with CPAP.  COVID-19 infection, January 22.   Elevated liver function studies.  Macular degeneration.  Otherwise as per assessment below.      RECOMMENDATIONS:      Overall she still has symptoms.  They are somewhat vague.  She relates these to possibly to eating breakfast in the morning.  She does have symptoms sometimes in the afternoon.  She had her pacemaker interrogated and was noted to have PVCs which may be symptomatic as well.    Suggested that we increase her magnesium oxide to 400 mg twice daily as tolerated.  Again I offered atenolol for which she declined.  She again declined statin treatment.  She is not on anticoagulation at her request.      She will continue her other medications.  Refills were provided.    Exercise dietary program was encouraged.    Hydration.    Pacemaker  interrogations as scheduled.    Electrophysiology consultation and follow-up as scheduled.    [employer] portal use was encouraged.    We will plan to see back in 3 weeks with Lexiscan perfusion stress test, echocardiogram, laboratory Studies and ECG as ordered.     Patient will follow up with their primary physician for general care.    The patient knows to contact medical care earlier if need be.      ALLERGIES:     Allergies   Allergen Reactions    Levothyroxine Cardiac arrhythmia/arrest and Shortness of breath    Fludrocortisone Swelling    Hydralazine Unknown    Influenza Virus Vaccine Whole Unknown    Warfarin Unknown        MEDICATIONS:     Current Outpatient Medications   Medication Instructions    amLODIPine (NORVASC) 5 mg, oral, Daily    aspirin 81 mg EC tablet Take 1 tablet (81 mg) by mouth once daily.    b complex 0.4 mg tablet 1 tablet, oral, Daily    cholecalciferol (Vitamin D-3) 5,000 Units tablet 1 tablet, oral, Daily    cyanocobalamin (Vitamin B-12) 1,000 mcg tablet 1 tablet, oral, Daily    glucosamine-chondroit-vit C-Mn (Glucosamine Chondroitin MaxStr) 500-400 mg capsule 1 capsule, oral, 2 times daily    magnesium oxide 500 mg tablet Take 250 mg by mouth early in the morning..    oxybutynin XL (Ditropan-XL) 5 mg 24 hr tablet 1 tablet, oral, Daily before breakfast    thyroid, pork, ([place] Thyroid) 30 mg tablet Take 0.5 tablets (15 mg) by mouth once daily in the morning. Take before meals.    ZINC ACETATE ORAL 1 tablet, Mouth/Throat, Daily       ELECTROCARDIOGRAM:      Atrial paced rhythm.  Secondary ST-T wave changes.  Rate 81.    CARDIAC TESTING:      Pacemaker interrogation.  Normal pacemaker function.  PVCs.  3 and half years remaining on battery.    LABORATORY DATA:      CBC:   Lab Results   Component Value Date    WBC 5.5 05/08/2024    RBC 4.74 05/08/2024    HGB 14.0 05/08/2024    HCT 43.1 05/08/2024     05/08/2024        CMP:    Lab Results   Component Value Date     05/08/2024    K  4.7 05/08/2024     05/08/2024    CO2 32 05/08/2024    BUN 23 05/08/2024    CREATININE 0.87 05/08/2024    GLUCOSE 88 05/08/2024    CALCIUM 10.0 05/08/2024       Magnesium:    Lab Results   Component Value Date    MG 2.42 (H) 05/08/2024       Lipid Profile:    Lab Results   Component Value Date    CHOL 267 (H) 05/08/2024    TRIG 88 05/08/2024    HDL 81.8 05/08/2024   LDL cholesterol: 168    Hepatic Function Panel:    Lab Results   Component Value Date    ALKPHOS 70 05/08/2024    ALT 15 05/08/2024    AST 18 05/08/2024    PROT 6.7 05/08/2024    BILITOT 0.5 05/08/2024    BILIDIR 0.1 05/08/2024       TSH:    Lab Results   Component Value Date    TSH 3.30 05/08/2024                 PROBLEM LIST:     Patient Active Problem List   Diagnosis    Elevated liver function tests    First-degree atrioventricular block    History of cardiac pacemaker    HTN (hypertension)    Hyperkalemia    Hyperlipidemia    Paroxysmal atrial fibrillation (Multi)    EDINSON (obstructive sleep apnea)    History of cardioembolic cerebrovascular accident (CVA)    Macular degeneration    SSS (sick sinus syndrome) (Multi)    Medication course changed             Jim Beal MD, [facility]   [facility] / [facility] /  Cardiology      Of Note:  [employer] voice recognition dictation software was utilized partially in the preparation of this note, therefore, inaccuracies in spelling, word choice and punctuation may have occurred which were not recognized the time of signing.    Patient was seen and examined with total time of visit including chart preparation, rooming, and chart completion exceeding 40 minutes.      ----

## 2024-05-14 ENCOUNTER — OFFICE VISIT (OUTPATIENT)
Dept: FAMILY MEDICINE CLINIC | Age: 84
End: 2024-05-14

## 2024-05-14 VITALS
HEIGHT: 65 IN | WEIGHT: 157 LBS | DIASTOLIC BLOOD PRESSURE: 76 MMHG | HEART RATE: 87 BPM | SYSTOLIC BLOOD PRESSURE: 122 MMHG | BODY MASS INDEX: 26.16 KG/M2 | TEMPERATURE: 98.4 F | OXYGEN SATURATION: 97 %

## 2024-05-14 DIAGNOSIS — R25.1 SHAKINESS: ICD-10-CM

## 2024-05-14 DIAGNOSIS — R00.2 PALPITATIONS: ICD-10-CM

## 2024-05-14 DIAGNOSIS — E16.2 HYPOGLYCEMIA: Primary | ICD-10-CM

## 2024-05-14 DIAGNOSIS — E16.2 HYPOGLYCEMIA: ICD-10-CM

## 2024-05-14 RX ORDER — BLOOD-GLUCOSE METER
1 KIT MISCELLANEOUS DAILY PRN
Qty: 1 KIT | Refills: 0 | Status: SHIPPED | OUTPATIENT
Start: 2024-05-14

## 2024-05-14 RX ORDER — GLUCOSAMINE HCL/CHONDROITIN SU 500-400 MG
1 CAPSULE ORAL DAILY
Qty: 100 STRIP | Refills: 3 | Status: SHIPPED | OUTPATIENT
Start: 2024-05-14

## 2024-05-14 NOTE — PROGRESS NOTES
breath, or wheezing  Cardiovascular ROS: no chest pain or dyspnea on exertion  Gastrointestinal ROS: no abdominal pain, change in bowel habits, or black or bloody stools  Genito-Urinary ROS: no dysuria, trouble voiding  Musculoskeletal ROS: negative for - gait disturbance, joint pain or joint stiffness  Neurological ROS: negative for - behavioral changes, memory loss, numbness/tingling, tremors or weakness    In general patient otherwise reports feeling well.     Physical Exam:  /76 (Site: Left Upper Arm)   Pulse 87   Temp 98.4 °F (36.9 °C)   Ht 1.651 m (5' 5\")   Wt 71.2 kg (157 lb)   LMP 01/01/1996   SpO2 97%   BMI 26.13 kg/m²     Gen: Well, NAD, Alert, Oriented x 3   HEENT: EOMI, eyes clear, MMM  Skin: without rash or jaundice  Neck: no significant lymphadenopathy or thyromegaly  Lungs: CTA B w/out Rales/Wheezes/Rhonchi, Good respiratory effort   Heart: RRR, S1S2, w/out M/R/G, non-displaced PMI   Ext: No C/C/E Bilaterally.   Neuro: Neurovascularly intact w/ Sensory/Motor intact UE/LE Bilaterally.    Lab Results   Component Value Date    WBC 4.6 (L) 04/18/2024    HGB 12.6 04/18/2024    HCT 38.4 04/18/2024     04/18/2024    CHOL 254 (H) 04/02/2024    TRIG 96 04/02/2024    HDL 75 (H) 04/02/2024    ALT 15 04/18/2024    AST 31 04/18/2024     05/03/2024    K 5.0 (H) 05/03/2024     05/03/2024    CREATININE 0.82 05/03/2024    BUN 28 (H) 05/03/2024    CO2 28 05/03/2024    TSH 2.740 04/02/2024    INR 1.0 06/14/2017    LABA1C 5.6 11/05/2021         A&P   Diagnosis Orders   1. Hypoglycemia  Insulin Free & Total    Blood Glucose Monitoring Suppl (ONE TOUCH ULTRA MINI) w/Device KIT    ONE TOUCH LANCETS MISC    blood glucose monitor strips      2. Shakiness        3. Palpitations            She will continue on current bp med doses as suggested by Dr. Ji    Check insulin level    Glucometer    She will consider transferring to [facility] cardiology and see Dr. Moses for EP    Moderate

## 2024-05-15 ENCOUNTER — CARE COORDINATION (OUTPATIENT)
Dept: CARE COORDINATION | Age: 84
End: 2024-05-15

## 2024-05-16 ENCOUNTER — CARE COORDINATION (OUTPATIENT)
Dept: CARE COORDINATION | Age: 84
End: 2024-05-16

## 2024-05-16 LAB
INSULIN FREE SERPL-ACNC: 6 UIU/ML (ref 3–25)
INSULIN SERPL-ACNC: 7 UIU/ML (ref 3–25)

## 2024-05-16 NOTE — CARE COORDINATION
[facility]     Patient needing assistance with possible hypoglycemia symptoms.    Would like to discuss high protein, low carb, low potassium diet options   Please call her Friday or Monday     RD will outreach to patient as requested and follow up as appropriate.       Carol Marnia RDN, LD  [phone]

## 2024-05-17 ENCOUNTER — CARE COORDINATION (OUTPATIENT)
Dept: CARE COORDINATION | Age: 84
End: 2024-05-17

## 2024-05-17 NOTE — CARE COORDINATION
Jovana PEACOCK Radha  May 17, 2024    Initial Referral Reason: Hypoglycemia     Patient Care Team:  David Villanueva MD as PCP - General  David Villanueva [facility]  Russell Arcos MD as Consulting Physician (Oncology)  Marlo Ji MD (Cardiology)  Tonia Vicente, RN as Ambulatory Care Manager  Marquita Lee, RN as Ambulatory Care Manager  Carol Marina RD, LD as Dietitian (Dietitian Registered)    Past Medical History:    Current Outpatient Medications   Medication Sig Dispense Refill    Blood Glucose Monitoring Suppl (ONE TOUCH ULTRA MINI) w/Device KIT 1 kit by Does not apply route daily as needed (test daily) 1 kit 0    ONE TOUCH LANCETS MISC 1 each by Does not apply route daily 100 each 3    blood glucose monitor strips 1 strip by Other route daily Test 1 times a day & as needed for symptoms of irregular blood glucose. 100 strip 3    metoprolol succinate (TOPROL XL) 50 MG extended release tablet Take 0.5 tablets by mouth daily (Patient not taking: Reported on 5/14/2024)      Apoaequorin (PREVAGEN PO) Take by mouth      magnesium (MAGNESIUM-OXIDE) 250 MG TABS tablet Take 1 tablet by mouth daily  0    Misc Natural Products (BRAINSTRONG MEMORY SUPPORT) TABS Take by mouth      ARMOUR THYROID 30 MG tablet TAKE 1 & 1/2 (ONE AND ONE-HALF) TABLETS BY MOUTH DAILY 135 tablet 1    oxybutynin (DITROPAN-XL) 5 MG extended release tablet TAKE 1 TABLET BY MOUTH DAILY 90 tablet 1    APPLE CIDER VINEGAR PO Take by mouth      Handicap Placard MISC by Does not apply route Exp 5 years 1 each 0    Handicap Placard MISC by Does not apply route Exp 5 years 1 each 0    amLODIPine (NORVASC) 2.5 MG tablet Take 1 tablet by mouth 2 times daily (Patient taking differently: Take 2 tablets by mouth daily) 180 tablet 3    Cholecalciferol (VITAMIN D3) 1.25 MG (60650 UT) CAPS Take by mouth      LUTEIN PO Take 40 mg by mouth daily      vitamin B-12 (CYANOCOBALAMIN) 1000 MCG tablet Take 1 tablet by mouth daily

## 2024-05-24 ENCOUNTER — CARE COORDINATION (OUTPATIENT)
Dept: CARE COORDINATION | Age: 84
End: 2024-05-24

## 2024-05-24 NOTE — CARE COORDINATION
Contacted Jovana Garcia and left voicemail regarding Dietitian follow up. Left call back number and will follow up as appropriate.         Carol Marina RDN, LD  [phone]

## 2024-05-24 NOTE — CARE COORDINATION
Jovana PEACOCK Radha  5/24/2024    Registered Dietitian Progress Note for Care Coordination    Assessment: oJvana is a 83 y.o. female.  RD referred for hypoglycemia. RD spoke with patient for initial nutrition assessment on 5/17/24. RD called to follow up with pt today 5/24/24. RD discussed previous goals with pt. Patient has not yet received the handouts and coupons in the mail. Patient states she is eating consistently throughout the day. Patient states this morning for breakfast she ate an english muffin, egg, cheese and a mandarin orange. Reviewed the components of a balanced meal using MyPlate and reiterated the importance of incorporating a protein source to each meal and snack. Patient verbalizes understanding. Patient states she started checking her BS on 5/18 and she hasn't had any low blood sugars <70 mg/dL. Patient states she has only had one episode within the past week and it was mild, per patient BS was not low at this time. Patient states if she feels an episode coming on then she will drink 1/4 cup of chocolate milk to help. Patient states on 5/18/24 her BS at 4:22 AM was 101 mg/dL, at 7AM BS 89 mg/dL, 1.5 hours after breakfast BS 96 mg/dL, before lunch BS 89 mg/dL, 2 hours after lunch 111 mg/dL and before dinner BS 74 mg/dL. Patient states on 5/19/24 her FBS was 92 mg/dL and on 5/21/24 her FBS was 94 mg/dL. Patient states she checks her BP occasionally, readings provided include 130/80 and 110/59. Patient states she has a stress test on 6/4/24. Patient has no nutrition related questions or concerns at this time.     Barriers to meeting goals: overwhelmed by complexity of regimen, stress, and lack of education    Nutrition Monitoring and Evaluation  Indicator/Goal Criteria Progress   #1 Eat balanced meals consistently throughout the day.  #1 Eat 3 meals/day and make meals balanced using MyPlate. Incorporate a protein source to each meal.  #1 Patient is eating 3 meals/day and making meals balanced using

## 2024-06-03 DIAGNOSIS — N39.46 MIXED INCONTINENCE: ICD-10-CM

## 2024-06-03 RX ORDER — OXYBUTYNIN CHLORIDE 5 MG/1
5 TABLET, EXTENDED RELEASE ORAL DAILY
Qty: 90 TABLET | Refills: 1 | Status: SHIPPED | OUTPATIENT
Start: 2024-06-03

## 2024-06-04 ENCOUNTER — CLINICAL SUPPORT (OUTPATIENT)
Dept: CARDIOLOGY | Facility: HOSPITAL | Age: 84
End: 2024-06-04
Payer: MEDICARE

## 2024-06-04 ENCOUNTER — ANCILLARY PROCEDURE (OUTPATIENT)
Dept: CARDIOLOGY | Facility: HOSPITAL | Age: 84
End: 2024-06-04
Payer: MEDICARE

## 2024-06-04 DIAGNOSIS — I48.0 PAROXYSMAL ATRIAL FIBRILLATION (MULTI): ICD-10-CM

## 2024-06-04 DIAGNOSIS — R06.09 DOE (DYSPNEA ON EXERTION): ICD-10-CM

## 2024-06-04 PROCEDURE — 93306 TTE W/DOPPLER COMPLETE: CPT | Performed by: INTERNAL MEDICINE

## 2024-06-04 PROCEDURE — 78452 HT MUSCLE IMAGE SPECT MULT: CPT

## 2024-06-04 PROCEDURE — 93306 TTE W/DOPPLER COMPLETE: CPT

## 2024-06-04 RX ORDER — REGADENOSON 0.08 MG/ML
0.4 INJECTION, SOLUTION INTRAVENOUS ONCE
Status: COMPLETED | OUTPATIENT
Start: 2024-06-04 | End: 2024-06-04

## 2024-06-04 RX ADMIN — REGADENOSON 0.4 MG: 0.08 INJECTION, SOLUTION INTRAVENOUS at 14:00

## 2024-06-05 DIAGNOSIS — E87.5 HYPERKALEMIA: ICD-10-CM

## 2024-06-05 LAB
ANION GAP SERPL CALCULATED.3IONS-SCNC: 10 MEQ/L (ref 9–15)
AORTIC VALVE MEAN GRADIENT: 6 MMHG
AORTIC VALVE PEAK VELOCITY: 1.67 M/S
AV PEAK GRADIENT: 11.2 MMHG
AVA (PEAK VEL): 1.7 CM2
AVA (VTI): 1.7 CM2
BUN SERPL-MCNC: 29 MG/DL (ref 8–23)
CALCIUM SERPL-MCNC: 9.6 MG/DL (ref 8.5–9.9)
CHLORIDE SERPL-SCNC: 100 MEQ/L (ref 95–107)
CO2 SERPL-SCNC: 27 MEQ/L (ref 20–31)
CREAT SERPL-MCNC: 0.82 MG/DL (ref 0.5–0.9)
EJECTION FRACTION APICAL 4 CHAMBER: 64.8
GLUCOSE SERPL-MCNC: 92 MG/DL (ref 70–99)
LEFT VENTRICLE INTERNAL DIMENSION DIASTOLE: 4.54 CM (ref 3.5–6)
LEFT VENTRICULAR OUTFLOW TRACT DIAMETER: 2 CM
LV EJECTION FRACTION BIPLANE: 66 %
MITRAL VALVE E/A RATIO: 0.87
MITRAL VALVE E/E' RATIO: 10.19
POTASSIUM SERPL-SCNC: 4.9 MEQ/L (ref 3.4–4.9)
RIGHT VENTRICLE PEAK SYSTOLIC PRESSURE: 33.5 MMHG
SODIUM SERPL-SCNC: 137 MEQ/L (ref 135–144)

## 2024-06-06 ENCOUNTER — CARE COORDINATION (OUTPATIENT)
Dept: CARE COORDINATION | Age: 84
End: 2024-06-06

## 2024-06-06 NOTE — CARE COORDINATION
Contacted Jovana Garcia and left voicemail regarding Dietitian follow up. Left call back number and will follow up as appropriate.         Carol Marina RDN, LD  [phone]

## 2024-06-06 NOTE — CARE COORDINATION
Ambulatory Care Coordination Note  2024    Patient Current Location:  Home: [address] [place] [address] [place] [address]     ACM contacted the patient by telephone. Verified name and  with patient as identifiers. Provided introduction to self, and explanation of the ACM role.     Challenges to be reviewed by the provider   Additional needs identified to be addressed with provider: No  none               Method of communication with provider: none.    ACM: Marquita Lee, RN    spoke with patient for care coordination follow up for Afib, HTN, and low blood sugar  Patient states she is still feeling \"out of sorts\" and fatigued at times.  Does monitor glucose and has changed diet to increase protein and carbs due to low blood sugar.  Glucose 81 today.    Discussed symptoms and ACM provided listening ear and offered support to patient.  Patient doesn't feel this is depression or any mood disorders.  Patient states she uses prayer, meditation, and her Orthodoxy for symptom relief.  Patient doesn't want to start antidepressants as PCP suggested.  Patient denies feeling anxious or depressed.  Patient still active in [employer] and with friends.  Follow up with PCP  to discuss.  Patient has follow up with Dr. Ji on  to review test results.  Denies CP, palpitations, or dizziness.  Denies sob or feeling of can't catch breath.  Patient had stress test done this week.    Encouraged patient to monitor symptoms, journal feelings and activity.  Discussed trying not to focus on \"what if symptoms\".    Will follow up after PCP appt to discuss findings, patient voiced understanding    Offered patient enrollment in the Remote Patient Monitoring (RPM) program for in-home monitoring: Yes, but did not enroll at this time: controlled chronic disease management.    Lab Results       None            Care Coordination Interventions    Referral from Primary Care Provider: No  Suggested Interventions and Community Resources

## 2024-06-06 NOTE — CARE COORDINATION
Jovana PEACOCK Radha  6/6/2024    Registered Dietitian Progress Note for Care Coordination    Assessment: Jovana is a 83 y.o. female. RD referred for hypoglycemia. RD spoke with patient for initial nutrition assessment on 5/17/24 and has been following up with patient. RD called to follow up with pt today 6/6/24. RD discussed previous goals with pt. Patient states she received the handouts and coupons in the mail, patient is very appreciative. RD reiterated the importance of eating 3 meals/day and not going long periods without eating in between meals. Reviewed the importance of making meals balanced using MyPlate. Patient states she is paying attention to her meals and she is eating more protein. No examples of meals provided per patient. Discussed incorporating a variety of fruits, vegetables, whole grains and lean protein. Discussed the importance of staying hydrated and drinking more water daily. Patient verbalizes understanding. Patient states she is not checking her BS as much because she hasn't been having BS <70 mg/dL. Patient states yesterday around 3:20 PM she stated not feeling well (shaky, cold and weak)- patient states she checked her BS at this time and it was 81 mg/dL. Patient states she ate glucose tabs and within 30 minutes she felt better. Per chart review, patient has OV with PCP on 6/11/24. Patient has no nutrition related questions or concerns at this time.     Barriers to meeting goals: overwhelmed by complexity of regimen, stress, and lack of education     Nutrition Monitoring and Evaluation  Indicator/Goal Criteria Progress   #1 Eat balanced meals consistently throughout the day.  #1 Eat 3 meals/day and make meals balanced using MyPlate. Incorporate a protein source to each meal.  #1 Patient is eating 3 meals/day and making meals balanced using MyPlate.    #2  Eat a balanced snack or supplement with Glucerna in between meals as needed.  #2 Pair a protein and CHO for a snack or drink Glucerna.  #2

## 2024-06-11 ENCOUNTER — OFFICE VISIT (OUTPATIENT)
Dept: FAMILY MEDICINE CLINIC | Age: 84
End: 2024-06-11
Payer: MEDICARE

## 2024-06-11 ENCOUNTER — OFFICE VISIT (OUTPATIENT)
Dept: CARDIOLOGY | Facility: CLINIC | Age: 84
End: 2024-06-11
Payer: MEDICARE

## 2024-06-11 VITALS
SYSTOLIC BLOOD PRESSURE: 110 MMHG | WEIGHT: 160.6 LBS | BODY MASS INDEX: 26.76 KG/M2 | HEART RATE: 83 BPM | DIASTOLIC BLOOD PRESSURE: 54 MMHG | HEIGHT: 65 IN

## 2024-06-11 VITALS
HEIGHT: 65 IN | SYSTOLIC BLOOD PRESSURE: 110 MMHG | TEMPERATURE: 98.1 F | HEART RATE: 90 BPM | DIASTOLIC BLOOD PRESSURE: 64 MMHG | WEIGHT: 158.2 LBS | BODY MASS INDEX: 26.36 KG/M2 | OXYGEN SATURATION: 97 %

## 2024-06-11 DIAGNOSIS — R79.89 ELEVATED LIVER FUNCTION TESTS: ICD-10-CM

## 2024-06-11 DIAGNOSIS — H35.30 MACULAR DEGENERATION, UNSPECIFIED LATERALITY, UNSPECIFIED TYPE: ICD-10-CM

## 2024-06-11 DIAGNOSIS — Z79.899 MEDICATION COURSE CHANGED: ICD-10-CM

## 2024-06-11 DIAGNOSIS — Z86.73 HISTORY OF CARDIOEMBOLIC CEREBROVASCULAR ACCIDENT (CVA): ICD-10-CM

## 2024-06-11 DIAGNOSIS — I48.0 PAROXYSMAL ATRIAL FIBRILLATION (MULTI): ICD-10-CM

## 2024-06-11 DIAGNOSIS — E87.5 HYPERKALEMIA: ICD-10-CM

## 2024-06-11 DIAGNOSIS — G47.33 OSA (OBSTRUCTIVE SLEEP APNEA): ICD-10-CM

## 2024-06-11 DIAGNOSIS — E03.9 HYPOTHYROIDISM, UNSPECIFIED TYPE: ICD-10-CM

## 2024-06-11 DIAGNOSIS — G47.33 OBSTRUCTIVE SLEEP APNEA SYNDROME: ICD-10-CM

## 2024-06-11 DIAGNOSIS — Z95.0 HISTORY OF CARDIAC PACEMAKER: ICD-10-CM

## 2024-06-11 DIAGNOSIS — I10 ESSENTIAL HYPERTENSION: ICD-10-CM

## 2024-06-11 DIAGNOSIS — I10 PRIMARY HYPERTENSION: ICD-10-CM

## 2024-06-11 DIAGNOSIS — E78.2 MIXED HYPERLIPIDEMIA: ICD-10-CM

## 2024-06-11 DIAGNOSIS — N39.46 MIXED INCONTINENCE: ICD-10-CM

## 2024-06-11 DIAGNOSIS — I48.91 ATRIAL FIBRILLATION, UNSPECIFIED TYPE (HCC): Primary | ICD-10-CM

## 2024-06-11 DIAGNOSIS — I49.5 SSS (SICK SINUS SYNDROME) (MULTI): ICD-10-CM

## 2024-06-11 DIAGNOSIS — R09.89 LABILE BLOOD PRESSURE: ICD-10-CM

## 2024-06-11 DIAGNOSIS — R25.1 SHAKINESS: ICD-10-CM

## 2024-06-11 DIAGNOSIS — I44.0 FIRST-DEGREE ATRIOVENTRICULAR BLOCK: ICD-10-CM

## 2024-06-11 DIAGNOSIS — E16.2 HYPOGLYCEMIA: ICD-10-CM

## 2024-06-11 PROCEDURE — 99213 OFFICE O/P EST LOW 20 MIN: CPT | Performed by: FAMILY MEDICINE

## 2024-06-11 PROCEDURE — 3074F SYST BP LT 130 MM HG: CPT | Performed by: INTERNAL MEDICINE

## 2024-06-11 PROCEDURE — 1123F ACP DISCUSS/DSCN MKR DOCD: CPT | Performed by: FAMILY MEDICINE

## 2024-06-11 PROCEDURE — G8400 PT W/DXA NO RESULTS DOC: HCPCS | Performed by: FAMILY MEDICINE

## 2024-06-11 PROCEDURE — 3078F DIAST BP <80 MM HG: CPT | Performed by: INTERNAL MEDICINE

## 2024-06-11 PROCEDURE — G8417 CALC BMI ABV UP PARAM F/U: HCPCS | Performed by: FAMILY MEDICINE

## 2024-06-11 PROCEDURE — G8427 DOCREV CUR MEDS BY ELIG CLIN: HCPCS | Performed by: FAMILY MEDICINE

## 2024-06-11 PROCEDURE — 1036F TOBACCO NON-USER: CPT | Performed by: FAMILY MEDICINE

## 2024-06-11 PROCEDURE — 1036F TOBACCO NON-USER: CPT | Performed by: INTERNAL MEDICINE

## 2024-06-11 PROCEDURE — 0509F URINE INCON PLAN DOCD: CPT | Performed by: FAMILY MEDICINE

## 2024-06-11 PROCEDURE — 1090F PRES/ABSN URINE INCON ASSESS: CPT | Performed by: FAMILY MEDICINE

## 2024-06-11 PROCEDURE — 99214 OFFICE O/P EST MOD 30 MIN: CPT | Performed by: INTERNAL MEDICINE

## 2024-06-11 PROCEDURE — 3074F SYST BP LT 130 MM HG: CPT | Performed by: FAMILY MEDICINE

## 2024-06-11 PROCEDURE — 1159F MED LIST DOCD IN RCRD: CPT | Performed by: INTERNAL MEDICINE

## 2024-06-11 PROCEDURE — 3078F DIAST BP <80 MM HG: CPT | Performed by: FAMILY MEDICINE

## 2024-06-11 NOTE — PROGRESS NOTES
CARDIOLOGY OFFICE NOTE     Date:   6/11/2024    Patient:    Sharon Nova    YOB: 1940    Primary Physician: Yuri Goodson MD       Reason for Visit: Cardiology follow-up post testing.    HPI:     Sharon Nova was seen in cardiac evaluation at the  Cardiology office June 11, 2024.      The patients problems are listed as in the impression below.    Electronic medical records reviewed.    Patient returns.  She had testing done recently.  She has been having some lightheadedness palpitations and shortness of breath.  She has a history of atrial fibrillation maintaining sinus rhythm.  She is post pacemaker implantation 2018.  She has normal LV systolic function previously.    She had an echocardiogram and Lexiscan perfusion stress test which both were reviewed with the patient in details.  Echo noted with normal function with diastolic dysfunction and mild to moderate left ventricular hypertrophy.  Aortic valve gnosis.  Lexiscan perfusion study was normal without ischemia or MI.  LVEF was 74%.    She actually does feel better.  She has no complaints currently.    She is planning on changing cardiologist for the sake of she goes to [facility] and were no longer covering there.    Patient denies Chest Pain, SOB, Lightheadedness, Dizziness, TIA or CVA symptoms.  No CHF or Edema.  No Palpitations.  No GI,  or Bleeding Issues. No Recent Fever or Chills.     Cardiovascular and general review of systems is otherwise negative.    A 14-system review is otherwise negative, other than noted.     PHYSICAL EXAMINATION:      Vitals:    06/11/24 1058   BP: 110/54   Pulse: 83     General: No acute distress. Vital signs as noted below. Alert and oriented. Head and neck examination: No jugular venous distention, no carotid bruits, no mass. Carotid upstrokes preserved. Oral mucosa moist. No xanthelasma.   Head and neck examination otherwise unremarkable.   Lungs: Clear to auscultation and percussion. No wheezes,  no rales, and no rhonchi. Chest  excursion appeared to be normal. No chest wall tenderness on palpation.  Pacemaker left chest with well healed incision.   Heart: Normal S1 and S2. No S3. No S4. No rub. Grade 1/6 systolic murmur, best heard left sternal border. Point of maximal impulse was decreased.   Abdomen was soft, obese. Nontender. No organomegaly. No bruits. No masses.   Extremities: No edema. No clubbing.  No cyanosis. Pulses are strong throughout. No bruits.   Musculoskeletal exam: No ulcers, otherwise unremarkable.   Neurologically appeared grossly intact.   .  IMPRESSION:       Cardiovascular status stable  Lightheadedness, resolved  Palpitations, resolved  Dyspnea on exertion, improved  Atrial fibrillation, maintained in sinus rhythm without recurrence.  Abnormal ECG  First-degree AV block  Sick sinus syndrome, Status post pacemaker implantation. [facility] [place], February 2008.   Premature ventricular contractions, symptomatic  No anticoagulation therapy, by patient preference.   Normal left ventricular systolic function, LVEF 65 % echocardiogram 6/2024  Negative coronary cineangiography June 2017.  Negative Lexiscan myocardial perfusion stress test, ejection fraction of 74%, 6/2024  LV diastolic dysfunction.  Left ventricular hypertrophy, concentric, mild to moderate.  Hypertension.  Hyperlipidemia.  History of transient ischemic attack history.  Obesity.  Sleep apnea, noncompliant with CPAP.  COVID-19 infection, January 22.   Elevated liver function studies.  Macular degeneration.  Otherwise as per assessment below.     RECOMMENDATIONS:      Sharon appears to be doing well overall.  I reassured her.  She will continue her current medications.    I encouraged her to follow an exercise dietary walking program.    Hydration was encouraged.    [employer] portal use was encouraged.    She plans to switch cardiology services from [facility] to [facility] given as noted above that we do not cover  [facility] anymore.    Will plan to see her only on an as-needed basis if things should change.     Patient will follow up with their primary physician for general care.    The patient knows to contact medical care earlier if need be.      ALLERGIES:     Allergies   Allergen Reactions    Levothyroxine Cardiac arrhythmia/arrest and Shortness of breath    Fludrocortisone Swelling    Hydralazine Unknown    Influenza Virus Vaccine Whole Unknown    Warfarin Unknown        MEDICATIONS:     Current Outpatient Medications   Medication Instructions    amLODIPine (NORVASC) 5 mg, oral, Daily    aspirin 81 mg EC tablet Take 1 tablet (81 mg) by mouth once daily.    b complex 0.4 mg tablet 1 tablet, oral, Daily    cholecalciferol (Vitamin D-3) 5,000 Units tablet 1 tablet, oral, Daily    cyanocobalamin (Vitamin B-12) 1,000 mcg tablet 1 tablet, oral, Daily    glucosamine-chondroit-vit C-Mn (Glucosamine Chondroitin MaxStr) 500-400 mg capsule 1 capsule, oral, 2 times daily    magnesium oxide (MAG-OX) 400 mg, oral, 2 times daily    oxybutynin XL (Ditropan-XL) 5 mg 24 hr tablet 1 tablet, oral, Daily before breakfast    thyroid, pork, ([place] Thyroid) 30 mg tablet Take 1.5 tablets (45 mg) by mouth once daily in the morning. Take before meals.    ZINC ACETATE ORAL 1 tablet, Mouth/Throat, Daily       ELECTROCARDIOGRAM:      None this visit    CARDIAC TESTING:      Lexiscan imaging stress test, 6/2024:  Normal no ischemia or MI.  LVEF 74%.    Echocardiogram, 6/2024:   Normal LV systolic function.  Ejection fraction 65%.  Mild to moderate left ventricular hypertrophy concentrically.  Diastolic dysfunction.  Aortic valve sclerosis without stenosis.    LABORATORY DATA:      CBC:   Lab Results   Component Value Date    WBC 5.5 05/08/2024    RBC 4.74 05/08/2024    HGB 14.0 05/08/2024    HCT 43.1 05/08/2024     05/08/2024        CMP:    Lab Results   Component Value Date     05/08/2024    K 4.7 05/08/2024     05/08/2024     CO2 32 05/08/2024    BUN 23 05/08/2024    CREATININE 0.87 05/08/2024    GLUCOSE 88 05/08/2024    CALCIUM 10.0 05/08/2024       Magnesium:    Lab Results   Component Value Date    MG 2.42 (H) 05/08/2024       Lipid Profile:    Lab Results   Component Value Date    CHOL 267 (H) 05/08/2024    TRIG 88 05/08/2024    HDL 81.8 05/08/2024       Hepatic Function Panel:    Lab Results   Component Value Date    ALKPHOS 70 05/08/2024    ALT 15 05/08/2024    AST 18 05/08/2024    PROT 6.7 05/08/2024    BILITOT 0.5 05/08/2024    BILIDIR 0.1 05/08/2024       TSH:    Lab Results   Component Value Date    TSH 3.30 05/08/2024                   PROBLEM LIST:     Patient Active Problem List   Diagnosis    Elevated liver function tests    First-degree atrioventricular block    History of cardiac pacemaker    HTN (hypertension)    Hyperkalemia    Hyperlipidemia    Paroxysmal atrial fibrillation (Multi)    Obstructive sleep apnea syndrome    History of cardioembolic cerebrovascular accident (CVA)    Macular degeneration    SSS (sick sinus syndrome) (Multi)    Medication course changed    Dizziness             Jim Beal MD, FACC   [facility] / [facility] /  Cardiology      Of Note:  [employer] voice recognition dictation software was utilized partially in the preparation of this note, therefore, inaccuracies in spelling, word choice and punctuation may have occurred which were not recognized the time of signing.    Patient was seen and examined with total time of visit including chart preparation, rooming, and chart completion exceeding 40 minutes.      ----

## 2024-06-11 NOTE — PROGRESS NOTES
Essential hypertension              She will continue on current bp med doses as suggested by Dr. Ji    Check insulin level    Glucometer    She will consider transferring to [facility] cardiology and see Dr. Moses for EP    Moderate MDM    David Villanueva MD

## 2024-06-11 NOTE — PATIENT INSTRUCTIONS
Exercise diet weight loss program.     Stay plenty HYDRATED     Use My Chart portal for reviewing records, testing and contacting office.      Please bring all medicines, vitamins, and herbal supplements with you in original bottles to every appointment!!!!    Prescriptions will not be filled unless you are compliant with your follow up appointments or have a follow up appointment scheduled as per instruction of your physician. Refills should be requested at the time of your visit.

## 2024-06-19 ENCOUNTER — CARE COORDINATION (OUTPATIENT)
Dept: CARE COORDINATION | Age: 84
End: 2024-06-19

## 2024-06-19 NOTE — CARE COORDINATION
Jovana PEACOCK [facility]  6/19/2024    Registered Dietitian Progress Note for Care Coordination    Assessment: Jovana is a 84 y.o. female. RD referred for hypoglycemia. RD spoke with patient for initial nutrition assessment on 5/17/24 and has been following up with patient. RD called to follow up with pt today 6/19/24. Per chart review, patient had OV with PCP on 6/11/24 and patient states the appointment went well. Per chart review- PCP checked patient's insulin levels, which are WNL. RD discussed previous goals with patient. Reiterated the importance of eating balanced meals and snacks consistently throughout the day. Patient states she is eating more consistently throughout the day and she is not having as many \"episodes of feeling off.\" No hypoglycemia episodes reported per patient. Patient states she has been increasing her protein intake and she recently bought Yoplait protein yogurt, which has 15 grams of protein per serving. RD reviewed protein sources and provided examples. Reviewed the importance of incorporating a variety of fruits, vegetables, whole grains, lean protein and low fat dairy. Explained the importance of eating a balanced snack prior to bed. Patient verbalizes understanding. Patient has no nutrition related questions or concerns at this time. Patient is very appreciative of RD help and declined need for future follow up calls.     Nutrition Monitoring and Evaluation  Indicator/Goal Criteria Progress   #1 Eat balanced meals consistently throughout the day.  #1 Eat 3 meals/day and make meals balanced using MyPlate. Incorporate a protein source to each meal.  #1 Patient is eating 3 meals/day and making meals balanced using MyPlate.    #2  Eat a balanced snack or supplement with Glucerna in between meals as needed.  #2 Pair a protein and CHO for a snack or drink Glucerna.  #2 Patient is eating balanced snacks as needed.    #3  Monitor BS and correct appropriately as needed.  #3 If hypoglycemia (<70): rule of

## 2024-06-21 ENCOUNTER — CARE COORDINATION (OUTPATIENT)
Dept: CARE COORDINATION | Age: 84
End: 2024-06-21

## 2024-06-21 NOTE — CARE COORDINATION
Ambulatory Care Coordination Note  2024    Patient Current Location:  Home: [address] [place] [address] [place] [address]     ACM contacted the patient by telephone. Verified name and  with patient as identifiers. Provided introduction to self, and explanation of the ACM role.     Challenges to be reviewed by the provider   Additional needs identified to be addressed with provider: No  none               Method of communication with provider: none.    ACM: Marquita Lee, RN    spoke with patient for care coordination follow up for AFib, HTN, and generalized not feeling well  Patient reports she is feeling better and feels the \"out of sorts\" and \"shakiness\" feelings are improving.  States she is eating more protein in diet and eating 3-5 small meals daily.    Working with dietician and agrees with plan and feels diet is controlling symptoms.    ACM provided listening ear and advised patient to add protein snacks to diet.  Drink plenty of water, and continue to keep diet and symptom log.  Voiced understanding  Denies palpitations, headaches, or CP.  BP stable.  110/64  Referral to Dr. Moses placed by PCP  Advised to report any new or worsening symptoms.    Patient still using prayer, meditation, and working on not focusing on her symptoms and if/when they'll happen.        Offered patient enrollment in the Remote Patient Monitoring (RPM) program for in-home monitoring: Yes, but did not enroll at this time: declined to enroll in the program becausedoesn't feel comfortable using ipad .    Lab Results       None            Care Coordination Interventions    Referral from Primary Care Provider: No  Suggested Interventions and Community Resources          Goals Addressed                   This Visit's Progress     Conditions and Symptoms   On track     I will schedule office visits, as directed by my provider.  I will keep my appointment or reschedule if I have to cancel.  I will notify my provider of any barriers to my plan

## 2024-07-07 DIAGNOSIS — E03.9 HYPOTHYROIDISM, UNSPECIFIED TYPE: ICD-10-CM

## 2024-07-08 RX ORDER — THYROID 30 MG/1
TABLET ORAL
Qty: 135 TABLET | Refills: 1 | Status: SHIPPED | OUTPATIENT
Start: 2024-07-08

## 2024-07-10 ENCOUNTER — CARE COORDINATION (OUTPATIENT)
Dept: CARE COORDINATION | Age: 84
End: 2024-07-10

## 2024-07-10 NOTE — CARE COORDINATION
Attempted to reach patient for care coordination follow up  Left message to return call to [facility] at [phone]

## 2024-07-10 NOTE — CARE COORDINATION
Ambulatory Care Coordination Note  7/10/2024    Patient Current Location:  Home: [address] [place] [address] [place] [id number]     ACM contacted the patient by telephone. Verified name and  with patient as identifiers. Provided introduction to self, and explanation of the ACM role.     Challenges to be reviewed by the provider   Additional needs identified to be addressed with provider: No  none               Method of communication with provider: none.    ACM: Marquita Lee, RN    spoke with patient for care coordination follow up for Afib, HTN, PVCs, and diet  Patient states she is doing ok for this call.  Denies any needs  Reports that she is keeping up with healthy diet.  Reviewed dietician recommendations and patient is adherent to plan.  Does report chung is increasing protein in diet polo before bed to help regulate glucose.  Patient denies further need for dietician call.    Denies feelings of being off.  Denies CP or palpitations.  Denies sob or cough.    Reports feeling more fatigued with activity and less endurance.  Discussed age and environment as factors to consider.  Patient states she is struggling with understanding the changes with aging.  Discussed tasks/chores in moderation and to morin away.  Voiced understanding.  States BP is stable.    Patient ready to graduate CC.  All education provided and needs met.  Patient agrees that follow up calls are necessary.  Patient thanked this ACM and appreciated the calls and educations.   ACM advised patient to reach out to dietician or this ACM with future needs or questions, voiced understanding  Encouraged patient to report any new or worsening symptoms to PCP, voiced understanding  Patient states she will continue to use prayer, meditation, and Sabianist as support.    PLAN:  Graduate  Continue meds  Continue with follow up appts  Continue with healthy diet    Offered patient enrollment in the Remote Patient Monitoring (RPM) program for in-home monitoring: Yes, but did

## 2024-07-24 ENCOUNTER — OFFICE VISIT (OUTPATIENT)
Dept: CARDIOLOGY CLINIC | Age: 84
End: 2024-07-24
Payer: MEDICARE

## 2024-07-24 VITALS
BODY MASS INDEX: 26.46 KG/M2 | HEART RATE: 93 BPM | WEIGHT: 159 LBS | SYSTOLIC BLOOD PRESSURE: 138 MMHG | DIASTOLIC BLOOD PRESSURE: 88 MMHG

## 2024-07-24 DIAGNOSIS — I48.91 ATRIAL FIBRILLATION, UNSPECIFIED TYPE (HCC): ICD-10-CM

## 2024-07-24 DIAGNOSIS — Z45.018 ENCOUNTER FOR INTERROGATION OF CARDIAC PACEMAKER: Primary | ICD-10-CM

## 2024-07-24 PROCEDURE — 1036F TOBACCO NON-USER: CPT | Performed by: INTERNAL MEDICINE

## 2024-07-24 PROCEDURE — 3075F SYST BP GE 130 - 139MM HG: CPT | Performed by: INTERNAL MEDICINE

## 2024-07-24 PROCEDURE — 3079F DIAST BP 80-89 MM HG: CPT | Performed by: INTERNAL MEDICINE

## 2024-07-24 PROCEDURE — G8417 CALC BMI ABV UP PARAM F/U: HCPCS | Performed by: INTERNAL MEDICINE

## 2024-07-24 PROCEDURE — 93000 ELECTROCARDIOGRAM COMPLETE: CPT | Performed by: INTERNAL MEDICINE

## 2024-07-24 PROCEDURE — 99204 OFFICE O/P NEW MOD 45 MIN: CPT | Performed by: INTERNAL MEDICINE

## 2024-07-24 PROCEDURE — G8427 DOCREV CUR MEDS BY ELIG CLIN: HCPCS | Performed by: INTERNAL MEDICINE

## 2024-07-24 PROCEDURE — 1123F ACP DISCUSS/DSCN MKR DOCD: CPT | Performed by: INTERNAL MEDICINE

## 2024-07-24 PROCEDURE — G8400 PT W/DXA NO RESULTS DOC: HCPCS | Performed by: INTERNAL MEDICINE

## 2024-07-24 PROCEDURE — 1090F PRES/ABSN URINE INCON ASSESS: CPT | Performed by: INTERNAL MEDICINE

## 2024-07-24 RX ORDER — CARVEDILOL 3.12 MG/1
3.12 TABLET ORAL 2 TIMES DAILY
Qty: 180 TABLET | Refills: 5 | Status: SHIPPED | OUTPATIENT
Start: 2024-07-24

## 2024-07-24 ASSESSMENT — ENCOUNTER SYMPTOMS
SHORTNESS OF BREATH: 0
CHEST TIGHTNESS: 0
ABDOMINAL PAIN: 0
RHINORRHEA: 0
NAUSEA: 0
EYE REDNESS: 0
VOMITING: 0
COLOR CHANGE: 0
DIARRHEA: 0
WHEEZING: 0
APNEA: 0
COUGH: 0
CONSTIPATION: 0

## 2024-07-24 NOTE — PROGRESS NOTES
movements intact.      Conjunctiva/sclera: Conjunctivae normal.      Pupils: Pupils are equal, round, and reactive to light.   Cardiovascular:      Rate and Rhythm: Normal rate and regular rhythm.      Pulses: Normal pulses.      Heart sounds: Normal heart sounds.   Pulmonary:      Effort: Pulmonary effort is normal.      Breath sounds: Normal breath sounds.   Abdominal:      General: Abdomen is flat. Bowel sounds are normal.      Palpations: Abdomen is soft.   Musculoskeletal:         General: Normal range of motion.      Cervical back: Normal range of motion and neck supple.   Skin:     General: Skin is warm.   Neurological:      General: No focal deficit present.      Mental Status: She is alert and oriented to person, place, and time. Mental status is at baseline.   Psychiatric:         Mood and Affect: Mood normal.        EKG: normal sinus rhythm    Orders Placed This Encounter   Procedures    EKG 12 lead     The ASCVD Risk score (Wicho KENDALL, et al., 2019) failed to calculate for the following reasons:    The 2019 ASCVD risk score is only valid for ages 40 to 79     ASSESSMENT:     Diagnosis Orders   1. Atrial fibrillation, unspecified type (HCC)  EKG 12 lead        PLAN:   Atrial fibrillation maintaining sinus rhythm not on anticoagulation by patient's preference  Patient on aspirin  Not on anticoagulation by patient's choice  Patient reports having side effects when on metoprolol  At 1 point patient was on sotalol which patient did not tolerate in the past  Currently sinus rhythm    Hypertension  I would like to stop amlodipine 5 mg daily  Start Coreg 3.125 mg p.o. twice daily  I instructed patient that if blood pressure is not well-controlled and is continuously above 140 patient can give us a call back for further instructions  I also instructed patient that if patient has side effects from Coreg patient can stop Coreg and restart amlodipine  If blood pressure is not well-controlled I would consider starting

## 2024-08-06 ENCOUNTER — TELEPHONE (OUTPATIENT)
Dept: CARDIOLOGY CLINIC | Age: 84
End: 2024-08-06

## 2024-08-06 NOTE — TELEPHONE ENCOUNTER
Called patient regarding her message. Instructed patient to continue her medications as prescribed, do not increase the dose or frequency. Patient has not been monitoring her BP regularly. Instructed patient to monitor her BP twice a day over the next week and to notify us of the readings next week when Dr. Stuart returns to the office. Patient verbalized understanding. No additional questions or concerns.    ----- Message from Jovana Garcia sent at 8/5/2024  3:05 PM EDT -----  Regarding: Blood Pressure  Contact: [phone]  Dr. Harvey  I am taking 1 Carvedilol, 3.125 mg, twice daily, as prescribed.  I have had NO negative side effects.  My blood pressure is still mostly in the 140s, 150s over 70's.  Instead of 2 tablets per day, may I go to 3 tablets?  Jovana Garcia

## 2024-08-07 ENCOUNTER — HOSPITAL ENCOUNTER (OUTPATIENT)
Dept: CARDIOLOGY | Age: 84
Discharge: HOME | End: 2024-08-07
Payer: MEDICARE

## 2024-08-07 PROCEDURE — 93296 REM INTERROG EVL PM/IDS: CPT

## 2024-08-12 DIAGNOSIS — E87.5 HYPERKALEMIA: ICD-10-CM

## 2024-08-12 LAB
ANION GAP SERPL CALCULATED.3IONS-SCNC: 11 MEQ/L (ref 9–15)
BUN SERPL-MCNC: 29 MG/DL (ref 8–23)
CALCIUM SERPL-MCNC: 9.5 MG/DL (ref 8.5–9.9)
CHLORIDE SERPL-SCNC: 105 MEQ/L (ref 95–107)
CO2 SERPL-SCNC: 25 MEQ/L (ref 20–31)
CREAT SERPL-MCNC: 0.95 MG/DL (ref 0.5–0.9)
GLUCOSE SERPL-MCNC: 83 MG/DL (ref 70–99)
POTASSIUM SERPL-SCNC: 5 MEQ/L (ref 3.4–4.9)
SODIUM SERPL-SCNC: 141 MEQ/L (ref 135–144)

## 2024-08-13 ENCOUNTER — TELEPHONE (OUTPATIENT)
Dept: CARDIOLOGY CLINIC | Age: 84
End: 2024-08-13

## 2024-08-13 RX ORDER — LOSARTAN POTASSIUM 25 MG/1
25 TABLET ORAL DAILY
Qty: 90 TABLET | Refills: 1 | Status: SHIPPED | OUTPATIENT
Start: 2024-08-13 | End: 2024-08-15 | Stop reason: ALTCHOICE

## 2024-08-13 NOTE — TELEPHONE ENCOUNTER
Per Dr. Stuart have patient start Losartan 25mg daily. Continue to monitor BP twice a day and record readings. Bring BP log to next appt.

## 2024-08-15 DIAGNOSIS — E87.5 HYPERKALEMIA: Primary | ICD-10-CM

## 2024-08-15 RX ORDER — CARVEDILOL 6.25 MG/1
6.25 TABLET ORAL 2 TIMES DAILY
Qty: 180 TABLET | Refills: 3 | Status: SHIPPED | OUTPATIENT
Start: 2024-08-15

## 2024-08-15 NOTE — PROGRESS NOTES
Per Dr. Stuart, discontinue losartan since potassium is high. Recheck BMP on Monday 08/19/2024. Have patient increase coreg to 6.25mg BID. Patient notified of these changes via My Chart message reply. Medication list updated and BMP ordered.

## 2024-08-21 DIAGNOSIS — E87.5 HYPERKALEMIA: ICD-10-CM

## 2024-08-21 LAB
ANION GAP SERPL CALCULATED.3IONS-SCNC: 12 MEQ/L (ref 9–15)
BUN SERPL-MCNC: 26 MG/DL (ref 8–23)
CALCIUM SERPL-MCNC: 9.6 MG/DL (ref 8.5–9.9)
CHLORIDE SERPL-SCNC: 102 MEQ/L (ref 95–107)
CO2 SERPL-SCNC: 27 MEQ/L (ref 20–31)
CREAT SERPL-MCNC: 0.79 MG/DL (ref 0.5–0.9)
GLUCOSE SERPL-MCNC: 85 MG/DL (ref 70–99)
POTASSIUM SERPL-SCNC: 5.1 MEQ/L (ref 3.4–4.9)
SODIUM SERPL-SCNC: 141 MEQ/L (ref 135–144)

## 2024-09-23 ENCOUNTER — PATIENT MESSAGE (OUTPATIENT)
Dept: FAMILY MEDICINE CLINIC | Age: 84
End: 2024-09-23

## 2024-09-23 DIAGNOSIS — E87.5 HYPERKALEMIA: Primary | ICD-10-CM

## 2024-09-23 DIAGNOSIS — E87.5 HYPERKALEMIA: ICD-10-CM

## 2024-09-23 LAB
ANION GAP SERPL CALCULATED.3IONS-SCNC: 9 MEQ/L (ref 9–15)
BUN SERPL-MCNC: 27 MG/DL (ref 8–23)
CALCIUM SERPL-MCNC: 9.8 MG/DL (ref 8.5–9.9)
CHLORIDE SERPL-SCNC: 103 MEQ/L (ref 95–107)
CO2 SERPL-SCNC: 29 MEQ/L (ref 20–31)
CREAT SERPL-MCNC: 0.91 MG/DL (ref 0.5–0.9)
GLUCOSE SERPL-MCNC: 86 MG/DL (ref 70–99)
POTASSIUM SERPL-SCNC: 5.8 MEQ/L (ref 3.4–4.9)
SODIUM SERPL-SCNC: 141 MEQ/L (ref 135–144)

## 2024-09-25 ENCOUNTER — OFFICE VISIT (OUTPATIENT)
Dept: CARDIOLOGY CLINIC | Age: 84
End: 2024-09-25
Payer: MEDICARE

## 2024-09-25 VITALS
SYSTOLIC BLOOD PRESSURE: 138 MMHG | HEART RATE: 83 BPM | RESPIRATION RATE: 16 BRPM | WEIGHT: 154 LBS | OXYGEN SATURATION: 99 % | BODY MASS INDEX: 25.63 KG/M2 | DIASTOLIC BLOOD PRESSURE: 90 MMHG

## 2024-09-25 DIAGNOSIS — I15.9 SECONDARY HYPERTENSION: ICD-10-CM

## 2024-09-25 DIAGNOSIS — E87.5 HYPERKALEMIA: ICD-10-CM

## 2024-09-25 PROCEDURE — G8400 PT W/DXA NO RESULTS DOC: HCPCS | Performed by: INTERNAL MEDICINE

## 2024-09-25 PROCEDURE — G8428 CUR MEDS NOT DOCUMENT: HCPCS | Performed by: INTERNAL MEDICINE

## 2024-09-25 PROCEDURE — 99214 OFFICE O/P EST MOD 30 MIN: CPT | Performed by: INTERNAL MEDICINE

## 2024-09-25 PROCEDURE — G8417 CALC BMI ABV UP PARAM F/U: HCPCS | Performed by: INTERNAL MEDICINE

## 2024-09-25 PROCEDURE — 3080F DIAST BP >= 90 MM HG: CPT | Performed by: INTERNAL MEDICINE

## 2024-09-25 PROCEDURE — 3075F SYST BP GE 130 - 139MM HG: CPT | Performed by: INTERNAL MEDICINE

## 2024-09-25 PROCEDURE — 1036F TOBACCO NON-USER: CPT | Performed by: INTERNAL MEDICINE

## 2024-09-25 PROCEDURE — 1090F PRES/ABSN URINE INCON ASSESS: CPT | Performed by: INTERNAL MEDICINE

## 2024-09-25 PROCEDURE — 1123F ACP DISCUSS/DSCN MKR DOCD: CPT | Performed by: INTERNAL MEDICINE

## 2024-09-25 RX ORDER — CARVEDILOL 12.5 MG/1
12.5 TABLET ORAL 2 TIMES DAILY
COMMUNITY
End: 2024-09-26 | Stop reason: SDUPTHER

## 2024-09-25 RX ORDER — CARVEDILOL 12.5 MG/1
6.25 TABLET ORAL 2 TIMES DAILY
Qty: 180 TABLET | Refills: 5 | Status: SHIPPED | OUTPATIENT
Start: 2024-09-25 | End: 2024-09-25 | Stop reason: DRUGHIGH

## 2024-09-25 ASSESSMENT — ENCOUNTER SYMPTOMS
APNEA: 0
CHEST TIGHTNESS: 0
ABDOMINAL PAIN: 0
CONSTIPATION: 0
VOMITING: 0
COLOR CHANGE: 0
EYE REDNESS: 0
RHINORRHEA: 0
WHEEZING: 0
NAUSEA: 0
COUGH: 0
SHORTNESS OF BREATH: 0
DIARRHEA: 0

## 2024-09-26 RX ORDER — CARVEDILOL 12.5 MG/1
12.5 TABLET ORAL 2 TIMES DAILY
Qty: 180 TABLET | Refills: 3 | Status: SHIPPED | OUTPATIENT
Start: 2024-09-26

## 2024-10-01 ENCOUNTER — HOSPITAL ENCOUNTER (EMERGENCY)
Age: 84
Discharge: HOME | End: 2024-10-01
Payer: MEDICARE

## 2024-10-01 ENCOUNTER — APPOINTMENT (OUTPATIENT)
Dept: CT IMAGING | Age: 84
End: 2024-10-01
Payer: MEDICARE

## 2024-10-01 ENCOUNTER — TELEMEDICINE (OUTPATIENT)
Dept: FAMILY MEDICINE CLINIC | Age: 84
End: 2024-10-01

## 2024-10-01 ENCOUNTER — APPOINTMENT (OUTPATIENT)
Dept: GENERAL RADIOLOGY | Age: 84
End: 2024-10-01
Payer: MEDICARE

## 2024-10-01 VITALS
OXYGEN SATURATION: 96 % | RESPIRATION RATE: 18 BRPM | WEIGHT: 151 LBS | SYSTOLIC BLOOD PRESSURE: 108 MMHG | TEMPERATURE: 99.3 F | HEIGHT: 65 IN | DIASTOLIC BLOOD PRESSURE: 93 MMHG | BODY MASS INDEX: 25.16 KG/M2 | HEART RATE: 90 BPM

## 2024-10-01 DIAGNOSIS — R07.89 OTHER CHEST PAIN: Primary | ICD-10-CM

## 2024-10-01 DIAGNOSIS — Z00.00 MEDICARE ANNUAL WELLNESS VISIT, SUBSEQUENT: Primary | ICD-10-CM

## 2024-10-01 DIAGNOSIS — N18.30 STAGE 3 CHRONIC KIDNEY DISEASE, UNSPECIFIED WHETHER STAGE 3A OR 3B CKD (HCC): ICD-10-CM

## 2024-10-01 LAB
ANION GAP SERPL CALCULATED.3IONS-SCNC: 7 MEQ/L (ref 9–15)
BASOPHILS # BLD: 0 K/UL (ref 0–0.2)
BASOPHILS NFR BLD: 0.6 %
BUN SERPL-MCNC: 25 MG/DL (ref 8–23)
CALCIUM SERPL-MCNC: 9.6 MG/DL (ref 8.5–9.9)
CHLORIDE SERPL-SCNC: 102 MEQ/L (ref 95–107)
CO2 SERPL-SCNC: 30 MEQ/L (ref 20–31)
CREAT SERPL-MCNC: 0.99 MG/DL (ref 0.5–0.9)
EOSINOPHIL # BLD: 0.1 K/UL (ref 0–0.7)
EOSINOPHIL NFR BLD: 1.6 %
ERYTHROCYTE [DISTWIDTH] IN BLOOD BY AUTOMATED COUNT: 13.8 % (ref 11.5–14.5)
GLUCOSE SERPL-MCNC: 103 MG/DL (ref 70–99)
HCT VFR BLD AUTO: 42.7 % (ref 37–47)
HGB BLD-MCNC: 14.2 G/DL (ref 12–16)
LYMPHOCYTES # BLD: 0.9 K/UL (ref 1–4.8)
LYMPHOCYTES NFR BLD: 16.9 %
MAGNESIUM SERPL-MCNC: 2.5 MG/DL (ref 1.7–2.4)
MCH RBC QN AUTO: 29.5 PG (ref 27–31.3)
MCHC RBC AUTO-ENTMCNC: 33.3 % (ref 33–37)
MCV RBC AUTO: 88.8 FL (ref 79.4–94.8)
MONOCYTES # BLD: 0.5 K/UL (ref 0.2–0.8)
MONOCYTES NFR BLD: 9.3 %
NEUTROPHILS # BLD: 3.6 K/UL (ref 1.4–6.5)
NEUTS SEG NFR BLD: 71.2 %
PERFORMED ON: ABNORMAL
PLATELET # BLD AUTO: 215 K/UL (ref 130–400)
POC CREATININE WHOLE BLOOD: 1.2
POC CREATININE: 1.2 MG/DL (ref 0.6–1.2)
POC SAMPLE TYPE: ABNORMAL
POTASSIUM SERPL-SCNC: 4.4 MEQ/L (ref 3.4–4.9)
RBC # BLD AUTO: 4.81 M/UL (ref 4.2–5.4)
SODIUM SERPL-SCNC: 139 MEQ/L (ref 135–144)
TROPONIN, HIGH SENSITIVITY: 12 NG/L (ref 0–19)
WBC # BLD AUTO: 5 K/UL (ref 4.8–10.8)

## 2024-10-01 PROCEDURE — 85025 COMPLETE CBC W/AUTO DIFF WBC: CPT

## 2024-10-01 PROCEDURE — 93005 ELECTROCARDIOGRAM TRACING: CPT

## 2024-10-01 PROCEDURE — 84484 ASSAY OF TROPONIN QUANT: CPT

## 2024-10-01 PROCEDURE — 99285 EMERGENCY DEPT VISIT HI MDM: CPT

## 2024-10-01 PROCEDURE — 83735 ASSAY OF MAGNESIUM: CPT

## 2024-10-01 PROCEDURE — 71275 CT ANGIOGRAPHY CHEST: CPT

## 2024-10-01 PROCEDURE — 71046 X-RAY EXAM CHEST 2 VIEWS: CPT

## 2024-10-01 PROCEDURE — 80048 BASIC METABOLIC PNL TOTAL CA: CPT

## 2024-10-01 PROCEDURE — 6360000004 HC RX CONTRAST MEDICATION

## 2024-10-01 RX ORDER — IOPAMIDOL 755 MG/ML
75 INJECTION, SOLUTION INTRAVASCULAR
Status: COMPLETED | OUTPATIENT
Start: 2024-10-01 | End: 2024-10-01

## 2024-10-01 RX ADMIN — IOPAMIDOL 75 ML: 755 INJECTION, SOLUTION INTRAVENOUS at 18:01

## 2024-10-01 ASSESSMENT — LIFESTYLE VARIABLES
HOW MANY STANDARD DRINKS CONTAINING ALCOHOL DO YOU HAVE ON A TYPICAL DAY: PATIENT DOES NOT DRINK
HOW MANY STANDARD DRINKS CONTAINING ALCOHOL DO YOU HAVE ON A TYPICAL DAY: PATIENT DOES NOT DRINK
HOW OFTEN DO YOU HAVE A DRINK CONTAINING ALCOHOL: NEVER
HOW OFTEN DO YOU HAVE A DRINK CONTAINING ALCOHOL: NEVER

## 2024-10-01 ASSESSMENT — PAIN DESCRIPTION - LOCATION: LOCATION: BACK

## 2024-10-01 ASSESSMENT — PATIENT HEALTH QUESTIONNAIRE - PHQ9
SUM OF ALL RESPONSES TO PHQ QUESTIONS 1-9: 0
1. LITTLE INTEREST OR PLEASURE IN DOING THINGS: NOT AT ALL
SUM OF ALL RESPONSES TO PHQ QUESTIONS 1-9: 0
SUM OF ALL RESPONSES TO PHQ QUESTIONS 1-9: 0
SUM OF ALL RESPONSES TO PHQ9 QUESTIONS 1 & 2: 0
2. FEELING DOWN, DEPRESSED OR HOPELESS: NOT AT ALL
SUM OF ALL RESPONSES TO PHQ QUESTIONS 1-9: 0

## 2024-10-01 ASSESSMENT — PAIN SCALES - GENERAL: PAINLEVEL_OUTOF10: 1

## 2024-10-01 ASSESSMENT — PAIN DESCRIPTION - ORIENTATION: ORIENTATION: MID;UPPER

## 2024-10-01 ASSESSMENT — PAIN DESCRIPTION - DESCRIPTORS: DESCRIPTORS: DISCOMFORT

## 2024-10-01 ASSESSMENT — PAIN DESCRIPTION - PAIN TYPE: TYPE: ACUTE PAIN

## 2024-10-01 NOTE — ED PROVIDER NOTES
to return to the emergency department if their symptoms worsen. Patient verbalized that they are agreeable and understandable to the plan. The results of pertinent diagnostic studies and exam findings were discussed with the patient. The patient's provisional diagnosis was discussed. The risks of medications administered and prescribed were discussed. Symptomatic management was discussed.  Anticipatory guidance was given. Explicit return precautions were discussed. Educational materials were provided. Plan of care was discussed. The patient was provided with written instructions and appropriate follow-up information. The patient understands their need and responsibility to obtain additional follow-up as instructed. The patient was told to follow up with their PCP in 1-2 days and return to the emergency department if symptoms persist, worsen, or become concerning. All questions were answered. The patient expressed understanding and agreement of the plan.       Procedures        CRITICAL CARE TIME   Total Critical Care time was 0 minutes, excluding separately reportable procedures.  There was a high probability of clinically significant/life threatening deterioration in the patient's condition which required my urgent intervention.       FINAL IMPRESSION      1. Other chest pain          DISPOSITION/PLAN   DISPOSITION Decision To Discharge 10/01/2024 07:14:23 PM      PATIENT REFERRED TO:  No follow-up provider specified.    DISCHARGE MEDICATIONS:  Discharge Medication List as of 10/1/2024  7:14 PM        Controlled Substances Monitoring:     DISPOSITION/PLAN   DISPOSITION Decision To Discharge 10/01/2024 07:14:23 PM  Condition at Disposition: Data Unavailable      (Please note that portions of this note were completed with a voice recognition program.  Efforts were made to edit the dictations but occasionally words are mis-transcribed.)    Livia Purdy MD (electronically signed)  Attending Emergency Physician

## 2024-10-01 NOTE — PATIENT INSTRUCTIONS

## 2024-10-01 NOTE — ED TRIAGE NOTES
A & Ox4. Skin pink warm and dry. States when the pain hits, it makes her feel like she needs to take a deep breath. Denies SOB, nausea, diaphoresis with the pain. Denies chest pain at this time. States only has mild mid back pain

## 2024-10-01 NOTE — PROGRESS NOTES
Medicare Annual Wellness Visit    Jovana Garcia is here for Medicare AWV    Assessment & Plan   Medicare annual wellness visit, subsequent    Recommendations for Preventive Services Due: see orders and patient instructions/AVS.  Recommended screening schedule for the next 5-10 years is provided to the patient in written form: see Patient Instructions/AVS.     Return in 1 year (on 10/1/2025) for Medicare AWV.     Subjective       Patient's complete Health Risk Assessment and screening values have been reviewed and are found in Flowsheets. The following problems were reviewed today and where indicated follow up appointments were made and/or referrals ordered.    No Positive Risk Factors identified today.                       Dentist Screen:  Have you seen the dentist within the past year?: (!) No    Intervention:  Advised to schedule with their dentist                Objective    Patient-Reported Vitals  No data recorded               Allergies   Allergen Reactions    Hydralazine Other (See Comments)     Ran fever and chills.  Face got real hot.    Influenza Vaccines Hives    Cortisone Other (See Comments)     Affects eyes- blurry vision    Coumadin [Warfarin Sodium]      Capillary swelling/inflammation    Levothyroxine     Pneumococcal Vaccine Hives     Pt states has an allergy to any vaccines that have egg embryo in them.    Yellow Fever Vaccine      Prior to Visit Medications    Medication Sig Taking? Authorizing Provider   carvedilol (COREG) 12.5 MG tablet Take 1 tablet by mouth 2 times daily Yes Flako Stuart MD   ARMOUR THYROID 30 MG tablet TAKE 1 & 1/2 (ONE AND ONE-HALF) TABLETS BY MOUTH DAILY Yes David Villanueva MD   oxyBUTYnin (DITROPAN-XL) 5 MG extended release tablet TAKE 1 TABLET BY MOUTH DAILY Yes David Villanueva MD   Apoaequorin (PREVAGEN PO) Take by mouth Yes ProviderMir MD   magnesium (MAGNESIUM-OXIDE) 250 MG TABS tablet Take 1 tablet by mouth daily Yes David Villanueva MD   APPLE CIDER VINEGAR

## 2024-10-02 LAB
EKG ATRIAL RATE: 74 BPM
EKG P-R INTERVAL: 274 MS
EKG Q-T INTERVAL: 366 MS
EKG QRS DURATION: 96 MS
EKG QTC CALCULATION (BAZETT): 406 MS
EKG R AXIS: -48 DEGREES
EKG T AXIS: 90 DEGREES
EKG VENTRICULAR RATE: 74 BPM

## 2024-10-17 DIAGNOSIS — E87.5 HYPERKALEMIA: ICD-10-CM

## 2024-10-17 LAB
ANION GAP SERPL CALCULATED.3IONS-SCNC: 8 MEQ/L (ref 9–15)
BUN SERPL-MCNC: 23 MG/DL (ref 8–23)
CALCIUM SERPL-MCNC: 9.5 MG/DL (ref 8.5–9.9)
CHLORIDE SERPL-SCNC: 101 MEQ/L (ref 95–107)
CO2 SERPL-SCNC: 27 MEQ/L (ref 20–31)
CREAT SERPL-MCNC: 0.83 MG/DL (ref 0.5–0.9)
GLUCOSE SERPL-MCNC: 86 MG/DL (ref 70–99)
POTASSIUM SERPL-SCNC: 4.7 MEQ/L (ref 3.4–4.9)
SODIUM SERPL-SCNC: 136 MEQ/L (ref 135–144)

## 2024-11-06 DIAGNOSIS — E87.5 HYPERKALEMIA: ICD-10-CM

## 2024-11-11 ENCOUNTER — HOSPITAL ENCOUNTER (OUTPATIENT)
Dept: ULTRASOUND IMAGING | Age: 84
Discharge: HOME | End: 2024-11-13
Attending: INTERNAL MEDICINE
Payer: MEDICARE

## 2024-11-11 DIAGNOSIS — E83.52 HYPERCALCEMIA: ICD-10-CM

## 2024-11-11 PROCEDURE — 76775 US EXAM ABDO BACK WALL LIM: CPT

## 2024-11-21 DIAGNOSIS — N39.46 MIXED INCONTINENCE: ICD-10-CM

## 2024-11-21 RX ORDER — OXYBUTYNIN CHLORIDE 5 MG/1
5 TABLET, EXTENDED RELEASE ORAL DAILY
Qty: 90 TABLET | Refills: 1 | Status: SHIPPED | OUTPATIENT
Start: 2024-11-21

## 2024-11-21 NOTE — TELEPHONE ENCOUNTER
Comments: mychart sent    Last Office Visit (last PCP visit):   6/11/2024    Next Visit Date:  Future Appointments   Date Time Provider Department Center   1/15/2025 11:15 AM Stuart, Flako, [facility]       **If hasn't been seen in over a year OR hasn't followed up according to last diabetes/ADHD visit, make appointment for patient before sending refill to provider.    Rx requested:  Requested Prescriptions     Pending Prescriptions Disp Refills    oxyBUTYnin (DITROPAN-XL) 5 MG extended release tablet [Pharmacy Med Name: oxybutynin chloride ER 5 mg tablet,extended release 24 hr] 90 tablet 1     Sig: TAKE 1 TABLET BY MOUTH DAILY

## 2024-12-18 ENCOUNTER — HOSPITAL ENCOUNTER (OUTPATIENT)
Dept: CARDIOLOGY | Age: 84
Discharge: HOME | End: 2024-12-18
Payer: MEDICARE

## 2024-12-18 DIAGNOSIS — Z95.0 PACEMAKER: Primary | ICD-10-CM

## 2024-12-18 PROCEDURE — 93296 REM INTERROG EVL PM/IDS: CPT

## 2024-12-27 DIAGNOSIS — E87.5 HYPERKALEMIA: ICD-10-CM

## 2024-12-27 LAB
ANION GAP SERPL CALCULATED.3IONS-SCNC: 11 MEQ/L (ref 9–15)
BUN SERPL-MCNC: 25 MG/DL (ref 8–23)
CALCIUM SERPL-MCNC: 9.6 MG/DL (ref 8.5–9.9)
CHLORIDE SERPL-SCNC: 98 MEQ/L (ref 95–107)
CO2 SERPL-SCNC: 29 MEQ/L (ref 20–31)
CREAT SERPL-MCNC: 0.83 MG/DL (ref 0.5–0.9)
GLUCOSE SERPL-MCNC: 83 MG/DL (ref 70–99)
POTASSIUM SERPL-SCNC: 5 MEQ/L (ref 3.4–4.9)
SODIUM SERPL-SCNC: 138 MEQ/L (ref 135–144)

## 2024-12-28 DIAGNOSIS — E03.9 HYPOTHYROIDISM, UNSPECIFIED TYPE: ICD-10-CM

## 2024-12-30 NOTE — TELEPHONE ENCOUNTER
Comments: LM to make future appt.    Last Office Visit (last PCP visit):   6/11/2024    Next Visit Date:  Future Appointments   Date Time Provider Department Center   1/15/2025 11:15 AM Stuart, Flako, MD [facility]   3/25/2025  9:00 AM [facility]       **If hasn't been seen in over a year OR hasn't followed up according to last diabetes/ADHD visit, make appointment for patient before sending refill to provider.    Rx requested:  Requested Prescriptions     Pending Prescriptions Disp Refills    ARMOUR THYROID 30 MG tablet [Pharmacy Med Name: [place] Thyroid 30 mg tablet] 135 tablet 1     Sig: TAKE 1 & 1/2 (ONE AND ONE-HALF) TABLETS BY MOUTH DAILY

## 2024-12-31 RX ORDER — THYROID 30 MG/1
TABLET ORAL
Qty: 135 TABLET | Refills: 1 | Status: SHIPPED | OUTPATIENT
Start: 2024-12-31

## 2025-01-04 ASSESSMENT — PATIENT HEALTH QUESTIONNAIRE - PHQ9
2. FEELING DOWN, DEPRESSED OR HOPELESS: NOT AT ALL
1. LITTLE INTEREST OR PLEASURE IN DOING THINGS: NOT AT ALL
SUM OF ALL RESPONSES TO PHQ QUESTIONS 1-9: 0
2. FEELING DOWN, DEPRESSED OR HOPELESS: NOT AT ALL
SUM OF ALL RESPONSES TO PHQ9 QUESTIONS 1 & 2: 0
1. LITTLE INTEREST OR PLEASURE IN DOING THINGS: NOT AT ALL
SUM OF ALL RESPONSES TO PHQ9 QUESTIONS 1 & 2: 0
SUM OF ALL RESPONSES TO PHQ QUESTIONS 1-9: 0

## 2025-01-07 ENCOUNTER — OFFICE VISIT (OUTPATIENT)
Dept: FAMILY MEDICINE CLINIC | Age: 85
End: 2025-01-07

## 2025-01-07 VITALS
BODY MASS INDEX: 24.89 KG/M2 | SYSTOLIC BLOOD PRESSURE: 120 MMHG | DIASTOLIC BLOOD PRESSURE: 74 MMHG | HEIGHT: 65 IN | HEART RATE: 85 BPM | WEIGHT: 149.4 LBS | OXYGEN SATURATION: 97 % | TEMPERATURE: 97.7 F

## 2025-01-07 DIAGNOSIS — C18.4 MALIGNANT NEOPLASM OF TRANSVERSE COLON (HCC): ICD-10-CM

## 2025-01-07 DIAGNOSIS — H35.3211 EXUDATIVE AGE-RELATED MACULAR DEGENERATION OF RIGHT EYE WITH ACTIVE CHOROIDAL NEOVASCULARIZATION (HCC): ICD-10-CM

## 2025-01-07 DIAGNOSIS — N18.30 STAGE 3 CHRONIC KIDNEY DISEASE, UNSPECIFIED WHETHER STAGE 3A OR 3B CKD (HCC): Primary | ICD-10-CM

## 2025-01-07 DIAGNOSIS — I48.91 ATRIAL FIBRILLATION, UNSPECIFIED TYPE (HCC): ICD-10-CM

## 2025-01-07 RX ORDER — HYDROCHLOROTHIAZIDE 25 MG/1
12.5 TABLET ORAL DAILY
COMMUNITY
Start: 2024-11-06

## 2025-01-07 NOTE — PROGRESS NOTES
Chief Complaint   Patient presents with    Atrial Fibrillation     Check up       HPI:  Jovana Garcia is a 84 y.o. female     Checkup    Did AWV over phone with CNP    States she has been \"well\"    Pacemaker checks    Recent check was good     Labs continue to remain stable    Now on HCTZ from Dr. Cooper          Lab Results   Component Value Date/Time     12/27/2024 10:27 AM    K 5.0 12/27/2024 10:27 AM    CL 98 12/27/2024 10:27 AM    CO2 29 12/27/2024 10:27 AM    BUN 25 12/27/2024 10:27 AM    CREATININE 0.83 12/27/2024 10:27 AM    GLUCOSE 83 12/27/2024 10:27 AM    GLUCOSE 83 03/09/2012 07:10 AM    CALCIUM 9.6 12/27/2024 10:27 AM    LABGLOM 69.7 12/27/2024 10:27 AM    LABGLOM 77.8 04/18/2024 01:45 PM            Patient Active Problem List   Diagnosis    Constipation    Fatigue    Atrial fibrillation (HCC)    Hypertension    Sleep apnea    Sick sinus syndrome (HCC)    Pacemaker    Hyperlipidemia    Hypothyroidism    Bunion of great toe of right foot    Hammer toe of right foot    History of uterine cancer    Malignant neoplasm of transverse colon (HCC)    AK (actinic keratosis)    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)    ABMD (anterior basement membrane dystrophy)    Nuclear sclerotic cataract of both eyes    Posterior vitreous detachment of left eye    Punctate keratitis    Senile cataract, unspecified    Vitreous degeneration and detachment of both eyes    Adenomatous polyp of transverse colon    Acute cystitis with hematuria    Frequency of urination    Stage 3 chronic kidney disease, unspecified whether stage 3a or 3b CKD (HCC)       Current Outpatient Medications   Medication Sig Dispense Refill    hydroCHLOROthiazide (HYDRODIURIL) 25 MG tablet Take 0.5 tablets by mouth daily      ARMOUR THYROID 30 MG tablet TAKE 1 & 1/2 (ONE AND ONE-HALF) TABLETS BY MOUTH DAILY 135 tablet 1    oxyBUTYnin (DITROPAN-XL) 5 MG extended release tablet TAKE 1 TABLET BY MOUTH DAILY 90 tablet 1    carvedilol

## 2025-01-15 ENCOUNTER — OFFICE VISIT (OUTPATIENT)
Dept: CARDIOLOGY CLINIC | Age: 85
End: 2025-01-15
Payer: MEDICARE

## 2025-01-15 VITALS
RESPIRATION RATE: 16 BRPM | DIASTOLIC BLOOD PRESSURE: 86 MMHG | OXYGEN SATURATION: 96 % | WEIGHT: 150 LBS | HEART RATE: 93 BPM | BODY MASS INDEX: 24.96 KG/M2 | SYSTOLIC BLOOD PRESSURE: 140 MMHG

## 2025-01-15 DIAGNOSIS — I73.9 CLAUDICATION (HCC): ICD-10-CM

## 2025-01-15 DIAGNOSIS — I10 PRIMARY HYPERTENSION: Primary | ICD-10-CM

## 2025-01-15 DIAGNOSIS — R09.89 BRUIT: ICD-10-CM

## 2025-01-15 PROCEDURE — G8400 PT W/DXA NO RESULTS DOC: HCPCS | Performed by: INTERNAL MEDICINE

## 2025-01-15 PROCEDURE — 1090F PRES/ABSN URINE INCON ASSESS: CPT | Performed by: INTERNAL MEDICINE

## 2025-01-15 PROCEDURE — 1123F ACP DISCUSS/DSCN MKR DOCD: CPT | Performed by: INTERNAL MEDICINE

## 2025-01-15 PROCEDURE — 3079F DIAST BP 80-89 MM HG: CPT | Performed by: INTERNAL MEDICINE

## 2025-01-15 PROCEDURE — 1159F MED LIST DOCD IN RCRD: CPT | Performed by: INTERNAL MEDICINE

## 2025-01-15 PROCEDURE — G8420 CALC BMI NORM PARAMETERS: HCPCS | Performed by: INTERNAL MEDICINE

## 2025-01-15 PROCEDURE — 1036F TOBACCO NON-USER: CPT | Performed by: INTERNAL MEDICINE

## 2025-01-15 PROCEDURE — 3077F SYST BP >= 140 MM HG: CPT | Performed by: INTERNAL MEDICINE

## 2025-01-15 PROCEDURE — 93000 ELECTROCARDIOGRAM COMPLETE: CPT | Performed by: INTERNAL MEDICINE

## 2025-01-15 PROCEDURE — G8427 DOCREV CUR MEDS BY ELIG CLIN: HCPCS | Performed by: INTERNAL MEDICINE

## 2025-01-15 PROCEDURE — 99214 OFFICE O/P EST MOD 30 MIN: CPT | Performed by: INTERNAL MEDICINE

## 2025-01-15 ASSESSMENT — ENCOUNTER SYMPTOMS
COUGH: 0
COLOR CHANGE: 0
RHINORRHEA: 0
CONSTIPATION: 0
ABDOMINAL PAIN: 0
NAUSEA: 0
DIARRHEA: 0
APNEA: 0
SHORTNESS OF BREATH: 0
VOMITING: 0
WHEEZING: 0
EYE REDNESS: 0
CHEST TIGHTNESS: 0

## 2025-01-15 NOTE — PROGRESS NOTES
Chief Complaint   Patient presents with    3 Month Follow-Up       Patient presents for initial medical evaluation. Patient is followed on a regular basis by David Lazcano MD.     Atrial fibrillation maintaining sinus rhythm not on anticoagulation by patient's preference  Sick sinus syndrome status post pacemaker placement implantation Medtronic MRI compatible February 2008  Heart failure with preserved ejection fraction EF 65% by TTE 6/2024  Mild CAD by coronary angiogram June 2017  Hypertension  Hyperlipidemia  TIA    Cardiac studies:  TTE 6/4/2024 at  EF 65% no major valvular disease  Nuclear stress test 6/4/2024 EF 74% no ischemia  ===============  1/15/2025  Follow-up on atrial fibrillation  Patient denies chest pain or shortness of breath  No palpitations  Blood pressure today 140/86    9/25/2024  Follow-up on atrial fibrillation  Patient reports feeling well denies chest pain or shortness of breath  No palpitations  Patient reports that about 2 months ago she fell at home  Denies LOC  As a consequence patient has been dealing with left shoulder pain  Patient brought a blood pressure log where blood pressures are ranging between 130s to 140s  Blood pressure today 138/90  Pacemaker interrogation 8/2024 6 hours of atrial fibrillation, 3 years of battery life    7/24/2024  First clinic visit comes to our clinic by choice to follow-up on cardiac comorbidities  Patient used to follow-up with Dr. Aguayo  EKG today sinus rhythm without signs of ischemia or major arrhythmias  Patient feels well, reports that she feels tired very much all the time  Denies chest pain or shortness of breath        Patient Active Problem List   Diagnosis    Constipation    Fatigue    Atrial fibrillation (HCC)    Hypertension    Sleep apnea    Sick sinus syndrome (HCC)    Pacemaker    Hyperlipidemia    Hypothyroidism    Bunion of great toe of right foot    Hammer toe of right foot    History of uterine cancer    Malignant

## 2025-01-30 ENCOUNTER — HOSPITAL ENCOUNTER (OUTPATIENT)
Dept: ULTRASOUND IMAGING | Age: 85
Discharge: HOME | End: 2025-02-01
Attending: INTERNAL MEDICINE
Payer: MEDICARE

## 2025-01-30 DIAGNOSIS — R09.89 BRUIT: ICD-10-CM

## 2025-01-30 DIAGNOSIS — I73.9 CLAUDICATION (HCC): ICD-10-CM

## 2025-01-30 LAB
VAS LEFT CCA DIST EDV: 20.5 CM/S
VAS LEFT CCA DIST PSV: 103 CM/S
VAS LEFT CCA MID EDV: 17.4 CM/S
VAS LEFT CCA MID PSV: 90.7 CM/S
VAS LEFT CCA PROX EDV: 13 CM/S
VAS LEFT CCA PROX PSV: 99.4 CM/S
VAS LEFT ECA EDV: 9.32 CM/S
VAS LEFT ECA PSV: 95.7 CM/S
VAS LEFT ICA DIST EDV: 16.5 CM/S
VAS LEFT ICA DIST PSV: 76.8 CM/S
VAS LEFT ICA MID EDV: 25.9 CM/S
VAS LEFT ICA MID PSV: 109 CM/S
VAS LEFT ICA PROX EDV: 20.3 CM/S
VAS LEFT ICA PROX PSV: 133 CM/S
VAS LEFT ICA/CCA PSV: 1.47
VAS LEFT VERTEBRAL EDV: 24.2 CM/S
VAS LEFT VERTEBRAL PSV: 77.7 CM/S
VAS RIGHT CCA DIST EDV: 20.5 CM/S
VAS RIGHT CCA DIST PSV: 101 CM/S
VAS RIGHT CCA MID EDV: 16.2 CM/S
VAS RIGHT CCA MID PSV: 98.8 CM/S
VAS RIGHT CCA PROX EDV: 13 CM/S
VAS RIGHT CCA PROX PSV: 88.8 CM/S
VAS RIGHT ECA EDV: 11.2 CM/S
VAS RIGHT ECA PSV: 104 CM/S
VAS RIGHT ICA DIST EDV: 17.8 CM/S
VAS RIGHT ICA DIST PSV: 75.8 CM/S
VAS RIGHT ICA MID EDV: 21.7 CM/S
VAS RIGHT ICA MID PSV: 84.1 CM/S
VAS RIGHT ICA PROX EDV: 19.9 CM/S
VAS RIGHT ICA PROX PSV: 95.1 CM/S
VAS RIGHT ICA/CCA PSV: 0.96

## 2025-01-30 PROCEDURE — 93880 EXTRACRANIAL BILAT STUDY: CPT

## 2025-01-30 PROCEDURE — 93924 LWR XTR VASC STDY BILAT: CPT

## 2025-02-11 DIAGNOSIS — E87.5 HYPERKALEMIA: ICD-10-CM

## 2025-03-07 DIAGNOSIS — E87.5 HYPERKALEMIA: ICD-10-CM

## 2025-03-07 LAB
ANION GAP SERPL CALCULATED.3IONS-SCNC: 11 MEQ/L (ref 9–15)
BUN SERPL-MCNC: 31 MG/DL (ref 8–23)
CALCIUM SERPL-MCNC: 9.9 MG/DL (ref 8.5–9.9)
CHLORIDE SERPL-SCNC: 102 MEQ/L (ref 95–107)
CO2 SERPL-SCNC: 29 MEQ/L (ref 20–31)
CREAT SERPL-MCNC: 0.92 MG/DL (ref 0.5–0.9)
GLUCOSE SERPL-MCNC: 85 MG/DL (ref 70–99)
POTASSIUM SERPL-SCNC: 4.8 MEQ/L (ref 3.4–4.9)
SODIUM SERPL-SCNC: 142 MEQ/L (ref 135–144)

## 2025-03-10 ENCOUNTER — RESULTS FOLLOW-UP (OUTPATIENT)
Dept: FAMILY MEDICINE CLINIC | Age: 85
End: 2025-03-10

## 2025-03-25 ENCOUNTER — HOSPITAL ENCOUNTER (OUTPATIENT)
Dept: CARDIOLOGY | Age: 85
Discharge: HOME | End: 2025-03-25
Payer: MEDICARE

## 2025-03-25 PROCEDURE — 93280 PM DEVICE PROGR EVAL DUAL: CPT

## 2025-04-16 ENCOUNTER — OFFICE VISIT (OUTPATIENT)
Age: 85
End: 2025-04-16
Payer: MEDICARE

## 2025-04-16 VITALS
HEART RATE: 85 BPM | WEIGHT: 145 LBS | OXYGEN SATURATION: 96 % | DIASTOLIC BLOOD PRESSURE: 68 MMHG | SYSTOLIC BLOOD PRESSURE: 128 MMHG | RESPIRATION RATE: 16 BRPM | BODY MASS INDEX: 24.13 KG/M2

## 2025-04-16 DIAGNOSIS — I10 PRIMARY HYPERTENSION: Primary | ICD-10-CM

## 2025-04-16 PROCEDURE — 1090F PRES/ABSN URINE INCON ASSESS: CPT | Performed by: INTERNAL MEDICINE

## 2025-04-16 PROCEDURE — 3074F SYST BP LT 130 MM HG: CPT | Performed by: INTERNAL MEDICINE

## 2025-04-16 PROCEDURE — 1159F MED LIST DOCD IN RCRD: CPT | Performed by: INTERNAL MEDICINE

## 2025-04-16 PROCEDURE — G8420 CALC BMI NORM PARAMETERS: HCPCS | Performed by: INTERNAL MEDICINE

## 2025-04-16 PROCEDURE — 1123F ACP DISCUSS/DSCN MKR DOCD: CPT | Performed by: INTERNAL MEDICINE

## 2025-04-16 PROCEDURE — G8427 DOCREV CUR MEDS BY ELIG CLIN: HCPCS | Performed by: INTERNAL MEDICINE

## 2025-04-16 PROCEDURE — 3078F DIAST BP <80 MM HG: CPT | Performed by: INTERNAL MEDICINE

## 2025-04-16 PROCEDURE — G8400 PT W/DXA NO RESULTS DOC: HCPCS | Performed by: INTERNAL MEDICINE

## 2025-04-16 PROCEDURE — 99213 OFFICE O/P EST LOW 20 MIN: CPT | Performed by: INTERNAL MEDICINE

## 2025-04-16 PROCEDURE — 99214 OFFICE O/P EST MOD 30 MIN: CPT | Performed by: INTERNAL MEDICINE

## 2025-04-16 PROCEDURE — 1036F TOBACCO NON-USER: CPT | Performed by: INTERNAL MEDICINE

## 2025-04-16 ASSESSMENT — ENCOUNTER SYMPTOMS
DIARRHEA: 0
VOMITING: 0
ABDOMINAL PAIN: 0
SHORTNESS OF BREATH: 0
EYE REDNESS: 0
APNEA: 0
CHEST TIGHTNESS: 0
COLOR CHANGE: 0
NAUSEA: 0
COUGH: 0
WHEEZING: 0
RHINORRHEA: 0
CONSTIPATION: 0

## 2025-04-16 NOTE — PROGRESS NOTES
difficulty urinating and dysuria.   Musculoskeletal: Negative.  Negative for joint swelling.   Skin:  Negative for color change, rash and wound.   Neurological:  Negative for dizziness, syncope, weakness, numbness and headaches.   Psychiatric/Behavioral: Negative.          VITALS:  Blood pressure 128/68, pulse 85, resp. rate 16, weight 65.8 kg (145 lb), last menstrual period 01/01/1996, SpO2 96%, not currently breastfeeding.  Body mass index is 24.13 kg/m².    Physical Examination:  Physical Exam  Vitals and nursing note reviewed.   Constitutional:       Appearance: Normal appearance.   HENT:      Head: Normocephalic and atraumatic.      Mouth/Throat:      Mouth: Mucous membranes are moist.      Pharynx: Oropharynx is clear.   Eyes:      Extraocular Movements: Extraocular movements intact.      Conjunctiva/sclera: Conjunctivae normal.      Pupils: Pupils are equal, round, and reactive to light.   Cardiovascular:      Rate and Rhythm: Normal rate and regular rhythm.      Pulses: Normal pulses.      Heart sounds: Normal heart sounds.   Pulmonary:      Effort: Pulmonary effort is normal.      Breath sounds: Normal breath sounds.   Abdominal:      General: Abdomen is flat. Bowel sounds are normal.      Palpations: Abdomen is soft.   Musculoskeletal:         General: Normal range of motion.      Cervical back: Normal range of motion and neck supple.   Skin:     General: Skin is warm.   Neurological:      General: No focal deficit present.      Mental Status: She is alert and oriented to person, place, and time. Mental status is at baseline.   Psychiatric:         Mood and Affect: Mood normal.        EKG: normal sinus rhythm    No orders of the defined types were placed in this encounter.    The ASCVD Risk score (Wicho DK, et al., 2019) failed to calculate for the following reasons:    The 2019 ASCVD risk score is only valid for ages 40 to 79     ASSESSMENT:     Diagnosis Orders   1. Primary hypertension            PLAN:

## 2025-04-18 ENCOUNTER — RESULTS FOLLOW-UP (OUTPATIENT)
Dept: FAMILY MEDICINE CLINIC | Age: 85
End: 2025-04-18

## 2025-04-18 DIAGNOSIS — E87.5 HYPERKALEMIA: ICD-10-CM

## 2025-04-18 LAB
ANION GAP SERPL CALCULATED.3IONS-SCNC: 9 MEQ/L (ref 9–15)
BUN SERPL-MCNC: 29 MG/DL (ref 8–23)
CALCIUM SERPL-MCNC: 9.7 MG/DL (ref 8.5–9.9)
CHLORIDE SERPL-SCNC: 98 MEQ/L (ref 95–107)
CO2 SERPL-SCNC: 29 MEQ/L (ref 20–31)
CREAT SERPL-MCNC: 0.82 MG/DL (ref 0.5–0.9)
GLUCOSE SERPL-MCNC: 74 MG/DL (ref 70–99)
POTASSIUM SERPL-SCNC: 4.3 MEQ/L (ref 3.4–4.9)
SODIUM SERPL-SCNC: 136 MEQ/L (ref 135–144)

## 2025-05-30 NOTE — PROGRESS NOTES
CARDIOLOGY OFFICE NOTE     Date:   4/2/2024    Patient:    Sharon Nova    YOB: 1940    Primary Physician: Yuri Goodson MD       Reason for Visit: 4-month follow-up.    HPI:     Sharon Nova was seen in cardiac evaluation at the  Cardiology office April 2, 2024.      The patients problems are listed as in the impression below.    Electronic medical records reviewed.    Sharon returns.  She feels well.  She is asymptomatic.    She noticed that her potassium was high, initially 6.  She reduced her potassium intake.  She actually talked to an online cardiologist who suggested that she may want to come off of her Betapace.  She is wanting to do so.  I spoke to her about pros and cons.  She wishes to come off if possible.    She has remained in sinus rhythm for quite some time atrial paced rhythm.  Therefore we will attempt to change from Betapace to Toprol.  Risk of recurrent atrial fibrillation was discussed.    Patient denies Chest Pain, SOB, Lightheadedness, Dizziness, TIA or CVA symptoms.  No CHF or Edema.  No Palpitations.  No GI,  or Bleeding Issues. No Recent Fever or Chills.     Cardiovascular and general review of systems is otherwise negative.    A 14-system review is otherwise negative, other than noted.     PHYSICAL EXAMINATION:      Vitals:    04/02/24 0825   BP: 134/82   Pulse: 82     General: No acute distress. Vital signs as noted below. Alert  and oriented. Head and neck examination: No jugular venous distention, no  carotid bruits, no mass. Carotid upstrokes preserved. Oral mucosa moist. No  xanthelasma. Head and neck examination otherwise unremarkable. Lungs: Clear to  auscultation and percussion. No wheezes, no rales, and no rhonchi. Chest  excursion appeared to be normal. No chest wall tenderness on palpation.   Pacemaker left chest with well healed incision. Heart: Normal S1 and S2. No  S3. No S4. No rub. Grade 1/6 systolic murmur, best heard left sternal border.  Point of  maximal impulse was decreased. Abdomen was soft, obese. Nontender.   No organomegaly. No bruits. No masses. Extremities: No edema. No clubbing.   No cyanosis. Pulses are strong throughout. No bruits. Musculoskeletal exam:   No ulcers, otherwise unremarkable. Neurologically appeared grossly intact.   .  IMPRESSION:       Cardiovascular status stable  Palpitations, rare  Atrial fibrillation, maintained in sinus rhythm without recurrence.  First-degree AV block  Sick sinus syndrome, Status post pacemaker implantation. Medtronic and POPRAGEOUSa MRI, February 2008.   No anticoagulation therapy, by patient preference.   Normal left ventricular systolic function, LVEF 50%  Negative coronary cineangiography June 2017.  Negative Lexiscan myocardial perfusion stress test, ejection fraction of 64%, July 2014.  Hypertension.  Hyperlipidemia.  History of transient ischemic attack history.  Obesity.  Sleep apnea, noncompliant with CPAP.  COVID-19 infection, January 22.   Elevated liver function studies.  Macular degeneration.  Otherwise as per assessment below.         RECOMMENDATIONS:      At her request we will discontinue Betapace start Toprol-XL 50 mg half tablet twice daily initially and then increase to 50 daily once as tolerated.  She will continue her other medications.  Refills were provided.    Exercise dietary program was encouraged.    Hydration.    She again is not on long-term regulation again at her request.    [employer] portal use was encouraged.    We will plan to see back in 2 weeks with Laboratory Studies and ECG as ordered.     Patient will follow up with their primary physician for general care.    The patient knows to contact medical care earlier if need be.      ALLERGIES:     Allergies   Allergen Reactions    Levothyroxine Cardiac arrhythmia/arrest and Shortness of breath    Fludrocortisone Swelling    Hydralazine Unknown    Influenza Virus Vaccine Whole Unknown    Warfarin Unknown        MEDICATIONS:     Current  Detail Level: Generalized Outpatient Medications   Medication Instructions    amLODIPine (NORVASC) 2.5 mg, oral, 2 times daily    aspirin 81 mg EC tablet TAKE 1 TABLET PO DAILY.    cholecalciferol (Vitamin D-3) 5,000 Units tablet 1 tablet, oral, Daily    cyanocobalamin (Vitamin B-12) 1,000 mcg tablet 1 tablet, oral, Daily    glucosamine-chondroit-vit C-Mn (Glucosamine Chondroitin MaxStr) 500-400 mg capsule 1 capsule, oral, 2 times daily    magnesium oxide 500 mg tablet TAKE 1 TABLET PO DAILY.    oxybutynin XL (Ditropan-XL) 5 mg 24 hr tablet 1 tablet, oral, Daily before breakfast    sotalol (BETAPACE) 40 mg, oral, Every 12 hours    thyroid, pork, ([place] Thyroid) 30 mg tablet TAKE 1 1/2 tablet daily       ELECTROCARDIOGRAM:      AV paced rhythm.  Rate 80.    CARDIAC TESTING:      None    LABORATORY DATA:      Chem-7, CBC normal except for potassium 5.4              PROBLEM LIST:     Patient Active Problem List   Diagnosis    Elevated liver function tests    First-degree atrioventricular block    History of cardiac pacemaker    HTN (hypertension)    Hyperkalemia    Hyperlipidemia    Paroxysmal atrial fibrillation (CMS/HCC)    Sleep apnea             Jim Beal MD, [facility] / [facility] /  Cardiology      Of Note:  [employer] voice recognition dictation software was utilized partially in the preparation of this note, therefore, inaccuracies in spelling, word choice and punctuation may have occurred which were not recognized the time of signing.    Patient was seen and examined with total time of visit including chart preparation, rooming, and chart completion exceeding 40 minutes.      ----   Detail Level: Zone

## 2025-06-02 DIAGNOSIS — I10 PRIMARY HYPERTENSION: Primary | ICD-10-CM

## 2025-06-02 RX ORDER — LANOLIN ALCOHOL/MO/W.PET/CERES
400 CREAM (GRAM) TOPICAL 2 TIMES DAILY
COMMUNITY
Start: 2025-03-02 | End: 2025-06-02 | Stop reason: SDUPTHER

## 2025-06-02 RX ORDER — LANOLIN ALCOHOL/MO/W.PET/CERES
400 CREAM (GRAM) TOPICAL DAILY
Qty: 90 TABLET | Refills: 3 | Status: SHIPPED | OUTPATIENT
Start: 2025-06-02

## 2025-06-02 NOTE — TELEPHONE ENCOUNTER
Patient calling in for a refill.  [email or web address]  Last refill is from Dr. Ji    Her medication list reflects Magnesium 250mg   1 tablet daily and   Magnesium 400mg 1 tablet twice daily.    Confirmed she takes Magnesium Oxide 400mg   1 tablet daily.        Updated Med list and formatted for approval          Requesting medication refill. Please approve or deny this request.    Rx requested:  Requested Prescriptions     Pending Prescriptions Disp Refills    magnesium oxide (MAG-OX) 400 MG tablet 30 tablet 1     Sig: Take 1 tablet by mouth daily         Last Office Visit:   4/16/2025      Next Visit Date:  Future Appointments   Date Time Provider Department Center   10/15/2025 11:15 AM Stuart, Flako, MD [facility]   1/13/2026  9:00 AM [facility]               Last refill . Please approve or deny.

## 2025-06-09 DIAGNOSIS — N39.46 MIXED INCONTINENCE: ICD-10-CM

## 2025-06-09 RX ORDER — OXYBUTYNIN CHLORIDE 5 MG/1
5 TABLET, EXTENDED RELEASE ORAL DAILY
Qty: 90 TABLET | Refills: 1 | Status: SHIPPED | OUTPATIENT
Start: 2025-06-09

## 2025-06-25 DIAGNOSIS — E87.5 HYPERKALEMIA: ICD-10-CM

## 2025-06-25 LAB
ANION GAP SERPL CALCULATED.3IONS-SCNC: 10 MEQ/L (ref 9–15)
BUN SERPL-MCNC: 29 MG/DL (ref 8–23)
CALCIUM SERPL-MCNC: 9.7 MG/DL (ref 8.5–9.9)
CHLORIDE SERPL-SCNC: 101 MEQ/L (ref 95–107)
CO2 SERPL-SCNC: 28 MEQ/L (ref 20–31)
CREAT SERPL-MCNC: 0.92 MG/DL (ref 0.5–0.9)
GLUCOSE SERPL-MCNC: 76 MG/DL (ref 70–99)
POTASSIUM SERPL-SCNC: 4.7 MEQ/L (ref 3.4–4.9)
SODIUM SERPL-SCNC: 139 MEQ/L (ref 135–144)

## 2025-06-26 ENCOUNTER — RESULTS FOLLOW-UP (OUTPATIENT)
Dept: FAMILY MEDICINE CLINIC | Age: 85
End: 2025-06-26

## 2025-07-01 ENCOUNTER — HOSPITAL ENCOUNTER (OUTPATIENT)
Dept: CARDIOLOGY | Age: 85
Discharge: HOME | End: 2025-07-01
Payer: MEDICARE

## 2025-07-01 DIAGNOSIS — Z95.0 PACEMAKER: Primary | ICD-10-CM

## 2025-07-01 PROCEDURE — 93296 REM INTERROG EVL PM/IDS: CPT

## 2025-07-17 DIAGNOSIS — E03.9 HYPOTHYROIDISM, UNSPECIFIED TYPE: ICD-10-CM

## 2025-07-17 RX ORDER — THYROID 30 MG/1
TABLET ORAL
Qty: 135 TABLET | Refills: 1 | Status: SHIPPED | OUTPATIENT
Start: 2025-07-17

## 2025-07-17 NOTE — TELEPHONE ENCOUNTER
Comments: mychart sent    Last Office Visit (last PCP visit):   1/7/2025    Next Visit Date:  Future Appointments   Date Time Provider Department Center   10/15/2025 11:15 AM Stuart, Flako, MD [facility]   1/13/2026  9:00 AM [facility]       **If hasn't been seen in over a year OR hasn't followed up according to last diabetes/ADHD visit, make appointment for patient before sending refill to provider.    Rx requested:  Requested Prescriptions     Pending Prescriptions Disp Refills    ARMOUR THYROID 30 MG tablet [Pharmacy Med Name: [facility] Thyroid 30 mg tablet] 135 tablet 1     Sig: TAKE 1 & 1/2 (ONE AND ONE-HALF) TABLETS BY MOUTH DAILY

## 2025-07-21 ENCOUNTER — TELEPHONE (OUTPATIENT)
Dept: GASTROENTEROLOGY | Age: 85
End: 2025-07-21

## 2025-07-21 NOTE — TELEPHONE ENCOUNTER
The patient would like to know if she is due for another colonoscopy, she thought due to her age she did not have to have another procedure. Please advise.

## 2025-07-26 SDOH — ECONOMIC STABILITY: FOOD INSECURITY: WITHIN THE PAST 12 MONTHS, THE FOOD YOU BOUGHT JUST DIDN'T LAST AND YOU DIDN'T HAVE MONEY TO GET MORE.: NEVER TRUE

## 2025-07-26 SDOH — ECONOMIC STABILITY: FOOD INSECURITY: WITHIN THE PAST 12 MONTHS, YOU WORRIED THAT YOUR FOOD WOULD RUN OUT BEFORE YOU GOT MONEY TO BUY MORE.: NEVER TRUE

## 2025-07-26 SDOH — ECONOMIC STABILITY: INCOME INSECURITY: IN THE LAST 12 MONTHS, WAS THERE A TIME WHEN YOU WERE NOT ABLE TO PAY THE MORTGAGE OR RENT ON TIME?: NO

## 2025-07-28 DIAGNOSIS — E87.5 HYPERKALEMIA: ICD-10-CM

## 2025-07-28 LAB
ANION GAP SERPL CALCULATED.3IONS-SCNC: 11 MEQ/L (ref 9–15)
BUN SERPL-MCNC: 28 MG/DL (ref 8–23)
CALCIUM SERPL-MCNC: 10 MG/DL (ref 8.5–9.9)
CHLORIDE SERPL-SCNC: 95 MEQ/L (ref 95–107)
CO2 SERPL-SCNC: 27 MEQ/L (ref 20–31)
CREAT SERPL-MCNC: 0.95 MG/DL (ref 0.5–0.9)
GLUCOSE SERPL-MCNC: 88 MG/DL (ref 70–99)
POTASSIUM SERPL-SCNC: 4.9 MEQ/L (ref 3.4–4.9)
SODIUM SERPL-SCNC: 133 MEQ/L (ref 135–144)

## 2025-07-29 ENCOUNTER — OFFICE VISIT (OUTPATIENT)
Dept: FAMILY MEDICINE CLINIC | Age: 85
End: 2025-07-29

## 2025-07-29 VITALS
HEIGHT: 65 IN | DIASTOLIC BLOOD PRESSURE: 72 MMHG | SYSTOLIC BLOOD PRESSURE: 124 MMHG | OXYGEN SATURATION: 96 % | WEIGHT: 146.2 LBS | TEMPERATURE: 98.1 F | HEART RATE: 74 BPM | BODY MASS INDEX: 24.36 KG/M2

## 2025-07-29 DIAGNOSIS — H35.3211 EXUDATIVE AGE-RELATED MACULAR DEGENERATION OF RIGHT EYE WITH ACTIVE CHOROIDAL NEOVASCULARIZATION (HCC): ICD-10-CM

## 2025-07-29 DIAGNOSIS — N39.46 MIXED INCONTINENCE: Primary | ICD-10-CM

## 2025-07-29 DIAGNOSIS — E03.9 HYPOTHYROIDISM, UNSPECIFIED TYPE: ICD-10-CM

## 2025-07-29 DIAGNOSIS — E16.2 HYPOGLYCEMIA: ICD-10-CM

## 2025-07-29 DIAGNOSIS — N18.30 STAGE 3 CHRONIC KIDNEY DISEASE, UNSPECIFIED WHETHER STAGE 3A OR 3B CKD (HCC): ICD-10-CM

## 2025-07-29 DIAGNOSIS — I48.91 ATRIAL FIBRILLATION, UNSPECIFIED TYPE (HCC): ICD-10-CM

## 2025-07-29 DIAGNOSIS — E87.5 HYPERKALEMIA: ICD-10-CM

## 2025-07-29 NOTE — PROGRESS NOTES
Chief Complaint   Patient presents with    Atrial Fibrillation     6 moth    Health Maintenance     Colonoscopy not needed        HPI:  Jovana Garcia is a 85 y.o. female     Checkup    States she has been \"well\"    Pacemaker checks    Recent check was good     Labs continue to remain stable    Taking some prevagen, which is helpful     Balance off a little, knee will buckle    Incontinence/gas        Lab Results   Component Value Date/Time     07/28/2025 08:00 AM    K 4.9 07/28/2025 08:00 AM    CL 95 07/28/2025 08:00 AM    CO2 27 07/28/2025 08:00 AM    BUN 28 07/28/2025 08:00 AM    CREATININE 0.95 07/28/2025 08:00 AM    GLUCOSE 88 07/28/2025 08:00 AM    GLUCOSE 83 03/09/2012 07:10 AM    CALCIUM 10.0 07/28/2025 08:00 AM    LABGLOM 59.0 07/28/2025 08:00 AM    LABGLOM 77.8 04/18/2024 01:45 PM            Patient Active Problem List   Diagnosis    Constipation    Fatigue    Atrial fibrillation (HCC)    Hypertension    Sleep apnea    Sick sinus syndrome (HCC)    Pacemaker    Hyperlipidemia    Hypothyroidism    Bunion of great toe of right foot    Hammer toe of right foot    History of uterine cancer    Malignant neoplasm of transverse colon (HCC)    AK (actinic keratosis)    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)    ABMD (anterior basement membrane dystrophy)    Nuclear sclerotic cataract of both eyes    Posterior vitreous detachment of left eye    Punctate keratitis    Senile cataract, unspecified    Vitreous degeneration and detachment of both eyes    Adenomatous polyp of transverse colon    Acute cystitis with hematuria    Frequency of urination    Stage 3 chronic kidney disease, unspecified whether stage 3a or 3b CKD (HCC)       Current Outpatient Medications   Medication Sig Dispense Refill    ARMOUR THYROID 30 MG tablet TAKE 1 & 1/2 (ONE AND ONE-HALF) TABLETS BY MOUTH DAILY 135 tablet 1    oxyBUTYnin (DITROPAN-XL) 5 MG extended release tablet TAKE 1 TABLET BY MOUTH DAILY 90 tablet 1    magnesium

## 2025-08-21 DIAGNOSIS — E87.5 HYPERKALEMIA: ICD-10-CM

## 2025-08-21 LAB
ANION GAP SERPL CALCULATED.3IONS-SCNC: 8 MEQ/L (ref 9–15)
BUN SERPL-MCNC: 26 MG/DL (ref 8–23)
CALCIUM SERPL-MCNC: 10.1 MG/DL (ref 8.5–9.9)
CHLORIDE SERPL-SCNC: 98 MEQ/L (ref 95–107)
CO2 SERPL-SCNC: 29 MEQ/L (ref 20–31)
CREAT SERPL-MCNC: 0.84 MG/DL (ref 0.5–0.9)
GLUCOSE SERPL-MCNC: 75 MG/DL (ref 70–99)
POTASSIUM SERPL-SCNC: 5.3 MEQ/L (ref 3.4–4.9)
SODIUM SERPL-SCNC: 135 MEQ/L (ref 135–144)

## 2025-09-02 ENCOUNTER — APPOINTMENT (OUTPATIENT)
Dept: CT IMAGING | Age: 85
End: 2025-09-02
Attending: EMERGENCY MEDICINE
Payer: MEDICARE

## 2025-09-02 ENCOUNTER — APPOINTMENT (OUTPATIENT)
Dept: GENERAL RADIOLOGY | Age: 85
End: 2025-09-02
Payer: MEDICARE

## 2025-09-02 ENCOUNTER — HOSPITAL ENCOUNTER (EMERGENCY)
Age: 85
Discharge: HOME | End: 2025-09-02
Attending: EMERGENCY MEDICINE
Payer: MEDICARE

## 2025-09-02 VITALS
WEIGHT: 146 LBS | HEART RATE: 64 BPM | BODY MASS INDEX: 24.32 KG/M2 | TEMPERATURE: 97 F | HEIGHT: 65 IN | OXYGEN SATURATION: 100 % | DIASTOLIC BLOOD PRESSURE: 75 MMHG | SYSTOLIC BLOOD PRESSURE: 167 MMHG | RESPIRATION RATE: 17 BRPM

## 2025-09-02 DIAGNOSIS — G54.2 CERVICAL NEUROPATHY: ICD-10-CM

## 2025-09-02 DIAGNOSIS — R53.83 FATIGUE, UNSPECIFIED TYPE: Primary | ICD-10-CM

## 2025-09-02 DIAGNOSIS — R53.1 GENERAL WEAKNESS: ICD-10-CM

## 2025-09-02 LAB
ALBUMIN SERPL-MCNC: 4.3 G/DL (ref 3.5–4.6)
ALP SERPL-CCNC: 65 U/L (ref 40–130)
ALT SERPL-CCNC: 17 U/L (ref 0–33)
ANION GAP SERPL CALCULATED.3IONS-SCNC: 12 MEQ/L (ref 9–15)
AST SERPL-CCNC: 22 U/L (ref 0–35)
BACTERIA URNS QL MICRO: NEGATIVE /HPF
BASOPHILS # BLD: 0 K/UL (ref 0–0.2)
BASOPHILS NFR BLD: 0.4 %
BILIRUB SERPL-MCNC: 0.4 MG/DL (ref 0.2–0.7)
BILIRUB UR QL STRIP: NEGATIVE
BUN SERPL-MCNC: 22 MG/DL (ref 8–23)
CALCIUM SERPL-MCNC: 9.7 MG/DL (ref 8.5–9.9)
CHLORIDE SERPL-SCNC: 98 MEQ/L (ref 95–107)
CLARITY UR: CLEAR
CO2 SERPL-SCNC: 26 MEQ/L (ref 20–31)
COLOR UR: YELLOW
CREAT SERPL-MCNC: 0.76 MG/DL (ref 0.5–0.9)
CRP SERPL HS-MCNC: <3 MG/L (ref 0–5)
EKG ATRIAL RATE: 64 BPM
EKG DIAGNOSIS: NORMAL
EKG P-R INTERVAL: 326 MS
EKG Q-T INTERVAL: 424 MS
EKG QRS DURATION: 114 MS
EKG QTC CALCULATION (BAZETT): 437 MS
EKG R AXIS: -58 DEGREES
EKG T AXIS: 56 DEGREES
EKG VENTRICULAR RATE: 64 BPM
EOSINOPHIL # BLD: 0.1 K/UL (ref 0–0.7)
EOSINOPHIL NFR BLD: 1.6 %
EPI CELLS #/AREA URNS AUTO: NORMAL /HPF (ref 0–5)
ERYTHROCYTE [DISTWIDTH] IN BLOOD BY AUTOMATED COUNT: 13.9 % (ref 11.5–14.5)
ERYTHROCYTE [SEDIMENTATION RATE] IN BLOOD BY WESTERGREN METHOD: 6 MM (ref 0–30)
GLOBULIN SER CALC-MCNC: 2.5 G/DL (ref 2.3–3.5)
GLUCOSE SERPL-MCNC: 98 MG/DL (ref 70–99)
GLUCOSE UR STRIP-MCNC: NEGATIVE MG/DL
HCT VFR BLD AUTO: 43.7 % (ref 37–47)
HGB BLD-MCNC: 14.3 G/DL (ref 12–16)
HGB UR QL STRIP: NEGATIVE
HYALINE CASTS #/AREA URNS AUTO: NORMAL /HPF (ref 0–5)
KETONES UR STRIP-MCNC: NEGATIVE MG/DL
LEUKOCYTE ESTERASE UR QL STRIP: ABNORMAL
LIPASE SERPL-CCNC: 46 U/L (ref 12–95)
LYMPHOCYTES # BLD: 0.8 K/UL (ref 1–4.8)
LYMPHOCYTES NFR BLD: 17.3 %
MAGNESIUM SERPL-MCNC: 2.3 MG/DL (ref 1.7–2.4)
MCH RBC QN AUTO: 29.3 PG (ref 27–31.3)
MCHC RBC AUTO-ENTMCNC: 32.7 % (ref 33–37)
MCV RBC AUTO: 89.5 FL (ref 79.4–94.8)
MONOCYTES # BLD: 0.4 K/UL (ref 0.2–0.8)
MONOCYTES NFR BLD: 8.8 %
NEUTROPHILS # BLD: 3.2 K/UL (ref 1.4–6.5)
NEUTS SEG NFR BLD: 71.7 %
NITRITE UR QL STRIP: NEGATIVE
PH UR STRIP: 6.5 [PH] (ref 5–9)
PHOSPHATE SERPL-MCNC: 2.8 MG/DL (ref 2.3–4.8)
PLATELET # BLD AUTO: 217 K/UL (ref 130–400)
POTASSIUM SERPL-SCNC: 4.2 MEQ/L (ref 3.4–4.9)
PROT SERPL-MCNC: 6.8 G/DL (ref 6.3–8)
PROT UR STRIP-MCNC: NEGATIVE MG/DL
RBC # BLD AUTO: 4.88 M/UL (ref 4.2–5.4)
RBC #/AREA URNS AUTO: NORMAL /HPF (ref 0–5)
SODIUM SERPL-SCNC: 136 MEQ/L (ref 135–144)
SP GR UR STRIP: 1.01 (ref 1–1.03)
T4 FREE SERPL-MCNC: 1.16 NG/DL (ref 0.84–1.68)
TROPONIN, HIGH SENSITIVITY: 12 NG/L (ref 0–19)
TROPONIN, HIGH SENSITIVITY: 14 NG/L (ref 0–19)
TSH SERPL-MCNC: 1.91 UIU/ML (ref 0.44–3.86)
URINE REFLEX TO CULTURE: ABNORMAL
UROBILINOGEN UR STRIP-ACNC: 0.2 E.U./DL
WBC # BLD AUTO: 4.5 K/UL (ref 4.8–10.8)
WBC #/AREA URNS AUTO: NORMAL /HPF (ref 0–5)

## 2025-09-02 PROCEDURE — 84484 ASSAY OF TROPONIN QUANT: CPT

## 2025-09-02 PROCEDURE — 81001 URINALYSIS AUTO W/SCOPE: CPT

## 2025-09-02 PROCEDURE — 85025 COMPLETE CBC W/AUTO DIFF WBC: CPT

## 2025-09-02 PROCEDURE — 99285 EMERGENCY DEPT VISIT HI MDM: CPT

## 2025-09-02 PROCEDURE — 70450 CT HEAD/BRAIN W/O DYE: CPT

## 2025-09-02 PROCEDURE — 85652 RBC SED RATE AUTOMATED: CPT

## 2025-09-02 PROCEDURE — 2580000003 HC RX 258: Performed by: EMERGENCY MEDICINE

## 2025-09-02 PROCEDURE — 84439 ASSAY OF FREE THYROXINE: CPT

## 2025-09-02 PROCEDURE — 93005 ELECTROCARDIOGRAM TRACING: CPT | Performed by: EMERGENCY MEDICINE

## 2025-09-02 PROCEDURE — 84443 ASSAY THYROID STIM HORMONE: CPT

## 2025-09-02 PROCEDURE — 83690 ASSAY OF LIPASE: CPT

## 2025-09-02 PROCEDURE — 86140 C-REACTIVE PROTEIN: CPT

## 2025-09-02 PROCEDURE — 71045 X-RAY EXAM CHEST 1 VIEW: CPT

## 2025-09-02 PROCEDURE — 83735 ASSAY OF MAGNESIUM: CPT

## 2025-09-02 PROCEDURE — 93010 ELECTROCARDIOGRAM REPORT: CPT | Performed by: INTERNAL MEDICINE

## 2025-09-02 PROCEDURE — 80053 COMPREHEN METABOLIC PANEL: CPT

## 2025-09-02 PROCEDURE — 84100 ASSAY OF PHOSPHORUS: CPT

## 2025-09-02 PROCEDURE — 72125 CT NECK SPINE W/O DYE: CPT

## 2025-09-02 RX ORDER — 0.9 % SODIUM CHLORIDE 0.9 %
1000 INTRAVENOUS SOLUTION INTRAVENOUS ONCE
Status: COMPLETED | OUTPATIENT
Start: 2025-09-02 | End: 2025-09-02

## 2025-09-02 RX ADMIN — SODIUM CHLORIDE 1000 ML: 0.9 INJECTION, SOLUTION INTRAVENOUS at 12:19

## 2025-09-02 ASSESSMENT — PAIN - FUNCTIONAL ASSESSMENT: PAIN_FUNCTIONAL_ASSESSMENT: 0-10

## 2025-09-02 ASSESSMENT — PAIN SCALES - GENERAL
PAINLEVEL_OUTOF10: 0
PAINLEVEL_OUTOF10: 0

## 2025-09-02 ASSESSMENT — LIFESTYLE VARIABLES
HOW OFTEN DO YOU HAVE A DRINK CONTAINING ALCOHOL: NEVER
HOW MANY STANDARD DRINKS CONTAINING ALCOHOL DO YOU HAVE ON A TYPICAL DAY: PATIENT DOES NOT DRINK

## (undated) DEVICE — PRECISOR HOT DISPOSABLE HOT BIOPSY FORCEPS, 2.8 MM X 230 CM, OLYMPUS CORD: Brand: PRECISOR

## (undated) DEVICE — TUBE SET 96 MM 64 MM H2O PERISTALTIC STD AUX CHANNEL

## (undated) DEVICE — ENDO CARRY-ON PROCEDURE KIT: Brand: ENDO CARRY-ON PROCEDURE KIT

## (undated) DEVICE — TRAP POLYP BALEEN

## (undated) DEVICE — BRUSH ENDO CLN L90.5IN SHTH DIA1.7MM BRIST DIA5-7MM 2-6MM

## (undated) DEVICE — SINGLE PORT MANIFOLD: Brand: NEPTUNE 2

## (undated) DEVICE — Device: Brand: ENDO SMARTCAP

## (undated) DEVICE — TUBING, SUCTION, 1/4" X 10', STRAIGHT: Brand: MEDLINE

## (undated) DEVICE — SNARE ENDOSCP AD L240CM LOOP W10MM SHTH DIA2.4MM RND INSUL